# Patient Record
Sex: FEMALE | Race: WHITE | NOT HISPANIC OR LATINO | Employment: OTHER | URBAN - METROPOLITAN AREA
[De-identification: names, ages, dates, MRNs, and addresses within clinical notes are randomized per-mention and may not be internally consistent; named-entity substitution may affect disease eponyms.]

---

## 2017-03-27 DIAGNOSIS — I10 HYPERTENSION GOAL BP (BLOOD PRESSURE) < 140/90: ICD-10-CM

## 2017-03-27 NOTE — TELEPHONE ENCOUNTER
Lisinopril 5 mg tablet      Last Written Prescription Date: 03/14/16  Last Fill Quantity: 90, # refills: 3  Last Office Visit with G, P or [facility] prescribing provider: 09/16/16       Potassium   Date Value Ref Range Status   05/18/2016 4.6 3.4 - 5.3 mmol/L Final     Creatinine   Date Value Ref Range Status   05/18/2016 1.20 (H) 0.52 - 1.04 mg/dL Final     BP Readings from Last 3 Encounters:   09/16/16 134/74   03/14/16 114/76   01/13/16 138/76     Milly Sommer CPhT  On Behalf of [facility]

## 2017-03-28 RX ORDER — LISINOPRIL 5 MG/1
5 TABLET ORAL DAILY
Qty: 30 TABLET | Refills: 0 | Status: SHIPPED | OUTPATIENT
Start: 2017-03-28 | End: 2017-04-26

## 2017-03-28 NOTE — TELEPHONE ENCOUNTER
Medication is being filled for 1 time refill only due to:  Patient needs labs K and Cr.     Karen Saini, Pharm.D.  on behalf of [facility] [place]   [facility]   [email or web address]

## 2017-03-31 DIAGNOSIS — N18.30 CKD (CHRONIC KIDNEY DISEASE) STAGE 3, GFR 30-59 ML/MIN (H): ICD-10-CM

## 2017-03-31 DIAGNOSIS — I10 ESSENTIAL HYPERTENSION WITH GOAL BLOOD PRESSURE LESS THAN 140/90: ICD-10-CM

## 2017-03-31 LAB
ANION GAP SERPL CALCULATED.3IONS-SCNC: 6 MMOL/L (ref 3–14)
BUN SERPL-MCNC: 16 MG/DL (ref 7–30)
CALCIUM SERPL-MCNC: 9.2 MG/DL (ref 8.5–10.1)
CHLORIDE SERPL-SCNC: 105 MMOL/L (ref 94–109)
CO2 SERPL-SCNC: 30 MMOL/L (ref 20–32)
CREAT SERPL-MCNC: 1.11 MG/DL (ref 0.52–1.04)
GFR SERPL CREATININE-BSD FRML MDRD: 49 ML/MIN/1.7M2
GLUCOSE SERPL-MCNC: 75 MG/DL (ref 70–99)
POTASSIUM SERPL-SCNC: 4.4 MMOL/L (ref 3.4–5.3)
SODIUM SERPL-SCNC: 141 MMOL/L (ref 133–144)

## 2017-03-31 PROCEDURE — 36415 COLL VENOUS BLD VENIPUNCTURE: CPT | Performed by: NURSE PRACTITIONER

## 2017-03-31 PROCEDURE — 80048 BASIC METABOLIC PNL TOTAL CA: CPT | Performed by: NURSE PRACTITIONER

## 2017-03-31 NOTE — PROGRESS NOTES
Please send out result letter with the following note, thanks.     Kidney function and the rest of your labs are stable.    -Sapphire Merritt, CNP

## 2017-03-31 NOTE — LETTER
[facility]   [address] [place]   [address]  [phone]    April 3, 2017      Romayne L Sattameraigor  [address] [facility] [address]              Dear Ms. Roth,    Kidney function and the rest of your labs are stable.    Results for orders placed or performed in visit on 03/31/17   Basic metabolic panel   Result Value Ref Range    Sodium 141 133 - 144 mmol/L    Potassium 4.4 3.4 - 5.3 mmol/L    Chloride 105 94 - 109 mmol/L    Carbon Dioxide 30 20 - 32 mmol/L    Anion Gap 6 3 - 14 mmol/L    Glucose 75 70 - 99 mg/dL    Urea Nitrogen 16 7 - 30 mg/dL    Creatinine 1.11 (H) 0.52 - 1.04 mg/dL    GFR Estimate 49 (L) >60 mL/min/1.7m2    GFR Estimate If Black 59 (L) >60 mL/min/1.7m2    Calcium 9.2 8.5 - 10.1 mg/dL           Sincerely,    Sapphire Merritt, CNP/nr

## 2017-04-21 NOTE — PROGRESS NOTES
SUBJECTIVE:                                                    Romayne L Sathre is a 71 year old female who presents to clinic today for the following health issues:      Hyperlipidemia Follow-Up      Rate your low fat/cholesterol diet?: fair    Taking statin?  No, was having diarrhea from this    Other lipid medications/supplements?:  none     Hypertension Follow-up      Outpatient blood pressures are not being checked.    Low Salt Diet: low salt     Hypothyroidism Follow-up      Since last visit, patient describes the following symptoms: Weight stable, no hair loss, no skin changes, no constipation, no loose stools, loose stools and hair loss     Pt also notes what she believes to be a wart on her right thumb    History of hypertension, currently taking lisinopril without side effects.  Denies chest pain, palpitations, or shortness of breath.    HLD - pt started experiencing diarrhea after lipitor.  Had episode diarrhea immediately after taking pill.  Diarrhea did improve after stopping medication.  Notes ongoing intermittent loose stools.      Intermittent lose stools x 1-2 months.  Occurring 3-4 times a day.  Not liquid, loser than normal.  Associated urgency, sometimes fecal incontinence.  No blood in stool or rectal pain.  No abd pain, decreased appetite, weight loss, or nausea.  No change in diet.  Never had colonoscopy.      Hypothyroidism - taking synthroid consistently.      Breast cancer 2008 s/p right mastectomy and radiation.    Idiopathic leukopenia, status post extensive hematology workup including marrow biopsy not revealing.     Patient Active Problem List   Diagnosis     Hypothyroidism     Edema     Lipoma of other skin and subcutaneous tissue     Hypertension goal BP (blood pressure) < 140/90     CKD (chronic kidney disease) stage 3, GFR 30-59 ml/min     Hyperlipidemia LDL goal <100     Advanced directives, counseling/discussion     History of breast cancer     Leucopenia     Health Care Home      Memory deficit     Cellulitis     Maxillary sinusitis     Past Surgical History:   Procedure Laterality Date     HC REMV CATARACT EXTRACAP,INSERT LENS  7/20/2007     SURGICAL HISTORY OF -   Oct 3, 2008    Rt saline implant       Social History   Substance Use Topics     Smoking status: Passive Smoke Exposure - Never Smoker     Last attempt to quit: 11/19/1984     Smokeless tobacco: Never Used      Comment: family members smoke; daughter     Alcohol use 6.0 oz/week      Comment: 0-1 drink per month     Family History   Problem Relation Age of Onset     DIABETES Mother      dec     Respiratory Father      emphysema     DIABETES Sister      DIABETES Brother      Family History Negative Brother      x 2     Family History Negative Sister      x 2     Psychotic Disorder Brother      depression     KIDNEY DISEASE Sister      ESRD on dialysis, due to DM         Current Outpatient Prescriptions   Medication Sig Dispense Refill     lisinopril (PRINIVIL/ZESTRIL) 5 MG tablet Take 1 tablet (5 mg) by mouth daily 90 tablet 3     levothyroxine (SYNTHROID/LEVOTHROID) 200 MCG tablet Take 1 tablet (200 mcg) by mouth daily 90 tablet 3     order for DME Equipment being ordered: bilateral knee high compression, 20-30mmHg compression 1 Units 0     Multiple Vitamin (MULTI-VITAMIN) per tablet Take 1 tablet by mouth daily.         [DISCONTINUED] lisinopril (PRINIVIL/ZESTRIL) 5 MG tablet Take 1 tablet (5 mg) by mouth daily 30 tablet 0     atorvastatin (LIPITOR) 10 MG tablet Take 1 tablet (10 mg) by mouth daily (Patient not taking: Reported on 4/26/2017) 90 tablet 2     [DISCONTINUED] levothyroxine (SYNTHROID, LEVOTHROID) 200 MCG tablet Take 1 tablet (200 mcg) by mouth daily 90 tablet 3       ROS:  Const, CV, Resp, GI, Integ as above, otherwise negative       OBJECTIVE:                                                    /74 (BP Location: Right arm, Patient Position: Chair, Cuff Size: Adult Large)  Pulse 84  Temp 98.4  F (36.9  C)  (Tympanic)  Resp 16  Wt 245 lb (111.1 kg)  SpO2 97%  BMI 36.18 kg/m2   GENERAL APPEARANCE: healthy, alert and no distress  EYES: Eyes grossly normal to inspection and conjunctivae and sclerae normal  RESP: lungs clear to auscultation - no rales, rhonchi or wheezes  CV: regular rates and rhythm, normal S1 S2, no S3 or S4 and no murmur, click or rub  SKIN: pt has 5mm raised scaly lesion to base of right thumb with 1cm surrounding erythema  PSYCH: mentation appears normal and affect normal/bright          ASSESSMENT/PLAN:                                                    (R19.5) Change in stool  (primary encounter diagnosis)  Comment: loose stools x 1-2 months, no abd pain or red flag symptoms  Plan: GASTROENTEROLOGY ADULT REF PROCEDURE ONLY        Recommend colonoscopy to r/o colon cancer.  If negative will follow up to reevaluate.  Labs today     (L98.9) Skin lesion  Comment: concern for BCC  Plan: DERMATOLOGY REFERRAL        Follow up scheduled today with derm     (I10) Hypertension goal BP (blood pressure) < 140/90  Comment: controlled  Plan: Basic metabolic panel, lisinopril         (PRINIVIL/ZESTRIL) 5 MG tablet        No change    (N18.3) CKD (chronic kidney disease) stage 3, GFR 30-59 ml/min  Plan: CBC with platelets            (E78.5) Hyperlipidemia LDL goal <100  Comment: stopped lipitor, thought it was causing loose stools but did not improve with stopping med  Plan: Lipid panel reflex to direct LDL        restart lipitor     (E03.9) Acquired hypothyroidism  Plan: TSH with free T4 reflex, levothyroxine         (SYNTHROID/LEVOTHROID) 200 MCG tablet                See Patient Instructions    Sapphire Merritt, ANGEL  [facility]    Patient Instructions   1.  For change in stool habits I recommend we do a colonoscopy to rule out a serious cause like colon cancer.  If normal colonoscopy we should follow up to evaluate the diarrhea.    2.  Update labs today.  Restart cholesterol medication  (atorvastatin/lipitor), I dont think it is causing diarrhea.  Refilled your blood pressure and tyroid medication.    3.  See dermatologist, I am concerned the lesion on thumb could be skin cancer    4.  Update pneumonia shot today

## 2017-04-26 ENCOUNTER — OFFICE VISIT (OUTPATIENT)
Dept: FAMILY MEDICINE | Facility: CLINIC | Age: 72
End: 2017-04-26
Payer: COMMERCIAL

## 2017-04-26 VITALS
SYSTOLIC BLOOD PRESSURE: 122 MMHG | DIASTOLIC BLOOD PRESSURE: 74 MMHG | TEMPERATURE: 98.4 F | BODY MASS INDEX: 36.18 KG/M2 | OXYGEN SATURATION: 97 % | HEART RATE: 84 BPM | RESPIRATION RATE: 16 BRPM | WEIGHT: 245 LBS

## 2017-04-26 DIAGNOSIS — L98.9 SKIN LESION: ICD-10-CM

## 2017-04-26 DIAGNOSIS — E78.5 HYPERLIPIDEMIA LDL GOAL <100: ICD-10-CM

## 2017-04-26 DIAGNOSIS — N18.30 CKD (CHRONIC KIDNEY DISEASE) STAGE 3, GFR 30-59 ML/MIN (H): ICD-10-CM

## 2017-04-26 DIAGNOSIS — R19.5 CHANGE IN STOOL: Primary | ICD-10-CM

## 2017-04-26 DIAGNOSIS — Z23 NEED FOR PNEUMOCOCCAL VACCINATION: ICD-10-CM

## 2017-04-26 DIAGNOSIS — I10 HYPERTENSION GOAL BP (BLOOD PRESSURE) < 140/90: ICD-10-CM

## 2017-04-26 DIAGNOSIS — E03.9 ACQUIRED HYPOTHYROIDISM: ICD-10-CM

## 2017-04-26 LAB
ANION GAP SERPL CALCULATED.3IONS-SCNC: 7 MMOL/L (ref 3–14)
BUN SERPL-MCNC: 17 MG/DL (ref 7–30)
CALCIUM SERPL-MCNC: 9.3 MG/DL (ref 8.5–10.1)
CHLORIDE SERPL-SCNC: 104 MMOL/L (ref 94–109)
CHOLEST SERPL-MCNC: 236 MG/DL
CO2 SERPL-SCNC: 28 MMOL/L (ref 20–32)
CREAT SERPL-MCNC: 1.16 MG/DL (ref 0.52–1.04)
ERYTHROCYTE [DISTWIDTH] IN BLOOD BY AUTOMATED COUNT: 13.9 % (ref 10–15)
GFR SERPL CREATININE-BSD FRML MDRD: 46 ML/MIN/1.7M2
GLUCOSE SERPL-MCNC: 81 MG/DL (ref 70–99)
HCT VFR BLD AUTO: 43.6 % (ref 35–47)
HDLC SERPL-MCNC: 45 MG/DL
HGB BLD-MCNC: 14 G/DL (ref 11.7–15.7)
LDLC SERPL CALC-MCNC: 148 MG/DL
MCH RBC QN AUTO: 28 PG (ref 26.5–33)
MCHC RBC AUTO-ENTMCNC: 32.1 G/DL (ref 31.5–36.5)
MCV RBC AUTO: 87 FL (ref 78–100)
NONHDLC SERPL-MCNC: 191 MG/DL
PLATELET # BLD AUTO: 189 10E9/L (ref 150–450)
POTASSIUM SERPL-SCNC: 5 MMOL/L (ref 3.4–5.3)
RBC # BLD AUTO: 5 10E12/L (ref 3.8–5.2)
SODIUM SERPL-SCNC: 139 MMOL/L (ref 133–144)
TRIGL SERPL-MCNC: 214 MG/DL
TSH SERPL DL<=0.005 MIU/L-ACNC: 0.44 MU/L (ref 0.4–4)
WBC # BLD AUTO: 1.8 10E9/L (ref 4–11)

## 2017-04-26 PROCEDURE — 84443 ASSAY THYROID STIM HORMONE: CPT | Performed by: NURSE PRACTITIONER

## 2017-04-26 PROCEDURE — 99214 OFFICE O/P EST MOD 30 MIN: CPT | Mod: 25 | Performed by: NURSE PRACTITIONER

## 2017-04-26 PROCEDURE — 80048 BASIC METABOLIC PNL TOTAL CA: CPT | Performed by: NURSE PRACTITIONER

## 2017-04-26 PROCEDURE — 90670 PCV13 VACCINE IM: CPT | Performed by: NURSE PRACTITIONER

## 2017-04-26 PROCEDURE — 80061 LIPID PANEL: CPT | Performed by: NURSE PRACTITIONER

## 2017-04-26 PROCEDURE — 85027 COMPLETE CBC AUTOMATED: CPT | Performed by: NURSE PRACTITIONER

## 2017-04-26 PROCEDURE — 36415 COLL VENOUS BLD VENIPUNCTURE: CPT | Performed by: NURSE PRACTITIONER

## 2017-04-26 PROCEDURE — G0009 ADMIN PNEUMOCOCCAL VACCINE: HCPCS | Performed by: NURSE PRACTITIONER

## 2017-04-26 RX ORDER — LEVOTHYROXINE SODIUM 200 UG/1
200 TABLET ORAL DAILY
Qty: 90 TABLET | Refills: 3 | Status: SHIPPED | OUTPATIENT
Start: 2017-04-26 | End: 2018-05-09

## 2017-04-26 RX ORDER — LISINOPRIL 5 MG/1
5 TABLET ORAL DAILY
Qty: 90 TABLET | Refills: 3 | Status: SHIPPED | OUTPATIENT
Start: 2017-04-26 | End: 2018-04-25

## 2017-04-26 NOTE — PATIENT INSTRUCTIONS
1.  For change in stool habits I recommend we do a colonoscopy to rule out a serious cause like colon cancer.  If normal colonoscopy we should follow up to evaluate the diarrhea.    2.  Update labs today.  Restart cholesterol medication (atorvastatin/lipitor), I dont think it is causing diarrhea.  Refilled your blood pressure and tyroid medication.    3.  See dermatologist, I am concerned the lesion on thumb could be skin cancer    4.  Update pneumonia shot today

## 2017-04-26 NOTE — PROGRESS NOTES
Please send out result letter with the following note, thanks.    Romayne,    Labs are stable.    Thyroid is normal.    Your blood sugar was normal indicating you do not have diabetes.    Kidney function is stable.    White blood count continues to be low which has been an ongoing issue, it is stable.    -Sapphire Merritt, CNP

## 2017-04-26 NOTE — LETTER
[facility]   [address] [place]   [address]  [phone]    April 27, 2017      Romayne L Sathre  [address] [facility] [address]              Dear Ms. Roth,    Labs are stable.    Thyroid is normal.    Your blood sugar was normal indicating you do not have diabetes.    Kidney function is stable.    White blood count continues to be low which has been an ongoing issue, it is stable.    Results for orders placed or performed in visit on 04/26/17   CBC with platelets   Result Value Ref Range    WBC 1.8 (L) 4.0 - 11.0 10e9/L    RBC Count 5.00 3.8 - 5.2 10e12/L    Hemoglobin 14.0 11.7 - 15.7 g/dL    Hematocrit 43.6 35.0 - 47.0 %    MCV 87 78 - 100 fl    MCH 28.0 26.5 - 33.0 pg    MCHC 32.1 31.5 - 36.5 g/dL    RDW 13.9 10.0 - 15.0 %    Platelet Count 189 150 - 450 10e9/L   Basic metabolic panel   Result Value Ref Range    Sodium 139 133 - 144 mmol/L    Potassium 5.0 3.4 - 5.3 mmol/L    Chloride 104 94 - 109 mmol/L    Carbon Dioxide 28 20 - 32 mmol/L    Anion Gap 7 3 - 14 mmol/L    Glucose 81 70 - 99 mg/dL    Urea Nitrogen 17 7 - 30 mg/dL    Creatinine 1.16 (H) 0.52 - 1.04 mg/dL    GFR Estimate 46 (L) >60 mL/min/1.7m2    GFR Estimate If Black 56 (L) >60 mL/min/1.7m2    Calcium 9.3 8.5 - 10.1 mg/dL   TSH with free T4 reflex   Result Value Ref Range    TSH 0.44 0.40 - 4.00 mU/L   Lipid panel reflex to direct LDL   Result Value Ref Range    Cholesterol 236 (H) <200 mg/dL    Triglycerides 214 (H) <150 mg/dL    HDL Cholesterol 45 (L) >49 mg/dL    LDL Cholesterol Calculated 148 (H) <100 mg/dL    Non HDL Cholesterol 191 (H) <130 mg/dL           Sincerely,    Sapphire Merritt, ANGEL/nr

## 2017-12-20 ENCOUNTER — OFFICE VISIT (OUTPATIENT)
Dept: FAMILY MEDICINE | Facility: CLINIC | Age: 72
End: 2017-12-20
Payer: COMMERCIAL

## 2017-12-20 VITALS
WEIGHT: 234 LBS | SYSTOLIC BLOOD PRESSURE: 170 MMHG | TEMPERATURE: 97.6 F | RESPIRATION RATE: 18 BRPM | DIASTOLIC BLOOD PRESSURE: 98 MMHG | OXYGEN SATURATION: 98 % | BODY MASS INDEX: 34.56 KG/M2 | HEART RATE: 68 BPM

## 2017-12-20 DIAGNOSIS — M54.50 ACUTE RIGHT-SIDED LOW BACK PAIN WITHOUT SCIATICA: Primary | ICD-10-CM

## 2017-12-20 DIAGNOSIS — B34.9 NONSPECIFIC SYNDROME SUGGESTIVE OF VIRAL ILLNESS: ICD-10-CM

## 2017-12-20 LAB
ALBUMIN UR-MCNC: NEGATIVE MG/DL
APPEARANCE UR: CLEAR
BACTERIA #/AREA URNS HPF: ABNORMAL /HPF
BILIRUB UR QL STRIP: NEGATIVE
COLOR UR AUTO: YELLOW
GLUCOSE UR STRIP-MCNC: NEGATIVE MG/DL
HGB UR QL STRIP: ABNORMAL
KETONES UR STRIP-MCNC: NEGATIVE MG/DL
LEUKOCYTE ESTERASE UR QL STRIP: NEGATIVE
MUCOUS THREADS #/AREA URNS LPF: PRESENT /LPF
NITRATE UR QL: NEGATIVE
NON-SQ EPI CELLS #/AREA URNS LPF: ABNORMAL /LPF
PH UR STRIP: 5.5 PH (ref 5–7)
RBC #/AREA URNS AUTO: ABNORMAL /HPF
SOURCE: ABNORMAL
SP GR UR STRIP: 1.02 (ref 1–1.03)
UROBILINOGEN UR STRIP-ACNC: 0.2 EU/DL (ref 0.2–1)
WBC #/AREA URNS AUTO: ABNORMAL /HPF

## 2017-12-20 PROCEDURE — 81001 URINALYSIS AUTO W/SCOPE: CPT | Performed by: PHYSICIAN ASSISTANT

## 2017-12-20 PROCEDURE — 99214 OFFICE O/P EST MOD 30 MIN: CPT | Performed by: PHYSICIAN ASSISTANT

## 2017-12-20 RX ORDER — CYCLOBENZAPRINE HCL 5 MG
5 TABLET ORAL 3 TIMES DAILY PRN
Qty: 16 TABLET | Refills: 0 | Status: SHIPPED | OUTPATIENT
Start: 2017-12-20 | End: 2018-05-09

## 2017-12-20 ASSESSMENT — ENCOUNTER SYMPTOMS
FEVER: 0
VOMITING: 0
HEMATURIA: 0
FREQUENCY: 0
BACK PAIN: 1
DIARRHEA: 0
DYSURIA: 0
NAUSEA: 0
SHORTNESS OF BREATH: 0
ABDOMINAL PAIN: 0
FOCAL WEAKNESS: 0
SORE THROAT: 0
COUGH: 0
CHILLS: 1
MYALGIAS: 1

## 2017-12-20 NOTE — NURSING NOTE
"Chief Complaint   Patient presents with     URI       Initial BP (!) 170/98  Pulse 68  Temp 97.6  F (36.4  C) (Oral)  Resp 18  Wt 234 lb (106.1 kg)  SpO2 98%  Breastfeeding? No  BMI 34.56 kg/m2 Estimated body mass index is 34.56 kg/(m^2) as calculated from the following:    Height as of 9/16/16: 5' 9\" (1.753 m).    Weight as of this encounter: 234 lb (106.1 kg).  Medication Reconciliation: complete     Deepa Noonan MA      "

## 2017-12-20 NOTE — PROGRESS NOTES
HPI    SUBJECTIVE:   Romayne L Sathre is a 72 year old female who presents to clinic today for the following health issues:    RESPIRATORY SYMPTOMS      Duration: 3 days    Description  Myalgias, chills, R low back pain    Severity: moderate    Accompanying signs and symptoms: none    History (predisposing factors):  none    Precipitating or alleviating factors: None    Therapies tried and outcome:  Ibuprofen, heat for back pain    Denies rigors, urinary sx, CP, SOB, sore throat, cough, congestion, ear pain, dental pain.  Back pain is constant, does not radiate. Is worse with sitting. No injury or trauma. Denies bowel/bladder incontinence, saddle anesthesia, or focal weakness.    Chart Review:  No flowsheet data found.  No flowsheet data found.    Patient Active Problem List   Diagnosis     Hypothyroidism     Edema     Lipoma of other skin and subcutaneous tissue     Hypertension goal BP (blood pressure) < 140/90     CKD (chronic kidney disease) stage 3, GFR 30-59 ml/min     Hyperlipidemia LDL goal <100     Advanced directives, counseling/discussion     History of breast cancer     Leucopenia     Health Care Home     Memory deficit     Cellulitis     Maxillary sinusitis     Past Surgical History:   Procedure Laterality Date     HC REMV CATARACT EXTRACAP,INSERT LENS  7/20/2007     SURGICAL HISTORY OF -   Oct 3, 2008    Rt saline implant     Family History   Problem Relation Age of Onset     DIABETES Mother      dec     Respiratory Father      emphysema     DIABETES Sister      DIABETES Brother      Family History Negative Brother      x 2     Family History Negative Sister      x 2     Psychotic Disorder Brother      depression     KIDNEY DISEASE Sister      ESRD on dialysis, due to DM      Social History   Substance Use Topics     Smoking status: Passive Smoke Exposure - Never Smoker     Last attempt to quit: 11/19/1984     Smokeless tobacco: Never Used      Comment: family members smoke; daughter     Alcohol use  6.0 oz/week      Comment: 0-1 drink per month        Problem list, Medication list, Allergies, Medical/Social/Surg hx reviewed in [facility], updated as appropriate.      Review of Systems   Constitutional: Positive for chills. Negative for fever.   HENT: Negative for congestion and sore throat.    Respiratory: Negative for cough and shortness of breath.    Cardiovascular: Negative for chest pain.   Gastrointestinal: Negative for abdominal pain, diarrhea, nausea and vomiting.   Genitourinary: Negative for dysuria, frequency, hematuria and urgency.   Musculoskeletal: Positive for back pain and myalgias.   Neurological: Negative for focal weakness.        No bowel or bladder incontinence. No saddle anesthesia   All other systems reviewed and are negative.        Physical Exam   Constitutional: She is oriented to person, place, and time and well-developed, well-nourished, and in no distress.   HENT:   Head: Normocephalic and atraumatic.   Right Ear: Tympanic membrane, external ear and ear canal normal.   Left Ear: Tympanic membrane, external ear and ear canal normal.   Mouth/Throat: Uvula is midline, oropharynx is clear and moist and mucous membranes are normal.   Cardiovascular: Normal rate and regular rhythm.    Pulses:       Dorsalis pedis pulses are 2+ on the right side, and 2+ on the left side.   Pulmonary/Chest: Effort normal and breath sounds normal.   Musculoskeletal:        Thoracic back: Normal.        Lumbar back: She exhibits tenderness and pain. She exhibits no bony tenderness.        Back:    Dorsiflexion intact bilaterally. Negative SLR. Patient ambulatory.   Neurological: She is oriented to person, place, and time. She displays no weakness. Gait normal.   Strength 5/5 BLE   Nursing note and vitals reviewed.    Vital Signs  BP (!) 170/98  Pulse 68  Temp 97.6  F (36.4  C) (Oral)  Resp 18  Wt 234 lb (106.1 kg)  SpO2 98%  Breastfeeding? No  BMI 34.56 kg/m2   Body mass index is 34.56 kg/(m^2).    Diagnostic  Test Results:  Results for orders placed or performed in visit on 12/20/17 (from the past 24 hour(s))   UA reflex to Microscopic and Culture   Result Value Ref Range    Color Urine Yellow     Appearance Urine Clear     Glucose Urine Negative NEG^Negative mg/dL    Bilirubin Urine Negative NEG^Negative    Ketones Urine Negative NEG^Negative mg/dL    Specific Gravity Urine 1.020 1.003 - 1.035    Blood Urine Small (A) NEG^Negative    pH Urine 5.5 5.0 - 7.0 pH    Protein Albumin Urine Negative NEG^Negative mg/dL    Urobilinogen Urine 0.2 0.2 - 1.0 EU/dL    Nitrite Urine Negative NEG^Negative    Leukocyte Esterase Urine Negative NEG^Negative    Source Midstream Urine    Urine Microscopic   Result Value Ref Range    WBC Urine 2-5 (A) OTO2^O - 2 /HPF    RBC Urine O - 2 OTO2^O - 2 /HPF    Squamous Epithelial /LPF Urine Few FEW^Few /LPF    Bacteria Urine Few (A) NEG^Negative /HPF    Mucous Urine Present (A) NEG^Negative /LPF       ASSESSMENT/PLAN:                                                        ICD-10-CM    1. Acute right-sided low back pain without sciatica M54.5 UA reflex to Microscopic and Culture     Urine Microscopic     cyclobenzaprine (FLEXERIL) 5 MG tablet   2. Nonspecific syndrome suggestive of viral illness B34.9      No midline tenderness or s/sx cauda equina. UA negative. Suspect muscle spasm, Rx Flexeril.    Lungs CTAB, no focal sign of infection. Likely nonspecific viral syndrome, return precautions given.    I have discussed any lab or imaging results, the patient's diagnosis, and my plan of treatment with the patient and/or family. Patient is aware to come back in if with worsening symptoms or if no relief despite treatment plan.  Patient voiced understanding and had no further questions.       Follow Up: Data Unavailable    VIRGINIA OlivaresA, PA-C  [facility]

## 2017-12-20 NOTE — PROGRESS NOTES
"  SUBJECTIVE:   Romayne L Sathre is a 72 year old female who presents to clinic today for the following health issues:    RESPIRATORY SYMPTOMS      Duration: x sunday    Description  \"flu like symptoms\", chills, achy, cold    Severity: moderate, back pain/pinched nerve 10/10    Accompanying signs and symptoms: lower back pain/pinched nerve    History (predisposing factors):  exposure to smoke    Precipitating or alleviating factors: None    Therapies tried and outcome:  Ibuprofen         Problem list and histories reviewed & adjusted, as indicated.  Additional history: {NONE - AS DOCUMENTED:045709::\"as documented\"}    {HIST REVIEW/ LINKS 2:763483}    Reviewed and updated as needed this visit by clinical staff  Tobacco  Allergies  Meds  Med Hx  Surg Hx  Fam Hx  Soc Hx      Reviewed and updated as needed this visit by Provider         {PROVIDER CHARTING PREFERENCE:001981}    "

## 2017-12-20 NOTE — MR AVS SNAPSHOT
"              After Visit Summary   2017    Romayne L Sathre    MRN: [id number]           Patient Information     Date Of Birth          1945        Visit Information        Provider Department      2017 2:20 PM Radha Benjamin PA-C [facility]        Today's Diagnoses     Acute right-sided low back pain without sciatica    -  1    Nonspecific syndrome suggestive of viral illness           Follow-ups after your visit        Who to contact     If you have questions or need follow up information about today's clinic visit or your schedule please contact [facility] directly at [phone].  Normal or non-critical lab and imaging results will be communicated to you by MyChart, letter or phone within 4 business days after the clinic has received the results. If you do not hear from us within 7 days, please contact the clinic through MyChart or phone. If you have a critical or abnormal lab result, we will notify you by phone as soon as possible.  Submit refill requests through [employer] or call your pharmacy and they will forward the refill request to us. Please allow 3 business days for your refill to be completed.          Additional Information About Your Visit        MyChart Information     [employer] lets you send messages to your doctor, view your test results, renew your prescriptions, schedule appointments and more. To sign up, go to [email or web address] . Click on \"Log in\" on the left side of the screen, which will take you to the Welcome page. Then click on \"Sign up Now\" on the right side of the page.     You will be asked to enter the access code listed below, as well as some personal information. Please follow the directions to create your username and password.     Your access code is: [email or web address]  Expires: 3/20/2018  3:08 PM     Your access code will  in 90 days. If you need help or a new code, please call your [place] clinic or [phone].      "   Care EveryWhere ID     This is your Care EveryWhere ID. This could be used by other organizations to access your [place] medical records  [id number]        Your Vitals Were     Pulse Temperature Respirations Pulse Oximetry Breastfeeding? BMI (Body Mass Index)    68 97.6  F (36.4  C) (Oral) 18 98% No 34.56 kg/m2       Blood Pressure from Last 3 Encounters:   12/20/17 (!) 170/98   04/26/17 122/74   09/16/16 134/74    Weight from Last 3 Encounters:   12/20/17 234 lb (106.1 kg)   04/26/17 245 lb (111.1 kg)   09/16/16 238 lb (108 kg)              We Performed the Following     UA reflex to Microscopic and Culture     Urine Microscopic          Today's Medication Changes          These changes are accurate as of: 12/20/17  3:08 PM.  If you have any questions, ask your nurse or doctor.               Start taking these medicines.        Dose/Directions    cyclobenzaprine 5 MG tablet   Commonly known as:  FLEXERIL   Used for:  Acute right-sided low back pain without sciatica   Started by:  Radha Benjamin PA-C        Dose:  5 mg   Take 1 tablet (5 mg) by mouth 3 times daily as needed for muscle spasms   Quantity:  16 tablet   Refills:  0            Where to get your medicines      Call your pharmacy to confirm that your medication is ready for pickup. It may take up to 24 hours for them to receive the prescription. If the prescription is not ready within 3 business days, please contact your clinic or your provider.     We will let you know when these medications are ready. If you don't hear back within 3 business days, please contact us.     cyclobenzaprine 5 MG tablet                Primary Care Provider Office Phone # Fax #    SapphirePHOENIX Yarbrough [phone] [address] [facility] [address]        Equal Access to Services     JU SAMANIEGO AH: Bhupinder Gonzales, sada louis, amando olmstead. So [facility]  [phone].    ATENCIÓN: Si griselda shaw, tiene a gann disposición servicios gratuitos de asistencia lingüística. Josh beal [phone].    We comply with applicable federal civil rights laws and [place] laws. We do not discriminate on the basis of race, color, national origin, age, disability, sex, sexual orientation, or gender identity.            Thank you!     Thank you for choosing [facility]  for your care. Our goal is always to provide you with excellent care. Hearing back from our patients is one way we can continue to improve our services. Please take a few minutes to complete the written survey that you may receive in the mail after your visit with us. Thank you!             Your Updated Medication List - Protect others around you: Learn how to safely use, store and throw away your medicines at www.disposemymeds.org.          This list is accurate as of: 12/20/17  3:08 PM.  Always use your most recent med list.                   Brand Name Dispense Instructions for use Diagnosis    atorvastatin 10 MG tablet    LIPITOR    90 tablet    Take 1 tablet (10 mg) by mouth daily    Hyperlipidemia LDL goal <100       cyclobenzaprine 5 MG tablet    FLEXERIL    16 tablet    Take 1 tablet (5 mg) by mouth 3 times daily as needed for muscle spasms    Acute right-sided low back pain without sciatica       levothyroxine 200 MCG tablet    SYNTHROID/LEVOTHROID    90 tablet    Take 1 tablet (200 mcg) by mouth daily    Acquired hypothyroidism       lisinopril 5 MG tablet    PRINIVIL/ZESTRIL    90 tablet    Take 1 tablet (5 mg) by mouth daily    Hypertension goal BP (blood pressure) < 140/90       Multi-vitamin Tabs tablet   Generic drug:  multivitamin, therapeutic with minerals      Take 1 tablet by mouth daily.

## 2018-04-25 DIAGNOSIS — I10 HYPERTENSION GOAL BP (BLOOD PRESSURE) < 140/90: ICD-10-CM

## 2018-04-25 RX ORDER — LISINOPRIL 5 MG/1
5 TABLET ORAL DAILY
Qty: 30 TABLET | Refills: 0 | Status: SHIPPED | OUTPATIENT
Start: 2018-04-25 | End: 2019-04-05

## 2018-04-25 NOTE — TELEPHONE ENCOUNTER
Routing refill request to provider for review/approval because:  Labs out of range:  Creatinine  -BP elevated  Labs overdue: Creatinine, potassium, sodium    Sapphire-Please sign if agree.  One month supply pended.    Team cordinators-Please contact patient to schedule BP follow up visit.    Thank you!  ANDREA Partida, RN          Last Written Prescription Date:  4/26/17  Last Fill Quantity: 90,  # refills: 3   Last office visit: 12/20/2017 with prescribing provider:  CHELLE Benjamin   Future Office Visit:        BP Readings from Last 3 Encounters:   12/20/17 (!) 170/98   04/26/17 122/74   09/16/16 134/74     Creatinine   Date Value Ref Range Status   04/26/2017 1.16 (H) 0.52 - 1.04 mg/dL Final   ]  Potassium   Date Value Ref Range Status   04/26/2017 5.0 3.4 - 5.3 mmol/L Final   ]    Sodium 139 on 4/26/17.

## 2018-04-25 NOTE — TELEPHONE ENCOUNTER
"Requested Prescriptions   Pending Prescriptions Disp Refills     lisinopril (PRINIVIL/ZESTRIL) 5 MG tablet  Last Written Prescription Date:  4/26/217  Last Fill Quantity: 90 tablet,  # refills: 3   Last Office Visit: 12/20/2017   Future Office Visit:      90 tablet 3     Sig: Take 1 tablet (5 mg) by mouth daily    ACE Inhibitors (Including Combos) Protocol Failed    4/25/2018  2:50 PM       Failed - Blood pressure under 140/90 in past 12 months    BP Readings from Last 3 Encounters:   12/20/17 (!) 170/98   04/26/17 122/74   09/16/16 134/74          Failed - Normal serum creatinine on file in past 12 months    Recent Labs   Lab Test  04/26/17   0902   CR  1.16*          Passed - Recent (12 mo) or future (30 days) visit within the authorizing provider's specialty    Patient had office visit in the last 12 months or has a visit in the next 30 days with authorizing provider or within the authorizing provider's specialty.  See \"Patient Info\" tab in inbasket, or \"Choose Columns\" in Meds & Orders section of the refill encounter.           Passed - Patient is age 18 or older       Passed - No active pregnancy on record       Passed - Normal serum potassium on file in past 12 months    Recent Labs   Lab Test  04/26/17   0902   POTASSIUM  5.0          Passed - No positive pregnancy test in past 12 months          "

## 2018-04-25 NOTE — TELEPHONE ENCOUNTER
Reason for Call:  Medication or medication refill:    Do you use a [facility]?  Name of the pharmacy and phone number for the current request:  [facility] [place], [place] - [address]    Name of the medication requested: lisinopril (PRINIVIL/ZESTRIL) 5 MG tablet    Other request: Patient is requesting a refill on the medication above. States she is out. Please assist. Thanks!    Can we leave a detailed message on this number? YES    Phone number patient can be reached at: Home number on file [phone] (home)    Best Time: Any    Call taken on 4/25/2018 at 2:44 PM by Viridiana Walden

## 2018-04-25 NOTE — TELEPHONE ENCOUNTER
"Requested Prescriptions   Pending Prescriptions Disp Refills     lisinopril (PRINIVIL/ZESTRIL) 5 MG tablet [Pharmacy Med Name: LISINOPRIL 5MG TABS]  Last Written Prescription Date:  4/26/2017  Last Fill Quantity: 90 tablet,  # refills: 3   Last Office Visit: 12/20/2017   Future Office Visit:      90 tablet 3     Sig: TAKE ONE TABLET BY MOUTH EVERY DAY    ACE Inhibitors (Including Combos) Protocol Failed    4/25/2018  3:00 PM       Failed - Blood pressure under 140/90 in past 12 months    BP Readings from Last 3 Encounters:   12/20/17 (!) 170/98   04/26/17 122/74   09/16/16 134/74          Failed - Normal serum creatinine on file in past 12 months    Recent Labs   Lab Test  04/26/17   0902   CR  1.16*          Passed - Recent (12 mo) or future (30 days) visit within the authorizing provider's specialty    Patient had office visit in the last 12 months or has a visit in the next 30 days with authorizing provider or within the authorizing provider's specialty.  See \"Patient Info\" tab in inbasket, or \"Choose Columns\" in Meds & Orders section of the refill encounter.           Passed - Patient is age 18 or older       Passed - No active pregnancy on record       Passed - Normal serum potassium on file in past 12 months    Recent Labs   Lab Test  04/26/17   0902   POTASSIUM  5.0          Passed - No positive pregnancy test in past 12 months          "

## 2018-04-26 RX ORDER — LISINOPRIL 5 MG/1
TABLET ORAL
Qty: 30 TABLET | Refills: 0 | Status: SHIPPED | OUTPATIENT
Start: 2018-04-26 | End: 2018-05-09

## 2018-04-26 NOTE — TELEPHONE ENCOUNTER
Blood pressure above range  Creatinine above range  Patient due for annual visit with PCP.  1 month supply pended.  Camille Alvarez RN

## 2018-04-26 NOTE — TELEPHONE ENCOUNTER
Please call pt, due for annual physical for additional refills.  Refilled x 1mo.    Sapphire Glass, CNP

## 2018-04-26 NOTE — TELEPHONE ENCOUNTER
Spoke with patient made appt also schedule her mammogram and let detailed message on her voice mail

## 2018-05-01 ENCOUNTER — RADIANT APPOINTMENT (OUTPATIENT)
Dept: MAMMOGRAPHY | Facility: CLINIC | Age: 73
End: 2018-05-01
Attending: NURSE PRACTITIONER
Payer: COMMERCIAL

## 2018-05-01 DIAGNOSIS — Z12.31 VISIT FOR SCREENING MAMMOGRAM: ICD-10-CM

## 2018-05-01 PROCEDURE — 77067 SCR MAMMO BI INCL CAD: CPT | Mod: 52

## 2018-05-07 NOTE — PROGRESS NOTES
SUBJECTIVE:   Romayne L Sathre is a 72 year old female who presents to clinic today for the following health issues:      Medication Followup of Lisinopril, levothyroxine    Taking Medication as prescribed: yes    Side Effects:  None    Medication Helping Symptoms:  yes     Here today for medication refills, also several new issues she would like to address:    Change in bowel habits:  Loose stools ongoing 3 months.  Has to be close to bathroom after breakfast.  stooling 3 times a day, stool is lose, not liquid.  Occasionally some blood with wiping.  No rectal pain or itching.  No abdominal pain.  Appetite is good, no unintentional weight loss.  No recent travel, no fevers.  Did start tumeric supplement 1.5 months ago. Wondering if related to teeth, poor dentition, hard to chew.    No recent diet changes.  No family history of colon cancer.      We actually addressed similar problem last year, she was advised to do colonoscopy which she did not do, reports symptoms had resolved but now back again.      Left hand paresthesias:  Numbness to left fingertips (thumb and index finger) x 1-2 months.  Tingling comes and goes.  It does not wake her up at night.  No weakness of hand.  Overuse like shoveling does trigger wrist pain.    She wonders if she had a stroke.  Felt weak, dizzy, and out of sorts 4 months ago.  Did not have numbness at that time.  No balance changes.  She does note occasional loss of balance which will cause her to reach out to stabilize herself but this is not new.  No difficulty speaking or swallowing.  No vision changes.      New right hand tremor:  Tremor to right hand, sister told her to take tumeric, present x 3 months.  Constant.  Tremor sometimes interferes with writing, no problems eating or doing other activities.  Is not worse with goal directed activity.  No known family hx of tremor.  Tremor not worsening over 4 months.  Tremor amplitude is variable.      Chronic disease follow up:  HTN-  controlled on lisinopril 5mg daily    Hypothyroidism- controlled on levothyroxine 200mcg daily    On statin.    Breast cancer 2008 s/p right mastectomy and radiation.  Normal mammo several days ago.       Idiopathic leukopenia, status post extensive hematology workup including marrow biopsy not revealing.    Patient Active Problem List   Diagnosis     Hypothyroidism     Edema     Lipoma of other skin and subcutaneous tissue     Hypertension goal BP (blood pressure) < 140/90     CKD (chronic kidney disease) stage 3, GFR 30-59 ml/min     Hyperlipidemia LDL goal <100     Advanced directives, counseling/discussion     History of breast cancer     Leucopenia     Health Care Home     Memory deficit     Cellulitis     Maxillary sinusitis     Past Surgical History:   Procedure Laterality Date     HC REMV CATARACT EXTRACAP,INSERT LENS  7/20/2007     SURGICAL HISTORY OF -   Oct 3, 2008    Rt saline implant       Social History   Substance Use Topics     Smoking status: Passive Smoke Exposure - Never Smoker     Last attempt to quit: 11/19/1984     Smokeless tobacco: Never Used      Comment: family members smoke; daughter     Alcohol use 6.0 oz/week      Comment: 0-1 drink per month     Family History   Problem Relation Age of Onset     DIABETES Mother      dec     Respiratory Father      emphysema     DIABETES Sister      DIABETES Brother      Family History Negative Brother      x 2     Family History Negative Sister      x 2     Psychotic Disorder Brother      depression     KIDNEY DISEASE Sister      ESRD on dialysis, due to DM         Current Outpatient Prescriptions   Medication Sig Dispense Refill     atorvastatin (LIPITOR) 10 MG tablet Take 1 tablet (10 mg) by mouth daily 90 tablet 3     levothyroxine (SYNTHROID/LEVOTHROID) 200 MCG tablet Take 1 tablet (200 mcg) by mouth daily 90 tablet 3     lisinopril (PRINIVIL/ZESTRIL) 5 MG tablet Take 1 tablet (5 mg) by mouth daily 30 tablet 0     lisinopril (PRINIVIL/ZESTRIL) 5 MG  "tablet Take 1 tablet (5 mg) by mouth daily 90 tablet 3     Multiple Vitamin (MULTI-VITAMIN) per tablet Take 1 tablet by mouth daily.         order for DME Equipment being ordered: left wrist brace 1 Units 0     UNABLE TO FIND MEDICATION NAME: Tumeric supplement       [DISCONTINUED] atorvastatin (LIPITOR) 10 MG tablet Take 1 tablet (10 mg) by mouth daily 90 tablet 2     [DISCONTINUED] levothyroxine (SYNTHROID/LEVOTHROID) 200 MCG tablet Take 1 tablet (200 mcg) by mouth daily 90 tablet 3     [DISCONTINUED] lisinopril (PRINIVIL/ZESTRIL) 5 MG tablet TAKE ONE TABLET BY MOUTH EVERY DAY 30 tablet 0       ROS:  Const: as above   R: NEGATIVE for significant cough or SOB  CV: NEGATIVE for chest pain, palpitations or peripheral edema  GI: as above   NEURO: as above           OBJECTIVE:                                                    /84  Pulse 80  Temp 97.2  F (36.2  C) (Oral)  Resp 16  Ht 5' 9\" (1.753 m)  Wt 245 lb (111.1 kg)  SpO2 98%  Breastfeeding? No  BMI 36.18 kg/m2   GENERAL APPEARANCE: healthy, alert and no distress  EYES: Eyes grossly normal to inspection, PERRL and conjunctivae and sclerae normal  CV: regular rates and rhythm, normal S1 S2, no S3 or S4 and no murmur, click or rub   RESP: lungs clear to auscultation - no rales, rhonchi or wheezes  ORTHO: LEFT Wrist Exam: WRIST:  Inspection: no swelling  no effusion  Palpation: Tender: none  Non-tender:   Range of Motion: normal  Strength: no deficits  Special tests: negative Tinel's at carpal tunnel.  , negative Phalen's.      NEURO: Normal strength and tone, mentation intact, speech normal and cranial nerves 2-12 intact.  Slight tremor noted to right hand at rest.    PSYCH: mentation appears normal and affect normal/bright          ASSESSMENT/PLAN:                                                    (I10) Hypertension goal BP (blood pressure) < 140/90  (primary encounter diagnosis)  Comment: controlled  Plan: Basic metabolic panel, lisinopril         " (PRINIVIL/ZESTRIL) 5 MG tablet        Refilled, update labs     (R19.4) Change in bowel habits  Comment: loose stools x 3 months, no triggering medication or diet change.  Given age consider malignancy (no red flag symptoms).  No symptoms concerning for infectious etiology.    Plan: GASTROENTEROLOGY ADULT REF PROCEDURE ONLY        Recommend diagnostic colo, she will call to schedule    (G56.92) Neuropathy of left hand  Comment: suspect carpal tunnel  Plan: order for DME        Discussed wrist brace, tylenol and ice as needed    (R25.1) Tremor of right hand  Comment: mild tremor right hand x 3 months, not worsening, no other focal neuro symptoms, no gait disturbance, however unilateral presentation concerning for possible parkinsons  Plan: follow up scheduled today with neuro    (N18.3) CKD (chronic kidney disease) stage 3, GFR 30-59 ml/min  Comment:   Plan: Basic metabolic panel, Albumin Random Urine         Quantitative with Creat Ratio            (E03.9) Acquired hypothyroidism  Comment:   Plan: TSH with free T4 reflex, levothyroxine         (SYNTHROID/LEVOTHROID) 200 MCG tablet        refilled    (E78.5) Hyperlipidemia LDL goal <100  Comment:   Plan: Lipid panel reflex to direct LDL Fasting,         atorvastatin (LIPITOR) 10 MG tablet        refilled    (D70.9) Neutropenia, unspecified type (H)  Comment:   Plan: CBC with platelets and differential                  See Patient Instructions    Sapphire Glass, [facility]    Patient Instructions   1.  Update labs today, refilled lisinopril, lipitor, and levothyroxine    2.  For loose stools I recommend colonoscopy to rule out colon cancer.  Please call [phone] to schedule    3.  For tremor schedule with neurology here    4.  Mammogram was clear    5.  Left finger numbness/tingling might be related to carpal tunnel, try to wear wrist brace at night and with repetitive activity

## 2018-05-09 ENCOUNTER — OFFICE VISIT (OUTPATIENT)
Dept: FAMILY MEDICINE | Facility: CLINIC | Age: 73
End: 2018-05-09
Payer: COMMERCIAL

## 2018-05-09 VITALS
WEIGHT: 245 LBS | TEMPERATURE: 97.2 F | DIASTOLIC BLOOD PRESSURE: 84 MMHG | SYSTOLIC BLOOD PRESSURE: 130 MMHG | BODY MASS INDEX: 36.29 KG/M2 | HEART RATE: 80 BPM | OXYGEN SATURATION: 98 % | RESPIRATION RATE: 16 BRPM | HEIGHT: 69 IN

## 2018-05-09 DIAGNOSIS — E03.9 ACQUIRED HYPOTHYROIDISM: ICD-10-CM

## 2018-05-09 DIAGNOSIS — E78.5 HYPERLIPIDEMIA LDL GOAL <100: ICD-10-CM

## 2018-05-09 DIAGNOSIS — R19.4 CHANGE IN BOWEL HABITS: ICD-10-CM

## 2018-05-09 DIAGNOSIS — N18.30 CKD (CHRONIC KIDNEY DISEASE) STAGE 3, GFR 30-59 ML/MIN (H): ICD-10-CM

## 2018-05-09 DIAGNOSIS — D70.9 NEUTROPENIA, UNSPECIFIED TYPE (H): ICD-10-CM

## 2018-05-09 DIAGNOSIS — I10 HYPERTENSION GOAL BP (BLOOD PRESSURE) < 140/90: Primary | ICD-10-CM

## 2018-05-09 DIAGNOSIS — R25.1 TREMOR OF RIGHT HAND: ICD-10-CM

## 2018-05-09 DIAGNOSIS — G56.92 NEUROPATHY OF LEFT HAND: ICD-10-CM

## 2018-05-09 LAB
BASOPHILS # BLD AUTO: 0 10E9/L (ref 0–0.2)
BASOPHILS NFR BLD AUTO: 0.4 %
DIFFERENTIAL METHOD BLD: ABNORMAL
EOSINOPHIL # BLD AUTO: 0.1 10E9/L (ref 0–0.7)
EOSINOPHIL NFR BLD AUTO: 4 %
ERYTHROCYTE [DISTWIDTH] IN BLOOD BY AUTOMATED COUNT: 13.4 % (ref 10–15)
HCT VFR BLD AUTO: 40 % (ref 35–47)
HGB BLD-MCNC: 13 G/DL (ref 11.7–15.7)
LYMPHOCYTES # BLD AUTO: 0.8 10E9/L (ref 0.8–5.3)
LYMPHOCYTES NFR BLD AUTO: 33.6 %
MCH RBC QN AUTO: 28.6 PG (ref 26.5–33)
MCHC RBC AUTO-ENTMCNC: 32.5 G/DL (ref 31.5–36.5)
MCV RBC AUTO: 88 FL (ref 78–100)
MONOCYTES # BLD AUTO: 0.6 10E9/L (ref 0–1.3)
MONOCYTES NFR BLD AUTO: 25.6 %
NEUTROPHILS # BLD AUTO: 0.9 10E9/L (ref 1.6–8.3)
NEUTROPHILS NFR BLD AUTO: 36.4 %
PLATELET # BLD AUTO: 185 10E9/L (ref 150–450)
RBC # BLD AUTO: 4.55 10E12/L (ref 3.8–5.2)
WBC # BLD AUTO: 2.5 10E9/L (ref 4–11)

## 2018-05-09 PROCEDURE — 99214 OFFICE O/P EST MOD 30 MIN: CPT | Performed by: NURSE PRACTITIONER

## 2018-05-09 PROCEDURE — 82043 UR ALBUMIN QUANTITATIVE: CPT | Performed by: NURSE PRACTITIONER

## 2018-05-09 PROCEDURE — 85025 COMPLETE CBC W/AUTO DIFF WBC: CPT | Performed by: NURSE PRACTITIONER

## 2018-05-09 PROCEDURE — 84443 ASSAY THYROID STIM HORMONE: CPT | Performed by: NURSE PRACTITIONER

## 2018-05-09 PROCEDURE — 80048 BASIC METABOLIC PNL TOTAL CA: CPT | Performed by: NURSE PRACTITIONER

## 2018-05-09 PROCEDURE — 36415 COLL VENOUS BLD VENIPUNCTURE: CPT | Performed by: NURSE PRACTITIONER

## 2018-05-09 PROCEDURE — 84439 ASSAY OF FREE THYROXINE: CPT | Performed by: NURSE PRACTITIONER

## 2018-05-09 PROCEDURE — 80061 LIPID PANEL: CPT | Performed by: NURSE PRACTITIONER

## 2018-05-09 RX ORDER — ATORVASTATIN CALCIUM 10 MG/1
10 TABLET, FILM COATED ORAL DAILY
Qty: 90 TABLET | Refills: 3 | Status: SHIPPED | OUTPATIENT
Start: 2018-05-09 | End: 2019-04-05

## 2018-05-09 RX ORDER — LEVOTHYROXINE SODIUM 200 UG/1
200 TABLET ORAL DAILY
Qty: 90 TABLET | Refills: 3 | Status: SHIPPED | OUTPATIENT
Start: 2018-05-09 | End: 2018-05-10

## 2018-05-09 RX ORDER — LISINOPRIL 5 MG/1
5 TABLET ORAL DAILY
Qty: 90 TABLET | Refills: 3 | Status: SHIPPED | OUTPATIENT
Start: 2018-05-09 | End: 2019-04-05

## 2018-05-09 NOTE — PATIENT INSTRUCTIONS
1.  Update labs today, refilled lisinopril, lipitor, and levothyroxine    2.  For loose stools I recommend colonoscopy to rule out colon cancer.  Please call [phone] to schedule    3.  For tremor schedule with neurology here    4.  Mammogram was clear    5.  Left finger numbness/tingling might be related to carpal tunnel, try to wear wrist brace at night and with repetitive activity

## 2018-05-09 NOTE — LETTER
May 11, 2018      Romayne L Sathre  [address]  [facility] [address]        Dear ,    We are writing to inform you of your test results.    Your thyroid levels are high, I recommend we lower your synthroid dose, I will have a nurse call you to discuss this.    Blood sugar was high, we will check a follow up more accurate test for diabetes when we recheck your thyroid labs.      All other labs are stable.      Resulted Orders   Basic metabolic panel   Result Value Ref Range    Sodium 141 133 - 144 mmol/L    Potassium 4.6 3.4 - 5.3 mmol/L    Chloride 107 94 - 109 mmol/L    Carbon Dioxide 28 20 - 32 mmol/L    Anion Gap 6 3 - 14 mmol/L    Glucose 121 (H) 70 - 99 mg/dL      Comment:      Non Fasting    Urea Nitrogen 20 7 - 30 mg/dL    Creatinine 1.08 (H) 0.52 - 1.04 mg/dL    GFR Estimate 50 (L) >60 mL/min/1.7m2      Comment:      Non  GFR Calc    GFR Estimate If Black 60 (L) >60 mL/min/1.7m2      Comment:       GFR Calc    Calcium 9.0 8.5 - 10.1 mg/dL   TSH with free T4 reflex   Result Value Ref Range    TSH 0.08 (L) 0.40 - 4.00 mU/L   Lipid panel reflex to direct LDL Fasting   Result Value Ref Range    Cholesterol 214 (H) <200 mg/dL      Comment:      Desirable:       <200 mg/dl    Triglycerides 362 (H) <150 mg/dL      Comment:      Borderline high:  150-199 mg/dl  High:             200-499 mg/dl  Very high:       >499 mg/dl  Non Fasting      HDL Cholesterol 35 (L) >49 mg/dL    LDL Cholesterol Calculated 107 (H) <100 mg/dL      Comment:      Above desirable:  100-129 mg/dl  Borderline High:  130-159 mg/dL  High:             160-189 mg/dL  Very high:       >189 mg/dl      Non HDL Cholesterol 179 (H) <130 mg/dL      Comment:      Above Desirable:  130-159 mg/dl  Borderline high:  160-189 mg/dl  High:             190-219 mg/dl  Very high:       >219 mg/dl     CBC with platelets and differential   Result Value Ref Range    WBC 2.5 (L) 4.0 - 11.0 10e9/L    RBC Count 4.55 3.8  - 5.2 10e12/L    Hemoglobin 13.0 11.7 - 15.7 g/dL    Hematocrit 40.0 35.0 - 47.0 %    MCV 88 78 - 100 fl    MCH 28.6 26.5 - 33.0 pg    MCHC 32.5 31.5 - 36.5 g/dL    RDW 13.4 10.0 - 15.0 %    Platelet Count 185 150 - 450 10e9/L    Diff Method Automated Method     % Neutrophils 36.4 %    % Lymphocytes 33.6 %    % Monocytes 25.6 %    % Eosinophils 4.0 %    % Basophils 0.4 %    Absolute Neutrophil 0.9 (L) 1.6 - 8.3 10e9/L    Absolute Lymphocytes 0.8 0.8 - 5.3 10e9/L    Absolute Monocytes 0.6 0.0 - 1.3 10e9/L    Absolute Eosinophils 0.1 0.0 - 0.7 10e9/L    Absolute Basophils 0.0 0.0 - 0.2 10e9/L   Albumin Random Urine Quantitative with Creat Ratio   Result Value Ref Range    Creatinine Urine 142 mg/dL    Albumin Urine mg/L 10 mg/L    Albumin Urine mg/g Cr 6.70 0 - 25 mg/g Cr   T4 free   Result Value Ref Range    T4 Free 1.53 (H) 0.76 - 1.46 ng/dL       If you have any questions or concerns, please call the clinic at the number listed above.       Sincerely,        PHOENIX Castle CNP/nr

## 2018-05-09 NOTE — MR AVS SNAPSHOT
After Visit Summary   5/9/2018    Romayne L Sathre    MRN: [id number]           Patient Information     Date Of Birth          1945        Visit Information        Provider Department      5/9/2018 2:00 PM Sapphire Glass APRN [facility]        Today's Diagnoses     Hypertension goal BP (blood pressure) < 140/90    -  1    CKD (chronic kidney disease) stage 3, GFR 30-59 ml/min        Acquired hypothyroidism        Hyperlipidemia LDL goal <100        Neutropenia, unspecified type (H)        Special screening for malignant neoplasms, colon        Change in bowel habits          Care Instructions    1.  Update labs today, refilled lisinopril, lipitor, and levothyroxine    2.  For loose stools I recommend colonoscopy to rule out colon cancer.  Please call [phone] to schedule    3.  keep an eye on the tremor, if worsening schedule with neurology here    4.  Mammogram was clear    5.  Left finger numbness/tingling might be related to carpal tunnel, try to wear wrist brace at night and with repetitive activity            Follow-ups after your visit        Additional Services     GASTROENTEROLOGY ADULT REF PROCEDURE ONLY       Last Lab Result: Creatinine (mg/dL)       Date                     Value                 04/26/2017               1.16 (H)         ----------  Body mass index is 36.18 kg/(m^2).     Needed:  No  Language:  English    Patient will be contacted to schedule procedure.     Please be aware that coverage of these services is subject to the terms and limitations of your health insurance plan.  Call member services at your health plan with any benefit or coverage questions.  Any procedures must be performed at a [place] facility OR coordinated by your clinic's referral office.    Please bring the following with you to your appointment:    (1) Any X-Rays, CTs or MRIs which have been performed.  Contact the facility where they were done to arrange  "for  prior to your scheduled appointment.    (2) List of current medications   (3) This referral request   (4) Any documents/labs given to you for this referral                  Who to contact     If you have questions or need follow up information about today's clinic visit or your schedule please contact [facility] directly at [phone].  Normal or non-critical lab and imaging results will be communicated to you by MyChart, letter or phone within 4 business days after the clinic has received the results. If you do not hear from us within 7 days, please contact the clinic through [facility] or phone. If you have a critical or abnormal lab result, we will notify you by phone as soon as possible.  Submit refill requests through [facility] or call your pharmacy and they will forward the refill request to us. Please allow 3 business days for your refill to be completed.          Additional Information About Your Visit        [facility] Information     [facility] lets you send messages to your doctor, view your test results, renew your prescriptions, schedule appointments and more. To sign up, go to [email or web address] . Click on \"Log in\" on the left side of the screen, which will take you to the Welcome page. Then click on \"Sign up Now\" on the right side of the page.     You will be asked to enter the access code listed below, as well as some personal information. Please follow the directions to create your username and password.     Your access code is: [email or web address]  Expires: 2018  2:31 PM     Your access code will  in 90 days. If you need help or a new code, please call your [place] clinic or [phone].        Care EveryWhere ID     This is your Care EveryWhere ID. This could be used by other organizations to access your [place] medical records  [id number]        Your Vitals Were     Pulse Temperature Respirations Height Pulse Oximetry Breastfeeding?    80 97.2  F (36.2  C) (Oral) 16 " "5' 9\" (1.753 m) 98% No    BMI (Body Mass Index)                   36.18 kg/m2            Blood Pressure from Last 3 Encounters:   05/09/18 130/84   12/20/17 (!) 170/98   04/26/17 122/74    Weight from Last 3 Encounters:   05/09/18 245 lb (111.1 kg)   12/20/17 234 lb (106.1 kg)   04/26/17 245 lb (111.1 kg)              We Performed the Following     Albumin Random Urine Quantitative with Creat Ratio     Basic metabolic panel     CBC with platelets and differential     GASTROENTEROLOGY ADULT REF PROCEDURE ONLY     Lipid panel reflex to direct LDL Fasting     TSH with free T4 reflex          Today's Medication Changes          These changes are accurate as of 5/9/18  2:31 PM.  If you have any questions, ask your nurse or doctor.               These medicines have changed or have updated prescriptions.        Dose/Directions    * lisinopril 5 MG tablet   Commonly known as:  PRINIVIL/ZESTRIL   This may have changed:  Another medication with the same name was changed. Make sure you understand how and when to take each.   Used for:  Hypertension goal BP (blood pressure) < 140/90   Changed by:  Sapphire Glass APRN CNP        Dose:  5 mg   Take 1 tablet (5 mg) by mouth daily   Quantity:  30 tablet   Refills:  0       * lisinopril 5 MG tablet   Commonly known as:  PRINIVIL/ZESTRIL   This may have changed:  See the new instructions.   Used for:  Hypertension goal BP (blood pressure) < 140/90   Changed by:  Sapphire Glass APRN CNP        Dose:  5 mg   Take 1 tablet (5 mg) by mouth daily   Quantity:  90 tablet   Refills:  3       * Notice:  This list has 2 medication(s) that are the same as other medications prescribed for you. Read the directions carefully, and ask your doctor or other care provider to review them with you.      Stop taking these medicines if you haven't already. Please contact your care team if you have questions.     cyclobenzaprine 5 MG tablet   Commonly known as:  FLEXERIL   Stopped by:  " Sapphire Glass, PHOENIX ORTIZ                Where to get your medicines      These medications were sent to [facility] [place], [place] - [address]  [address], [facility] [address]     Phone:  [phone]     atorvastatin 10 MG tablet    levothyroxine 200 MCG tablet    lisinopril 5 MG tablet                Primary Care Provider Office Phone # Fax #    PHOENIX Castle [phone]       [address]  [facility] [address]        Equal Access to Services     [facility]: Hadii aad ku hadasho Soomaali, waaxda luqadaha, qaybta kaalmada adeegyada, waxflower stacy hayhien young . So [facility] [phone].    ATENCIÓN: Si habla español, tiene a gann disposición servicios gratuitos de asistencia lingüística. [facility] [phone].    We comply with applicable federal civil rights laws and [place] laws. We do not discriminate on the basis of race, color, national origin, age, disability, sex, sexual orientation, or gender identity.            Thank you!     Thank you for choosing [facility]  for your care. Our goal is always to provide you with excellent care. Hearing back from our patients is one way we can continue to improve our services. Please take a few minutes to complete the written survey that you may receive in the mail after your visit with us. Thank you!             Your Updated Medication List - Protect others around you: Learn how to safely use, store and throw away your medicines at www.disposemymeds.org.          This list is accurate as of 5/9/18  2:31 PM.  Always use your most recent med list.                   Brand Name Dispense Instructions for use Diagnosis    atorvastatin 10 MG tablet    LIPITOR    90 tablet    Take 1 tablet (10 mg) by mouth daily    Hyperlipidemia LDL goal <100       levothyroxine 200 MCG tablet    SYNTHROID/LEVOTHROID    90 tablet    Take 1 tablet (200 mcg) by mouth daily    Acquired  hypothyroidism       * lisinopril 5 MG tablet    PRINIVIL/ZESTRIL    30 tablet    Take 1 tablet (5 mg) by mouth daily    Hypertension goal BP (blood pressure) < 140/90       * lisinopril 5 MG tablet    PRINIVIL/ZESTRIL    90 tablet    Take 1 tablet (5 mg) by mouth daily    Hypertension goal BP (blood pressure) < 140/90       Multi-vitamin Tabs tablet   Generic drug:  multivitamin, therapeutic with minerals      Take 1 tablet by mouth daily.        UNABLE TO FIND      MEDICATION NAME: Tumeric supplement        * Notice:  This list has 2 medication(s) that are the same as other medications prescribed for you. Read the directions carefully, and ask your doctor or other care provider to review them with you.

## 2018-05-10 ENCOUNTER — TELEPHONE (OUTPATIENT)
Dept: FAMILY MEDICINE | Facility: CLINIC | Age: 73
End: 2018-05-10

## 2018-05-10 DIAGNOSIS — R73.01 IFG (IMPAIRED FASTING GLUCOSE): ICD-10-CM

## 2018-05-10 DIAGNOSIS — E03.9 ACQUIRED HYPOTHYROIDISM: Primary | ICD-10-CM

## 2018-05-10 LAB
ANION GAP SERPL CALCULATED.3IONS-SCNC: 6 MMOL/L (ref 3–14)
BUN SERPL-MCNC: 20 MG/DL (ref 7–30)
CALCIUM SERPL-MCNC: 9 MG/DL (ref 8.5–10.1)
CHLORIDE SERPL-SCNC: 107 MMOL/L (ref 94–109)
CHOLEST SERPL-MCNC: 214 MG/DL
CO2 SERPL-SCNC: 28 MMOL/L (ref 20–32)
CREAT SERPL-MCNC: 1.08 MG/DL (ref 0.52–1.04)
CREAT UR-MCNC: 142 MG/DL
GFR SERPL CREATININE-BSD FRML MDRD: 50 ML/MIN/1.7M2
GLUCOSE SERPL-MCNC: 121 MG/DL (ref 70–99)
HDLC SERPL-MCNC: 35 MG/DL
LDLC SERPL CALC-MCNC: 107 MG/DL
MICROALBUMIN UR-MCNC: 10 MG/L
MICROALBUMIN/CREAT UR: 6.7 MG/G CR (ref 0–25)
NONHDLC SERPL-MCNC: 179 MG/DL
POTASSIUM SERPL-SCNC: 4.6 MMOL/L (ref 3.4–5.3)
SODIUM SERPL-SCNC: 141 MMOL/L (ref 133–144)
T4 FREE SERPL-MCNC: 1.53 NG/DL (ref 0.76–1.46)
TRIGL SERPL-MCNC: 362 MG/DL
TSH SERPL DL<=0.005 MIU/L-ACNC: 0.08 MU/L (ref 0.4–4)

## 2018-05-10 RX ORDER — LEVOTHYROXINE SODIUM 175 UG/1
175 TABLET ORAL DAILY
Qty: 30 TABLET | Refills: 1 | Status: SHIPPED | OUTPATIENT
Start: 2018-05-10 | End: 2018-06-08

## 2018-05-10 NOTE — TELEPHONE ENCOUNTER
Writer ENRIQUETA (non-detailed) instructing patient to call clinic back at earliest convenience.     Message to be given:   Please call pt to inform her thyroid levels are high, I recommend reducing synthroid to 175 mcg daily and lab recheck in 6 weeks.  New Rx sent into pharmacy.       Blood glucose was also a little high, will get A1C test for diabetes when we recheck thyroid.     All other labs are stable.     Cholesterol is high, please make sure pt is taking her lipitor daily.     Sapphire Glass, CNP          Thanks! Milly Ruano RN

## 2018-05-10 NOTE — PROGRESS NOTES
Please send out result letter with the following note, thanks.    Your thyroid levels are high, I recommend we lower your synthroid dose, I will have a nurse call you to discuss this.    Blood sugar was high, we will check a follow up more accurate test for diabetes when we recheck your thyroid labs.      All other labs are stable.      -Sapphire Glass, CNP

## 2018-05-10 NOTE — TELEPHONE ENCOUNTER
Please call pt to inform her thyroid levels are high, I recommend reducing synthroid to 175 mcg daily and lab recheck in 6 weeks.  New Rx sent into pharmacy.      Blood glucose was also a little high, will get A1C test for diabetes when we recheck thyroid.    All other labs are stable.    Cholesterol is high, please make sure pt is taking her lipitor daily.    Sapphire Glass, CNP

## 2018-05-11 NOTE — TELEPHONE ENCOUNTER
Pt returned call and gave her message below. Also informed her new RX was sent in. No further action needed. Closing encounter.

## 2018-06-07 DIAGNOSIS — E03.9 ACQUIRED HYPOTHYROIDISM: ICD-10-CM

## 2018-06-07 DIAGNOSIS — R73.01 IFG (IMPAIRED FASTING GLUCOSE): ICD-10-CM

## 2018-06-07 LAB
HBA1C MFR BLD: 5.4 % (ref 0–5.6)
T4 FREE SERPL-MCNC: 1.46 NG/DL (ref 0.76–1.46)
TSH SERPL DL<=0.005 MIU/L-ACNC: 0.28 MU/L (ref 0.4–4)

## 2018-06-07 PROCEDURE — 36415 COLL VENOUS BLD VENIPUNCTURE: CPT | Performed by: NURSE PRACTITIONER

## 2018-06-07 PROCEDURE — 84443 ASSAY THYROID STIM HORMONE: CPT | Performed by: NURSE PRACTITIONER

## 2018-06-07 PROCEDURE — 84439 ASSAY OF FREE THYROXINE: CPT | Performed by: NURSE PRACTITIONER

## 2018-06-07 PROCEDURE — 83036 HEMOGLOBIN GLYCOSYLATED A1C: CPT | Performed by: NURSE PRACTITIONER

## 2018-06-07 NOTE — LETTER
June 8, 2018      Romayne L Sathre  [address]  [facility] [address]        Dear ,    We are writing to inform you of your test results.    A1C (3 month blood glucose/diabetes test) was normal.    Thyroid levels are improved but still slightly off, I recommend we decrease your synthroid again.  I will have a nurse call you to schedule this.      Resulted Orders   TSH with free T4 reflex   Result Value Ref Range    TSH 0.28 (L) 0.40 - 4.00 mU/L   Hemoglobin A1c   Result Value Ref Range    Hemoglobin A1C 5.4 0 - 5.6 %      Comment:      Normal <5.7% Prediabetes 5.7-6.4%  Diabetes 6.5% or higher - adopted from ADA   consensus guidelines.     T4 free   Result Value Ref Range    T4 Free 1.46 0.76 - 1.46 ng/dL       If you have any questions or concerns, please call the clinic at the number listed above.       Sincerely,        PHOENIX Gonzalez CNP/nr

## 2018-06-08 ENCOUNTER — TELEPHONE (OUTPATIENT)
Dept: FAMILY MEDICINE | Facility: CLINIC | Age: 73
End: 2018-06-08

## 2018-06-08 DIAGNOSIS — E03.9 ACQUIRED HYPOTHYROIDISM: Primary | ICD-10-CM

## 2018-06-08 RX ORDER — LEVOTHYROXINE SODIUM 150 UG/1
150 TABLET ORAL DAILY
Qty: 30 TABLET | Refills: 1 | Status: SHIPPED | OUTPATIENT
Start: 2018-06-08 | End: 2018-08-02

## 2018-06-08 NOTE — TELEPHONE ENCOUNTER
Please call pt to inform her thyroid levels are improved but still on the high side. I recommend we lower her synthroid again to 150mcg daily and recheck labs in 6 weeks.      Sapphire Glass, CNP

## 2018-06-08 NOTE — PROGRESS NOTES
Please send out result letter with the following note, thanks.    A1C (3 month blood glucose/diabetes test) was normal.    Thyroid levels are improved but still slightly off, I recommend we decrease your synthroid again.  I will have a nurse call you to schedule this.      -Sapphire Glass, CNP

## 2018-06-11 ENCOUNTER — OFFICE VISIT (OUTPATIENT)
Dept: FAMILY MEDICINE | Facility: CLINIC | Age: 73
End: 2018-06-11
Payer: COMMERCIAL

## 2018-06-11 VITALS
HEART RATE: 83 BPM | WEIGHT: 244 LBS | RESPIRATION RATE: 12 BRPM | BODY MASS INDEX: 36.03 KG/M2 | OXYGEN SATURATION: 98 % | TEMPERATURE: 98.8 F | SYSTOLIC BLOOD PRESSURE: 120 MMHG | DIASTOLIC BLOOD PRESSURE: 62 MMHG

## 2018-06-11 DIAGNOSIS — R82.90 NONSPECIFIC FINDING ON EXAMINATION OF URINE: ICD-10-CM

## 2018-06-11 DIAGNOSIS — R30.0 DYSURIA: ICD-10-CM

## 2018-06-11 DIAGNOSIS — N30.01 ACUTE CYSTITIS WITH HEMATURIA: Primary | ICD-10-CM

## 2018-06-11 LAB
ALBUMIN UR-MCNC: 100 MG/DL
APPEARANCE UR: CLEAR
BACTERIA #/AREA URNS HPF: ABNORMAL /HPF
BILIRUB UR QL STRIP: NEGATIVE
COLOR UR AUTO: YELLOW
GLUCOSE UR STRIP-MCNC: NEGATIVE MG/DL
HGB UR QL STRIP: ABNORMAL
KETONES UR STRIP-MCNC: NEGATIVE MG/DL
LEUKOCYTE ESTERASE UR QL STRIP: ABNORMAL
NITRATE UR QL: NEGATIVE
NON-SQ EPI CELLS #/AREA URNS LPF: ABNORMAL /LPF
PH UR STRIP: 5.5 PH (ref 5–7)
RBC #/AREA URNS AUTO: ABNORMAL /HPF
SOURCE: ABNORMAL
SP GR UR STRIP: 1.02 (ref 1–1.03)
UROBILINOGEN UR STRIP-ACNC: 0.2 EU/DL (ref 0.2–1)
WBC #/AREA URNS AUTO: ABNORMAL /HPF

## 2018-06-11 PROCEDURE — 99213 OFFICE O/P EST LOW 20 MIN: CPT | Performed by: NURSE PRACTITIONER

## 2018-06-11 PROCEDURE — 87086 URINE CULTURE/COLONY COUNT: CPT | Performed by: NURSE PRACTITIONER

## 2018-06-11 PROCEDURE — 81001 URINALYSIS AUTO W/SCOPE: CPT | Performed by: NURSE PRACTITIONER

## 2018-06-11 PROCEDURE — 99207 C PAF COMPLETED  NO CHARGE: CPT | Performed by: NURSE PRACTITIONER

## 2018-06-11 RX ORDER — SULFAMETHOXAZOLE/TRIMETHOPRIM 800-160 MG
1 TABLET ORAL 2 TIMES DAILY
Qty: 14 TABLET | Refills: 0 | Status: SHIPPED | OUTPATIENT
Start: 2018-06-11 | End: 2018-06-18

## 2018-06-11 NOTE — MR AVS SNAPSHOT
After Visit Summary   6/11/2018    Romayne L Sathre    MRN: [id number]           Patient Information     Date Of Birth          1945        Visit Information        Provider Department      6/11/2018 10:00 AM Sapphire Glass APRN CNP [facility]        Today's Diagnoses     Dysuria    -  1    Nonspecific finding on examination of urine        Acute cystitis with hematuria          Care Instructions    1.  For urinary tract infection start antibiotic bactrim 1 pill twice a day for 7 days.  Drink lots of fluids.  Follow up if worsening symptoms.  Final culture pending, I will call if we need to change antibiotics.      2.  Call to schedule diagnostic colonoscopy          Follow-ups after your visit        Your next 10 appointments already scheduled     Jun 12, 2018  3:30 PM CDT   New Visit with Brendan Cole MD   [facility]    [address] [place] [id number] [phone]            Jul 23, 2018  7:30 AM CDT   LAB with  LAB   [facility]    [address] [place] [id number] [phone]           Please do not eat 10-12 hours before your appointment if you are coming in fasting for labs on lipids, cholesterol, or glucose (sugar). This does not apply to pregnant women. Water, hot tea and black coffee (with nothing added) are okay. Do not drink other fluids, diet soda or chew gum.              Who to contact     If you have questions or need follow up information about today's clinic visit or your schedule please contact [facility] directly at [phone].  Normal or non-critical lab and imaging results will be communicated to you by MyChart, letter or phone within 4 business days after the clinic has received the results. If you do not hear from us within 7 days, please contact the clinic through MyChart or phone. If you  have a critical or abnormal lab result, we will notify you by phone as soon as possible.  Submit refill requests through [facility] or call your pharmacy and they will forward the refill request to us. Please allow 3 business days for your refill to be completed.          Additional Information About Your Visit        Care EveryWhere ID     This is your Care EveryWhere ID. This could be used by other organizations to access your [place] medical records  [id number]        Your Vitals Were     Pulse Temperature Respirations Pulse Oximetry BMI (Body Mass Index)       83 98.8  F (37.1  C) (Oral) 12 98% 36.03 kg/m2        Blood Pressure from Last 3 Encounters:   06/11/18 120/62   05/09/18 130/84   12/20/17 (!) 170/98    Weight from Last 3 Encounters:   06/11/18 244 lb (110.7 kg)   05/09/18 245 lb (111.1 kg)   12/20/17 234 lb (106.1 kg)              We Performed the Following     UA reflex to Microscopic and Culture     Urine Culture Aerobic Bacterial     Urine Microscopic          Today's Medication Changes          These changes are accurate as of 6/11/18 10:36 AM.  If you have any questions, ask your nurse or doctor.               Start taking these medicines.        Dose/Directions    sulfamethoxazole-trimethoprim 800-160 MG per tablet   Commonly known as:  BACTRIM DS/SEPTRA DS   Used for:  Acute cystitis with hematuria   Started by:  Sapphire Glass APRN CNP        Dose:  1 tablet   Take 1 tablet by mouth 2 times daily for 7 days   Quantity:  14 tablet   Refills:  0            Where to get your medicines      These medications were sent to [facility] [address]  [address] [facility] [address]     Phone:  [phone]     sulfamethoxazole-trimethoprim 800-160 MG per tablet                Primary Care Provider Office Phone # Fax #    PH[place]ENIX Castle [phone] [address]  [facility] [address]        Equal Access to  Services     [facility]: Hadii aad ku hadharshnic Sobella, waaxda luqadaha, qaybta kaalmada vadim, amando young . So [facility] [phone].    ATENCIÓN: Si ivala colin, tiene a gann disposición servicios gratuitos de asistencia lingüística. Llame al [phone].    We comply with applicable federal civil rights laws and [place] laws. We do not discriminate on the basis of race, color, national origin, age, disability, sex, sexual orientation, or gender identity.            Thank you!     Thank you for choosing [facility]  for your care. Our goal is always to provide you with excellent care. Hearing back from our patients is one way we can continue to improve our services. Please take a few minutes to complete the written survey that you may receive in the mail after your visit with us. Thank you!             Your Updated Medication List - Protect others around you: Learn how to safely use, store and throw away your medicines at www.disposemymeds.org.          This list is accurate as of 6/11/18 10:36 AM.  Always use your most recent med list.                   Brand Name Dispense Instructions for use Diagnosis    atorvastatin 10 MG tablet    LIPITOR    90 tablet    Take 1 tablet (10 mg) by mouth daily    Hyperlipidemia LDL goal <100       levothyroxine 150 MCG tablet    SYNTHROID/LEVOTHROID    30 tablet    Take 1 tablet (150 mcg) by mouth daily    Acquired hypothyroidism       * lisinopril 5 MG tablet    PRINIVIL/ZESTRIL    30 tablet    Take 1 tablet (5 mg) by mouth daily    Hypertension goal BP (blood pressure) < 140/90       * lisinopril 5 MG tablet    PRINIVIL/ZESTRIL    90 tablet    Take 1 tablet (5 mg) by mouth daily    Hypertension goal BP (blood pressure) < 140/90       Multi-vitamin Tabs tablet   Generic drug:  multivitamin, therapeutic with minerals      Take 1 tablet by mouth daily.        order for DME     1 Units    Equipment being ordered: left wrist brace     Neuropathy of left hand       sulfamethoxazole-trimethoprim 800-160 MG per tablet    BACTRIM DS/SEPTRA DS    14 tablet    Take 1 tablet by mouth 2 times daily for 7 days    Acute cystitis with hematuria       UNABLE TO FIND      MEDICATION NAME: Tumeric supplement        * Notice:  This list has 2 medication(s) that are the same as other medications prescribed for you. Read the directions carefully, and ask your doctor or other care provider to review them with you.

## 2018-06-11 NOTE — PROGRESS NOTES
SUBJECTIVE:   Romayne L Sathre is a 72 year old female who presents to clinic today for the following health issues:      URINARY TRACT SYMPTOMS      Duration: 6-7 days    Description  dysuria and urgency    Intensity:  moderate    Accompanying signs and symptoms:  Fever/chills: no   Flank pain no   Nausea and vomiting: no   Vaginal symptoms: odor just this AM  Abdominal/Pelvic Pain: YES    History  History of frequent UTI's: no   History of kidney stones: no   Sexually Active: no  Possibility of pregnancy: No    Precipitating or alleviating factors: None    Therapies tried and outcome: cranberry juice    Outcome: no relief    Pt would also like to talk about change in bowel patterns and appetite    Discussed scheduling colo last visit to eval change in bowel habits.  She has not scheduled this yet.      Seeing neuro tomorrow for eval hand tremor.      Patient Active Problem List   Diagnosis     Hypothyroidism     Edema     Lipoma of other skin and subcutaneous tissue     Hypertension goal BP (blood pressure) < 140/90     CKD (chronic kidney disease) stage 3, GFR 30-59 ml/min     Hyperlipidemia LDL goal <100     Advanced directives, counseling/discussion     History of breast cancer     Leucopenia     Health Care Home     Memory deficit     Cellulitis     Maxillary sinusitis     Past Surgical History:   Procedure Laterality Date     HC REMV CATARACT EXTRACAP,INSERT LENS  7/20/2007     SURGICAL HISTORY OF -   Oct 3, 2008    Rt saline implant       Social History   Substance Use Topics     Smoking status: Passive Smoke Exposure - Never Smoker     Last attempt to quit: 11/19/1984     Smokeless tobacco: Never Used      Comment: family members smoke; daughter     Alcohol use 6.0 oz/week      Comment: 0-1 drink per month     Family History   Problem Relation Age of Onset     DIABETES Mother      dec     Respiratory Father      emphysema     DIABETES Sister      DIABETES Brother      Family History Negative Brother       x 2     Family History Negative Sister      x 2     Psychotic Disorder Brother      depression     KIDNEY DISEASE Sister      ESRD on dialysis, due to DM         Current Outpatient Prescriptions   Medication Sig Dispense Refill     atorvastatin (LIPITOR) 10 MG tablet Take 1 tablet (10 mg) by mouth daily 90 tablet 3     levothyroxine (SYNTHROID/LEVOTHROID) 150 MCG tablet Take 1 tablet (150 mcg) by mouth daily 30 tablet 1     lisinopril (PRINIVIL/ZESTRIL) 5 MG tablet Take 1 tablet (5 mg) by mouth daily 90 tablet 3     lisinopril (PRINIVIL/ZESTRIL) 5 MG tablet Take 1 tablet (5 mg) by mouth daily 30 tablet 0     Multiple Vitamin (MULTI-VITAMIN) per tablet Take 1 tablet by mouth daily.         order for DME Equipment being ordered: left wrist brace 1 Units 0     UNABLE TO FIND MEDICATION NAME: Tumeric supplement         ROS:  , GI, Neuro as above, otherwise negative       OBJECTIVE:                                                    /62 (BP Location: Right arm, Patient Position: Chair, Cuff Size: Adult Large)  Pulse 83  Temp 98.8  F (37.1  C) (Oral)  Resp 12  Wt 244 lb (110.7 kg)  SpO2 98%  BMI 36.03 kg/m2   GENERAL APPEARANCE: healthy, alert and no distress  EYES: Eyes grossly normal to inspection and conjunctivae and sclerae normal  RESP: respirations nonlabored  PSYCH: mentation appears normal and affect normal/bright       Diagnostic test results:  Diagnostic Test Results:  Results for orders placed or performed in visit on 06/11/18 (from the past 24 hour(s))   UA reflex to Microscopic and Culture   Result Value Ref Range    Color Urine Yellow     Appearance Urine Clear     Glucose Urine Negative NEG^Negative mg/dL    Bilirubin Urine Negative NEG^Negative    Ketones Urine Negative NEG^Negative mg/dL    Specific Gravity Urine 1.020 1.003 - 1.035    Blood Urine Moderate (A) NEG^Negative    pH Urine 5.5 5.0 - 7.0 pH    Protein Albumin Urine 100 (A) NEG^Negative mg/dL    Urobilinogen Urine 0.2 0.2 - 1.0  EU/dL    Nitrite Urine Negative NEG^Negative    Leukocyte Esterase Urine Small (A) NEG^Negative    Source Midstream Urine    Urine Microscopic   Result Value Ref Range    WBC Urine 25-50 (A) OTO5^0 - 5 /HPF    RBC Urine 5-10 (A) OTO2^O - 2 /HPF    Squamous Epithelial /LPF Urine Few FEW^Few /LPF    Bacteria Urine Moderate (A) NEG^Negative /HPF        ASSESSMENT/PLAN:                                                    (N30.01) Acute cystitis with hematuria  (primary encounter diagnosis)  Comment: UA positive for infection  Plan: sulfamethoxazole-trimethoprim (BACTRIM         DS/SEPTRA DS) 800-160 MG per tablet        Start 7d course bactrim, push fluids, culture pending.  Follow up if not improving or worsening.      (R30.0) Dysuria  Comment:   Plan: UA reflex to Microscopic and Culture, Urine         Microscopic            (R82.90) Nonspecific finding on examination of urine  Comment:   Plan: Urine Culture Aerobic Bacterial            Given number to schedule diagnostic colo per last visit.          See Patient Instructions    Sapphire Glass, [facility]    Patient Instructions   1.  For urinary tract infection start antibiotic bactrim 1 pill twice a day for 7 days.  Drink lots of fluids.  Follow up if worsening symptoms.  Final culture pending, I will call if we need to change antibiotics.

## 2018-06-11 NOTE — PATIENT INSTRUCTIONS
1.  For urinary tract infection start antibiotic bactrim 1 pill twice a day for 7 days.  Drink lots of fluids.  Follow up if worsening symptoms.  Final culture pending, I will call if we need to change antibiotics.      2.  Call to schedule diagnostic colonoscopy

## 2018-06-11 NOTE — TELEPHONE ENCOUNTER
Pt did call back and this message was relayed to her.   She did  her new rx and appt was made for her lab in 6 weeks    Nasrin Scott RN, BSN     .

## 2018-06-11 NOTE — LETTER
June 13, 2018      Romayne L Sathre  [address]  [facility] [address]        Dear ,    We are writing to inform you of your test results.    Final urine culture was actually negative for infection.  I do recommend you finish out the antibiotics and follow up if symptoms are not improving.    Resulted Orders   UA reflex to Microscopic and Culture   Result Value Ref Range    Color Urine Yellow     Appearance Urine Clear     Glucose Urine Negative NEG^Negative mg/dL    Bilirubin Urine Negative NEG^Negative    Ketones Urine Negative NEG^Negative mg/dL    Specific Gravity Urine 1.020 1.003 - 1.035    Blood Urine Moderate (A) NEG^Negative    pH Urine 5.5 5.0 - 7.0 pH    Protein Albumin Urine 100 (A) NEG^Negative mg/dL    Urobilinogen Urine 0.2 0.2 - 1.0 EU/dL    Nitrite Urine Negative NEG^Negative    Leukocyte Esterase Urine Small (A) NEG^Negative    Source Midstream Urine    Urine Microscopic   Result Value Ref Range    WBC Urine 25-50 (A) OTO5^0 - 5 /HPF    RBC Urine 5-10 (A) OTO2^O - 2 /HPF    Squamous Epithelial /LPF Urine Few FEW^Few /LPF    Bacteria Urine Moderate (A) NEG^Negative /HPF   Urine Culture Aerobic Bacterial   Result Value Ref Range    Specimen Description Urine     Culture Micro       50,000 to 100,000 colonies/mL  mixed urogenital caryn         If you have any questions or concerns, please call the clinic at the number listed above.       Sincerely,        PHOENIX Castle CNP/nr

## 2018-06-12 ENCOUNTER — OFFICE VISIT (OUTPATIENT)
Dept: NEUROLOGY | Facility: CLINIC | Age: 73
End: 2018-06-12
Payer: COMMERCIAL

## 2018-06-12 VITALS
HEART RATE: 75 BPM | RESPIRATION RATE: 16 BRPM | BODY MASS INDEX: 36.33 KG/M2 | DIASTOLIC BLOOD PRESSURE: 70 MMHG | SYSTOLIC BLOOD PRESSURE: 122 MMHG | TEMPERATURE: 98.5 F | WEIGHT: 246 LBS | OXYGEN SATURATION: 97 %

## 2018-06-12 DIAGNOSIS — R25.1 TREMOR OF RIGHT HAND: Primary | ICD-10-CM

## 2018-06-12 LAB
BACTERIA SPEC CULT: NORMAL
SPECIMEN SOURCE: NORMAL

## 2018-06-12 PROCEDURE — 99205 OFFICE O/P NEW HI 60 MIN: CPT | Performed by: PSYCHIATRY & NEUROLOGY

## 2018-06-12 NOTE — PROGRESS NOTES
INITIAL NEUROLOGY CONSULTATION    DATE OF VISIT: 6/12/2018  CLINIC LOCATION: [facility]  MRN: [id number]  PATIENT NAME: Romayne L Sathre  YOB: 1945    PRIMARY CARE PROVIDER: PHOENIX Castle CNP.     REASON FOR VISIT:   Chief Complaint   Patient presents with     Consult     tremor-right hand     HISTORY OF PRESENT ILLNESS:                                                    Ms. Romayne L Sathre is 72 year old right handed female patient with past medical history of breast cancer s/p right mastectomy, chronic kidney disease stage III, hyperlipidemia, hypertension, and hypothyroidism, who was seen in consultation today requested by PHOENIX Castle CNP, for right hand tremor.    Per patient's report, she was in her usual state of health until approximately 3 years ago, when she noticed intermittent postural and action right hand tremor.  She does not notice it at rest.  It does not interfere with her eating, drinking, and other daily activities.  She does not drink alcohol and is unable to tell whether it improves her tremor.  Certain arm positions make it more pronounced.  It also increases with stress.  Tremor worsened when her thyroid medication was increased.  Otherwise, she did not notice significant interval worsening over time.  She did not notice any other aggravating or alleviating factors.  No tremor of other body parts.  Occasionally, her speech gets slurred, but she denies any other focal neurological symptoms.  She takes turmeric and curcumin.  No other treatments tried.      At the same time, the patient denies any anosmia, micrographia, bradykinesia, festinating gait, postural instability, acting out in sleep, hypophonia, hypomimia, muscle stiffness, and recent falls.  Her paternal aunt had hand tremor.  She does not have any additional family history of Parkinson's disease or tremor.    Recent laboratory evaluation (May 2018) includes BMP (notable for  mildly elevated creatinine of 1.08 and slightly reduced GFR of 50), normal hemoglobin A1C of 5.4, elevated LDL at 107, low TSH (0.08 and later 0.28), and CBC (notable for low  WBC of 2.5).  Her free T4 was elevated at 1.53 in May, but normalized in June 2018 (1.46).  Her urinalysis was normal in June 2018.    Previous brain evaluation includes brain MRI (2008) that demonstrated no acute intracranial findings or evidence of metastatic disease.  Minimal brain atrophy and small vessel white matter changes were noted.    The patient denies a history of recent head injury. Prior neurological history: negative for migraine, stroke, brain neoplasms, seizure disorders, multiple sclerosis, meningitis, encephalitis, and major head injuries.    Neurologic Review of Systems - no amaurosis, diplopia, abnormal speech, unilateral numbness or weakness. She endorses insomnia, fatigue, feet edema, thyroid problems, blood in stool, heartburn, and urinary urgency.  These problems have been already discussed with other medical providers.  Otherwise, she denies any other complaints on 14-point comprehensive review of systems.  PAST MEDICAL/SURGICAL HISTORY:                                                    I personally reviewed patient's past medical and surgical history with the patient at today's visit.  Past Medical History:   Diagnosis Date     Breast cancer (H) 8/26/2008    Right Breast     CKD (chronic kidney disease) stage 3, GFR 30-59 ml/min 9/17/2010     Hyperlipidemia LDL goal <100 11/29/2010     Hypertension goal BP (blood pressure) < 140/90 9/17/2010     Hypothyroid      Other psoriasis      Past Surgical History:   Procedure Laterality Date     HC REMV CATARACT EXTRACAP,INSERT LENS  7/20/2007     SURGICAL HISTORY OF -   Oct 3, 2008    Rt saline implant     MEDICATIONS:                                                    I personally reviewed patient's medications and allergies with the patient at today's visit.  Current  Outpatient Prescriptions on File Prior to Visit:  atorvastatin (LIPITOR) 10 MG tablet Take 1 tablet (10 mg) by mouth daily   levothyroxine (SYNTHROID/LEVOTHROID) 150 MCG tablet Take 1 tablet (150 mcg) by mouth daily   lisinopril (PRINIVIL/ZESTRIL) 5 MG tablet Take 1 tablet (5 mg) by mouth daily   lisinopril (PRINIVIL/ZESTRIL) 5 MG tablet Take 1 tablet (5 mg) by mouth daily   Multiple Vitamin (MULTI-VITAMIN) per tablet Take 1 tablet by mouth daily.     order for DME Equipment being ordered: left wrist brace   sulfamethoxazole-trimethoprim (BACTRIM DS/SEPTRA DS) 800-160 MG per tablet Take 1 tablet by mouth 2 times daily for 7 days   UNABLE TO FIND MEDICATION NAME: Tumeric supplement      ALLERGIES:                                                      Allergies   Allergen Reactions     No Known Drug Allergies      FAMILY/SOCIAL HISTORY:                                                    Family and social history was reviewed with the patient at today's visit.  Paternal aunt had history of hand tremor.  No other family history of neurological disorders.   Problem (# of Occurrences) Relation (Name,Age of Onset)    DIABETES (3) Mother: dec, Sister, Brother    Family History Negative (2) Brother: x 2, Sister: x 2    KIDNEY DISEASE (1) Sister: ESRD on dialysis, due to DM    Psychotic Disorder (1) Brother: depression    Respiratory (1) Father: emphysema        , lives with daughter.  Never smoker.  Denies current alcohol and recreational drug use.  Retired.  Social History   Substance Use Topics     Smoking status: Passive Smoke Exposure - Never Smoker     Last attempt to quit: 11/19/1984     Smokeless tobacco: Never Used      Comment: family members smoke; daughter     Alcohol use 6.0 oz/week      Comment: 0-1 drink per month     REVIEW OF SYSTEMS:                                                    Patient has completed a [facility] Patient Health History, including a 14-system review, which was personally  reviewed, and pertinent positives are listed in HPI. She denies any additional problems on the further questioning.  EXAM:                                                    VITAL SIGNS:   /70 (BP Location: Left arm, Patient Position: Sitting, Cuff Size: Adult Regular)  Pulse 75  Temp 98.5  F (36.9  C) (Oral)  Resp 16  Wt 111.6 kg (246 lb)  SpO2 97%  BMI 36.33 kg/m2  MINI COG 6/12/2018   Clock Draw Score 2 Normal   3 Item Recall 2 objects recalled   Mini Cog Total Score 4   Administered by:  Eunice Noonan MA     General: pt is in NAD, cooperative.  Skin: normal turgor, moist mucous membranes, no lesions/rashes noticed. Right mastectomy.  HEENT: ATNC, EOMI, PERRL, white sclera, normal conjunctiva, no nystagmus or ptosis. No carotid bruits bilaterally.  Respiratory: lung sounds clear to auscultation bilaterally, no crackles, wheezes, rhonchi. Symmetric lung excursion, no accessory respiratory muscle use.    Cardiovascular: normal S1/S2, no murmurs/rubs/gallops.   Abdomen: Not distended.  : deferred.    Neurological:  Mental: alert, follows commands, mini-cog is 4/5 with 2/3 on memory recall, no aphasia or dysarthria. Fund of knowledge is appropriate for age.  Cranial Nerves:  CN II: visual acuity - able to accurately count fingers with each eye. Visual fields intact, fundi: discs sharp, no papilledema and normal vessels bilaterally.  CN III, IV, VI: EOM intact, left pupil is larger than right (patient's baseline), both are reactive to light  CN V: facial sensation nl  CN VII: face symmetric, no facial droop  CN VIII: hearing is bilaterally reduced  CN IX: palate elevation symmetric, uvula at midline  CN XI SCM normal, shoulder shrug nl  CN XII: tongue midline  Motor: Strength: 5/5 in all major groups of all extremities. Normal tone (even with provoking maneuvers).  The patient has intermittent low amplitude high-frequency postural and action right hand tremor.  Milder tremor is also seen on the left.  The  patient completed spiral tremor sheet that will be scanned into her medical record.  No other abnormal movements. No pronator drift b/l.  Reflexes: Triceps, biceps, brachioradialis, patellar, and achilles reflexes normal and symmetric. No clonus noted. Toes are down-going b/l.   Sensory: temperature, light touch, pinprick, and vibration intact. Romberg: negative.  Coordination: FNF and heel-shin tests intact b/l. No dysdiadochokinesia with rapid alternating movements.  Gait:  Normal, except mild difficulty with tandem walk.  DATA:   LABS/IMAGING/OTHER STUDIES: I reviewed pertinent medical records, including personal review of brain MRI images and Care Everywhere, as detailed in the history of present illness.  ASSESSMENT and PLAN:      ASSESSMENT: Romayne L Sathre is a 72 year old female patient with past medical history of breast cancer s/p right mastectomy, chronic kidney disease stage III, hyperlipidemia, hypertension, and hypothyroidism, who presents with bilateral asymmetric (right greater than left) postural and action hand tremor for the last 3 years.    We had a detailed discussion with the patient regarding her symptoms.  Her neurological exam today reveals mentioned above hand tremor without additional focal neurological findings.  The clinical presentation and tremor features would be most consistent with enhanced physiologic tremor versus essential tremor.  The possibility of tremor-predominant Parkinson's disease is highly unlikely, though not totally excluded.  At the present time, the patient does not have any additional extrapyramidal findings to suggest such diagnosis.  Interval examination over time might be helpful to monitor for development of these symptoms.    Meanwhile, we went in detail over the available treatment options for the suspected diagnosis.  Her thyroid function should be stable before any additional medications are tried.  We discussed that propranolol and primidone could be used  in the future if tremor progresses and starts to interfere with his daily activities.  However, at this time, the patient does not feel that any treatment is needed.     DIAGNOSES:    ICD-10-CM    1. Tremor of right hand R25.1      PLAN: At today's visit we thoroughly discussed various diagnostic possibilities for patient's right hand tremor.  No additional neurologic evaluation is needed at this point.     No new medications were ordered.    Next follow-up appointment is in the next 6 months (to evaluate for interval changes) or earlier if needed.    I advised the patient to call me with any questions or concerns.    Total Time:  81 minutes with > 50% spent counseling the patient on stated above assessment and recommendations, including nature of the differential diagnosis, available treatment options, and the plan.  Additional time was used to answer patient's questions regarding her symptoms and the plan.    Brendan Cole MD  / Neurology  [place]  (Chart documentation was completed in part with Dragon voice-recognition software. Even though reviewed, some grammatical, spelling, and word errors may remain.)

## 2018-06-12 NOTE — PATIENT INSTRUCTIONS
AFTER VISIT SUMMARY (AVS):    At today's visit we discussed various diagnostic possibilities for your right hand tremor, which  could be related to elevated thyroid hormone or essential tremor.  No additional neurologic evaluation is needed at this point.  I would like to see you in 6 months to evaluate for interval changes.    No new medications were ordered.    Next follow-up appointment is in the next 6 months or earlier if needed.    Please do not hesitate to call me with any questions or concerns.    Thanks.

## 2018-06-12 NOTE — MR AVS SNAPSHOT
After Visit Summary   6/12/2018    Romayne L Sathre    MRN: [id number]           Patient Information     Date Of Birth          1945        Visit Information        Provider Department      6/12/2018 3:30 PM Brendan Cole MD [facility]        Today's Diagnoses     Tremor of right hand    -  1      Care Instructions    AFTER VISIT SUMMARY (AVS):    At today's visit we discussed various diagnostic possibilities for your right hand tremor, which  could be related to elevated thyroid hormone or essential tremor.  No additional neurologic evaluation is needed at this point.  I would like to see you in 6 months to evaluate for interval changes.    No new medications were ordered.    Next follow-up appointment is in the next 6 months or earlier if needed.    Please do not hesitate to call me with any questions or concerns.    Thanks.           Follow-ups after your visit        Follow-up notes from your care team     Return in about 6 months (around 12/12/2018).      Your next 10 appointments already scheduled     Jul 23, 2018  7:30 AM CDT   LAB with  LAB   [facility] ([facility])    [address] [place] [id number] [phone]           Please do not eat 10-12 hours before your appointment if you are coming in fasting for labs on lipids, cholesterol, or glucose (sugar). This does not apply to pregnant women. Water, hot tea and black coffee (with nothing added) are okay. Do not drink other fluids, diet soda or chew gum.            Dec 12, 2018  3:30 PM CST   Return Visit with Brendan Cole MD   [facility] ([facility])    [address] [place] [address] [phone]              Who to contact     If you have questions or need follow up information about today's clinic visit or your schedule please contact [facility] directly at  [phone].  Normal or non-critical lab and imaging results will be communicated to you by MyChart, letter or phone within 4 business days after the clinic has received the results. If you do not hear from us within 7 days, please contact the clinic through MyChart or phone. If you have a critical or abnormal lab result, we will notify you by phone as soon as possible.  Submit refill requests through [employer] or call your pharmacy and they will forward the refill request to us. Please allow 3 business days for your refill to be completed.          Additional Information About Your Visit        Care EveryWhere ID     This is your Care EveryWhere ID. This could be used by other organizations to access your [place] medical records  [id number]        Your Vitals Were     Pulse Temperature Respirations Pulse Oximetry BMI (Body Mass Index)       75 98.5  F (36.9  C) (Oral) 16 97% 36.33 kg/m2        Blood Pressure from Last 3 Encounters:   06/12/18 122/70   06/11/18 120/62   05/09/18 130/84    Weight from Last 3 Encounters:   06/12/18 111.6 kg (246 lb)   06/11/18 110.7 kg (244 lb)   05/09/18 111.1 kg (245 lb)              Today, you had the following     No orders found for display       Primary Care Provider Office Phone # Fax #    Sapphire PHOENIX Winters [facility] [phone]       [address]  [facility] [address]        Equal Access to Services     JU SAMANIEGO : Hadii aad ku hadasho Sobettyali, waaxda luqadaha, qaybta kaalmada adeegyada, amando young . So [facility] [phone].    ATENCIÓN: Si habla español, tiene a gann disposición servicios gratuitos de asistencia lingüística. Llame al [phone].    We comply with applicable federal civil rights laws and [place] laws. We do not discriminate on the basis of race, color, national origin, age, disability, sex, sexual orientation, or gender identity.            Thank you!     Thank you for choosing [facility]   for your care. Our goal is always to provide you with excellent care. Hearing back from our patients is one way we can continue to improve our services. Please take a few minutes to complete the written survey that you may receive in the mail after your visit with us. Thank you!             Your Updated Medication List - Protect others around you: Learn how to safely use, store and throw away your medicines at www.disposemymeds.org.          This list is accurate as of 6/12/18  4:04 PM.  Always use your most recent med list.                   Brand Name Dispense Instructions for use Diagnosis    atorvastatin 10 MG tablet    LIPITOR    90 tablet    Take 1 tablet (10 mg) by mouth daily    Hyperlipidemia LDL goal <100       levothyroxine 150 MCG tablet    SYNTHROID/LEVOTHROID    30 tablet    Take 1 tablet (150 mcg) by mouth daily    Acquired hypothyroidism       * lisinopril 5 MG tablet    PRINIVIL/ZESTRIL    30 tablet    Take 1 tablet (5 mg) by mouth daily    Hypertension goal BP (blood pressure) < 140/90       * lisinopril 5 MG tablet    PRINIVIL/ZESTRIL    90 tablet    Take 1 tablet (5 mg) by mouth daily    Hypertension goal BP (blood pressure) < 140/90       Multi-vitamin Tabs tablet   Generic drug:  multivitamin, therapeutic with minerals      Take 1 tablet by mouth daily.        order for DME     1 Units    Equipment being ordered: left wrist brace    Neuropathy of left hand       sulfamethoxazole-trimethoprim 800-160 MG per tablet    BACTRIM DS/SEPTRA DS    14 tablet    Take 1 tablet by mouth 2 times daily for 7 days    Acute cystitis with hematuria       UNABLE TO FIND      MEDICATION NAME: Tumeric supplement        * Notice:  This list has 2 medication(s) that are the same as other medications prescribed for you. Read the directions carefully, and ask your doctor or other care provider to review them with you.

## 2018-06-13 NOTE — PROGRESS NOTES
Please send out result letter with the following note, thanks.    Final urine culture was actually negative for infection.  I do recommend you finish out the antibiotics and follow up if symptoms are not improving.    -Sapphire Glass, CNP

## 2018-07-23 DIAGNOSIS — E03.9 ACQUIRED HYPOTHYROIDISM: ICD-10-CM

## 2018-07-23 LAB — TSH SERPL DL<=0.005 MIU/L-ACNC: 0.94 MU/L (ref 0.4–4)

## 2018-07-23 PROCEDURE — 36415 COLL VENOUS BLD VENIPUNCTURE: CPT | Performed by: NURSE PRACTITIONER

## 2018-07-23 PROCEDURE — 84443 ASSAY THYROID STIM HORMONE: CPT | Performed by: NURSE PRACTITIONER

## 2018-07-23 NOTE — LETTER
July 24, 2018      Amritflowerne FARA Ira  [address]  [facility] [address]        Dear ,    We are writing to inform you of your test results.    Normal results.     Resulted Orders   TSH with free T4 reflex   Result Value Ref Range    TSH 0.94 0.40 - 4.00 mU/L       If you have any questions or concerns, please call the clinic at the number listed above.       Sincerely,    Erica Schmitz MD/nr

## 2018-10-01 DIAGNOSIS — E03.9 ACQUIRED HYPOTHYROIDISM: ICD-10-CM

## 2018-10-01 NOTE — TELEPHONE ENCOUNTER
"Requested Prescriptions   Pending Prescriptions Disp Refills     levothyroxine (SYNTHROID/LEVOTHROID) 150 MCG tablet [Pharmacy Med Name: LEVOTHYROXINE SODIUM 150MCG TABS]  Last Written Prescription Date:  8/3/2018  Last Fill Quantity: 30 tablet,  # refills: 1   Last Office Visit: 6/11/2018   Future Office Visit:    Next 5 appointments (look out 90 days)     Dec 12, 2018  3:30 PM CST   [facility] Visit with Brendan Cole MD   [facility]    [address] [place] [id number] [phone]                30 tablet 1     Sig: TAKE ONE TABLET BY MOUTH EVERY DAY    Thyroid Protocol Passed    10/1/2018 10:48 AM       Passed - Patient is 12 years or older       Passed - Recent (12 mo) or future (30 days) visit within the authorizing provider's specialty    Patient had office visit in the last 12 months or has a visit in the next 30 days with authorizing provider or within the authorizing provider's specialty.  See \"Patient Info\" tab in inbasket, or \"Choose Columns\" in Meds & Orders section of the refill encounter.           Passed - Normal TSH on file in past 12 months    Recent Labs   Lab Test  07/23/18   0943   TSH  0.94           Passed - No active pregnancy on record    If patient is pregnant or has had a positive pregnancy test, please check TSH.       Passed - No positive pregnancy test in past 12 months    If patient is pregnant or has had a positive pregnancy test, please check TSH.            "

## 2018-10-02 RX ORDER — LEVOTHYROXINE SODIUM 150 UG/1
TABLET ORAL
Qty: 90 TABLET | Refills: 1 | Status: SHIPPED | OUTPATIENT
Start: 2018-10-02 | End: 2019-04-05

## 2019-04-04 NOTE — PROGRESS NOTES
SUBJECTIVE:   Romayne L Sathre is a 73 year old female who presents to clinic today for the following health issues:    Hypertension Follow-up      Outpatient blood pressures are not being checked.    Low Salt Diet: low salt, trying to keep low but does add salt    Hypothyroidism Follow-up      Since last visit, patient describes the following symptoms: Weight stable, no hair loss, no skin changes, no constipation, no loose stools      Amount of exercise or physical activity: None    Problems taking medications regularly: No    Medication side effects: none    Diet: low salt        Acute concerns:  Edema x 1-2 months to legs, wearing compression stockings.  No prior similar occurrence.  No prior echo.  Weight is up 10 pounds since June.  Elevating legs at night helps.  No leg pain.  Does have dyspnea on exertion as below.      She is wondering about treatment of her nail fungus.    Wondering about sleep study.  She falls asleep easily during the day.  She has been told she snores at night, she doesn't think it can be heard through close door.  No morning headache.  She does have night wakening, gets 4h/sleep per night.  Bad dreams wake her up, or just wakes up and has difficulty going back to sleep.  Was taking over the counter sleep aid, but this worsened bad dreams.  She does drink coffee in the afternoon.    Chronic disease follow up:  HTN- controlled on lisinopril 5mg daily.  No chest pain.  No side effects with lisinopril.  She does note some shortness of breath with walking, this has been ongoing for 1 month.  It is off an on when she is walking.  She can walk 1/2 mile before she gets short of breath.  She used to be able to walk 2 miles.  No coughing or wheezing.  No associated chest pain.  She clarifies she gets tired and has to stop and rest.  She does note new swelling in legs 1-2 months ago as above .  Difficulty breathing only occurs with exertion.    Quit smoking 20 years ago.  She smoked for 2-3  years and a bit when she was younger.  A pack would last her 1 week.  No hx of COPD or asthma.     Hypothyroidism- controlled on levothyroxine 150mcg daily     Breast cancer 2008 s/p right mastectomy and radiation.  Normal mammo 5/2018.       Idiopathic leukopenia, status post extensive hematology workup including marrow biopsy not revealing etiology.    Saw neurology for hand tremor 6/2018, felt to be physiologic tremor v. Essential tremor    FIT  Tdap/zoster    Patient Active Problem List   Diagnosis     Hypothyroidism     Edema     Lipoma of other skin and subcutaneous tissue     Hypertension goal BP (blood pressure) < 140/90     CKD (chronic kidney disease) stage 3, GFR 30-59 ml/min (H)     Hyperlipidemia LDL goal <100     Advanced directives, counseling/discussion     History of breast cancer     Leucopenia     Health Care Home     Memory deficit     Cellulitis     Maxillary sinusitis     Obesity (BMI 35.0-39.9) with comorbidity (H)     Past Surgical History:   Procedure Laterality Date     HC REMV CATARACT EXTRACAP,INSERT LENS  7/20/2007     SURGICAL HISTORY OF -   Oct 3, 2008    Rt saline implant       Social History     Tobacco Use     Smoking status: Passive Smoke Exposure - Never Smoker     Smokeless tobacco: Never Used     Tobacco comment: family members smoke; daughter   Substance Use Topics     Alcohol use: Yes     Alcohol/week: 6.0 oz     Comment: 0-1 drink per month     Family History   Problem Relation Age of Onset     Diabetes Mother         dec     Respiratory Father         emphysema     Diabetes Sister      Diabetes Brother      Family History Negative Brother         x 2     Family History Negative Sister         x 2     Psychotic Disorder Brother         depression     Kidney Disease Sister         ESRD on dialysis, due to DM         Current Outpatient Medications   Medication Sig Dispense Refill     atorvastatin (LIPITOR) 10 MG tablet Take 1 tablet (10 mg) by mouth daily 90 tablet 3     Black  Pepper-Turmeric (TURMERIC COMPLEX/BLACK PEPPER PO)        levothyroxine (SYNTHROID/LEVOTHROID) 150 MCG tablet Take 1 tablet (150 mcg) by mouth daily 90 tablet 3     lisinopril (PRINIVIL/ZESTRIL) 5 MG tablet Take 1 tablet (5 mg) by mouth daily 90 tablet 3     Multiple Vitamin (MULTI-VITAMIN) per tablet Take 1 tablet by mouth daily.         order for DME Equipment being ordered: left wrist brace 1 Units 0     Potassium Acetate POWD        UNABLE TO FIND MEDICATION NAME: Tumeric supplement         ROS:  CV, Resp, Integ as above, otherwise negative       OBJECTIVE:                                                    /65 (BP Location: Right arm, Patient Position: Chair, Cuff Size: Adult Large)   Pulse 74   Temp 98.3  F (36.8  C) (Oral)   Resp 16   Wt 116.6 kg (257 lb)   SpO2 98%   BMI 37.95 kg/m     GENERAL APPEARANCE: alert and no distress  EYES: Eyes grossly normal to inspection and conjunctivae and sclerae normal  HENT: ear canals and TM's normal and nose and mouth without ulcers or lesions  RESP: lungs clear to auscultation - no rales, rhonchi or wheezes  CV: regular rates and rhythm, normal S1 S2, no S3 or S4 and no murmur, click or rub.  Trace peripheral edema to bilateral lower legs, she was wearing compression stockings today  SKIN: all toenails are thick, yellow discoloring, and deformed  PSYCH: mentation appears normal and affect normal/bright     EKG today NSR, no ischemic changes     ASSESSMENT/PLAN:                                                    (R06.09) DIAZ (dyspnea on exertion)  (primary encounter diagnosis)  Comment: she has decreased exercise intol x 1mo, also with new peripheral edema, concerning for possible heart failure.  No recent smoking hx or wheezing on exam to suggest COPD.  No anginal symptoms to suggest CAD, normal EKG today.  Also consider exercise intolerance and obesity hypoventilation  Plan: EKG 12-lead complete w/read - Clinics,         Echocardiogram Complete        Labs  today to r/o anemia.  Scheduled for echo next week with plans to follow up after.  consider CXR at follow up visit.  will reevaluate symptoms to determine if stress test is indicated.  I do suspect weight and deconditioning are playing a role.    (R60.9) Peripheral edema  Comment: HF v. Weight related  Plan: EKG 12-lead complete w/read - Clinics,         Echocardiogram Complete        Workup with echo as above, she is already doing compression and leg elevation    (I10) Hypertension goal BP (blood pressure) < 140/90  Comment: controlled  Plan: TSH with free T4 reflex, Albumin Random Urine         Quantitative with Creat Ratio, lisinopril         (PRINIVIL/ZESTRIL) 5 MG tablet        monitor    (R40.0) Has daytime drowsiness  Comment: does have interrupted sleep which likely contributes, snoring and weight concerning for possible HUGO  Plan: SLEEP EVALUATION & MANAGEMENT REFERRAL - [facility] / [facility]  [phone] (Age 2 and up)        Reviewed good sleep hygiene, did provider referral for sleep eval    (B35.1) Onychomycosis  Comment:   Plan: we discussed would need oral antifungal for treatment, declines at this time    (N18.3) CKD (chronic kidney disease) stage 3, GFR 30-59 ml/min (H)  Comment:   Plan: Basic metabolic panel, Albumin Random Urine         Quantitative with Creat Ratio            (E78.5) Hyperlipidemia LDL goal <100  Comment:   Plan: Lipid panel reflex to direct LDL Fasting,         atorvastatin (LIPITOR) 10 MG tablet            (Z12.11) Screen for colon cancer  Comment:   Plan: Fecal colorectal cancer screen (FIT)            (D70.9) Neutropenia, unspecified type (H)  Comment:   Plan: CBC with platelets            (E03.9) Acquired hypothyroidism  Comment:   Plan: levothyroxine (SYNTHROID/LEVOTHROID) 150 MCG         tablet            (E66.01) Morbid obesity (H)  Comment:   Plan:         See Patient Instructions    Sapphire Glass,  [facility]    Patient Instructions   1.  I am concerned about the swelling in your legs and shortness of breath.  We need to get an ultrasound of your heart to make sure it is pumping normally.  We will also get lab work today.  Lets follow up after the US to review results.    2.  We will update labs and I refilled all you medications today    3.  I did give you a referral to the sleep center, look at sleep recommendations below    General recommendations for sleep problems (Insomnia)  Allow 2-4 weeks to see results     Establish a regular sleep schedule   Go to bed at same time each night, get up at same time each day, avoid sleeping-in on weekends.  Cut down time in bed (if not asleep, get up, go in other room, do something calming and boring such as sorting papers, making warm milk, knitting, dusting)   Avoid trying to force yourself to sleep.  Use your bed only for sleep. Do not read or watch Television in bed.     Make the bedroom comfortable  Keepthe temperature in your bedroom comfortable, keep bedroom quiet when sleeping, and consider ear plugs (silicon) especially if you have partner who snores.  Keep bedroom dark enough:  use dark blinds or wear an eye mask if needed.    Relax at bedtime.    Do not watch tv or play video games or surf on computer right before bed; the full spectrum light actually stimulates your brain!  Relax each muscle group individually ; begin with your feet, work toward your head. Deal with your worries before bedtime by setting aside a worry time for 30 minutes earlier or journal your thoughts.  Listen to relaxation tapes such as Classical Music or Nature sounds or imagine a tranquil scene (e.g. waterfall or beach)   Back Massage : Gentle 5-minute back rub prior to bedtime can help relax you.    Perform measures to make you tired at bedtime.   Get regular Exercise each day (at least 6 hours before bedtime)   Take medications only as directed   Eat a light bedtime  snack or warm drink, such as milk or herbal tea (non-caffeinated)   No Napping during day     Stimulus Control   Do not lie awake for more than 30 minutes.   Get out of bed and perform a quiet activity, then return to bed when sleepy.  Repeat as many times per night as needed     Things to avoid   Do not Exercise just before bedtime   No overstimulating activities just before bed, such as competitive games or exciting Television programs  Avoid caffeine (coffee, tea, soda), chocolate after lunchtime   Do not use alcohol to induce sleep (worsens Insomnia)   Do not take someone else's sleeping pills   Do not look at the clock when awakening   Do not turn on light when getting up to use bathroom     Read the book Say Good Night To Insomnia by Nyla.    I recommend taking melatonin at night to help with insomnia. This is a natural sleep inducer that shouldn't leave you feeling sedated or too tired in the morning.  Many people use it to help prevent jet lag when traveling.      It is available over the counter in drug stores and grocery stores.  Please follow the instruction on the bottle.  Dosages vary from 0.3 mg to 3 mg, and even very low doses appear to be effective.  Take it about 30 minutes before you lie down at night.

## 2019-04-05 ENCOUNTER — OFFICE VISIT (OUTPATIENT)
Dept: FAMILY MEDICINE | Facility: CLINIC | Age: 74
End: 2019-04-05
Payer: COMMERCIAL

## 2019-04-05 VITALS
SYSTOLIC BLOOD PRESSURE: 146 MMHG | TEMPERATURE: 98.3 F | RESPIRATION RATE: 16 BRPM | HEART RATE: 74 BPM | DIASTOLIC BLOOD PRESSURE: 65 MMHG | OXYGEN SATURATION: 98 % | WEIGHT: 257 LBS | BODY MASS INDEX: 37.95 KG/M2

## 2019-04-05 DIAGNOSIS — E03.9 ACQUIRED HYPOTHYROIDISM: ICD-10-CM

## 2019-04-05 DIAGNOSIS — D70.9 NEUTROPENIA, UNSPECIFIED TYPE (H): ICD-10-CM

## 2019-04-05 DIAGNOSIS — Z12.11 SCREEN FOR COLON CANCER: ICD-10-CM

## 2019-04-05 DIAGNOSIS — R60.0 PERIPHERAL EDEMA: ICD-10-CM

## 2019-04-05 DIAGNOSIS — E66.01 MORBID OBESITY (H): ICD-10-CM

## 2019-04-05 DIAGNOSIS — R40.0 HAS DAYTIME DROWSINESS: ICD-10-CM

## 2019-04-05 DIAGNOSIS — I10 HYPERTENSION GOAL BP (BLOOD PRESSURE) < 140/90: ICD-10-CM

## 2019-04-05 DIAGNOSIS — B35.1 ONYCHOMYCOSIS: ICD-10-CM

## 2019-04-05 DIAGNOSIS — E78.5 HYPERLIPIDEMIA LDL GOAL <100: ICD-10-CM

## 2019-04-05 DIAGNOSIS — N18.30 CKD (CHRONIC KIDNEY DISEASE) STAGE 3, GFR 30-59 ML/MIN (H): ICD-10-CM

## 2019-04-05 DIAGNOSIS — R06.09 DOE (DYSPNEA ON EXERTION): Primary | ICD-10-CM

## 2019-04-05 LAB
ANION GAP SERPL CALCULATED.3IONS-SCNC: 4 MMOL/L (ref 3–14)
BUN SERPL-MCNC: 17 MG/DL (ref 7–30)
CALCIUM SERPL-MCNC: 9.2 MG/DL (ref 8.5–10.1)
CHLORIDE SERPL-SCNC: 107 MMOL/L (ref 94–109)
CHOLEST SERPL-MCNC: 155 MG/DL
CO2 SERPL-SCNC: 28 MMOL/L (ref 20–32)
CREAT SERPL-MCNC: 1.2 MG/DL (ref 0.52–1.04)
ERYTHROCYTE [DISTWIDTH] IN BLOOD BY AUTOMATED COUNT: 13.6 % (ref 10–15)
GFR SERPL CREATININE-BSD FRML MDRD: 45 ML/MIN/{1.73_M2}
GLUCOSE SERPL-MCNC: 76 MG/DL (ref 70–99)
HCT VFR BLD AUTO: 42.3 % (ref 35–47)
HDLC SERPL-MCNC: 49 MG/DL
HGB BLD-MCNC: 13.5 G/DL (ref 11.7–15.7)
LDLC SERPL CALC-MCNC: 81 MG/DL
MCH RBC QN AUTO: 28.6 PG (ref 26.5–33)
MCHC RBC AUTO-ENTMCNC: 31.9 G/DL (ref 31.5–36.5)
MCV RBC AUTO: 90 FL (ref 78–100)
NONHDLC SERPL-MCNC: 106 MG/DL
PLATELET # BLD AUTO: 201 10E9/L (ref 150–450)
POTASSIUM SERPL-SCNC: 4.4 MMOL/L (ref 3.4–5.3)
RBC # BLD AUTO: 4.72 10E12/L (ref 3.8–5.2)
SODIUM SERPL-SCNC: 139 MMOL/L (ref 133–144)
TRIGL SERPL-MCNC: 125 MG/DL
TSH SERPL DL<=0.005 MIU/L-ACNC: 3.95 MU/L (ref 0.4–4)
WBC # BLD AUTO: 2.1 10E9/L (ref 4–11)

## 2019-04-05 PROCEDURE — 93000 ELECTROCARDIOGRAM COMPLETE: CPT | Performed by: NURSE PRACTITIONER

## 2019-04-05 PROCEDURE — 82043 UR ALBUMIN QUANTITATIVE: CPT | Performed by: NURSE PRACTITIONER

## 2019-04-05 PROCEDURE — 99214 OFFICE O/P EST MOD 30 MIN: CPT | Performed by: NURSE PRACTITIONER

## 2019-04-05 PROCEDURE — 36415 COLL VENOUS BLD VENIPUNCTURE: CPT | Performed by: NURSE PRACTITIONER

## 2019-04-05 PROCEDURE — 84443 ASSAY THYROID STIM HORMONE: CPT | Performed by: NURSE PRACTITIONER

## 2019-04-05 PROCEDURE — 80061 LIPID PANEL: CPT | Performed by: NURSE PRACTITIONER

## 2019-04-05 PROCEDURE — 85027 COMPLETE CBC AUTOMATED: CPT | Performed by: NURSE PRACTITIONER

## 2019-04-05 PROCEDURE — 80048 BASIC METABOLIC PNL TOTAL CA: CPT | Performed by: NURSE PRACTITIONER

## 2019-04-05 RX ORDER — ATORVASTATIN CALCIUM 10 MG/1
10 TABLET, FILM COATED ORAL DAILY
Qty: 90 TABLET | Refills: 3 | Status: SHIPPED | OUTPATIENT
Start: 2019-04-05 | End: 2020-05-22

## 2019-04-05 RX ORDER — LISINOPRIL 5 MG/1
5 TABLET ORAL DAILY
Qty: 90 TABLET | Refills: 3 | Status: SHIPPED | OUTPATIENT
Start: 2019-04-05 | End: 2019-04-17

## 2019-04-05 RX ORDER — POTASSIUM ACETATE 100 %
POWDER (GRAM) MISCELLANEOUS
COMMUNITY
End: 2020-05-19

## 2019-04-05 RX ORDER — LEVOTHYROXINE SODIUM 150 UG/1
150 TABLET ORAL DAILY
Qty: 90 TABLET | Refills: 3 | Status: SHIPPED | OUTPATIENT
Start: 2019-04-05 | End: 2020-04-07

## 2019-04-05 NOTE — PATIENT INSTRUCTIONS
1.  I am concerned about the swelling in your legs and shortness of breath.  We need to get an ultrasound of your heart to make sure it is pumping normally.  We will also get lab work today.  Lets follow up after the US to review results.    2.  We will update labs and I refilled all you medications today    3.  I did give you a referral to the sleep center, look at sleep recommendations below    General recommendations for sleep problems (Insomnia)  Allow 2-4 weeks to see results     Establish a regular sleep schedule   Go to bed at same time each night, get up at same time each day, avoid sleeping-in on weekends.  Cut down time in bed (if not asleep, get up, go in other room, do something calming and boring such as sorting papers, making warm milk, knitting, dusting)   Avoid trying to force yourself to sleep.  Use your bed only for sleep. Do not read or watch Television in bed.     Make the bedroom comfortable  Keepthe temperature in your bedroom comfortable, keep bedroom quiet when sleeping, and consider ear plugs (silicon) especially if you have partner who snores.  Keep bedroom dark enough:  use dark blinds or wear an eye mask if needed.    Relax at bedtime.    Do not watch tv or play video games or surf on computer right before bed; the full spectrum light actually stimulates your brain!  Relax each muscle group individually ; begin with your feet, work toward your head. Deal with your worries before bedtime by setting aside a worry time for 30 minutes earlier or journal your thoughts.  Listen to relaxation tapes such as Classical Music or Nature sounds or imagine a tranquil scene (e.g. waterfall or beach)   Back Massage : Gentle 5-minute back rub prior to bedtime can help relax you.    Perform measures to make you tired at bedtime.   Get regular Exercise each day (at least 6 hours before bedtime)   Take medications only as directed   Eat a light bedtime snack or warm drink, such as milk or herbal tea  (non-caffeinated)   No Napping during day     Stimulus Control   Do not lie awake for more than 30 minutes.   Get out of bed and perform a quiet activity, then return to bed when sleepy.  Repeat as many times per night as needed     Things to avoid   Do not Exercise just before bedtime   No overstimulating activities just before bed, such as competitive games or exciting Television programs  Avoid caffeine (coffee, tea, soda), chocolate after lunchtime   Do not use alcohol to induce sleep (worsens Insomnia)   Do not take someone else's sleeping pills   Do not look at the clock when awakening   Do not turn on light when getting up to use bathroom     Read the book Say Good Night To Insomnia by Nyla.    I recommend taking melatonin at night to help with insomnia. This is a natural sleep inducer that shouldn't leave you feeling sedated or too tired in the morning.  Many people use it to help prevent jet lag when traveling.      It is available over the counter in drug stores and grocery stores.  Please follow the instruction on the bottle.  Dosages vary from 0.3 mg to 3 mg, and even very low doses appear to be effective.  Take it about 30 minutes before you lie down at night.

## 2019-04-06 LAB
CREAT UR-MCNC: 144 MG/DL
MICROALBUMIN UR-MCNC: 67 MG/L
MICROALBUMIN/CREAT UR: 46.25 MG/G CR (ref 0–25)

## 2019-04-08 NOTE — RESULT ENCOUNTER NOTE
Please send out result letter with the following note, thanks.    Romayne,    Kidney function is down just a bit.  Staying hydrated, taking your lisinopril, and avoiding NSAID medications (ibuprofen, naproxen, aleve) helps keep kidneys healthy.  We might consider increasing your lisinopril dose next visit.    Normal thyroid, cholesterol looks good, normal blood sugar.  White count continues to be low as it has been for a long time.    -Sapphire Glass, CNP

## 2019-04-11 ENCOUNTER — ANCILLARY PROCEDURE (OUTPATIENT)
Dept: CARDIOLOGY | Facility: CLINIC | Age: 74
End: 2019-04-11
Attending: NURSE PRACTITIONER
Payer: COMMERCIAL

## 2019-04-11 DIAGNOSIS — R60.0 PERIPHERAL EDEMA: ICD-10-CM

## 2019-04-11 DIAGNOSIS — R06.09 DOE (DYSPNEA ON EXERTION): ICD-10-CM

## 2019-04-12 NOTE — PROGRESS NOTES
SUBJECTIVE:   Romayne L Sathre is a 73 year old female who presents to clinic today for the following   health issues:        Results      Pt is here today to follow-up for results of recent echo       Seen last week for peripheral edema and mild dyspnea on exertion x 1-2 months.  She had workup including normal EKG, TSH, BMP, CBC.  Echo with EF 60-65%, LVH, intermediate diastolic function/filling pressures.      She also noted snoring and daytime drowsiness, discussed sleep study.    Today pt notes no change in dyspnea on exertion or peripheral edema.      Patient Active Problem List   Diagnosis     Hypothyroidism     Edema     Lipoma of other skin and subcutaneous tissue     Hypertension goal BP (blood pressure) < 140/90     CKD (chronic kidney disease) stage 3, GFR 30-59 ml/min (H)     Hyperlipidemia LDL goal <100     Advanced directives, counseling/discussion     History of breast cancer     Leucopenia     Health Care Home     Memory deficit     Cellulitis     Maxillary sinusitis     Obesity (BMI 35.0-39.9) with comorbidity (H)     Past Surgical History:   Procedure Laterality Date     HC REMV CATARACT EXTRACAP,INSERT LENS  7/20/2007     SURGICAL HISTORY OF -   Oct 3, 2008    Rt saline implant       Social History     Tobacco Use     Smoking status: Passive Smoke Exposure - Never Smoker     Smokeless tobacco: Never Used     Tobacco comment: family members smoke; daughter   Substance Use Topics     Alcohol use: Yes     Alcohol/week: 6.0 oz     Comment: 0-1 drink per month     Family History   Problem Relation Age of Onset     Diabetes Mother         dec     Respiratory Father         emphysema     Diabetes Sister      Diabetes Brother      Family History Negative Brother         x 2     Family History Negative Sister         x 2     Psychotic Disorder Brother         depression     Kidney Disease Sister         ESRD on dialysis, due to DM         Current Outpatient Medications   Medication Sig Dispense Refill      atorvastatin (LIPITOR) 10 MG tablet Take 1 tablet (10 mg) by mouth daily 90 tablet 3     Black Pepper-Turmeric (TURMERIC COMPLEX/BLACK PEPPER PO)        levothyroxine (SYNTHROID/LEVOTHROID) 150 MCG tablet Take 1 tablet (150 mcg) by mouth daily 90 tablet 3     lisinopril (PRINIVIL/ZESTRIL) 10 MG tablet Take 1 tablet (10 mg) by mouth daily 90 tablet 3     Multiple Vitamin (MULTI-VITAMIN) per tablet Take 1 tablet by mouth daily.         Potassium Acetate POWD        UNABLE TO FIND MEDICATION NAME: Tumeric supplement         ROS:  CV, Resp as above, otherwise negative       OBJECTIVE:                                                    /72 (BP Location: Left arm, Patient Position: Chair, Cuff Size: Adult Regular)   Pulse 79   Temp 98.6  F (37  C) (Oral)   Resp 16   Wt 117.9 kg (260 lb)   SpO2 98%   BMI 38.40 kg/m     GENERAL APPEARANCE: healthy, alert and no distress  EYES: Eyes grossly normal to inspection and conjunctivae and sclerae normal  RESP: lungs clear to auscultation - no rales, rhonchi or wheezes  CV: regular rates and rhythm, normal S1 S2, no S3 or S4 and no murmur, click or rub.  tracem edema to lower extremities  PSYCH: mentation appears normal and affect normal/bright     Office Visit on 04/05/2019   Component Date Value Ref Range Status     WBC 04/05/2019 2.1* 4.0 - 11.0 10e9/L Final     RBC Count 04/05/2019 4.72  3.8 - 5.2 10e12/L Final     Hemoglobin 04/05/2019 13.5  11.7 - 15.7 g/dL Final     Hematocrit 04/05/2019 42.3  35.0 - 47.0 % Final     MCV 04/05/2019 90  78 - 100 fl Final     MCH 04/05/2019 28.6  26.5 - 33.0 pg Final     MCHC 04/05/2019 31.9  31.5 - 36.5 g/dL Final     RDW 04/05/2019 13.6  10.0 - 15.0 % Final     Platelet Count 04/05/2019 201  150 - 450 10e9/L Final     Sodium 04/05/2019 139  133 - 144 mmol/L Final     Potassium 04/05/2019 4.4  3.4 - 5.3 mmol/L Final     Chloride 04/05/2019 107  94 - 109 mmol/L Final     Carbon Dioxide 04/05/2019 28  20 - 32 mmol/L Final      Anion Gap 04/05/2019 4  3 - 14 mmol/L Final     Glucose 04/05/2019 76  70 - 99 mg/dL Final     Urea Nitrogen 04/05/2019 17  7 - 30 mg/dL Final     Creatinine 04/05/2019 1.20* 0.52 - 1.04 mg/dL Final     GFR Estimate 04/05/2019 45* >60 mL/min/[1.73_m2] Final    Comment: Non  GFR Calc  Starting 12/18/2018, serum creatinine based estimated GFR (eGFR) will be   calculated using the Chronic Kidney Disease Epidemiology Collaboration   (CKD-EPI) equation.       GFR Estimate If Black 04/05/2019 52* >60 mL/min/[1.73_m2] Final    Comment:  GFR Calc  Starting 12/18/2018, serum creatinine based estimated GFR (eGFR) will be   calculated using the Chronic Kidney Disease Epidemiology Collaboration   (CKD-EPI) equation.       Calcium 04/05/2019 9.2  8.5 - 10.1 mg/dL Final     Cholesterol 04/05/2019 155  <200 mg/dL Final     Triglycerides 04/05/2019 125  <150 mg/dL Final     HDL Cholesterol 04/05/2019 49* >49 mg/dL Final     LDL Cholesterol Calculated 04/05/2019 81  <100 mg/dL Final    Desirable:       <100 mg/dl     Non HDL Cholesterol 04/05/2019 106  <130 mg/dL Final     TSH 04/05/2019 3.95  0.40 - 4.00 mU/L Final     Creatinine Urine 04/05/2019 144  mg/dL Final     Albumin Urine mg/L 04/05/2019 67  mg/L Final     Albumin Urine mg/g Cr 04/05/2019 46.25* 0 - 25 mg/g Cr Final     Echo 4/11/19:  Interpretation Summary  Global and regional left ventricular function is normal with an EF of 60-65%.  Left ventricular hypertrophy.  Diastolic function and filling pressures indeterminate.  Right ventricular function, chamber size, wall motion, and thickness are  normal.  No significant valvular abnormalities.  IVC collapses with inspiration suggesting normal RA pressure.  No pericardial effusion is present.    Chest xray negative for infiltrate or mass on my interpretation, radiology read pending.       ASSESSMENT/PLAN:                                                    (R06.09) DIAZ (dyspnea on exertion)   (primary encounter diagnosis)  Comment: echo with normal EF, r/o HF (thought intermediate filling pressures HFPEF a possibility), neg CXR today ruling out infiltrate or lung mass, labs today stable, no HALI or anemia.  Still consider CAD/anginal equivalent, obstructive lung disease (minimal smoking hx makes COPD less likely, unlikely to have new dx asthma at this age).  Also consider exercise intolerance/obesity hypoventilation   Plan: XR Chest 2 Views, Echocardiogram Dobutamine         Stress        Stress test scheduled for next week, she would like to be called with the results.  If normal we will initiate graded exercise program with close monitoring of symptoms, will pursue pulm eval if worsening/not improving.      (I10) Hypertension goal BP (blood pressure) < 140/90  Comment: controlled  Plan: will increase lisinopril dose for renal protection per below    (R60.9) Peripheral edema  Comment: suspect dependent edema, reassuring echo and labs  Plan: discussed wt loss, compression, elevation    (R53.83) Fatigue, unspecified type  Comment: concern for underlying HUGO  Plan: she will schedule sleep study after above cardiac workup     (N18.3) CKD (chronic kidney disease) stage 3, GFR 30-59 ml/min (H)  Comment:   Plan: lisinopril (PRINIVIL/ZESTRIL) 10 MG tablet,         **Basic metabolic panel FUTURE anytime        Increase lisinopril to 10mg daily, follow up lab check in 1-2 weeks        See Patient Instructions    Sapphire Glass [facility]    Patient Instructions   1. Ultrasound of your heart looked good.  Labs were normal/stable.  Chest xray today is normal.  I think we need to move forward with a stress test to make sure there is no blockage in your heart causing the shortness of breath.      2.  If stress test is normal I would recommend starting an exercise routine and slowly increasing it (walking every day and slowly increasing the distance you walk).  I would want you to let me know if the  shortness of breath was worsening or not slowly improving.    3.  To help protect your kidneys and keep blood pressure lower increase lisinopril to 10mg once a day.  You can take two of the 5mg tabs until you are out.  Please schedule a lab recheck within 1-2 weeks.  There is a refill of 10mg tabs available at the pharmacy    4.  I do recommend you schedule a sleep study like we talked about last visit

## 2019-04-17 ENCOUNTER — OFFICE VISIT (OUTPATIENT)
Dept: FAMILY MEDICINE | Facility: CLINIC | Age: 74
End: 2019-04-17
Payer: COMMERCIAL

## 2019-04-17 ENCOUNTER — ANCILLARY PROCEDURE (OUTPATIENT)
Dept: GENERAL RADIOLOGY | Facility: CLINIC | Age: 74
End: 2019-04-17
Attending: NURSE PRACTITIONER
Payer: COMMERCIAL

## 2019-04-17 VITALS
SYSTOLIC BLOOD PRESSURE: 142 MMHG | BODY MASS INDEX: 38.4 KG/M2 | WEIGHT: 260 LBS | RESPIRATION RATE: 16 BRPM | HEART RATE: 79 BPM | OXYGEN SATURATION: 98 % | TEMPERATURE: 98.6 F | DIASTOLIC BLOOD PRESSURE: 72 MMHG

## 2019-04-17 DIAGNOSIS — I10 HYPERTENSION GOAL BP (BLOOD PRESSURE) < 140/90: ICD-10-CM

## 2019-04-17 DIAGNOSIS — R53.83 FATIGUE, UNSPECIFIED TYPE: ICD-10-CM

## 2019-04-17 DIAGNOSIS — N18.30 CKD (CHRONIC KIDNEY DISEASE) STAGE 3, GFR 30-59 ML/MIN (H): ICD-10-CM

## 2019-04-17 DIAGNOSIS — R06.09 DOE (DYSPNEA ON EXERTION): ICD-10-CM

## 2019-04-17 DIAGNOSIS — R06.09 DOE (DYSPNEA ON EXERTION): Primary | ICD-10-CM

## 2019-04-17 DIAGNOSIS — R60.0 PERIPHERAL EDEMA: ICD-10-CM

## 2019-04-17 PROCEDURE — 71046 X-RAY EXAM CHEST 2 VIEWS: CPT

## 2019-04-17 PROCEDURE — 99214 OFFICE O/P EST MOD 30 MIN: CPT | Performed by: NURSE PRACTITIONER

## 2019-04-17 RX ORDER — LISINOPRIL 10 MG/1
10 TABLET ORAL DAILY
Qty: 90 TABLET | Refills: 3 | Status: SHIPPED | OUTPATIENT
Start: 2019-04-17 | End: 2020-05-04

## 2019-04-17 NOTE — PATIENT INSTRUCTIONS
1. Ultrasound of your heart looked good.  Labs were normal/stable.  Chest xray today is normal.  I think we need to move forward with a stress test to make sure there is no blockage in your heart causing the shortness of breath.      2.  If stress test is normal I would recommend starting an exercise routine and slowly increasing it (walking every day and slowly increasing the distance you walk).  I would want you to let me know if the shortness of breath was worsening or not slowly improving.    3.  To help protect your kidneys and keep blood pressure lower increase lisinopril to 10mg once a day.  You can take two of the 5mg tabs until you are out.  Please schedule a lab recheck within 1-2 weeks.  There is a refill of 10mg tabs available at the pharmacy    4.  I do recommend you schedule a sleep study like we talked about last visit

## 2019-04-23 ENCOUNTER — HOSPITAL ENCOUNTER (OUTPATIENT)
Dept: CARDIOLOGY | Facility: CLINIC | Age: 74
Discharge: HOME | End: 2019-04-23
Attending: NURSE PRACTITIONER | Admitting: NURSE PRACTITIONER
Payer: COMMERCIAL

## 2019-04-23 DIAGNOSIS — R06.09 DOE (DYSPNEA ON EXERTION): ICD-10-CM

## 2019-04-23 PROCEDURE — 93325 DOPPLER ECHO COLOR FLOW MAPG: CPT | Mod: 26 | Performed by: INTERNAL MEDICINE

## 2019-04-23 PROCEDURE — 93321 DOPPLER ECHO F-UP/LMTD STD: CPT | Mod: 26 | Performed by: INTERNAL MEDICINE

## 2019-04-23 PROCEDURE — 25000128 H RX IP 250 OP 636: Performed by: NURSE PRACTITIONER

## 2019-04-23 PROCEDURE — 25500064 ZZH RX 255 OP 636: Performed by: NURSE PRACTITIONER

## 2019-04-23 PROCEDURE — 93016 CV STRESS TEST SUPVJ ONLY: CPT | Performed by: INTERNAL MEDICINE

## 2019-04-23 PROCEDURE — 25000125 ZZHC RX 250: Performed by: NURSE PRACTITIONER

## 2019-04-23 PROCEDURE — 40000264 ECHO STRESS ECHOCARDIOGRAM

## 2019-04-23 PROCEDURE — 93018 CV STRESS TEST I&R ONLY: CPT | Performed by: INTERNAL MEDICINE

## 2019-04-23 PROCEDURE — 93350 STRESS TTE ONLY: CPT | Mod: 26 | Performed by: INTERNAL MEDICINE

## 2019-04-23 RX ORDER — SODIUM CHLORIDE 9 MG/ML
INJECTION, SOLUTION INTRAVENOUS CONTINUOUS
Status: ACTIVE | OUTPATIENT
Start: 2019-04-23 | End: 2019-04-23

## 2019-04-23 RX ORDER — DOBUTAMINE HYDROCHLORIDE 200 MG/100ML
10-50 INJECTION INTRAVENOUS CONTINUOUS
Status: ACTIVE | OUTPATIENT
Start: 2019-04-23 | End: 2019-04-23

## 2019-04-23 RX ORDER — ATROPINE SULFATE 0.4 MG/ML
.2-2 AMPUL (ML) INJECTION
Status: COMPLETED | OUTPATIENT
Start: 2019-04-23 | End: 2019-04-23

## 2019-04-23 RX ORDER — METOPROLOL TARTRATE 1 MG/ML
1-20 INJECTION, SOLUTION INTRAVENOUS
Status: ACTIVE | OUTPATIENT
Start: 2019-04-23 | End: 2019-04-23

## 2019-04-23 RX ADMIN — PERFLUTREN 6 ML: 6.52 INJECTION, SUSPENSION INTRAVENOUS at 14:27

## 2019-04-23 RX ADMIN — METOPROLOL TARTRATE 2 MG: 5 INJECTION INTRAVENOUS at 14:26

## 2019-04-23 RX ADMIN — ATROPINE SULFATE 0.1 MG: 0.4 INJECTION, SOLUTION INTRAMUSCULAR; INTRAVENOUS; SUBCUTANEOUS at 14:21

## 2019-04-23 RX ADMIN — DOBUTAMINE IN DEXTROSE 10 MCG/KG/MIN: 200 INJECTION, SOLUTION INTRAVENOUS at 14:13

## 2019-04-23 NOTE — LETTER
2019      Romayne L Sathre  [address]  [facility] [address]        Romayne,    Here are the results of your stress test which was normal as we discussed on the phone.  Lets try upping the exercise level and see if the breathing improves.    Results for orders placed or performed during the hospital encounter of 19   Echocardiogram Dobutamine Stress    Narrative    [id number]  [id number]  [id number]  [id number]^JESENIA^ELI^TIFFANY           [facility],[place]  Echocardiography Laboratory  [address] [place] [id number]     Name: SATHRE, ROMAYNE L  MRN: [id number]  : 1945  Study Date: 2019 02:08 PM  Age: 73 yrs  Gender: Female  Patient Location: [facility]  Reason For Study: DIAZ (dyspnea on exertion)  Ordering Physician: ELI BA  Referring Physician: ELI BA  Performed By: Baltazar Palmer RDCS     BSA: 2.3 m2  Height: 69 in  Weight: 260 lb  BP: 176/86 mmHg  _____________________________________________________________________________  __     _____________________________________________________________________________  __        Interpretation Summary  Dobutamine stress echocardiogram with no inducible ischemia.     Target heart rate achieved. Normal blood pressure response to dobutamine  No anginal symptoms during stress test.  No ECG evidence of ischemia at rest or with dobutamine.  Normal segmental and global LV function with EF of approximately 55-60% at  rest, with dobutamine left ventricular cavity size decreases and LVEF  increases to 65-70%.  No stress induced regional wall motion abnormalities.     Normal aortic root and no significant valvular dysfunction noted on screening  2D and Doppler examination.  _____________________________________________________________________________  __     Stress  The drug infusion was stopped due to target heart rate achieved.  The patient did not exhibit any symptoms during drug  infusion.  The maximum dose of dobutamine was 30mcg/kg/min.  The maximum dose of atropine was 0.1mg.  The maximum dose of metoprolol was 2mg.  Definity (NDC #05770-469-25) given intravenously.  Patient was given 6ml mixture of 1.5ml Definity and 8.5ml saline.  4 ml wasted.  Definity Expiration 2019 .  Definity Lot # 6229 .  There was a normal BP response to drug infusion.     Stress Results                                       Maximum Predicted HR:   148 bpm             Target HR: 126 bpm        % Maximum Predicted HR: 92 %                             Stage  DurationHeart Rate  BP                                 (mm:ss)   (bpm)                         Baseline            64    176/86                           Peak   10:40     136    153/64                           Stress Duration:   10:40 mm:ss *                     Maximum Stress HR: 136 bpm *     Left Ventricle  The left ventricle is normal in size. Left ventricular systolic function is  normal.     Right Ventricle  The right ventricle is normal in size and function.     Mitral Valve  The mitral valve is normal in structure and function.        Tricuspid Valve  The tricuspid valve is normal in structure and function.     Aortic Valve  The aortic valve is normal in structure and function.     Vessels  The aortic root is normal size.     Pericardium  There is no pericardial effusion.     Procedure  Dobutamine Echo Complete. Contrast Definity.     _____________________________________________________________________________  __  MMode/2D Measurements & Calculations  asc Aorta Diam: 3.2 cm           Doppler Measurements & Calculations  TR max judy: 285.0 cm/sec  TR max P.5 mmHg           _____________________________________________________________________________  __           Report approved by: Flako Jean 2019 04:51 PM          Sincerely,      -Sapphire Glass APRN CNP/jln

## 2019-04-23 NOTE — PROGRESS NOTES
Pt here for dobutamine stress test.  Test, meds and side effects reviewed with patient.  Achieved target HR at 30 mcg Dobutamine and a total of 0.1mg IV atropine.  Gave a total of 2mg IV Metoprolol to bring HR back to baseline.  Post monitoring complete and VSS.  Pt escorted out to the gold waiting room.

## 2019-04-24 ENCOUNTER — TELEPHONE (OUTPATIENT)
Dept: FAMILY MEDICINE | Facility: CLINIC | Age: 74
End: 2019-04-24

## 2019-04-24 DIAGNOSIS — R40.0 HAS DAYTIME DROWSINESS: ICD-10-CM

## 2019-04-24 DIAGNOSIS — N18.30 CKD (CHRONIC KIDNEY DISEASE) STAGE 3, GFR 30-59 ML/MIN (H): ICD-10-CM

## 2019-04-24 LAB
ANION GAP SERPL CALCULATED.3IONS-SCNC: 7 MMOL/L (ref 3–14)
BUN SERPL-MCNC: 18 MG/DL (ref 7–30)
CALCIUM SERPL-MCNC: 8.9 MG/DL (ref 8.5–10.1)
CHLORIDE SERPL-SCNC: 106 MMOL/L (ref 94–109)
CO2 SERPL-SCNC: 27 MMOL/L (ref 20–32)
CREAT SERPL-MCNC: 1.19 MG/DL (ref 0.52–1.04)
GFR SERPL CREATININE-BSD FRML MDRD: 45 ML/MIN/{1.73_M2}
GLUCOSE SERPL-MCNC: 126 MG/DL (ref 70–99)
POTASSIUM SERPL-SCNC: 4.5 MMOL/L (ref 3.4–5.3)
SODIUM SERPL-SCNC: 140 MMOL/L (ref 133–144)

## 2019-04-24 PROCEDURE — 80048 BASIC METABOLIC PNL TOTAL CA: CPT | Performed by: NURSE PRACTITIONER

## 2019-04-24 PROCEDURE — 36415 COLL VENOUS BLD VENIPUNCTURE: CPT | Performed by: NURSE PRACTITIONER

## 2019-04-24 NOTE — TELEPHONE ENCOUNTER
Patient did return my call and we reviewed her normal stress test results, discussed graded exercise program for shortness of breath.    Sapphire Glass, CNP

## 2019-04-24 NOTE — TELEPHONE ENCOUNTER
Called pt home and mobile number and left vm to call back and discuss stress test results.    Sapphire Glass, CNP

## 2019-04-24 NOTE — LETTER
April 25, 2019      Romayne L Sathre  [address]  [facility] [address]          Romayne,    The results of your follow up labs look good!  Results for orders placed or performed in visit on 04/24/19   **Basic metabolic panel FUTURE anytime   Result Value Ref Range    Sodium 140 133 - 144 mmol/L    Potassium 4.5 3.4 - 5.3 mmol/L    Chloride 106 94 - 109 mmol/L    Carbon Dioxide 27 20 - 32 mmol/L    Anion Gap 7 3 - 14 mmol/L    Glucose 126 (H) 70 - 99 mg/dL    Urea Nitrogen 18 7 - 30 mg/dL    Creatinine 1.19 (H) 0.52 - 1.04 mg/dL    GFR Estimate 45 (L) >60 mL/min/[1.73_m2]    GFR Estimate If Black 53 (L) >60 mL/min/[1.73_m2]    Calcium 8.9 8.5 - 10.1 mg/dL             Sincerely,        Sapphire Glass, APRN CNP/jln

## 2019-04-24 NOTE — RESULT ENCOUNTER NOTE
Please send out result letter with the following note, thanks.    Romayne,    Follow up labs look good.    -Sapphire Glass, CNP

## 2019-05-22 NOTE — LETTER
April 9, 2019      Romayne L Sathre  [address]  [facility] [address]        Dear ,    We are writing to inform you of your test results.    Kidney function is down just a bit.  Staying hydrated, taking your lisinopril, and avoiding NSAID medications (ibuprofen, naproxen, aleve) helps keep kidneys healthy.  We might consider increasing your lisinopril dose next visit.     Normal thyroid, cholesterol looks good, normal blood sugar.  White count continues to be low as it has been for a long time.     Resulted Orders   CBC with platelets   Result Value Ref Range    WBC 2.1 (L) 4.0 - 11.0 10e9/L    RBC Count 4.72 3.8 - 5.2 10e12/L    Hemoglobin 13.5 11.7 - 15.7 g/dL    Hematocrit 42.3 35.0 - 47.0 %    MCV 90 78 - 100 fl    MCH 28.6 26.5 - 33.0 pg    MCHC 31.9 31.5 - 36.5 g/dL    RDW 13.6 10.0 - 15.0 %    Platelet Count 201 150 - 450 10e9/L   Basic metabolic panel   Result Value Ref Range    Sodium 139 133 - 144 mmol/L    Potassium 4.4 3.4 - 5.3 mmol/L    Chloride 107 94 - 109 mmol/L    Carbon Dioxide 28 20 - 32 mmol/L    Anion Gap 4 3 - 14 mmol/L    Glucose 76 70 - 99 mg/dL    Urea Nitrogen 17 7 - 30 mg/dL    Creatinine 1.20 (H) 0.52 - 1.04 mg/dL    GFR Estimate 45 (L) >60 mL/min/[1.73_m2]      Comment:      Non  GFR Calc  Starting 12/18/2018, serum creatinine based estimated GFR (eGFR) will be   calculated using the Chronic Kidney Disease Epidemiology Collaboration   (CKD-EPI) equation.      GFR Estimate If Black 52 (L) >60 mL/min/[1.73_m2]      Comment:       GFR Calc  Starting 12/18/2018, serum creatinine based estimated GFR (eGFR) will be   calculated using the Chronic Kidney Disease Epidemiology Collaboration   (CKD-EPI) equation.      Calcium 9.2 8.5 - 10.1 mg/dL   Lipid panel reflex to direct LDL Fasting   Result Value Ref Range    Cholesterol 155 <200 mg/dL    Triglycerides 125 <150 mg/dL    HDL Cholesterol 49 (L) >49 mg/dL    LDL Cholesterol Calculated 81 <100  mg/dL      Comment:      Desirable:       <100 mg/dl    Non HDL Cholesterol 106 <130 mg/dL   TSH with free T4 reflex   Result Value Ref Range    TSH 3.95 0.40 - 4.00 mU/L   Albumin Random Urine Quantitative with Creat Ratio   Result Value Ref Range    Creatinine Urine 144 mg/dL    Albumin Urine mg/L 67 mg/L    Albumin Urine mg/g Cr 46.25 (H) 0 - 25 mg/g Cr       If you have any questions or concerns, please call the clinic at the number listed above.       Sincerely,        Sapphire Glass, APRN CNP/nr                 various height poles. Pt. required encouragement to reach the 3rd pole with B hands. Pt. was able to reach the 3rd pole with B hands. Pt. then removed the rings and placed in a bucket, completing one side at a time. Pt. required increased time to complete the task but tolerated the weights well. Plan   Plan  Times per week: 5-7x/wk   Plan weeks: 1-2  Current Treatment Recommendations: Strengthening, Balance Training, Functional Mobility Training, Pain Management, Safety Education & Training, Self-Care / ADL, Equipment Evaluation, Education, & procurement, Patient/Caregiver Education & Training, Positioning, Home Management Training, Endurance Training  Plan Comment: cont current POC    Goals  Patient Goals   Patient goals : \"To get stronger or something. \"        Therapy Time   Individual Concurrent Group Co-treatment   Time In 1000         Time Out [address] [place] [address] Electronically signed by EDMUNDO Sung on 5/22/2019 at 12:39 PM

## 2020-02-10 ENCOUNTER — TELEPHONE (OUTPATIENT)
Dept: FAMILY MEDICINE | Facility: CLINIC | Age: 75
End: 2020-02-10

## 2020-02-10 NOTE — TELEPHONE ENCOUNTER
Panel Management Review      Patient has the following on her problem list: None      Composite cancer screening  Chart review shows that this patient is due/due soon for the following None  Summary:    Patient is due/failing the following:   BP CHECK    Action needed:   Patient needs nurse only appointment.    Type of outreach:    Phone, left message for patient to call back.  -vivian cortez     Questions for provider review:    None                                                                                                                                    Talia Noonan MA       Chart routed to Care Team .

## 2020-02-11 ENCOUNTER — ALLIED HEALTH/NURSE VISIT (OUTPATIENT)
Dept: NURSING | Facility: CLINIC | Age: 75
End: 2020-02-11
Payer: COMMERCIAL

## 2020-02-11 VITALS — OXYGEN SATURATION: 99 % | HEART RATE: 80 BPM | SYSTOLIC BLOOD PRESSURE: 132 MMHG | DIASTOLIC BLOOD PRESSURE: 78 MMHG

## 2020-02-11 DIAGNOSIS — I10 HYPERTENSION GOAL BP (BLOOD PRESSURE) < 140/90: Primary | ICD-10-CM

## 2020-02-11 PROCEDURE — 99207 ZZC NO CHARGE NURSE ONLY: CPT

## 2020-02-11 NOTE — NURSING NOTE
Romayne L Sathre is a 74 year old patient who comes in today for a Blood Pressure check.  Initial BP:  /78 (BP Location: Left arm, Patient Position: Sitting, Cuff Size: Adult Regular)   Pulse 80   SpO2 99%      80  Disposition: follow-up as previously indicated by provider and results routed to provider    Charisse Patterson MA on 2/11/2020 at 2:59 PM

## 2020-03-03 ENCOUNTER — TRANSFERRED RECORDS (OUTPATIENT)
Dept: HEALTH INFORMATION MANAGEMENT | Facility: CLINIC | Age: 75
End: 2020-03-03

## 2020-04-05 DIAGNOSIS — E03.9 ACQUIRED HYPOTHYROIDISM: ICD-10-CM

## 2020-04-07 RX ORDER — LEVOTHYROXINE SODIUM 150 UG/1
TABLET ORAL
Qty: 90 TABLET | Refills: 0 | Status: SHIPPED | OUTPATIENT
Start: 2020-04-07 | End: 2020-05-22

## 2020-04-07 NOTE — TELEPHONE ENCOUNTER
"Last Written Prescription Date:  4/5/19  Last Fill Quantity: 90,  # refills: 3   Last office visit: 4/17/2019 with prescribing provider:     Future Office Visit:    Requested Prescriptions   Pending Prescriptions Disp Refills     levothyroxine (SYNTHROID/LEVOTHROID) 150 MCG tablet [Pharmacy Med Name: LEVOTHYROXINE SODIUM 150MCG TABS] 90 tablet 3     Sig: TAKE ONE TABLET BY MOUTH ONCE DAILY       Thyroid Protocol Failed - 4/7/2020 11:41 AM        Failed - Normal TSH on file in past 12 months     Recent Labs   Lab Test 04/05/19  1227   TSH 3.95              Passed - Patient is 12 years or older        Passed - Recent (12 mo) or future (30 days) visit within the authorizing provider's specialty     Patient has had an office visit with the authorizing provider or a provider within the authorizing providers department within the previous 12 mos or has a future within next 30 days. See \"Patient Info\" tab in inbasket, or \"Choose Columns\" in Meds & Orders section of the refill encounter.              Passed - Medication is active on med list        Passed - No active pregnancy on record     If patient is pregnant or has had a positive pregnancy test, please check TSH.          Passed - No positive pregnancy test in past 12 months     If patient is pregnant or has had a positive pregnancy test, please check TSH.                   "

## 2020-04-28 DIAGNOSIS — N18.30 CKD (CHRONIC KIDNEY DISEASE) STAGE 3, GFR 30-59 ML/MIN (H): ICD-10-CM

## 2020-04-30 NOTE — TELEPHONE ENCOUNTER
Medication is being filled for 1 time refill only due to:  Patient needs labs to be ordered. Patient needs to be seen because it has been more than one year since last visit.   Please schedule.  Mary Terry RN

## 2020-05-04 DIAGNOSIS — N18.30 CKD (CHRONIC KIDNEY DISEASE) STAGE 3, GFR 30-59 ML/MIN (H): ICD-10-CM

## 2020-05-04 RX ORDER — LISINOPRIL 10 MG/1
TABLET ORAL
Qty: 90 TABLET | Refills: 0 | OUTPATIENT
Start: 2020-05-04

## 2020-05-04 RX ORDER — LISINOPRIL 10 MG/1
10 TABLET ORAL DAILY
Qty: 30 TABLET | Refills: 0 | Status: SHIPPED | OUTPATIENT
Start: 2020-05-04 | End: 2020-05-22

## 2020-05-04 NOTE — TELEPHONE ENCOUNTER
DORY Glass  --Please contact patient and ask patient to schedule a virtual visit for this refill request.    --Due for annual office visit.    --She has only one pill left for tomorrow, so if you are not able to schedule her today or tomorrow, route to provider because patient has abnormal lab.      Creatinine   Date Value Ref Range Status   04/24/2019 1.19 (H) 0.52 - 1.04 mg/dL Final   04/05/2019 1.20 (H) 0.52 - 1.04 mg/dL Final   05/09/2018 1.08 (H) 0.52 - 1.04 mg/dL Final   04/26/2017 1.16 (H) 0.52 - 1.04 mg/dL Final   03/31/2017 1.11 (H) 0.52 - 1.04 mg/dL Final

## 2020-05-04 NOTE — TELEPHONE ENCOUNTER
She only has one pill left for tomorrow.     Thank You,    [facility] [place]

## 2020-05-04 NOTE — TELEPHONE ENCOUNTER
Refilled x 1mo, agree with virtual visit for follow up as suggested by triage.    Sapphire Glass, CNP

## 2020-05-19 ENCOUNTER — VIRTUAL VISIT (OUTPATIENT)
Dept: FAMILY MEDICINE | Facility: CLINIC | Age: 75
End: 2020-05-19
Payer: COMMERCIAL

## 2020-05-19 DIAGNOSIS — R41.3 MEMORY DEFICIT: ICD-10-CM

## 2020-05-19 DIAGNOSIS — E66.01 MORBID OBESITY (H): ICD-10-CM

## 2020-05-19 DIAGNOSIS — Z85.3 HISTORY OF BREAST CANCER: ICD-10-CM

## 2020-05-19 DIAGNOSIS — E03.9 ACQUIRED HYPOTHYROIDISM: ICD-10-CM

## 2020-05-19 DIAGNOSIS — R12 HEART BURN: ICD-10-CM

## 2020-05-19 DIAGNOSIS — E78.5 HYPERLIPIDEMIA LDL GOAL <100: ICD-10-CM

## 2020-05-19 DIAGNOSIS — R60.9 DEPENDENT EDEMA: ICD-10-CM

## 2020-05-19 DIAGNOSIS — Z12.11 SCREEN FOR COLON CANCER: ICD-10-CM

## 2020-05-19 DIAGNOSIS — R53.81 PHYSICAL DECONDITIONING: ICD-10-CM

## 2020-05-19 DIAGNOSIS — N18.30 CKD (CHRONIC KIDNEY DISEASE) STAGE 3, GFR 30-59 ML/MIN (H): ICD-10-CM

## 2020-05-19 DIAGNOSIS — I10 HYPERTENSION GOAL BP (BLOOD PRESSURE) < 140/90: Primary | ICD-10-CM

## 2020-05-19 DIAGNOSIS — R73.09 ELEVATED GLUCOSE: ICD-10-CM

## 2020-05-19 DIAGNOSIS — R53.82 CHRONIC FATIGUE: ICD-10-CM

## 2020-05-19 DIAGNOSIS — D70.9 NEUTROPENIA, UNSPECIFIED TYPE (H): ICD-10-CM

## 2020-05-19 PROCEDURE — 99214 OFFICE O/P EST MOD 30 MIN: CPT | Mod: 95 | Performed by: FAMILY MEDICINE

## 2020-05-19 RX ORDER — FAMOTIDINE 20 MG/1
20 TABLET, FILM COATED ORAL DAILY
Status: ON HOLD | COMMUNITY
End: 2020-11-23

## 2020-05-19 RX ORDER — CETIRIZINE HYDROCHLORIDE 10 MG/1
10 TABLET ORAL DAILY
COMMUNITY

## 2020-05-19 NOTE — PROGRESS NOTES
"Subjective     Romayne L Sathre is a 74 year old female who presents to clinic today for the following health issues:    HPI   Hypertension Follow-up      Do you check your blood pressure regularly outside of the clinic? No     Are you following a low salt diet? Yes    Are your blood pressures ever more than 140 on the top number (systolic) OR more   than 90 on the bottom number (diastolic), for example 140/90? No      How many servings of fruits and vegetables do you eat daily?  2-3    On average, how many sweetened beverages do you drink each day (Examples: soda, juice, sweet tea, etc.  Do NOT count diet or artificially sweetened beverages)?   0    How many days per week do you exercise enough to make your heart beat faster? 3 or less    How many minutes a day do you exercise enough to make your heart beat faster? 30 - 60    How many days per week do you miss taking your medication? 0    {additonal problems for provider to add (Optional):555718}    {HIST REVIEW/ LINKS 2 (Optional):425723}    {Additional problems for the provider to add (optional):207676}  Reviewed and updated as needed this visit by Provider         Review of Systems   {ROS COMP (Optional):715159}      Objective    There were no vitals taken for this visit.  There is no height or weight on file to calculate BMI.  Physical Exam   {Exam List (Optional):107344}    {Diagnostic Test Results (Optional):239762::\"Diagnostic Test Results:\",\"Labs reviewed in Epic\"}        {PROVIDER CHARTING PREFERENCE:857801}  .tel      "

## 2020-05-19 NOTE — PATIENT INSTRUCTIONS
Labs are due and ordered.  Please do them this week and then we can reorder your medications for longer based on results in case dose needs to be adjusted.  Follow-up with Sapphire leo primary in 6 months or earlier.  Consider sleep evaluation if fatigue recurs.  Stay active and keep feet elevated if you have any dependent edema.  Take your cholesterol medication and blood pressure pill at bedtime and your thyroid medication in the morning.  I have updated your med list please review it if there is any discrepancies let us know.  Continue routine care with your primary Sapphire Glass.

## 2020-05-19 NOTE — NURSING NOTE
Staff called to make appt for pt. Unable to reach pt. Left a vm for pt to call us back to schedule lab appt.     Talia Noonan MA

## 2020-05-19 NOTE — PROGRESS NOTES
"  Romayne L Sathre is a 74 year old female who is being evaluated via a billable telephone visit.      The patient has been notified of following:     \"This telephone visit will be conducted via a call between you and your physician/provider. We have found that certain health care needs can be provided without the need for a physical exam.  This service lets us provide the care you need with a short phone conversation.  If a prescription is necessary we can send it directly to your pharmacy.  If lab work is needed we can place an order for that and you can then stop by our lab to have the test done at a later time.    Telephone visits are billed at different rates depending on your insurance coverage. During this emergency period, for some insurers they may be billed the same as an in-person visit.  Please reach out to your insurance provider with any questions.    If during the course of the call the physician/provider feels a telephone visit is not appropriate, you will not be charged for this service.\"    Patient has given verbal consent for Telephone visit?  Yes    What phone number would you like to be contacted at?     How would you like to obtain your AVS? Mail a copy    Subjective     Romayne L Sathre is a 74 year old female who presents via phone visit today for the following health issues:    HPI  Hypertension Follow-up    Do you check your blood pressure regularly outside of the clinic? No     Are you following a low salt diet? Yes    Are your blood pressures ever more than 140 on the top number (systolic) OR more   than 90 on the bottom number (diastolic), for example 140/90? No    How many servings of fruits and vegetables do you eat daily?  2-3    On average, how many sweetened beverages do you drink each day (Examples: soda, juice, sweet tea, etc.  Do NOT count diet or artificially sweetened beverages)?   0    How many days per week do you exercise enough to make your heart beat faster? 3 or " less    How many minutes a day do you exercise enough to make your heart beat faster? 30 - 60    How many days per week do you miss taking your medication? 0    History of passive smoke exposure, obesity, hypertension on lisinopril, hyperlipidemia on atorvastatin, hypothyroidism on levothyroxine, CKD 3, history of memory deficit per epic, history of chronic leg edema, history of prior maxillary sinusitis, resolved cellulitis, history of idiopathic leukopenia s/p work-up that was negative by hematology in 2012, history of lipoma noted in the past, history of prior right breast cancer in 2008 s/p surgery and saline implants in remission no longer followed by oncology, history of cataract surgery, on multivitamin under care of PCP [facility],  Seen by PCP last in April 2019 for dyspnea on exertion.  Examination was unremarkable, chest x-ray was negative.  EKG was unremarkable.  Echo showed a normal EF.  Stress test done was normal.  Her lisinopril was increased and suspected to be deconditioned and advised weight loss and increase activity and referred to sleep for her history of fatigue for possible obstructive sleep apnea but was never seen by sleep.  Not seen till called for medication refill on 4/30/2020 and appointment made today with this writer.  Fit test never done.    New to this writer.    Reports taking all her meds in the morning.  Reports is no longer on a potassium pill or ranitidine which was recalled has been taking famotidine 20 mg twice a day.  HTN not checking  HLD taking meds  Thyroid overdue for labs, Needs med refill  Has enough 30 days of lisinopril   Has 90 days on other meds given in April as an up till June.  CKD3  Not on nsaids  In conversation with her difficult getting the names of the meds that she is on.  Reports her fatigue has resolved she does snore off-and-on she has some dependent edema but is trying to be more active and never did see the sleep specialist.  Denies any symptoms of  breast issues currently reports of breast cancer is in remission.  Has no concerns about her memory currently.  Reports heartburn is controlled on famotidine twice a day.  Reminded about colon cancer screening with a stool test.    Currently notes no fever or chills, no headache or dizziness, no double or blurry vision, no facial pain, earache, sore throat, runny nose, post nasal drip, no trouble hearing, smelling, tasting or swallowing, no cough , no chest pain, trouble breathing or palpitations, No abdominal pain,  nausea or vomiting or diarrhea or constipation, no blood in stools or black stools, no weight loss or night sweats. No dysuria, hematuria, frequency, urgency, hesitancy, incontinence, No pelvic complaints. No joint pain. No rash.    Lives with daughter and granddaughter     Patient Active Problem List   Diagnosis     Hypothyroidism     Lipoma of other skin and subcutaneous tissue     Hypertension goal BP (blood pressure) < 140/90     CKD (chronic kidney disease) stage 3, GFR 30-59 ml/min (H)     Hyperlipidemia LDL goal <100     Advanced directives, counseling/discussion     History of breast cancer     Leucopenia     Health Care Home     Memory deficit     Obesity (BMI 35.0-39.9) with comorbidity (H)     Dependent edema     Chronic fatigue     Physical deconditioning     Heart burn     Past Surgical History:   Procedure Laterality Date     HC REMV CATARACT EXTRACAP,INSERT LENS, W/O ECP  7/20/2007     SURGICAL HISTORY OF -   Oct 3, 2008    Rt saline implant       Social History     Tobacco Use     Smoking status: Passive Smoke Exposure - Never Smoker     Smokeless tobacco: Never Used     Tobacco comment: family members smoke; daughter   Substance Use Topics     Alcohol use: Yes     Alcohol/week: 10.0 standard drinks     Comment: 0-1 drink per month     Family History   Problem Relation Age of Onset     Diabetes Mother         dec     Respiratory Father         emphysema     Diabetes Sister      Diabetes  Brother      Family History Negative Brother         x 2     Family History Negative Sister         x 2     Psychotic Disorder Brother         depression     Kidney Disease Sister         ESRD on dialysis, due to DM         Current Outpatient Medications   Medication Sig Dispense Refill     atorvastatin (LIPITOR) 10 MG tablet Take 1 tablet (10 mg) by mouth daily 90 tablet 3     Black Pepper-Turmeric (TURMERIC COMPLEX/BLACK PEPPER PO)        cetirizine (ZYRTEC) 10 MG tablet Take 10 mg by mouth daily       famotidine (PEPCID) 20 MG tablet Take 20 mg by mouth 2 times daily       levothyroxine (SYNTHROID/LEVOTHROID) 150 MCG tablet TAKE ONE TABLET BY MOUTH ONCE DAILY 90 tablet 0     lisinopril (ZESTRIL) 10 MG tablet Take 1 tablet (10 mg) by mouth daily 30 tablet 0     Multiple Vitamin (MULTI-VITAMIN) per tablet Take 1 tablet by mouth daily.         Allergies   Allergen Reactions     No Known Drug Allergies      Recent Labs   Lab Test 04/24/19  0804 04/05/19  1227 07/23/18  0943 06/07/18  0944 05/09/18  1445 04/26/17  0902  07/19/14  0627 07/18/14  0722  07/16/14  1750   A1C  --   --   --  5.4  --   --   --   --   --   --   --    LDL  --  81  --   --  107* 148*   < >  --   --   --   --    HDL  --  49*  --   --  35* 45*   < >  --   --   --   --    TRIG  --  125  --   --  362* 214*   < >  --   --   --   --    ALT  --   --   --   --   --   --   --  28 24  --  30   CR 1.19* 1.20*  --   --  1.08* 1.16*   < > 1.21* 1.13*   < > 1.00   GFRESTIMATED 45* 45*  --   --  50* 46*   < > 44* 48*   < > 55*   GFRESTBLACK 53* 52*  --   --  60* 56*   < > 54* 58*   < > 67   POTASSIUM 4.5 4.4  --   --  4.6 5.0   < > 4.9 5.3   < > 4.3   TSH  --  3.95 0.94 0.28* 0.08* 0.44   < >  --   --   --   --     < > = values in this interval not displayed.      BP Readings from Last 3 Encounters:   02/11/20 132/78   04/17/19 142/72   04/05/19 146/65    Wt Readings from Last 3 Encounters:   04/17/19 117.9 kg (260 lb)   04/05/19 116.6 kg (257 lb)   06/12/18  111.6 kg (246 lb)                    Reviewed and updated as needed this visit by Provider  Tobacco  Allergies  Meds  Med Hx  Surg Hx  Fam Hx  Soc Hx        Review of Systems   Constitutional, HEENT, cardiovascular, pulmonary, GI, , musculoskeletal, neuro, skin, endocrine and psych systems are negative, except as otherwise noted.       Objective   Reported vitals:  There were no vitals taken for this visit.   healthy, alert, no distress and cooperative  PSYCH: Alert and oriented times 3; coherent speech, normal   rate and volume, able to articulate logical thoughts, able   to abstract reason, no tangential thoughts, no hallucinations   or delusions  Her affect is normal, pleasant and mildly forgetful about her meds but is tracking appropriately.  RESP: No cough, no audible wheezing, able to talk in full sentences  Remainder of exam unable to be completed due to telephone visits    Diagnostic Test Results:  Labs reviewed in Epic  none         Assessment/Plan:  1. Hypertension goal BP (blood pressure) < 140/90  Unknown what her blood pressure is doing currently it has been stable in the past.  Advised to get a home blood pressure monitoring kit and to check it at home.  And follow-up with primary in 3 to 6 months to have it reevaluated. Labs are over due and ordered.  Advised to please do them this week and then we can reorder her medications for longer based on results in case dose needs to be adjusted.  She has enough medications for now until June. Follow-up with [facility] primary in 6 months or earlier.  Consider sleep evaluation if fatigue recurs. Stay active and keep feet elevated if having any dependent edema. Advised to take her cholesterol medication and blood pressure pill at bedtime and her thyroid medication in the morning on an empty stomach. I have updated her med list  What she told me today there is a record of having a memory deficit in epic.  She responds appropriately but this is the  first time I am talking to up and on the telephone.  She lives with her daughter and granddaughter and there have been no concerns expressed that I know well.  She is advised to please review her med list on a AVS for any discrepancies & let us know.  To continue routine care with your primary [facility].  - Albumin Random Urine Quantitative with Creat Ratio; Future  - Comprehensive metabolic panel; Future  - TSH with free T4 reflex; Future    2. Hyperlipidemia LDL goal <100  She will do labs and then we will refill her medications based on the result.  She has enough medications currently till June.  - Lipid panel reflex to direct LDL Fasting; Future  - TSH with free T4 reflex; Future    3. Acquired hypothyroidism  She will do labs and then we will refill her medications based on the result.  She has enough medications currently till June.  - TSH with free T4 reflex; Future    4. CKD (chronic kidney disease) stage 3, GFR 30-59 ml/min (H)  Avoid NSAID's.  - Albumin Random Urine Quantitative with Creat Ratio; Future  - Comprehensive metabolic panel; Future    5. Obesity (BMI 35.0-39.9) with comorbidity (H)  Work on diet and exercise and weight loss.  - Comprehensive metabolic panel; Future  - Lipid panel reflex to direct LDL Fasting; Future  - TSH with free T4 reflex; Future    6. Dependent edema  Complete echo and examination last year and labs are unremarkable.  Advised to stay active and weight loss would help.  - CBC with platelets differential; Future  - Comprehensive metabolic panel; Future    7. Chronic fatigue  Reports resolved and declines need to see a sleep specialist.  - CBC with platelets differential; Future  - TSH with free T4 reflex; Future    8. Physical deconditioning  Working on improved activity.    9. Neutropenia, unspecified type (H)  Chronic asymptomatic worked up in the past by hematology in 2012.  - CBC with platelets differential; Future    10. Memory deficit  Denies any issue currently.   Lives with her daughter and granddaughter.  Had some difficulty discussing her medications on the phone with me but I think we clarified what she is supposed to be on and she will check the after visit summary for any discrepancies.  - TSH with free T4 reflex; Future    11. History of breast cancer  Reportedly asymptomatic and in remission.    12. Heart burn  Reports has no symptoms currently on famotidine twice a day.    13. Screen for colon cancer  Never did do the fit test last year so reordered again.  - Fecal colorectal cancer screen FIT; Future    Return in about 6 months (around 11/19/2020) for Routine Visit for chronic issues with PCP.      Phone call duration:  274 - 201 = 8 minutes    Marry Rodriguez MD

## 2020-05-21 DIAGNOSIS — I10 HYPERTENSION GOAL BP (BLOOD PRESSURE) < 140/90: ICD-10-CM

## 2020-05-21 DIAGNOSIS — R60.9 DEPENDENT EDEMA: ICD-10-CM

## 2020-05-21 DIAGNOSIS — N18.30 CKD (CHRONIC KIDNEY DISEASE) STAGE 3, GFR 30-59 ML/MIN (H): ICD-10-CM

## 2020-05-21 DIAGNOSIS — E03.9 ACQUIRED HYPOTHYROIDISM: ICD-10-CM

## 2020-05-21 DIAGNOSIS — D70.9 NEUTROPENIA, UNSPECIFIED TYPE (H): ICD-10-CM

## 2020-05-21 DIAGNOSIS — R41.3 MEMORY DEFICIT: ICD-10-CM

## 2020-05-21 DIAGNOSIS — R53.82 CHRONIC FATIGUE: ICD-10-CM

## 2020-05-21 DIAGNOSIS — E78.5 HYPERLIPIDEMIA LDL GOAL <100: ICD-10-CM

## 2020-05-21 DIAGNOSIS — E66.01 MORBID OBESITY (H): ICD-10-CM

## 2020-05-21 PROCEDURE — 36415 COLL VENOUS BLD VENIPUNCTURE: CPT | Performed by: FAMILY MEDICINE

## 2020-05-21 PROCEDURE — 85025 COMPLETE CBC W/AUTO DIFF WBC: CPT | Performed by: FAMILY MEDICINE

## 2020-05-21 PROCEDURE — 80053 COMPREHEN METABOLIC PANEL: CPT | Performed by: FAMILY MEDICINE

## 2020-05-21 PROCEDURE — 83721 ASSAY OF BLOOD LIPOPROTEIN: CPT | Mod: 59 | Performed by: FAMILY MEDICINE

## 2020-05-21 PROCEDURE — 84443 ASSAY THYROID STIM HORMONE: CPT | Performed by: FAMILY MEDICINE

## 2020-05-21 PROCEDURE — 82043 UR ALBUMIN QUANTITATIVE: CPT | Performed by: FAMILY MEDICINE

## 2020-05-21 PROCEDURE — 80061 LIPID PANEL: CPT | Performed by: FAMILY MEDICINE

## 2020-05-21 PROCEDURE — 84439 ASSAY OF FREE THYROXINE: CPT | Performed by: FAMILY MEDICINE

## 2020-05-22 ENCOUNTER — TELEPHONE (OUTPATIENT)
Dept: FAMILY MEDICINE | Facility: CLINIC | Age: 75
End: 2020-05-22

## 2020-05-22 DIAGNOSIS — E03.9 ACQUIRED HYPOTHYROIDISM: ICD-10-CM

## 2020-05-22 DIAGNOSIS — N18.30 CKD (CHRONIC KIDNEY DISEASE) STAGE 3, GFR 30-59 ML/MIN (H): ICD-10-CM

## 2020-05-22 DIAGNOSIS — E78.5 HYPERLIPIDEMIA LDL GOAL <100: ICD-10-CM

## 2020-05-22 LAB
ALBUMIN SERPL-MCNC: 3.4 G/DL (ref 3.4–5)
ALP SERPL-CCNC: 49 U/L (ref 40–150)
ALT SERPL W P-5'-P-CCNC: 32 U/L (ref 0–50)
ANION GAP SERPL CALCULATED.3IONS-SCNC: 7 MMOL/L (ref 3–14)
AST SERPL W P-5'-P-CCNC: 22 U/L (ref 0–45)
BASOPHILS # BLD AUTO: 0 10E9/L (ref 0–0.2)
BILIRUB SERPL-MCNC: 0.3 MG/DL (ref 0.2–1.3)
BUN SERPL-MCNC: 24 MG/DL (ref 7–30)
CALCIUM SERPL-MCNC: 8.6 MG/DL (ref 8.5–10.1)
CHLORIDE SERPL-SCNC: 106 MMOL/L (ref 94–109)
CHOLEST SERPL-MCNC: 162 MG/DL
CO2 SERPL-SCNC: 26 MMOL/L (ref 20–32)
CREAT SERPL-MCNC: 1.15 MG/DL (ref 0.52–1.04)
CREAT UR-MCNC: 120 MG/DL
DACRYOCYTES BLD QL SMEAR: ABNORMAL
DIFFERENTIAL METHOD BLD: ABNORMAL
EOSINOPHIL # BLD AUTO: 0.1 10E9/L (ref 0–0.7)
ERYTHROCYTE [DISTWIDTH] IN BLOOD BY AUTOMATED COUNT: 13.4 % (ref 10–15)
GFR SERPL CREATININE-BSD FRML MDRD: 47 ML/MIN/{1.73_M2}
GLUCOSE SERPL-MCNC: 146 MG/DL (ref 70–99)
HCT VFR BLD AUTO: 41 % (ref 35–47)
HDLC SERPL-MCNC: 31 MG/DL
HGB BLD-MCNC: 12.8 G/DL (ref 11.7–15.7)
LDLC SERPL CALC-MCNC: ABNORMAL MG/DL
LDLC SERPL DIRECT ASSAY-MCNC: 76 MG/DL
LYMPHOCYTES # BLD AUTO: 1 10E9/L (ref 0.8–5.3)
MCH RBC QN AUTO: 28.9 PG (ref 26.5–33)
MCHC RBC AUTO-ENTMCNC: 31.2 G/DL (ref 31.5–36.5)
MCV RBC AUTO: 93 FL (ref 78–100)
MICROALBUMIN UR-MCNC: 13 MG/L
MICROALBUMIN/CREAT UR: 10.92 MG/G CR (ref 0–25)
MONOCYTES # BLD AUTO: 0.6 10E9/L (ref 0–1.3)
NEUTROPHILS # BLD AUTO: 0.5 10E9/L (ref 1.6–8.3)
NONHDLC SERPL-MCNC: 131 MG/DL
PLATELET # BLD AUTO: 200 10E9/L (ref 150–450)
POIKILOCYTOSIS BLD QL SMEAR: ABNORMAL
POTASSIUM SERPL-SCNC: 4.4 MMOL/L (ref 3.4–5.3)
PROT SERPL-MCNC: 7.6 G/DL (ref 6.8–8.8)
RBC # BLD AUTO: 4.43 10E12/L (ref 3.8–5.2)
SODIUM SERPL-SCNC: 139 MMOL/L (ref 133–144)
T4 FREE SERPL-MCNC: 1.57 NG/DL (ref 0.76–1.46)
TRIGL SERPL-MCNC: 466 MG/DL
TSH SERPL DL<=0.005 MIU/L-ACNC: 10.36 MU/L (ref 0.4–4)
WBC # BLD AUTO: 2.2 10E9/L (ref 4–11)

## 2020-05-22 RX ORDER — ATORVASTATIN CALCIUM 10 MG/1
10 TABLET, FILM COATED ORAL DAILY
Qty: 90 TABLET | Refills: 1 | Status: SHIPPED | OUTPATIENT
Start: 2020-05-22 | End: 2020-12-08

## 2020-05-22 RX ORDER — LISINOPRIL 10 MG/1
10 TABLET ORAL DAILY
Qty: 90 TABLET | Refills: 1 | Status: ON HOLD | OUTPATIENT
Start: 2020-05-22 | End: 2020-11-23

## 2020-05-22 RX ORDER — LEVOTHYROXINE SODIUM 150 UG/1
150 TABLET ORAL DAILY
Qty: 90 TABLET | Refills: 0 | Status: SHIPPED | OUTPATIENT
Start: 2020-05-22 | End: 2020-06-30

## 2020-05-22 NOTE — RESULT ENCOUNTER NOTE
-Liver and gallbladder tests (ALT,AST, Alk phos,bilirubin) are normal.  -Kidney function (GFR) is decreased.  ADVISE: but similar to prior, avoid anti inflammatories to protect your kidneys.   -Sodium is normal.  -Potassium is normal.  -Calcium is normal.  - glucose is elevated, will add HBA1c so we can check for diabetes. rest of labs not back

## 2020-05-22 NOTE — TELEPHONE ENCOUNTER
Referral info given to pt and mailed  We went over the message below from Dr Rodriguez-  And patient verbalized her understanding.     Thanks!     Karen Hdz RN

## 2020-05-22 NOTE — TELEPHONE ENCOUNTER
CBC shows a low white count but stable to prior.  CMP shows decreased kidney function similar to prior recommend avoiding NSAIDs.  Glucose is elevated please add hemoglobin A1c to labs to check for diabetes.  Rest of electrolytes and liver tests were normal.  Microalbumin now normal continue lisinopril which protects the kidneys a refill will be sent on that.  Triglycerides are elevated LDL was cool HDL was low.  Continue statin at current dose and a refill will be sent.  TSH is suggestive of thyroid being undercorrected but free T4 suggests it is overcorrected so this is a confusing picture.  Recommend seeing an endocrinologist given this conflict but recheck TSH and free T4 in 6 weeks to further determine what to do next in the meantime continue same dose of levothyroxine 150 mcg  Please close encounter when done.

## 2020-05-22 NOTE — TELEPHONE ENCOUNTER
LVM for patient to call clinic and ask for available triage nurse.     See Provider message below reagarding lab results from 5/21.      Thanks! Milly Ruano RN

## 2020-05-26 ENCOUNTER — TELEPHONE (OUTPATIENT)
Dept: ENDOCRINOLOGY | Facility: CLINIC | Age: 75
End: 2020-05-26

## 2020-05-26 NOTE — TELEPHONE ENCOUNTER
To schedulers: Please schedule  for lst available endocrine within a month. This could be a virtual visit. Preferred Provider: Josiah Jeffery MD  Endocrine triage    [facility]    Phone Message    May a detailed message be left on voicemail: no // unknown    Reason for Call: Appointment Intake    Referring Provider Name: Marry Rodriguez MD  Diagnosis and/or Symptoms: Acquired hypothyroidism    Referral and patient records are in the system, please advise and contact patient directly for scheduling options.    Action Taken: Message routed to:  [facility] Endocrinology Clinic    Travel Screening: Not Applicable

## 2020-05-28 NOTE — TELEPHONE ENCOUNTER
CLINIC COORDINATOR SCHEDULING NOTES    CALL RESULT: LVM    APPT TYPE: VIDEO VISIT NEW (or TELEPHONE VISIT NEW if pt does not have video capabilities)    PROVIDER: any provider accepting new endocrine patients. Preferred: Latia/Josiah    DATE APPT NEEDED: 1st available within a month    ADDITIONAL NOTES: Letter sent.

## 2020-05-30 ENCOUNTER — DOCUMENTATION ONLY (OUTPATIENT)
Dept: LAB | Facility: CLINIC | Age: 75
End: 2020-05-30

## 2020-05-30 DIAGNOSIS — R73.09 ELEVATED GLUCOSE: Primary | ICD-10-CM

## 2020-05-30 NOTE — PROGRESS NOTES
Lab was not notified to the add on lab, so the sample is now too old to add on the A1c.  Order was sent through the [place] lab which is not open, so the order was never seen.  Order was futured if still needed.    Thanks

## 2020-06-01 NOTE — PROGRESS NOTES
"[facility]-Please advise if you recommend patient return for A1C lab draw?  Dr. Michael durbin [facility] this week.    Per 5/22/20 telephone encounter:  \"Glucose is elevated please add hemoglobin A1c to labs to check for diabetes.\"    Thank you!  VEL Burkett, BSN, RN    "

## 2020-06-01 NOTE — PROGRESS NOTES
LM for pt to make a new lab appt for these labs.  Clinic lab was NOT able to add these orders onto the most recent lab draw.  Please make a lab only appt.  LENORA Roque

## 2020-06-01 NOTE — PROGRESS NOTES
Yes and she needs tsh, labs have already been entered by dr. Rodriguez.  Thanks.    [facility], CNP

## 2020-06-08 DIAGNOSIS — Z12.11 SCREEN FOR COLON CANCER: ICD-10-CM

## 2020-06-08 DIAGNOSIS — R73.09 ELEVATED GLUCOSE: ICD-10-CM

## 2020-06-08 DIAGNOSIS — E03.9 ACQUIRED HYPOTHYROIDISM: ICD-10-CM

## 2020-06-08 LAB — HBA1C MFR BLD: 5.5 % (ref 0–5.6)

## 2020-06-08 PROCEDURE — 83036 HEMOGLOBIN GLYCOSYLATED A1C: CPT | Performed by: FAMILY MEDICINE

## 2020-06-08 PROCEDURE — 84439 ASSAY OF FREE THYROXINE: CPT | Performed by: FAMILY MEDICINE

## 2020-06-08 PROCEDURE — 36415 COLL VENOUS BLD VENIPUNCTURE: CPT | Performed by: FAMILY MEDICINE

## 2020-06-08 PROCEDURE — 84443 ASSAY THYROID STIM HORMONE: CPT | Performed by: FAMILY MEDICINE

## 2020-06-08 PROCEDURE — 82274 ASSAY TEST FOR BLOOD FECAL: CPT | Performed by: FAMILY MEDICINE

## 2020-06-08 NOTE — LETTER
Anuja 15, 2020      Romayne L Ira  [address]  [facility] [id number]        Dear ,    We are writing to inform you of your test results.    Results within acceptable limits.  -FIT test (screening test for colon cancer) was normal. ADVISE: rechecking this test in 1 year..     Resulted Orders   Fecal colorectal cancer screen FIT   Result Value Ref Range    Occult Blood Scn FIT Negative NEG^Negative       If you have any questions or concerns, please call the clinic at the number listed above.       Sincerely,    Marry Rodriguez MD/nr

## 2020-06-09 LAB
T4 FREE SERPL-MCNC: 1.09 NG/DL (ref 0.76–1.46)
TSH SERPL DL<=0.005 MIU/L-ACNC: 10.38 MU/L (ref 0.4–4)

## 2020-06-09 NOTE — RESULT ENCOUNTER NOTE
TSH remains mildly elevated at 10.38 but free T4 is now normal.  Continue current dose and recheck your thyroid in 6 weeks with your primary [facility].

## 2020-06-14 LAB — HEMOCCULT STL QL IA: NEGATIVE

## 2020-06-29 DIAGNOSIS — E03.9 ACQUIRED HYPOTHYROIDISM: ICD-10-CM

## 2020-06-30 RX ORDER — LEVOTHYROXINE SODIUM 150 UG/1
TABLET ORAL
Qty: 90 TABLET | Refills: 0 | Status: SHIPPED | OUTPATIENT
Start: 2020-06-30 | End: 2020-07-08

## 2020-07-01 NOTE — TELEPHONE ENCOUNTER
Marry Rodriguez MD   6/9/2020  9:25 AM       TSH remains mildly elevated at 10.38 but free T4 is now normal.  Continue current dose and recheck your thyroid in 6 weeks with your primary [facility]     Medication refilled-Future lab orders placed. Alexandria Gonzalez RN

## 2020-07-07 NOTE — PROGRESS NOTES
"Romayne L Sathre is a 74 year old female who is being evaluated via a billable telephone visit.      The patient has been notified of following:     \"This telephone visit will be conducted via a call between you and your physician/provider. We have found that certain health care needs can be provided without the need for a physical exam.  This service lets us provide the care you need with a short phone conversation.  If a prescription is necessary we can send it directly to your pharmacy.  If lab work is needed we can place an order for that and you can then stop by our lab to have the test done at a later time.    Telephone visits are billed at different rates depending on your insurance coverage. During this emergency period, for some insurers they may be billed the same as an in-person visit.  Please reach out to your insurance provider with any questions.    If during the course of the call the physician/provider feels a telephone visit is not appropriate, you will not be charged for this service.\"    Patient has given verbal consent for Telephone visit?  Yes    What phone number would you like to be contacted at? [phone]    How would you like to obtain your AVS? Kareen Carson MA        "

## 2020-07-08 ENCOUNTER — VIRTUAL VISIT (OUTPATIENT)
Dept: ENDOCRINOLOGY | Facility: CLINIC | Age: 75
End: 2020-07-08
Payer: COMMERCIAL

## 2020-07-08 DIAGNOSIS — E03.9 ACQUIRED HYPOTHYROIDISM: ICD-10-CM

## 2020-07-08 DIAGNOSIS — M81.0 AGE-RELATED OSTEOPOROSIS WITHOUT CURRENT PATHOLOGICAL FRACTURE: Primary | ICD-10-CM

## 2020-07-08 RX ORDER — LEVOTHYROXINE SODIUM 150 UG/1
TABLET ORAL
Qty: 100 TABLET | Refills: 3 | Status: SHIPPED | OUTPATIENT
Start: 2020-07-08 | End: 2021-02-07

## 2020-07-08 NOTE — PROGRESS NOTES
"Endocrine Consult telephone visit note-     Attending Assessment/Plan :     subclinicial hypothyroidism prior to restart of LT4 11/2004.  She doesn't describe hypothyroid symptoms other than the ignacio horses.  Based on past labs the 175 mcg/day dose is the one that gave her normal TFTS. She has a large supply of the current 150 mcg tablets- increase to 150 * 8/week in divided dose (mean 171 mcg/day)    Osteoporosis by DXA 2014.  Untreated.  - recommend DXA follow up at [facility]. Discussed and ordered     Due to the COVID 19 pandemic this visit was a telephone  visit in order to help prevent spread of infection in this high risk patient and the general population. The patient gave verbal consent for the visit today.    Start time 1601 doximity video; nudge 1603; telephone call   Stop time 1625  Total time 24 minutes   This would  have been billed as E & M new consult level 4     Kamila Jeffery MD    Chief complaint:  Romayne is a 74 year old female seen in consultation at the request of Dr Marry Rodriguez for hypothyroidism. I have reviewed Care Everywhere including [facility] lab reports, imaging reports and provider notes as indicated.      HISTORY OF PRESENT ILLNESS  Romayne recalls that she has been on thyroid pills almost 10 years. Physician notes 11/19/04 refers to her being out of meds for a long time, \"needs ...Synthroid\".   She recalls she was always sleepy and dragging prior to the treatment.  She thinks the pills helped \"to a certain extent\". She was less tired, less sleeping.    She has never been told of goiter.     Review of our system records shows TFTS dating to 2002, most of which have slightly high TSH with normal free T4, a few with low TSH.  The most recent were 6/8/2020: TSH 10.38, free T4 1.09    The MAR shows she has been prescribed LT4 for many years, at least since 2004. .  Specifically  11/19/04 Synthroid 100   3/22/10 Synthroid 125  11/22/10 levothyroxine (Synthroid) 125  1/27/14 LT4 " 137  5/5/14 LT4 150 and 137  7/3/14 LT4 150  1/7/16 LT4 150 and 25  1/8/16  LT4 175  3/14/16  LT4 200  5/10/18 LT4 175  6/8/18 to present  LT4 150-- today she says she takes it in the AM 30 minutes before she eats.  She takes it by itself with water. She has pill tray she has set up.   She has been on this dose about 2 months.  Later she said it was 6 months ago.   She said she was on 180 as one pill /day prior.       There his a family history of thyroid disease in her daughter, ? Her mother    DXA 2014 showed lowest T-score -2.5 at the   CXR 4/17/2019 shows no compressions.  There is ? Mild kyphosis   She has never taken treatment for osteoporosis.  She has never fractured a bone.  She reports some height loss- used to be 5'10 and now 5'9.5? .  She notes that her Back is curved; DXA hurt the back      REVIEW OF SYSTEMS  No neck or throat symptoms  No neck lumps  Swallow is OK  Voice is OK  Cardiac: negative  Respiratory: negative  GI:a little loose stools - constipation once in a while; painful defecation/tight feeling x about 1 month;   No other pains.   Not too good with exercise - not on feet a lot -   Prakash horses in legs - she uses gatorade for that     10 system ROS otherwise as per the HPI or negative    Past Medical History  Past Medical History:   Diagnosis Date     Breast cancer (H) 8/26/2008    Right Breast     CKD (chronic kidney disease) stage 3, GFR 30-59 ml/min (H) 9/17/2010     Hyperlipidemia LDL goal <100 11/29/2010     Hypertension goal BP (blood pressure) < 140/90 9/17/2010     Hypothyroid 2002     Osteoporosis 2014     Other psoriasis      Past Surgical History:   Procedure Laterality Date     HC REMV CATARACT EXTRACAP,INSERT LENS, W/O ECP  7/20/2007     SURGICAL HISTORY OF -   Oct 3, 2008    Rt saline implant       Medications  Current Outpatient Medications   Medication Sig Dispense Refill     atorvastatin (LIPITOR) 10 MG tablet Take 1 tablet (10 mg) by mouth daily 90 tablet 1     Black  Pepper-Turmeric (TURMERIC COMPLEX/BLACK PEPPER PO)        cetirizine (ZYRTEC) 10 MG tablet Take 10 mg by mouth daily       famotidine (PEPCID) 20 MG tablet Take 20 mg by mouth 2 times daily       levothyroxine (SYNTHROID/LEVOTHROID) 150 MCG tablet MON to SAT 1 tablet/day; SAT and SUN 1.5 tablet 100 tablet 3     lisinopril (ZESTRIL) 10 MG tablet Take 1 tablet (10 mg) by mouth daily 90 tablet 1     Multiple Vitamin (MULTI-VITAMIN) per tablet Take 1 tablet by mouth daily.         She is  On generic LT4 - she notes that sometimes they are a different     Allergies  Allergies   Allergen Reactions     No Known Drug Allergies      Family History  family history includes Diabetes in her brother, mother, and sister; Family History Negative in her brother and sister; Kidney Disease in her sister; Psychotic Disorder in her brother; Respiratory in her father; Thyroid Disease in her daughter and mother.    Social History  Social History     Tobacco Use     Smoking status: Passive Smoke Exposure - Never Smoker     Smokeless tobacco: Never Used     Tobacco comment: family members smoke; daughter   Substance Use Topics     Alcohol use: Yes     Alcohol/week: 10.0 standard drinks     Comment: 0-1 drink per month     Drug use: No     Granddaughter lives with her;     Physical Exam  There were no vitals taken for this visit.  There is no height or weight on file to calculate BMI.  GENERAL :  In no apparent distress  RESP: No audible wheeze, cough,  NEURO: Mentation and speech appropriate   PSYCH:  affect normal/bright    DATA REVIEW    ENDO THYROID LABS-[facility] Latest Ref Rng & Units 6/8/2020 5/21/2020   TSH 0.40 - 4.00 mU/L 10.38 (H) 10.36 (H)   T4 FREE 0.76 - 1.46 ng/dL 1.09 1.57 (H)   CEA 0 - 2.5 ug/L       ENDO THYROID LABS-[facility] Latest Ref Rng & Units 4/5/2019 7/23/2018   TSH 0.40 - 4.00 mU/L 3.95 0.94   T4 FREE 0.76 - 1.46 ng/dL     CEA 0 - 2.5 ug/L       ENDO THYROID LABS-[facility] Latest Ref Rng & Units 6/7/2018 5/9/2018   TSH 0.40 - 4.00  mU/L 0.28 (L) 0.08 (L)   T4 FREE 0.76 - 1.46 ng/dL 1.46 1.53 (H)   CEA 0 - 2.5 ug/L       ENDO THYROID LABS-[facility] Latest Ref Rng & Units 4/26/2017 5/18/2016   TSH 0.40 - 4.00 mU/L 0.44 0.46   T4 FREE 0.76 - 1.46 ng/dL     CEA 0 - 2.5 ug/L       ENDO THYROID LABS-[facility] Latest Ref Rng & Units 3/14/2016 1/7/2016   TSH 0.40 - 4.00 mU/L 8.67 (H) 14.72 (H)   T4 FREE 0.76 - 1.46 ng/dL 1.02 0.94   CEA 0 - 2.5 ug/L       ENDO THYROID LABS-[facility] Latest Ref Rng & Units 11/19/2014 5/5/2014   TSH 0.40 - 4.00 mU/L 2.76 9.71 (H)   T4 FREE 0.76 - 1.46 ng/dL  1.19   CEA 0 - 2.5 ug/L       ENDO THYROID LABS-[facility] Latest Ref Rng & Units 1/22/2014 1/30/2013   TSH 0.40 - 4.00 mU/L 7.77 (H) 5.97 (H)   T4 FREE 0.76 - 1.46 ng/dL  1.31   CEA 0 - 2.5 ug/L       ENDO THYROID LABS-[facility] Latest Ref Rng & Units 12/14/2011 10/26/2011   TSH 0.40 - 4.00 mU/L 3.18 4.65   T4 FREE 0.76 - 1.46 ng/dL     CEA 0 - 2.5 ug/L       ENDO THYROID LABS-[facility] Latest Ref Rng & Units 11/22/2010 4/27/2009   TSH 0.40 - 4.00 mU/L 7.74 (H)    T4 FREE 0.76 - 1.46 ng/dL 1.26    CEA 0 - 2.5 ug/L  <0.5     ENDO THYROID LABS-[facility] Latest Ref Rng & Units 11/4/2008 9/19/2008   TSH 0.40 - 4.00 mU/L 5.90 (H) 5.13 (H)   T4 FREE 0.76 - 1.46 ng/dL 1.21 1.11   CEA 0 - 2.5 ug/L       ENDO THYROID LABS-[facility] Latest Ref Rng & Units 9/5/2008 7/18/2008   TSH 0.40 - 4.00 mU/L  10.50 (H)   T4 FREE 0.76 - 1.46 ng/dL  0.68 (L)   CEA 0 - 2.5 ug/L <0.5      ENDO THYROID LABS-[facility] Latest Ref Rng & Units 3/14/2006 1/7/2005   TSH 0.40 - 4.00 mU/L 12.48 (H) 6.31 (H)   T4 FREE 0.76 - 1.46 ng/dL 1.35 1.24   CEA 0 - 2.5 ug/L       ENDO THYROID LABS-[facility] Latest Ref Rng & Units 11/19/2004 8/1/2003   TSH 0.40 - 4.00 mU/L 14.01 (H) 7.67 (H)   T4 FREE 0.76 - 1.46 ng/dL 0.87 0.72   CEA 0 - 2.5 ug/L       ENDO THYROID LABS-[facility] Latest Ref Rng & Units 11/5/2002   TSH 0.40 - 4.00 mU/L 11.36 (H)   T4 FREE 0.76 - 1.46 ng/dL    CEA 0 - 2.5 ug/L

## 2020-07-08 NOTE — PATIENT INSTRUCTIONS
I recommend you increase the levothyroxine using the 150 mcg tablets as follows:  MON to FRI 1 tablet/day;  Saturday and Sudany 1.5 tablet/day    You should have labs to follow up on the change in about 3 months or at the time of your already scheduled testing in November as long as you feel OK.  I am leaving orders for follow up labs on the medical record.  Please call [phone]  to schedule lab follow up testing as directed.  Alternatively, it can be drawn in any [place] lab.      Using a pill tray can help you keep track of your medication.  If you miss a levothyroxine dose you can take 2 the next day.  You can't do this with most pills but you can do this with the thyroid pill      Information for patients on Levo-thyroxine      The thyroid hormone, Levo-thyroxine (L-T4) is one of the main hormones normally produced by the thyroid.  The drug is identical in chemical structure to the natural product.  Levothyroxine is used to treat hypothyroidism (underactive thyroid).  Sometimes it is used even when the thyroid is not underactive, to treat a goiter (enlarged thyroid) or a thyroid nodule.  For patients with a history of thyroid removal, L-T4 is used to replace the deficiency created by the surgery.    The purpose of giving L-T4 to treat hypothyroidism is to make up for the thyroid hormone previously produced by your thyroid before it failed.  Depending on the extent of your thyroid failure, you may require a higher or a lower dose of L-T4.  The goal is to make your blood level of TSH (a hormone made by the master gland, the pituitary, the gland which controls and judges the thyroid) normal.    This drug is usually well-tolerated and safe but it is important to take the proper dose for YOU.  This involves periodic measurements of TSH, usually within 1-2 months of a dose change.  You should be off biotin containing supplements for at least one week prior to thyroid blood tests.    You should alert your doctor  if you have signs you are getting too much L-T4.  These include excessive nervousness, heart fluttering or skipping beats, diarrhea, or feeling too hot and/or sweaty.      There are many brand names for Levo-thyroxine (L-T4), including Synthroid, Levoxyl, Levothroid, Unithroid, Tirosint and others.  Changing from one brand name thyroid hormone product to another or to a generic product (all generics are called levothyroxine) can cause changes in your thyroid hormone balance, even if the dose stays the same.  Since generic products can come from many different companies (such as [employer], [employer], [employer] and others), there may be less consistency among the different generic preparations.  The decision to change brands or to change to a generic product must be made with your physician or other caregiver.  Follow-up testing should be arranged to monitor for any needed adjustments.  Monitoring may need to be more frequent if you always receive a generic thyroid hormone product.    For proper thyroid hormone balance the dose of thyroid hormone in your pills must be precise and consistent.    Be sure to take the medication as prescribed on an empty stomach, and at least 4 hours apart from calcium supplements, iron and soy products.  Store the medication away from severe heat and humidity.    You should be OFF any biotin containing supplements at least 7 days prior to thyroid lab draws.       Many insurance companies and other providers charge higher co-payments for brand name medications.  Since brand name L-T4 is not very expensive, be sure to ask if the actual cost of buying the medication is less than the co-payment.  In some instances the charge for a co-payment may be greater than buying the drug outright, especially if the prescription needs to be refilled every 30 days.

## 2020-09-29 ENCOUNTER — OFFICE VISIT (OUTPATIENT)
Dept: OPHTHALMOLOGY | Facility: CLINIC | Age: 75
End: 2020-09-29
Attending: OPHTHALMOLOGY
Payer: COMMERCIAL

## 2020-09-29 DIAGNOSIS — S05.91XS RIGHT EYE INJURY, SEQUELA: Primary | ICD-10-CM

## 2020-09-29 DIAGNOSIS — H25.12 AGE-RELATED NUCLEAR CATARACT OF LEFT EYE: ICD-10-CM

## 2020-09-29 DIAGNOSIS — Z96.1 PSEUDOPHAKIA OF RIGHT EYE: ICD-10-CM

## 2020-09-29 DIAGNOSIS — H40.003 GLAUCOMA SUSPECT OF BOTH EYES: ICD-10-CM

## 2020-09-29 DIAGNOSIS — H18.053 KRUKENBERG SPINDLE OF BOTH EYES: ICD-10-CM

## 2020-09-29 DIAGNOSIS — H43.821 VITREOMACULAR ADHESION OF RIGHT EYE: ICD-10-CM

## 2020-09-29 PROCEDURE — 92133 CPTRZD OPH DX IMG PST SGM ON: CPT | Mod: ZF | Performed by: OPHTHALMOLOGY

## 2020-09-29 PROCEDURE — G0463 HOSPITAL OUTPT CLINIC VISIT: HCPCS | Mod: ZF

## 2020-09-29 PROCEDURE — 92134 CPTRZ OPH DX IMG PST SGM RTA: CPT | Mod: ZF | Performed by: OPHTHALMOLOGY

## 2020-09-29 ASSESSMENT — REFRACTION_WEARINGRX
OD_ADD: +2.25
OS_SPHERE: -5.00
OD_AXIS: 060
OD_SPHERE: -4.00
OS_CYLINDER: +1.00
OS_AXIS: 004
OS_ADD: +2.25
OD_CYLINDER: +0.25
SPECS_TYPE: PAL

## 2020-09-29 ASSESSMENT — TONOMETRY
OS_IOP_MMHG: 18
IOP_METHOD: TONOPEN
OD_IOP_MMHG: 14

## 2020-09-29 ASSESSMENT — REFRACTION_MANIFEST
OD_CYLINDER: +0.25
OS_AXIS: 004
OD_SPHERE: -4.00
OS_CYLINDER: +1.25
OD_AXIS: 060
OS_SPHERE: -5.25

## 2020-09-29 ASSESSMENT — VISUAL ACUITY
METHOD: SNELLEN - LINEAR
OS_PH_CC: 20/30 SLOW
OD_CC: 20/40 SLOW
OS_CC: 20/40 SLOW
OS_CC+: -2
CORRECTION_TYPE: GLASSES
OD_CC+: -2
OS_PH_CC+: -2

## 2020-09-29 ASSESSMENT — EXTERNAL EXAM - LEFT EYE: OS_EXAM: NORMAL

## 2020-09-29 ASSESSMENT — EXTERNAL EXAM - RIGHT EYE: OD_EXAM: NORMAL

## 2020-09-29 ASSESSMENT — CONF VISUAL FIELD
OS_NORMAL: 1
OD_NORMAL: 1

## 2020-09-29 ASSESSMENT — CUP TO DISC RATIO
OS_RATIO: 0.7
OD_RATIO: 0.5

## 2020-09-29 ASSESSMENT — SLIT LAMP EXAM - LIDS
COMMENTS: NORMAL
COMMENTS: MILD PTOSIS

## 2020-09-29 NOTE — PROGRESS NOTES
"Chief Complaint(s) and History of Present Illness(es)     COMPREHENSIVE EYE EXAM     Laterality: both eyes    Onset: gradual    Onset: months ago    Quality: blurred vision    Severity: moderate    Frequency: intermittently    Timing: throughout the day    Context: distance vision and near vision    Course: gradually worsening    Associated symptoms: Negative for dryness, eye pain, redness, tearing,   flashes and floaters    Pain scale: 0/10       Comments     Pt is here for a complete eye exam. She c/o blurry vision x the last   month with her glasses at dist. Near vision is ok with glasses, but does   note she tends to take glasses off for really small print x the last   month. Pt feels the RE may be \"clouded\" over after the cataract surgery.     Ocular meds: none    Gemini Myersong, COMT 2:47 PM September 29, 2020      Patient reports that she has been having difficulties for the past month and a half - seeing clear with distance.   Patient wears glasses, and obtained new MRx last September.  Was told that she had cataract in the right eye before.   No eye pain. No eye irritation. No flashes of lights, or floaters. No discharge.   No problems with lights.     No CTL usage - has not worn in the past.   Hx of stick to her right eye, occurred back 20 years ago. This eye went black; then they did surgery in that right eye.      PMHx: HTN    POHx: CE IOL right eye (20 years ago). No other ocular surgeries. No ocular hx; uses glasses (progressive).      FHx: No FHx of glaucoma, cataract, AMD or other ocular conditions.      Review of systems for the eyes was negative other than the pertinent positives/negatives listed in the HPI.     Assessment & Plan      Romayne L Sathre is a 75 year old female with the following diagnoses:   1. Right eye injury, sequela    2. Krukenberg spindle of both eyes    3. Glaucoma suspect of both eyes    4. Pseudophakia of right eye    5. Age-related nuclear cataract of left eye    6. " Vitreomacular adhesion of right eye         Patient with pigmentation on endothelium, pseudophacodenesis right eye, and asymmetric c/d ratio.  ?history of PDS versus pseudoexfoliation. Concerning for secondary glaucoma.  Suspect right eye vitrectomy (will try to obtain old [facility] notes)  TMax unknown  Family history: negative  Trauma history: positive - right eye with remote hx of trauma.   Gonioscopy: will obtain next visit  Pachmetry: will obtain next visit  Visual field: will obtain next visit    Pseudophakia right eye  - PCIOL centered, but unstable.  Monitor    Cataract, left eye  VISUALLY SIGNIFICANT.  Will pursue cataract extraction after completion of glaucoma eval    Vitreomacular traction right eye   Recommend retina consult for eval/treatment    Patient disposition:   Return in about 4 weeks (around 10/27/2020) for Venus (same day as Afsaneh); [facility] VF, pachy, gonio.    Thuan Combs MD  Department of Ophthalmology  Pager: [phone]    Attending Physician Attestation:  Complete documentation of historical and exam elements from today's encounter can be found in the full encounter summary report (not reduplicated in this progress note).  I personally obtained the chief complaint(s) and history of present illness.  I confirmed and edited as necessary the review of systems, past medical/surgical history, family history, social history, and examination findings as documented by others; and I examined the patient myself.  I personally reviewed the relevant tests, images, and reports as documented above.  I formulated and edited as necessary the assessment and plan and discussed the findings and management plan with the patient and family. .Attending Physician Image/Tesing Attestation: I personally reviewed the ophthalmic test(s) associated with this encounter, agree with the interpretation(s) as documented by the resident/fellow, and have edited the corresponding report(s) as necessary.   - Ruben Shelby,  MD

## 2020-09-29 NOTE — NURSING NOTE
"Chief Complaint(s) and History of Present Illness(es)     COMPREHENSIVE EYE EXAM     In both eyes.  Onset was gradual.  This started months ago.  Charactertized as  blurred vision.  Severity is moderate.  Occurring intermittently.  It is worse throughout the day.  Context:  distance vision and near vision.  Since onset it is gradually worsening.  Associated symptoms include Negative for dryness, eye pain, redness, tearing, flashes and floaters.  Pain was noted as 0/10.              Comments     Pt is here for a complete eye exam. She c/o blurry vision x the last month with her glasses at dist. Near vision is ok with glasses, but does note she tends to take glasses off for really small print x the last month. Pt feels the RE may be \"clouded\" over after the cataract surgery.     Ocular meds: none    Gemini Villa, COMT 2:47 PM September 29, 2020                   "

## 2020-09-30 DIAGNOSIS — H40.003 GLAUCOMA SUSPECT OF BOTH EYES: Primary | ICD-10-CM

## 2020-10-14 ENCOUNTER — OFFICE VISIT (OUTPATIENT)
Dept: OPHTHALMOLOGY | Facility: CLINIC | Age: 75
End: 2020-10-14
Attending: OPHTHALMOLOGY
Payer: COMMERCIAL

## 2020-10-14 DIAGNOSIS — H25.812 COMBINED FORM OF AGE-RELATED CATARACT, LEFT EYE: ICD-10-CM

## 2020-10-14 DIAGNOSIS — H43.821 VITREOMACULAR ADHESION OF RIGHT EYE: ICD-10-CM

## 2020-10-14 DIAGNOSIS — Z96.1 PSEUDOPHAKIA OF RIGHT EYE: ICD-10-CM

## 2020-10-14 DIAGNOSIS — H25.812 COMBINED FORM OF AGE-RELATED CATARACT, LEFT EYE: Primary | ICD-10-CM

## 2020-10-14 DIAGNOSIS — H40.003 GLAUCOMA SUSPECT OF BOTH EYES: Primary | ICD-10-CM

## 2020-10-14 DIAGNOSIS — H40.003 GLAUCOMA SUSPECT OF BOTH EYES: ICD-10-CM

## 2020-10-14 DIAGNOSIS — H31.001 RETINAL SCAR OF RIGHT EYE: ICD-10-CM

## 2020-10-14 DIAGNOSIS — H43.821 VITREOMACULAR ADHESION OF RIGHT EYE: Primary | ICD-10-CM

## 2020-10-14 DIAGNOSIS — S05.91XS RIGHT EYE INJURY, SEQUELA: ICD-10-CM

## 2020-10-14 PROCEDURE — 92083 EXTENDED VISUAL FIELD XM: CPT | Performed by: OPHTHALMOLOGY

## 2020-10-14 PROCEDURE — 92012 INTRM OPH EXAM EST PATIENT: CPT | Mod: 25 | Performed by: OPHTHALMOLOGY

## 2020-10-14 PROCEDURE — 76519 ECHO EXAM OF EYE: CPT | Performed by: OPHTHALMOLOGY

## 2020-10-14 PROCEDURE — 76514 ECHO EXAM OF EYE THICKNESS: CPT | Performed by: OPHTHALMOLOGY

## 2020-10-14 PROCEDURE — 999N000103 HC STATISTIC NO CHARGE FACILITY FEE

## 2020-10-14 PROCEDURE — 92020 GONIOSCOPY: CPT | Performed by: OPHTHALMOLOGY

## 2020-10-14 PROCEDURE — 92250 FUNDUS PHOTOGRAPHY W/I&R: CPT | Performed by: OPHTHALMOLOGY

## 2020-10-14 PROCEDURE — 99213 OFFICE O/P EST LOW 20 MIN: CPT | Mod: 25 | Performed by: OPHTHALMOLOGY

## 2020-10-14 PROCEDURE — G0463 HOSPITAL OUTPT CLINIC VISIT: HCPCS

## 2020-10-14 ASSESSMENT — TONOMETRY
OD_IOP_MMHG: 13
OS_IOP_MMHG: 16
IOP_METHOD: APPLANATION
OD_IOP_MMHG: 13
IOP_METHOD: APPLANATION
OS_IOP_MMHG: 16

## 2020-10-14 ASSESSMENT — EXTERNAL EXAM - RIGHT EYE
OD_EXAM: NORMAL
OD_EXAM: NORMAL

## 2020-10-14 ASSESSMENT — REFRACTION_WEARINGRX
OD_SPHERE: -4.00
OD_CYLINDER: +0.25
OS_SPHERE: -5.00
OS_AXIS: 004
SPECS_TYPE: PAL
OS_CYLINDER: +1.00
OD_AXIS: 060
OS_ADD: +2.25
OD_CYLINDER: +0.25
OS_SPHERE: -5.00
OS_ADD: +2.25
OS_AXIS: 004
OD_ADD: +2.25
SPECS_TYPE: PAL
OD_SPHERE: -4.00
OD_AXIS: 060
OS_CYLINDER: +1.00
OD_ADD: +2.25

## 2020-10-14 ASSESSMENT — VISUAL ACUITY
CORRECTION_TYPE: GLASSES
OS_CC+: +2
OS_CC: 20/60
OD_CC: 20/50
OS_CC+: +2
OS_PH_CC+: -2
CORRECTION_TYPE: GLASSES
OS_PH_CC+: -2
OS_PH_CC: 20/30
OS_PH_CC: 20/30
OD_CC: 20/50
OS_CC: 20/60
METHOD: SNELLEN - LINEAR
OD_CC+: -2
METHOD: SNELLEN - LINEAR
OD_CC+: -2

## 2020-10-14 ASSESSMENT — SLIT LAMP EXAM - LIDS
COMMENTS: NORMAL
COMMENTS: MILD PTOSIS
COMMENTS: MILD PTOSIS
COMMENTS: NORMAL

## 2020-10-14 ASSESSMENT — CUP TO DISC RATIO
OS_RATIO: 0.6
OS_RATIO: 0.6
OD_RATIO: 0.5
OD_RATIO: 0.5

## 2020-10-14 ASSESSMENT — EXTERNAL EXAM - LEFT EYE
OS_EXAM: NORMAL
OS_EXAM: NORMAL

## 2020-10-14 ASSESSMENT — CONF VISUAL FIELD
OD_NORMAL: 1
OS_NORMAL: 1
OS_NORMAL: 1
OD_NORMAL: 1

## 2020-10-14 ASSESSMENT — PACHYMETRY
OD_CT(UM): 571
OS_CT(UM): 568

## 2020-10-14 NOTE — PROGRESS NOTES
CC -   Blurred vision ou    INTERVAL HISTORY - Initial visit    HPI -   Romayne L Sathre is a  75 year old year-old patient presenting for referral from Dr Shelby for VMT right eye. Problem with focusing at distance and near. No complain from metamorphopsia. Deneis seeing floaters or flashes.       PAST OCULAR SURGERY  Notes only CE IOL  But retina scars shows she has had a PPV for a superotemporal retinal tear before    RETINAL IMAGING:  OCT 9/25/20  OD - VMT with traction over fovea  OS - normal foveal contour ; small ERM inferior macula    Optos:   Right eye 360 peripheral laser with a superotemporal tear surrounded by laser scars. Nasal mid peripheral atrophy (likely a posterior drainage retinotomy site)  Left eye within normal limits    ASSESSMENT & PLAN    1. Vitreoretinal adhesion right eye  Endorses blurred vision but denies metamorphopsia  Retina appearance confirms a previous retinal surgery (PPV for RRD) but patient doesn't recall it  Vitreous appears empty in exam  This is more likely a partially detached ERM rather than VMT  Traction at fovea possibly contributes to blurred vision  Observe for 4-6 months and then consider PPV and MS    2. Cataract, left eye  Being scheduled for surgery by Dr. Shelby  No major pathology in retinal periphery     3. Glaucoma suspect ou  Large disc with large cup each eye  Being followed by Dr. Shelby    Disposition:    Proceed with left eye ce iol  RTC 3-4 months for repeat OCT and DFE    Complete documentation of historical and exam elements from today's encounter can be found in the full encounter summary report (not reduplicated in this progress note). I personally obtained the chief complaint(s) and history of present illness.  I confirmed and edited as necessary the review of systems, past medical/surgical history, family history, social history, and examination findings as documented by others; and I examined the patient myself. I personally reviewed the relevant tests,  images, and reports as documented above. I formulated and edited as necessary the assessment and plan and discussed the findings and management plan with the patient and family.     Fede Donovan MD

## 2020-10-14 NOTE — LETTER
10/14/2020       RE: Romayne L Sathre  [address]  [facility] [address]     Dear Dr. Shelby,    Thank you for referring your patient, Romayne L Sathre, to the [facility] at [facility]. Please see a copy of my visit note below.    CC -   Blurred vision ou    INTERVAL HISTORY - Initial visit    HPI -   Romayne L Sathre is a  75 year old year-old patient presenting for referral from Dr Shelby for VMT right eye. Problem with focusing at distance and near. No complain from metamorphopsia. Deneis seeing floaters or flashes.       PAST OCULAR SURGERY  Notes only CE IOL  But retina scars shows she has had a PPV for a superotemporal retinal tear before    RETINAL IMAGING:  OCT 9/25/20  OD - VMT with traction over fovea  OS - normal foveal contour ; small ERM inferior macula    Optos:   Right eye 360 peripheral laser with a superotemporal tear surrounded by laser scars. Nasal mid peripheral atrophy (likely a posterior drainage retinotomy site)  Left eye within normal limits    ASSESSMENT & PLAN    1. Vitreoretinal adhesion right eye  Endorses blurred vision but denies metamorphopsia  Retina appearance confirms a previous retinal surgery (PPV for RRD) but patient doesn't recall it  Vitreous appears empty in exam  This is more likely a partially detached ERM rather than VMT  Traction at fovea possibly contributes to blurred vision  Observe for 4-6 months and then consider PPV and MS    2. Cataract, left eye  Being scheduled for surgery by Dr. Shelby  No major pathology in retinal periphery     3. Glaucoma suspect ou  Large disc with large cup each eye  Being followed by Dr. Shelby    Disposition:    Proceed with left eye ce iol  RTC 3-4 months for repeat OCT and DFE    Complete documentation of historical and exam elements from today's encounter can be found in the full encounter summary report (not reduplicated in this progress note). I personally obtained the chief  complaint(s) and history of present illness.  I confirmed and edited as necessary the review of systems, past medical/surgical history, family history, social history, and examination findings as documented by others; and I examined the patient myself. I personally reviewed the relevant tests, images, and reports as documented above. I formulated and edited as necessary the assessment and plan and discussed the findings and management plan with the patient and family.     Fede Donovan MD        Again, thank you for allowing me to participate in the care of your patient.      Sincerely,    Fede Donovan MD

## 2020-10-14 NOTE — NURSING NOTE
Chief Complaints and History of Present Illnesses   Patient presents with     Glaucoma Suspect Follow Up     2 week f/u glaucoma suspect     Chief Complaint(s) and History of Present Illness(es)     Glaucoma Suspect Follow Up     Laterality: both eyes    Associated symptoms: Negative for eye pain, flashes and floaters    Pain scale: 0/10    Comments: 2 week f/u glaucoma suspect              Comments     No change in vision noted  No drops    SHANNON Victoria 9:32 AM October 14, 2020

## 2020-10-14 NOTE — NURSING NOTE
Chief Complaints and History of Present Illnesses   Patient presents with     Consult For     Chief Complaint(s) and History of Present Illness(es)     Consult For     Laterality: right eye    Associated symptoms: Negative for eye pain, flashes and floaters    Pain scale: 0/10              Comments     Referred by Dr. Shelby for vitreomacular traction of the right eye  Seeing Dr. Shelby today for glaucoma suspect f/u - pt reports no change in vision noted  No drops    SHANNON Victoria 9:51 AM October 14, 2020

## 2020-10-14 NOTE — PROGRESS NOTES
Chief Complaint(s) and History of Present Illness(es)     Glaucoma Suspect Follow Up     Laterality: both eyes    Associated symptoms: Negative for eye pain, flashes and floaters    Pain scale: 0/10    Comments: 2 week f/u glaucoma suspect              Comments     No change in vision noted  No drops    SHANNON Victoria 9:32 AM October 14, 2020             Review of systems for the eyes was negative other than the pertinent positives/negatives listed in the HPI.      Assessment & Plan      Romayne L Sathre is a 75 year old female with the following diagnoses:   1. Combined form of age-related cataract, left eye    2. Glaucoma suspect of both eyes    3. Pseudophakia of right eye    4. Right eye injury, sequela    5. Vitreomacular adhesion of right eye       TMax unknown  Family history: negative  Trauma history: positive - right eye with remote hx of trauma.   Gonioscopy: right eye: open to CB, variable pigmented TM left eye: steep insertion, open to ~scleral spur, sampolesi's line+ inferiorly  Pachmetry: 571/568  Visual field: right eye: LVC, central scotoma, non specific defects left eye: LVC, early inferior arcuate vs rim artifact    - No indication for additional glaucoma treatment at this time.  Would proceed with cataract extraction alone for the left eye     Cataract, LEFT eye  VISUALLY SIGNIFICANT.    Will pursue cataract extraction left eye without additional MIGS.      Vitreomacular traction right eye   Seeing Dr. Donovan today 10/14/2020  Would recommend to pursue retina surgery after left eye cataract surgery      Special equipment/needs:    Anesthesia:Topical  Dilation:Moderate  Iris expansion:Possible Epi  Pseudoexfoliation: No pseudoexfoliation  Trypan Blue: No   Dex  Goal emmetropia          Shaq Preston MD  Ophthalmology PGY-3  [facility]    Attending Physician Attestation:  Complete documentation of historical and exam elements from today's encounter can be found in the full  encounter summary report (not reduplicated in this progress note).  I personally obtained the chief complaint(s) and history of present illness.  I confirmed and edited as necessary the review of systems, past medical/surgical history, family history, social history, and examination findings as documented by others; and I examined the patient myself.  I personally reviewed the relevant tests, images, and reports as documented above.  I formulated and edited as necessary the assessment and plan and discussed the findings and management plan with the patient and family. .Attending Physician Image/Tesing Attestation: I personally reviewed the ophthalmic test(s) associated with this encounter, agree with the interpretation(s) as documented by the resident/fellow, and have edited the corresponding report(s) as necessary.   - Ruben Shelby MD

## 2020-10-20 ENCOUNTER — TELEPHONE (OUTPATIENT)
Dept: OPHTHALMOLOGY | Facility: CLINIC | Age: 75
End: 2020-10-20

## 2020-10-20 ENCOUNTER — HOSPITAL ENCOUNTER (OUTPATIENT)
Facility: AMBULATORY SURGERY CENTER | Age: 75
End: 2020-10-20
Attending: OPHTHALMOLOGY
Payer: COMMERCIAL

## 2020-10-20 DIAGNOSIS — Z11.59 ENCOUNTER FOR SCREENING FOR OTHER VIRAL DISEASES: Primary | ICD-10-CM

## 2020-10-20 NOTE — TELEPHONE ENCOUNTER
Spoke with patient to schedule left eye surgery with Dr. Shelby    Surgery was scheduled on 11/16 at [facility]  Patient will have H&P at  [facility] on 11/12    Patient is aware a COVID-19 test is needed before their procedure. The test should be with-in 4 days of their procedure.   Test Details: Date 11/12 Location [facility]     Post-Op visit was scheduled on  11/16 and 11/12  Patient was advised a / is needed day of surgery. As well as, for 24 hours after their surgery procedure.  Surgery packet was mailed 10/20, patient has my direct contact information for any further questions [phone].

## 2020-10-21 NOTE — TELEPHONE ENCOUNTER
FUTURE VISIT INFORMATION      SURGERY INFORMATION:    Date: 20    [facility]    Surgeon:  Ruben Shelby MD    Anesthesia Type:  Combined MAC with Topical    Procedure: LEFT EYE PHACOEMULSIFICATION, CATARACT, WITH INTRAOCULAR LENS IMPLANT    Consult: ov 10/14/20    RECORDS REQUESTED FROM:       Primary Care Provider: Sapphire Glass APRN [facility]    Pertinent Medical History: Hypertension    Most recent EKG+ Tracin19    Most recent ECHO: 19    Most recent Cardiac Stress Test: 19

## 2020-11-05 ENCOUNTER — OFFICE VISIT (OUTPATIENT)
Dept: URGENT CARE | Facility: URGENT CARE | Age: 75
End: 2020-11-05
Payer: COMMERCIAL

## 2020-11-05 VITALS
WEIGHT: 238 LBS | BODY MASS INDEX: 35.25 KG/M2 | HEIGHT: 69 IN | OXYGEN SATURATION: 95 % | DIASTOLIC BLOOD PRESSURE: 85 MMHG | HEART RATE: 81 BPM | TEMPERATURE: 103 F | SYSTOLIC BLOOD PRESSURE: 145 MMHG

## 2020-11-05 DIAGNOSIS — R50.9 FEVER, UNSPECIFIED FEVER CAUSE: ICD-10-CM

## 2020-11-05 DIAGNOSIS — R05.9 COUGH: ICD-10-CM

## 2020-11-05 DIAGNOSIS — N30.00 ACUTE CYSTITIS WITHOUT HEMATURIA: ICD-10-CM

## 2020-11-05 DIAGNOSIS — R82.90 NONSPECIFIC FINDING ON EXAMINATION OF URINE: Primary | ICD-10-CM

## 2020-11-05 LAB
ALBUMIN UR-MCNC: 100 MG/DL
APPEARANCE UR: CLEAR
BACTERIA #/AREA URNS HPF: ABNORMAL /HPF
BILIRUB UR QL STRIP: NEGATIVE
COLOR UR AUTO: YELLOW
FLUAV+FLUBV AG SPEC QL: NEGATIVE
FLUAV+FLUBV AG SPEC QL: NEGATIVE
GLUCOSE UR STRIP-MCNC: NEGATIVE MG/DL
HGB UR QL STRIP: ABNORMAL
KETONES UR STRIP-MCNC: NEGATIVE MG/DL
LEUKOCYTE ESTERASE UR QL STRIP: ABNORMAL
NITRATE UR QL: POSITIVE
NON-SQ EPI CELLS #/AREA URNS LPF: ABNORMAL /LPF
PH UR STRIP: 5 PH (ref 5–7)
RBC #/AREA URNS AUTO: ABNORMAL /HPF
SOURCE: ABNORMAL
SP GR UR STRIP: 1.02 (ref 1–1.03)
SPECIMEN SOURCE: NORMAL
UROBILINOGEN UR STRIP-ACNC: 0.2 EU/DL (ref 0.2–1)
WAXY CASTS #/AREA URNS LPF: ABNORMAL /LPF
WBC #/AREA URNS AUTO: ABNORMAL /HPF

## 2020-11-05 PROCEDURE — 87804 INFLUENZA ASSAY W/OPTIC: CPT | Performed by: FAMILY MEDICINE

## 2020-11-05 PROCEDURE — U0003 INFECTIOUS AGENT DETECTION BY NUCLEIC ACID (DNA OR RNA); SEVERE ACUTE RESPIRATORY SYNDROME CORONAVIRUS 2 (SARS-COV-2) (CORONAVIRUS DISEASE [COVID-19]), AMPLIFIED PROBE TECHNIQUE, MAKING USE OF HIGH THROUGHPUT TECHNOLOGIES AS DESCRIBED BY CMS-2020-01-R: HCPCS | Performed by: FAMILY MEDICINE

## 2020-11-05 PROCEDURE — 87086 URINE CULTURE/COLONY COUNT: CPT | Performed by: FAMILY MEDICINE

## 2020-11-05 PROCEDURE — 99214 OFFICE O/P EST MOD 30 MIN: CPT | Performed by: FAMILY MEDICINE

## 2020-11-05 PROCEDURE — 81001 URINALYSIS AUTO W/SCOPE: CPT | Performed by: FAMILY MEDICINE

## 2020-11-05 RX ORDER — SULFAMETHOXAZOLE/TRIMETHOPRIM 800-160 MG
1 TABLET ORAL 2 TIMES DAILY
Qty: 10 TABLET | Refills: 0 | Status: SHIPPED | OUTPATIENT
Start: 2020-11-05 | End: 2020-11-10

## 2020-11-05 ASSESSMENT — MIFFLIN-ST. JEOR: SCORE: 1643.94

## 2020-11-05 NOTE — PROGRESS NOTES
Subjective:   Romayne L Sathre is a 75 year old female who presents for   Chief Complaint   Patient presents with     Urgent Care     Pt in clinic c/o fever, abdominal pain, nausea and dizziness for 3-4 days.     Fever     Dizziness     Abdominal Pain     Nausea     No known exposure to COVID-19  She reports no vomiting.   She has no hx of kidney infections.   No present cough. Patient then clarified after examination that she has had a mild cough for 3 days    She has no pain with urination nor does she have increased urinary frequency    She has no alterations to taste or smell - although things may taste bland    Fever present today and yesterday.     She lives with her daughter who is asymptomatic at this time      Dizziness: Started 2-3 days ago -- symptoms not worsening - comes and goes. NO hx of vertigo or stroke.   No issues with vision.     Denies hx of heart problems   She is treated for high blood pressure - takes lisinopril for this.   Previous smoker.     She has abdominal discomfort that is present in the upper region of her belly. Again, without vomiting. NO pain with touching belly today.   No hx of surgeries (Abdominal)     Her last bowel movement was today  (loose stools no more than 2-3 times a day) -- present for 2 days    Denies flank discomfort    Patient Active Problem List    Diagnosis Date Noted     Combined form of age-related cataract, left eye 10/20/2020     Priority: Medium     Added automatically from request for surgery [id number]       Dependent edema 05/19/2020     Priority: Medium     Chronic fatigue 05/19/2020     Priority: Medium     Physical deconditioning 05/19/2020     Priority: Medium     Heart burn 05/19/2020     Priority: Medium     Obesity (BMI 35.0-39.9) with comorbidity (H) 04/05/2019     Priority: Medium     Memory deficit 10/03/2012     Priority: Medium     Health Care Home 07/17/2012     Priority: Medium     FPA [facility] for .  Margarita Lawler RN-BSN,  "[facility]  [phone]   DX V65.8 REPLACED WITH 14016 HEALTH CARE HOME (04/08/2013)       Leucopenia 07/13/2012     Priority: Medium     Idiopathic - s/p hematology workup       Advanced directives, counseling/discussion 12/12/2011     Priority: Medium     Advance Directive Problem List Overview:   Name Relationship Phone    Primary Health Care Agent            Alternative Health Care Agent          Discussed advance care planning with patient; information given to patient to review. 12/12/2011          History of breast cancer 12/12/2011     Priority: Medium     Hyperlipidemia LDL goal <100 11/29/2010     Priority: Medium     Hypertension goal BP (blood pressure) < 140/90 09/17/2010     Priority: Medium     CKD (chronic kidney disease) stage 3, GFR 30-59 ml/min 09/17/2010     Priority: Medium     Hypothyroidism 11/19/2004     Priority: Medium     Problem list name updated by automated process. Provider to review       Lipoma of other skin and subcutaneous tissue 11/19/2004     Priority: Medium       Current Outpatient Medications   Medication     atorvastatin (LIPITOR) 10 MG tablet     Black Pepper-Turmeric (TURMERIC COMPLEX/BLACK PEPPER PO)     cetirizine (ZYRTEC) 10 MG tablet     famotidine (PEPCID) 20 MG tablet     levothyroxine (SYNTHROID/LEVOTHROID) 150 MCG tablet     lisinopril (ZESTRIL) 10 MG tablet     Multiple Vitamin (MULTI-VITAMIN) per tablet     sulfamethoxazole-trimethoprim (BACTRIM DS) 800-160 MG tablet     No current facility-administered medications for this visit.        ROS:  As above per HPI    Objective:   BP (!) 145/85   Pulse 81   Temp 103  F (39.4  C) (Oral)   Ht 1.753 m (5' 9\")   Wt 108 kg (238 lb)   SpO2 95%   BMI 35.15 kg/m  , Body mass index is 35.15 kg/m .  Gen:  NAD, well-nourished, sitting in chair comfortably  HEENT: EOMI, sclera anicteric, Head normocephalic, ; nares patent; moist mucous membranes  Neck: trachea midline, no thyromegaly  CV:  Hemodynamically stable, RRR  Pulm:  no " increased work of breathing , CTAB, no wheezes/rales/rhonchi   ABD: soft, non-distended, no pain with palpation  Extrem: no cyanosis, edema or clubbing  Skin: no obvious rashes or abnormalities  Psych: Euthymic, linear thoughts, normal rate of speech  Neuro: no slurred speech, no facial droop, CN II-XII Intact    Results for orders placed or performed in visit on 11/05/20   *UA reflex to Microscopic and Culture ([place] and [facility] (except [place] and [place])     Status: Abnormal    Specimen: Midstream Urine   Result Value Ref Range    Color Urine Yellow     Appearance Urine Clear     Glucose Urine Negative NEG^Negative mg/dL    Bilirubin Urine Negative NEG^Negative    Ketones Urine Negative NEG^Negative mg/dL    Specific Gravity Urine 1.025 1.003 - 1.035    Blood Urine Small (A) NEG^Negative    pH Urine 5.0 5.0 - 7.0 pH    Protein Albumin Urine 100 (A) NEG^Negative mg/dL    Urobilinogen Urine 0.2 0.2 - 1.0 EU/dL    Nitrite Urine Positive (A) NEG^Negative    Leukocyte Esterase Urine Small (A) NEG^Negative    Source Midstream Urine    Urine Microscopic     Status: Abnormal   Result Value Ref Range    WBC Urine 5-10 (A) OTO5^0 - 5 /HPF    RBC Urine O - 2 OTO2^O - 2 /HPF    Waxy Cast 2-5 (A) NEG^Negative /LPF    Squamous Epithelial /LPF Urine Moderate (A) FEW^Few /LPF    Bacteria Urine Few (A) NEG^Negative /HPF   Influenza A/B antigen     Status: None   Result Value Ref Range    Influenza A/B Agn Specimen Nasopharyngeal     Influenza A Negative NEG^Negative    Influenza B Negative NEG^Negative       Assessment & Plan:   Romayne L Sathre, 75 year old female who presents with:  Nonspecific finding on examination of urine  Acute cystitis without hematuria  Absent of flank pain and dysuria. No hx of pyelonephritis. 5 day course of bactrim given to cover for possible UTI  - sulfamethoxazole-trimethoprim (BACTRIM DS) 800-160 MG tablet  Dispense: 10 tablet; Refill: 0    - Urine Culture Aerobic Bacterial    Fever,  unspecified fever cause  - Influenza A/B antigen  - Symptomatic COVID-19 Virus (Coronavirus) by PCR      Cough   COVID swab and flu NP swab collected here in the clinic by this writer (full PPE with n95 worn)      Patient does not appear to be septic and has normal mentation - she reports no current dizziness. She presents with a fever and is flu negative, given we are in a pandemic we discussed this could be COVID-19 -- she assures me she has not been exposed to strangers but she does live with her daughter who does go out in public. Patient was not aware she had a fever and had initially told staff she had no cough - MA and myself had made contact with the patient without full PPE worn (surgical masks were worn) - the time I spent in the room for this initial encounter was < 3 minutes before stepping out to sanitize and gown up.      Normal lung exam and O2 wnl - she has no shortness of breath or pain with breathing. Patient has a fever of 103 present. Concern for pneumonia is low at this time. MyChart is active but will make an emphasis to call ASAP if results return positive.     Additional instructions provided in AVS      Livan Stover MD   [facility]    The use of Dragon/[employer] dictation services may have been used to construct the content in this note; any grammatical or spelling errors are non-intentional. Please contact the author of this note directly if you are in need of any clarification.

## 2020-11-05 NOTE — PATIENT INSTRUCTIONS
Bactrim antibiotic take for 5 days (twice a day) to cover bladder infection    If fever hasn't broken by Saturday please call the nurse line to discuss      It is preferred for fever and discomfort to treat with tylenol 650mg (every 4-6 hours) -- this is preferred over ibuprofen (although evidence is lacking that it can worsen illness with COVID-19)      Self-quarantine away from others for 10 days from onset of symptoms as a precaution   -- must be at least 24-48 hours without fever (without the use of ibuprofen/tylenol) before going back out into the public      Stay hydrated: drink 50-70 ounces of water a day      If you develop severe shortness of breath/difficulty breathing,  lightheadedness or dizziness-- please call the clinic immediately or go to ED to be evaluated if symptoms appear severe.         Follow-up appointment with your doctor's office in 1 week (an E-visit may be optional if things are uncomplicated)  -They will determine when it is okay for you to return to work/school/being out in public.         COVID test is usually completed by our central testing facility within 48 hours but could take longer based on current demand.   -**Please sign up for [employer] , this will be the fastest way to get results of your test**   -otherwise you may see delays as long as 24-48 hours to get notification by phone for a positive result; negative tests could see a delay as long as 7-10 days (via mail)  to reach you      It is important to know false negative rates for the COVID-19 PCR test can be as high as 30% , thus if your test is negative and you have ongoing or worsening symptoms please call your doctor's office for a recommendation on if you should be re-tested for COVID-19

## 2020-11-06 ENCOUNTER — TELEPHONE (OUTPATIENT)
Dept: URGENT CARE | Facility: URGENT CARE | Age: 75
End: 2020-11-06

## 2020-11-06 LAB
BACTERIA SPEC CULT: NORMAL
BACTERIA SPEC CULT: NORMAL
Lab: NORMAL
SARS-COV-2 RNA SPEC QL NAA+PROBE: ABNORMAL
SPECIMEN SOURCE: ABNORMAL
SPECIMEN SOURCE: NORMAL

## 2020-11-07 ENCOUNTER — NURSE TRIAGE (OUTPATIENT)
Dept: EMERGENCY MEDICINE | Facility: CLINIC | Age: 75
End: 2020-11-07

## 2020-11-07 NOTE — TELEPHONE ENCOUNTER
Coronavirus (COVID-19) Notification    Reason for call  Notify of POSITIVE  COVID-19 lab result, assess symptoms,  review [facility] recommendations    Lab Result   Lab test for 2019-nCoV rRt-PCR or SARS-COV-2 PCR  Oropharyngeal AND/OR nasopharyngeal swabs were POSITIVE for 2019-nCoV RNA [OR] SARS-COV-2 RNA (COVID-19) RNA     We have been unable to reach Patient by phone at this time to notify of their Positive COVID-19 result.  Left voicemail message requesting a call back to [phone] [facility] for results.        POSITIVE COVID-19 Letter sent.    [Name]  Felton Casey RN  [facility]  COVID19 Results Team RN  Ph# [phone]

## 2020-11-07 NOTE — TELEPHONE ENCOUNTER
Please call patient to let her know her test for covid is positive.  Have her quarantine for 7 days (at least) and follow up with her pcp in 3 days - Monday.  Follow up sooner if she develops worsening shortness of breath.

## 2020-11-09 NOTE — TELEPHONE ENCOUNTER
"Pt calls back to receive her coronavirus PCR testing results.  Conveyed Positive Result per RN protocols.  Primary symptom = \"fatigue.\"  States \"I'm just dragging.\"  Onset 5 days ago, ongoing now.  \"Still able to perform all ADLs.\"    OTHER Covid Symptom:  \"Chills\" ongoing.  \"Real bad chills.\"  Now on day 5 of chills.  Pt has taken acetaminophen, but not consistently.    SECOND ISSUE:  UTi -> treated.  Pt completed Bactrim Rx for UTi diagnosed Nov 5th.  Urine was malodorous and cloudy at time of urine labs.  Now resolved.  No burning pain with urination before and none now.    Due to pt's immune suppressed status (leucopenia) and positive Covid19 status, decision is to seek provider advice via virtual telephone visit for her ongoing chills.  Pt agrees to plan.  Transferred to a  for an appointment now.    Татьяна VELASCO Health Nurse Advisor     Reason for Disposition    HIGH RISK patient (e.g., age > 64 years, diabetes, heart or lung disease, weak immune system) (Exception: Has already been evaluated by healthcare provider and has no new or worsening symptoms)    Fever present > 3 days (72 hours)    Additional Information    Negative: SEVERE difficulty breathing (e.g., struggling for each breath, speaks in single words)    Negative: Difficult to awaken or acting confused (e.g., disoriented, slurred speech)    Negative: Bluish (or gray) lips or face now    Negative: Shock suspected (e.g., cold/pale/clammy skin, too weak to stand, low BP, rapid pulse)    Negative: Sounds like a life-threatening emergency to the triager    Negative: [1] COVID-19 exposure AND [2] no symptoms    Negative: COVID-19 and Breastfeeding, questions about    Negative: [1] Adult with possible COVID-19 symptoms AND [2] triager concerned about severity of symptoms or other causes    Negative: SEVERE or constant chest pain or pressure (Exception: mild central chest pain, present only when coughing)    Negative: MODERATE difficulty " breathing (e.g., speaks in phrases, SOB even at rest, pulse 100-120)    Negative: Patient sounds very sick or weak to the triager    Negative: MILD difficulty breathing (e.g., minimal/no SOB at rest, SOB with walking, pulse <100)    Negative: Chest pain or pressure    Negative: Fever > 103 F (39.4 C)    Negative: [1] Fever > 101 F (38.3 C) AND [2] age > 60    Negative: [1] Fever > 100.0 F (37.8 C) AND [2] bedridden (e.g., nursing home patient, CVA, chronic illness, recovering from surgery)    Protocols used: CORONAVIRUS (COVID-19) DIAGNOSED OR [id number]-IZ 8.4.20

## 2020-11-12 ENCOUNTER — NURSE TRIAGE (OUTPATIENT)
Dept: CARE COORDINATION | Facility: CLINIC | Age: 75
End: 2020-11-12

## 2020-11-12 ENCOUNTER — PRE VISIT (OUTPATIENT)
Dept: SURGERY | Facility: CLINIC | Age: 75
End: 2020-11-12

## 2020-11-16 ENCOUNTER — APPOINTMENT (OUTPATIENT)
Dept: GENERAL RADIOLOGY | Facility: CLINIC | Age: 75
DRG: 871 | End: 2020-11-16
Attending: EMERGENCY MEDICINE
Payer: COMMERCIAL

## 2020-11-16 ENCOUNTER — HOSPITAL ENCOUNTER (INPATIENT)
Facility: CLINIC | Age: 75
LOS: 8 days | Discharge: HOME | DRG: 871 | End: 2020-11-24
Attending: EMERGENCY MEDICINE | Admitting: INTERNAL MEDICINE
Payer: COMMERCIAL

## 2020-11-16 DIAGNOSIS — N30.00 ACUTE CYSTITIS WITHOUT HEMATURIA: Primary | ICD-10-CM

## 2020-11-16 DIAGNOSIS — U07.1 2019 NOVEL CORONAVIRUS DISEASE (COVID-19): ICD-10-CM

## 2020-11-16 DIAGNOSIS — R09.02 HYPOXIA: ICD-10-CM

## 2020-11-16 LAB
ALBUMIN SERPL-MCNC: 2.2 G/DL (ref 3.4–5)
ALP SERPL-CCNC: 84 U/L (ref 40–150)
ALT SERPL W P-5'-P-CCNC: 24 U/L (ref 0–50)
ANION GAP SERPL CALCULATED.3IONS-SCNC: 10 MMOL/L (ref 3–14)
AST SERPL W P-5'-P-CCNC: 34 U/L (ref 0–45)
BASOPHILS # BLD AUTO: 0 10E9/L (ref 0–0.2)
BASOPHILS NFR BLD AUTO: 0.5 %
BILIRUB SERPL-MCNC: 0.8 MG/DL (ref 0.2–1.3)
BUN SERPL-MCNC: 48 MG/DL (ref 7–30)
CALCIUM SERPL-MCNC: 8.9 MG/DL (ref 8.5–10.1)
CHLORIDE SERPL-SCNC: 105 MMOL/L (ref 94–109)
CO2 SERPL-SCNC: 21 MMOL/L (ref 20–32)
CREAT SERPL-MCNC: 1.55 MG/DL (ref 0.52–1.04)
D DIMER PPP FEU-MCNC: >20 UG/ML FEU (ref 0–0.5)
DIFFERENTIAL METHOD BLD: ABNORMAL
EOSINOPHIL # BLD AUTO: 0 10E9/L (ref 0–0.7)
EOSINOPHIL NFR BLD AUTO: 0.5 %
ERYTHROCYTE [DISTWIDTH] IN BLOOD BY AUTOMATED COUNT: 13.6 % (ref 10–15)
FLUAV+FLUBV AG SPEC QL: NEGATIVE
FLUAV+FLUBV AG SPEC QL: NEGATIVE
FLUAV+FLUBV RNA SPEC QL NAA+PROBE: NORMAL
FLUAV+FLUBV RNA SPEC QL NAA+PROBE: NORMAL
GFR SERPL CREATININE-BSD FRML MDRD: 33 ML/MIN/{1.73_M2}
GLUCOSE SERPL-MCNC: 152 MG/DL (ref 70–99)
HCT VFR BLD AUTO: 40.9 % (ref 35–47)
HGB BLD-MCNC: 13.3 G/DL (ref 11.7–15.7)
IMM GRANULOCYTES # BLD: 0.1 10E9/L (ref 0–0.4)
IMM GRANULOCYTES NFR BLD: 1.4 %
LACTATE BLD-SCNC: 2.7 MMOL/L (ref 0.7–2)
LYMPHOCYTES # BLD AUTO: 0.4 10E9/L (ref 0.8–5.3)
LYMPHOCYTES NFR BLD AUTO: 6.7 %
MCH RBC QN AUTO: 27.7 PG (ref 26.5–33)
MCHC RBC AUTO-ENTMCNC: 32.5 G/DL (ref 31.5–36.5)
MCV RBC AUTO: 85 FL (ref 78–100)
MONOCYTES # BLD AUTO: 0.9 10E9/L (ref 0–1.3)
MONOCYTES NFR BLD AUTO: 16.2 %
NEUTROPHILS # BLD AUTO: 4.1 10E9/L (ref 1.6–8.3)
NEUTROPHILS NFR BLD AUTO: 74.7 %
NRBC # BLD AUTO: 0 10*3/UL
NRBC BLD AUTO-RTO: 0 /100
NT-PROBNP SERPL-MCNC: 359 PG/ML (ref 0–900)
PLATELET # BLD AUTO: 331 10E9/L (ref 150–450)
PLATELET # BLD EST: ABNORMAL 10*3/UL
POTASSIUM SERPL-SCNC: 4.4 MMOL/L (ref 3.4–5.3)
PROCALCITONIN SERPL-MCNC: 0.13 NG/ML
PROT SERPL-MCNC: 8.8 G/DL (ref 6.8–8.8)
RBC # BLD AUTO: 4.81 10E12/L (ref 3.8–5.2)
RSV AG SPEC QL: NEGATIVE
RSV RNA SPEC NAA+PROBE: NORMAL
SODIUM SERPL-SCNC: 136 MMOL/L (ref 133–144)
SPECIMEN SOURCE: NORMAL
TROPONIN I SERPL-MCNC: <0.015 UG/L (ref 0–0.04)
WBC # BLD AUTO: 5.5 10E9/L (ref 4–11)

## 2020-11-16 PROCEDURE — 83605 ASSAY OF LACTIC ACID: CPT | Performed by: EMERGENCY MEDICINE

## 2020-11-16 PROCEDURE — 99292 CRITICAL CARE ADDL 30 MIN: CPT | Mod: 25 | Performed by: EMERGENCY MEDICINE

## 2020-11-16 PROCEDURE — 99285 EMERGENCY DEPT VISIT HI MDM: CPT | Mod: 25

## 2020-11-16 PROCEDURE — 83880 ASSAY OF NATRIURETIC PEPTIDE: CPT | Performed by: EMERGENCY MEDICINE

## 2020-11-16 PROCEDURE — 96365 THER/PROPH/DIAG IV INF INIT: CPT | Mod: 59

## 2020-11-16 PROCEDURE — 84484 ASSAY OF TROPONIN QUANT: CPT | Performed by: EMERGENCY MEDICINE

## 2020-11-16 PROCEDURE — 200N000002 HC R&B ICU UMMC

## 2020-11-16 PROCEDURE — 87040 BLOOD CULTURE FOR BACTERIA: CPT | Performed by: EMERGENCY MEDICINE

## 2020-11-16 PROCEDURE — 250N000011 HC RX IP 250 OP 636: Performed by: EMERGENCY MEDICINE

## 2020-11-16 PROCEDURE — 80053 COMPREHEN METABOLIC PANEL: CPT | Performed by: EMERGENCY MEDICINE

## 2020-11-16 PROCEDURE — XW043E5 INTRODUCTION OF REMDESIVIR ANTI-INFECTIVE INTO CENTRAL VEIN, PERCUTANEOUS APPROACH, NEW TECHNOLOGY GROUP 5: ICD-10-PCS | Performed by: INTERNAL MEDICINE

## 2020-11-16 PROCEDURE — 85025 COMPLETE CBC W/AUTO DIFF WBC: CPT | Performed by: EMERGENCY MEDICINE

## 2020-11-16 PROCEDURE — 87807 RSV ASSAY W/OPTIC: CPT | Performed by: EMERGENCY MEDICINE

## 2020-11-16 PROCEDURE — 93010 ELECTROCARDIOGRAM REPORT: CPT | Performed by: EMERGENCY MEDICINE

## 2020-11-16 PROCEDURE — 71045 X-RAY EXAM CHEST 1 VIEW: CPT

## 2020-11-16 PROCEDURE — 96367 TX/PROPH/DG ADDL SEQ IV INF: CPT

## 2020-11-16 PROCEDURE — 87804 INFLUENZA ASSAY W/OPTIC: CPT | Performed by: EMERGENCY MEDICINE

## 2020-11-16 PROCEDURE — 96375 TX/PRO/DX INJ NEW DRUG ADDON: CPT

## 2020-11-16 PROCEDURE — 96368 THER/DIAG CONCURRENT INF: CPT

## 2020-11-16 PROCEDURE — 250N000009 HC RX 250: Performed by: EMERGENCY MEDICINE

## 2020-11-16 PROCEDURE — 258N000003 HC RX IP 258 OP 636: Performed by: EMERGENCY MEDICINE

## 2020-11-16 PROCEDURE — 84145 PROCALCITONIN (PCT): CPT | Performed by: EMERGENCY MEDICINE

## 2020-11-16 PROCEDURE — 85379 FIBRIN DEGRADATION QUANT: CPT | Performed by: EMERGENCY MEDICINE

## 2020-11-16 PROCEDURE — 99291 CRITICAL CARE FIRST HOUR: CPT | Mod: 25 | Performed by: EMERGENCY MEDICINE

## 2020-11-16 PROCEDURE — 96376 TX/PRO/DX INJ SAME DRUG ADON: CPT

## 2020-11-16 PROCEDURE — 96366 THER/PROPH/DIAG IV INF ADDON: CPT

## 2020-11-16 RX ORDER — OSELTAMIVIR PHOSPHATE 75 MG/1
75 CAPSULE ORAL ONCE
Status: DISCONTINUED | OUTPATIENT
Start: 2020-11-16 | End: 2020-11-17

## 2020-11-16 RX ORDER — SODIUM CHLORIDE 9 MG/ML
INJECTION, SOLUTION INTRAVENOUS CONTINUOUS
Status: DISCONTINUED | OUTPATIENT
Start: 2020-11-16 | End: 2020-11-17

## 2020-11-16 RX ORDER — HEPARIN SODIUM 10000 [USP'U]/100ML
0-5000 INJECTION, SOLUTION INTRAVENOUS CONTINUOUS
Status: DISCONTINUED | OUTPATIENT
Start: 2020-11-16 | End: 2020-11-17

## 2020-11-16 RX ORDER — DEXAMETHASONE SODIUM PHOSPHATE 10 MG/ML
10 INJECTION, SOLUTION INTRAMUSCULAR; INTRAVENOUS ONCE
Status: COMPLETED | OUTPATIENT
Start: 2020-11-16 | End: 2020-11-16

## 2020-11-16 RX ORDER — PIPERACILLIN SODIUM, TAZOBACTAM SODIUM 4; .5 G/20ML; G/20ML
4.5 INJECTION, POWDER, LYOPHILIZED, FOR SOLUTION INTRAVENOUS ONCE
Status: COMPLETED | OUTPATIENT
Start: 2020-11-16 | End: 2020-11-16

## 2020-11-16 RX ADMIN — DEXAMETHASONE SODIUM PHOSPHATE 10 MG: 10 INJECTION, SOLUTION INTRAMUSCULAR; INTRAVENOUS at 20:08

## 2020-11-16 RX ADMIN — HEPARIN SODIUM 1800 UNITS/HR: 10000 INJECTION, SOLUTION INTRAVENOUS at 22:31

## 2020-11-16 RX ADMIN — VANCOMYCIN HYDROCHLORIDE 1500 MG: 1 INJECTION, POWDER, LYOPHILIZED, FOR SOLUTION INTRAVENOUS at 21:48

## 2020-11-16 RX ADMIN — PIPERACILLIN SODIUM AND TAZOBACTAM SODIUM 4.5 G: 4; .5 INJECTION, POWDER, LYOPHILIZED, FOR SOLUTION INTRAVENOUS at 20:50

## 2020-11-16 RX ADMIN — REMDESIVIR 200 MG: 100 INJECTION, POWDER, LYOPHILIZED, FOR SOLUTION INTRAVENOUS at 23:39

## 2020-11-16 RX ADMIN — SODIUM CHLORIDE 500 ML: 9 INJECTION, SOLUTION INTRAVENOUS at 20:49

## 2020-11-16 ASSESSMENT — ENCOUNTER SYMPTOMS
SHORTNESS OF BREATH: 1
MYALGIAS: 1

## 2020-11-17 ENCOUNTER — APPOINTMENT (OUTPATIENT)
Dept: CARDIOLOGY | Facility: CLINIC | Age: 75
DRG: 871 | End: 2020-11-17
Payer: COMMERCIAL

## 2020-11-17 LAB
ANION GAP SERPL CALCULATED.3IONS-SCNC: 7 MMOL/L (ref 3–14)
APTT PPP: 238 SEC (ref 22–37)
BASE DEFICIT BLDA-SCNC: 5.1 MMOL/L
BASOPHILS # BLD AUTO: 0 10E9/L (ref 0–0.2)
BASOPHILS NFR BLD AUTO: 0.3 %
BUN SERPL-MCNC: 47 MG/DL (ref 7–30)
CALCIUM SERPL-MCNC: 8.8 MG/DL (ref 8.5–10.1)
CHLORIDE SERPL-SCNC: 110 MMOL/L (ref 94–109)
CO2 SERPL-SCNC: 19 MMOL/L (ref 20–32)
CREAT SERPL-MCNC: 1.53 MG/DL (ref 0.52–1.04)
CRP SERPL-MCNC: 170 MG/L (ref 0–8)
DIFFERENTIAL METHOD BLD: ABNORMAL
EOSINOPHIL # BLD AUTO: 0 10E9/L (ref 0–0.7)
EOSINOPHIL NFR BLD AUTO: 0 %
ERYTHROCYTE [DISTWIDTH] IN BLOOD BY AUTOMATED COUNT: 13.5 % (ref 10–15)
FIBRINOGEN PPP-MCNC: 711 MG/DL (ref 200–420)
GFR SERPL CREATININE-BSD FRML MDRD: 33 ML/MIN/{1.73_M2}
GLUCOSE BLDC GLUCOMTR-MCNC: 109 MG/DL (ref 70–99)
GLUCOSE BLDC GLUCOMTR-MCNC: 127 MG/DL (ref 70–99)
GLUCOSE BLDC GLUCOMTR-MCNC: 139 MG/DL (ref 70–99)
GLUCOSE BLDC GLUCOMTR-MCNC: 171 MG/DL (ref 70–99)
GLUCOSE SERPL-MCNC: 152 MG/DL (ref 70–99)
HCO3 BLD-SCNC: 19 MMOL/L (ref 21–28)
HCT VFR BLD AUTO: 36.8 % (ref 35–47)
HGB BLD-MCNC: 11.6 G/DL (ref 11.7–15.7)
IMM GRANULOCYTES # BLD: 0.1 10E9/L (ref 0–0.4)
IMM GRANULOCYTES NFR BLD: 1.9 %
INR PPP: 1.63 (ref 0.86–1.14)
INTERPRETATION ECG - MUSE: NORMAL
LDH SERPL L TO P-CCNC: 330 U/L (ref 81–234)
LYMPHOCYTES # BLD AUTO: 0.3 10E9/L (ref 0.8–5.3)
LYMPHOCYTES NFR BLD AUTO: 10 %
MCH RBC QN AUTO: 27 PG (ref 26.5–33)
MCHC RBC AUTO-ENTMCNC: 31.5 G/DL (ref 31.5–36.5)
MCV RBC AUTO: 86 FL (ref 78–100)
MONOCYTES # BLD AUTO: 0.3 10E9/L (ref 0–1.3)
MONOCYTES NFR BLD AUTO: 10.6 %
NEUTROPHILS # BLD AUTO: 2.5 10E9/L (ref 1.6–8.3)
NEUTROPHILS NFR BLD AUTO: 77.2 %
NRBC # BLD AUTO: 0 10*3/UL
NRBC BLD AUTO-RTO: 0 /100
O2/TOTAL GAS SETTING VFR VENT: ABNORMAL %
PCO2 BLD: 32 MM HG (ref 35–45)
PH BLD: 7.39 PH (ref 7.35–7.45)
PLATELET # BLD AUTO: 265 10E9/L (ref 150–450)
PO2 BLD: 79 MM HG (ref 80–105)
POTASSIUM SERPL-SCNC: 4.9 MMOL/L (ref 3.4–5.3)
RBC # BLD AUTO: 4.29 10E12/L (ref 3.8–5.2)
RETICS # AUTO: 54.9 10E9/L (ref 25–95)
RETICS/RBC NFR AUTO: 1.3 % (ref 0.5–2)
SODIUM SERPL-SCNC: 137 MMOL/L (ref 133–144)
UFH PPP CHRO-ACNC: 0.23 IU/ML
UFH PPP CHRO-ACNC: 0.71 IU/ML
UFH PPP CHRO-ACNC: >1.1 IU/ML
UFH PPP CHRO-ACNC: >1.1 IU/ML
WBC # BLD AUTO: 3.2 10E9/L (ref 4–11)

## 2020-11-17 PROCEDURE — 85025 COMPLETE CBC W/AUTO DIFF WBC: CPT | Performed by: STUDENT IN AN ORGANIZED HEALTH CARE EDUCATION/TRAINING PROGRAM

## 2020-11-17 PROCEDURE — 250N000009 HC RX 250: Performed by: EMERGENCY MEDICINE

## 2020-11-17 PROCEDURE — 36415 COLL VENOUS BLD VENIPUNCTURE: CPT | Performed by: INTERNAL MEDICINE

## 2020-11-17 PROCEDURE — 36415 COLL VENOUS BLD VENIPUNCTURE: CPT | Performed by: STUDENT IN AN ORGANIZED HEALTH CARE EDUCATION/TRAINING PROGRAM

## 2020-11-17 PROCEDURE — 94799 UNLISTED PULMONARY SVC/PX: CPT

## 2020-11-17 PROCEDURE — 999N000127 HC STATISTIC PERIPHERAL IV START W US GUIDANCE

## 2020-11-17 PROCEDURE — 82803 BLOOD GASES ANY COMBINATION: CPT | Performed by: STUDENT IN AN ORGANIZED HEALTH CARE EDUCATION/TRAINING PROGRAM

## 2020-11-17 PROCEDURE — 200N000002 HC R&B ICU UMMC

## 2020-11-17 PROCEDURE — 250N000011 HC RX IP 250 OP 636: Performed by: STUDENT IN AN ORGANIZED HEALTH CARE EDUCATION/TRAINING PROGRAM

## 2020-11-17 PROCEDURE — 999N000157 HC STATISTIC RCP TIME EA 10 MIN

## 2020-11-17 PROCEDURE — 999N001017 HC STATISTIC GLUCOSE BY METER IP

## 2020-11-17 PROCEDURE — 80048 BASIC METABOLIC PNL TOTAL CA: CPT | Performed by: STUDENT IN AN ORGANIZED HEALTH CARE EDUCATION/TRAINING PROGRAM

## 2020-11-17 PROCEDURE — 93306 TTE W/DOPPLER COMPLETE: CPT

## 2020-11-17 PROCEDURE — 250N000013 HC RX MED GY IP 250 OP 250 PS 637: Performed by: STUDENT IN AN ORGANIZED HEALTH CARE EDUCATION/TRAINING PROGRAM

## 2020-11-17 PROCEDURE — 86140 C-REACTIVE PROTEIN: CPT | Performed by: STUDENT IN AN ORGANIZED HEALTH CARE EDUCATION/TRAINING PROGRAM

## 2020-11-17 PROCEDURE — 36600 WITHDRAWAL OF ARTERIAL BLOOD: CPT

## 2020-11-17 PROCEDURE — 93306 TTE W/DOPPLER COMPLETE: CPT | Mod: 26 | Performed by: STUDENT IN AN ORGANIZED HEALTH CARE EDUCATION/TRAINING PROGRAM

## 2020-11-17 PROCEDURE — 85610 PROTHROMBIN TIME: CPT | Performed by: STUDENT IN AN ORGANIZED HEALTH CARE EDUCATION/TRAINING PROGRAM

## 2020-11-17 PROCEDURE — 999N000215 HC STATISTIC HFNC ADULT NON-CPAP

## 2020-11-17 PROCEDURE — 85730 THROMBOPLASTIN TIME PARTIAL: CPT | Performed by: STUDENT IN AN ORGANIZED HEALTH CARE EDUCATION/TRAINING PROGRAM

## 2020-11-17 PROCEDURE — 250N000013 HC RX MED GY IP 250 OP 250 PS 637: Performed by: INTERNAL MEDICINE

## 2020-11-17 PROCEDURE — 85045 AUTOMATED RETICULOCYTE COUNT: CPT | Performed by: STUDENT IN AN ORGANIZED HEALTH CARE EDUCATION/TRAINING PROGRAM

## 2020-11-17 PROCEDURE — 99291 CRITICAL CARE FIRST HOUR: CPT | Mod: GC | Performed by: INTERNAL MEDICINE

## 2020-11-17 PROCEDURE — 99292 CRITICAL CARE ADDL 30 MIN: CPT | Performed by: INTERNAL MEDICINE

## 2020-11-17 PROCEDURE — 85384 FIBRINOGEN ACTIVITY: CPT | Performed by: STUDENT IN AN ORGANIZED HEALTH CARE EDUCATION/TRAINING PROGRAM

## 2020-11-17 PROCEDURE — 85520 HEPARIN ASSAY: CPT | Performed by: INTERNAL MEDICINE

## 2020-11-17 PROCEDURE — 250N000012 HC RX MED GY IP 250 OP 636 PS 637: Performed by: STUDENT IN AN ORGANIZED HEALTH CARE EDUCATION/TRAINING PROGRAM

## 2020-11-17 PROCEDURE — 83615 LACTATE (LD) (LDH) ENZYME: CPT | Performed by: STUDENT IN AN ORGANIZED HEALTH CARE EDUCATION/TRAINING PROGRAM

## 2020-11-17 PROCEDURE — 85520 HEPARIN ASSAY: CPT | Performed by: STUDENT IN AN ORGANIZED HEALTH CARE EDUCATION/TRAINING PROGRAM

## 2020-11-17 PROCEDURE — 258N000003 HC RX IP 258 OP 636: Performed by: EMERGENCY MEDICINE

## 2020-11-17 RX ORDER — ONDANSETRON 4 MG/1
4 TABLET, ORALLY DISINTEGRATING ORAL EVERY 6 HOURS PRN
Status: DISCONTINUED | OUTPATIENT
Start: 2020-11-17 | End: 2020-11-24 | Stop reason: HOSPADM

## 2020-11-17 RX ORDER — NICOTINE POLACRILEX 4 MG
15-30 LOZENGE BUCCAL
Status: DISCONTINUED | OUTPATIENT
Start: 2020-11-17 | End: 2020-11-24 | Stop reason: HOSPADM

## 2020-11-17 RX ORDER — POLYETHYLENE GLYCOL 3350 17 G/17G
17 POWDER, FOR SOLUTION ORAL DAILY PRN
Status: DISCONTINUED | OUTPATIENT
Start: 2020-11-17 | End: 2020-11-24 | Stop reason: HOSPADM

## 2020-11-17 RX ORDER — FAMOTIDINE 20 MG/1
20 TABLET, FILM COATED ORAL DAILY
Status: DISCONTINUED | OUTPATIENT
Start: 2020-11-17 | End: 2020-11-20

## 2020-11-17 RX ORDER — CETIRIZINE HYDROCHLORIDE 10 MG/1
10 TABLET ORAL DAILY
Status: DISCONTINUED | OUTPATIENT
Start: 2020-11-17 | End: 2020-11-24 | Stop reason: HOSPADM

## 2020-11-17 RX ORDER — LEVOTHYROXINE SODIUM 75 UG/1
150 TABLET ORAL
Status: DISCONTINUED | OUTPATIENT
Start: 2020-11-17 | End: 2020-11-24 | Stop reason: HOSPADM

## 2020-11-17 RX ORDER — ACETAMINOPHEN 325 MG/1
650 TABLET ORAL EVERY 4 HOURS PRN
Status: DISCONTINUED | OUTPATIENT
Start: 2020-11-17 | End: 2020-11-24 | Stop reason: HOSPADM

## 2020-11-17 RX ORDER — AMOXICILLIN 250 MG
1 CAPSULE ORAL 2 TIMES DAILY PRN
Status: DISCONTINUED | OUTPATIENT
Start: 2020-11-17 | End: 2020-11-24 | Stop reason: HOSPADM

## 2020-11-17 RX ORDER — AMOXICILLIN 250 MG
2 CAPSULE ORAL 2 TIMES DAILY PRN
Status: DISCONTINUED | OUTPATIENT
Start: 2020-11-17 | End: 2020-11-24 | Stop reason: HOSPADM

## 2020-11-17 RX ORDER — HEPARIN SODIUM 10000 [USP'U]/100ML
0-5000 INJECTION, SOLUTION INTRAVENOUS CONTINUOUS
Status: DISCONTINUED | OUTPATIENT
Start: 2020-11-17 | End: 2020-11-19

## 2020-11-17 RX ORDER — ATORVASTATIN CALCIUM 10 MG/1
10 TABLET, FILM COATED ORAL DAILY
Status: DISCONTINUED | OUTPATIENT
Start: 2020-11-17 | End: 2020-11-24 | Stop reason: HOSPADM

## 2020-11-17 RX ORDER — PROCHLORPERAZINE MALEATE 5 MG
5 TABLET ORAL EVERY 6 HOURS PRN
Status: DISCONTINUED | OUTPATIENT
Start: 2020-11-17 | End: 2020-11-24 | Stop reason: HOSPADM

## 2020-11-17 RX ORDER — PROCHLORPERAZINE 25 MG
12.5 SUPPOSITORY, RECTAL RECTAL EVERY 12 HOURS PRN
Status: DISCONTINUED | OUTPATIENT
Start: 2020-11-17 | End: 2020-11-24 | Stop reason: HOSPADM

## 2020-11-17 RX ORDER — ONDANSETRON 2 MG/ML
4 INJECTION INTRAMUSCULAR; INTRAVENOUS EVERY 6 HOURS PRN
Status: DISCONTINUED | OUTPATIENT
Start: 2020-11-17 | End: 2020-11-24 | Stop reason: HOSPADM

## 2020-11-17 RX ORDER — DEXTROSE MONOHYDRATE 25 G/50ML
25-50 INJECTION, SOLUTION INTRAVENOUS
Status: DISCONTINUED | OUTPATIENT
Start: 2020-11-17 | End: 2020-11-24 | Stop reason: HOSPADM

## 2020-11-17 RX ORDER — HEPARIN SODIUM 10000 [USP'U]/100ML
0-5000 INJECTION, SOLUTION INTRAVENOUS CONTINUOUS
Status: DISCONTINUED | OUTPATIENT
Start: 2020-11-17 | End: 2020-11-17

## 2020-11-17 RX ADMIN — FAMOTIDINE 20 MG: 20 TABLET ORAL at 08:18

## 2020-11-17 RX ADMIN — DEXAMETHASONE 6 MG: 2 TABLET ORAL at 08:18

## 2020-11-17 RX ADMIN — REMDESIVIR 100 MG: 100 INJECTION, POWDER, LYOPHILIZED, FOR SOLUTION INTRAVENOUS at 23:51

## 2020-11-17 RX ADMIN — LEVOTHYROXINE SODIUM 150 MCG: 0.03 TABLET ORAL at 08:18

## 2020-11-17 RX ADMIN — CETIRIZINE HYDROCHLORIDE 10 MG: 10 TABLET, FILM COATED ORAL at 08:23

## 2020-11-17 RX ADMIN — ATORVASTATIN CALCIUM 10 MG: 10 TABLET, FILM COATED ORAL at 08:23

## 2020-11-17 RX ADMIN — HEPARIN SODIUM 1200 UNITS/HR: 10000 INJECTION, SOLUTION INTRAVENOUS at 05:42

## 2020-11-17 ASSESSMENT — ACTIVITIES OF DAILY LIVING (ADL)
ADLS_ACUITY_SCORE: 20
ADLS_ACUITY_SCORE: 19
TOILETING_ISSUES: NO
ADLS_ACUITY_SCORE: 19
FALL_HISTORY_WITHIN_LAST_SIX_MONTHS: NO
ADLS_ACUITY_SCORE: 19
WALKING_OR_CLIMBING_STAIRS_DIFFICULTY: NO
DOING_ERRANDS_INDEPENDENTLY_DIFFICULTY: YES
ADLS_ACUITY_SCORE: 20
DRESSING/BATHING_DIFFICULTY: NO

## 2020-11-17 NOTE — PLAN OF CARE
ICU End of Shift Summary. See flowsheets for vital signs and detailed assessment.     Changes this shift: Pt alert and oriented x4, pleasant, cooperative. VS WNL ex, intermittent tachypnea, high 20's-low 30's. Afebrile, denies pain. Arrived to unit around 0200, mutual skin check completed by myself and Opal MUNOZ RN. L gluteal cleft wound present (non-blanchable redness), wound consult ordered. Started on 13L Oxymask, weaned to 10L, sating mid-high 90's. Frequent cough, non-productive, congested. Switched to High Flow NC 30LPM FiO2 80. Pt sating high 90's, resting comfortably in bed, work of breathing seems lessened. Pupils unequal, but reactive, MD notified. Purewick in place, adequate UOP. Small (smear) BM overnight. Able to turn with assist of 1, turned frequently overnight.     Plan: Continue to monitor.     Problem: Adult Inpatient Plan of Care  Goal: Optimal Comfort and Wellbeing  Outcome: No Change  Goal: Readiness for Transition of Care  Outcome: No Change  Intervention: Mutually Develop Transition Plan  Recent Flowsheet Documentation  Taken 11/17/2020 0200 by Shabana Lugo, RN  Equipment Currently Used at Home: none     Problem: Adult Inpatient Plan of Care  Goal: Absence of Hospital-Acquired Illness or Injury  Intervention: Prevent Skin Injury  Recent Flowsheet Documentation  Taken 11/17/2020 0400 by Shabana Lugo, RN  Body Position:   turned   side-lying 30 degrees   left  Taken 11/17/2020 0200 by Shabana Lugo, RN  Body Position:   turned   side-lying   right  Intervention: Prevent and Manage VTE (Venous Thromboembolism) Risk  Recent Flowsheet Documentation  Taken 11/17/2020 0400 by Shabana Lugo, RN  VTE Prevention/Management:   pneumatic compression device   ambulation promoted   anticoagulant therapy maintained   bleeding risk assessed  Taken 11/17/2020 0200 by Shabana Lugo, RN  VTE Prevention/Management:   pneumatic compression device   ambulation promoted   anticoagulant therapy  maintained   bleeding risk assessed  Intervention: Prevent Infection  Recent Flowsheet Documentation  Taken 11/17/2020 0400 by Shabana Lugo, RN  Infection Prevention:   hand hygiene promoted   personal protective equipment utilized   rest/sleep promoted   single patient room provided   visitors restricted/screened  Taken 11/17/2020 0200 by Shabana Lugo, RN  Infection Prevention:   hand hygiene promoted   personal protective equipment utilized   rest/sleep promoted   single patient room provided   visitors restricted/screened

## 2020-11-17 NOTE — PHARMACY-VANCOMYCIN DOSING SERVICE
Pharmacy Vancomycin Initial Note  Date of Service 2020  Patient's  1945  75 year old, female    Indication: Sepsis    Current estimated CrCl = CrCl cannot be calculated (Patient's most recent lab result is older than the maximum 10 days allowed.).    Creatinine for last 3 days  No results found for requested labs within last 72 hours.    Recent Vancomycin Level(s) for last 3 days  No results found for requested labs within last 72 hours.      Vancomycin IV Administrations (past 72 hours)      No vancomycin orders with administrations in past 72 hours.                Nephrotoxins and other renal medications (From now, onward)    Start     Dose/Rate Route Frequency Ordered Stop    20  piperacillin-tazobactam (ZOSYN) 4.5 g vial to attach to  mL bag      4.5 g  over 30 Minutes Intravenous ONCE 20            Contrast Orders - past 72 hours (72h ago, onward)    None                Plan:  1.  Start vancomycin 1500 mg IV once for loading dose in the ER.         Carla Ring [facility]

## 2020-11-17 NOTE — ED NOTES
Dr. Estrada informed pt creatinine 1.55 CT discontinued at this time. Another bolus ordered then RN to repeat BMP.

## 2020-11-17 NOTE — ED PROVIDER NOTES
ED Provider Note  [facility]      History     Chief Complaint   Patient presents with     Shortness of Breath     cough, SOB,      The history is provided by the patient and medical records.     Romayne L Sathre is a 75 year old female with a medical history significant for hypertension, hyperlipidemia, CKD stage III, hypothyroidism and right breast cancer s/p right mastectomy (2008) who presents to the Emergency Department for evaluation of shortness of breath.  Per review of patient's chart, patient presented to an urgent care clinic on 11/5/2020 with a 3-day history of a mild cough, dizziness, fever, nausea and abdominal pain.  Patient tested positive for COVID-19.  Patient was started on Bactrim twice daily for 5 days.    Patient presents to the Emergency Department today as her shortness of breath has been worsening over the last couple of days.  She has also been having myalgias, but she denies any specific chest pain.  She denies any history of lung disease.  She does note that she smoked for approximately a year.    Past Medical History  Past Medical History:   Diagnosis Date     Breast cancer (H) 8/26/2008    Right Breast     CKD (chronic kidney disease) stage 3, GFR 30-59 ml/min 9/17/2010     Hyperlipidemia LDL goal <100 11/29/2010     Hypertension goal BP (blood pressure) < 140/90 9/17/2010     Hypothyroid 2002     Nonsenile cataract      Osteoporosis 2014     Other psoriasis      Past Surgical History:   Procedure Laterality Date     HC REMV CATARACT EXTRACAP,INSERT LENS, W/O ECP Right 05/29/2002    Dr. Clinton Lopez ([id number], Power:  20.0D)     SURGICAL HISTORY OF -   Oct 3, 2008    Rt saline implant          atorvastatin (LIPITOR) 10 MG tablet       Black Pepper-Turmeric (TURMERIC COMPLEX/BLACK PEPPER PO)       cetirizine (ZYRTEC) 10 MG tablet       famotidine (PEPCID) 20 MG tablet       levothyroxine (SYNTHROID/LEVOTHROID) 150 MCG tablet       lisinopril (ZESTRIL) 10 MG  tablet       Multiple Vitamin (MULTI-VITAMIN) per tablet      Allergies   Allergen Reactions     No Known Drug Allergies      Past medical history, past surgical history, medications, and allergies were reviewed with the patient. Additional pertinent items: S/p right mastectomy (2008)    Family History  Family History   Problem Relation Age of Onset     Diabetes Mother         dec     Thyroid Disease Mother         unsure     Respiratory Father         emphysema     Diabetes Sister      Diabetes Brother      Family History Negative Brother         x 2     Family History Negative Sister         x 2     Psychotic Disorder Brother         depression     Kidney Disease Sister         ESRD on dialysis, due to DM     Thyroid Disease Daughter      Goiter No family hx of      Glaucoma No family hx of      Macular Degeneration No family hx of      Family history was reviewed with the patient. Additional pertinent items: None    Social History  Social History     Tobacco Use     Smoking status: Passive Smoke Exposure - Never Smoker     Smokeless tobacco: Never Used     Tobacco comment: family members smoke; daughter   Substance Use Topics     Alcohol use: Yes     Alcohol/week: 10.0 standard drinks     Comment: 0-1 drink per month     Drug use: No      Social history was reviewed with the patient. Additional pertinent items: None      Review of Systems   Constitutional: Positive for fatigue, fever and unexpected weight change.   HENT: Positive for rhinorrhea and sinus pain.    Respiratory: Positive for shortness of breath (worsening).    Cardiovascular: Negative for chest pain.   Gastrointestinal: Positive for abdominal pain and nausea.   Musculoskeletal: Positive for myalgias.   Neurological: Negative.    Hematological: Negative.    Psychiatric/Behavioral: Negative.    All other systems reviewed and are negative.      Physical Exam     ED Triage Vitals   Enc Vitals Group      BP 11/16/20 1951 126/82      Pulse 11/16/20 1938  107      Resp --       Temp 11/16/20 1938 98.3  F (36.8  C)      Temp src 11/16/20 1938 Tympanic      SpO2 11/16/20 1938 (!) 79 %     Physical Exam  Vitals signs and nursing note reviewed.   Constitutional:       General: She is not in acute distress.     Appearance: She is well-developed. She is not diaphoretic.   HENT:      Head: Atraumatic.      Mouth/Throat:      Mouth: Mucous membranes are moist.      Pharynx: Oropharynx is clear. No oropharyngeal exudate.   Eyes:      General: No scleral icterus.     Pupils: Pupils are equal, round, and reactive to light.   Neck:      Musculoskeletal: Normal range of motion.   Cardiovascular:      Rate and Rhythm: Normal rate and regular rhythm.      Heart sounds: Normal heart sounds.   Pulmonary:      Effort: Pulmonary effort is normal. No respiratory distress.      Breath sounds: Normal breath sounds.   Abdominal:      General: Bowel sounds are normal.      Palpations: Abdomen is soft.      Tenderness: There is no abdominal tenderness.   Musculoskeletal:         General: No tenderness.   Skin:     General: Skin is warm.      Findings: No rash.   Neurological:      General: No focal deficit present.      Mental Status: She is alert and oriented to person, place, and time.      Cranial Nerves: No cranial nerve deficit.           ED Course      Procedures     7:49 PM  The patient was seen and examined by Leoncio Estrada MD in Room ED07.                EKG Interpretation:      Interpreted by Leoncio Estrada MD  Time reviewed: perEpic  Symptoms at time of EKG: SOB   Rhythm: normal sinus   Rate: normal  Axis: normal  Ectopy: none  Conduction: normal  ST Segments/ T Waves: No ST-T wave changes  Q Waves: none  Comparison to prior: No old EKG available    Clinical Impression: normal EKG             Critical Care Addendum    My initial assessment, based on my review of prehospital provider report, review of nursing observations, review of vital signs, focused history, physical exam,  review of cardiac rhythm monitor and 12 lead ECG analysis, established that Romayne L Sathre has respiratory insufficiency, which requires immediate intervention, and therefore she is critically ill.     After the initial assessment, the care team initiated multiple lab tests, initiated IV fluid administration and initiated medication therapy with dexamethasone, remdesivir, zosyn, vanco, heparin gtt to provide stabilization care. Due to the critical nature of this patient, I reassessed nursing observations, vital signs, physical exam and review of cardiac rhythm monitor multiple times prior to her disposition.     Time also spent performing documentation, reviewing test results and coordination of care.     Critical care time (excluding teaching time and procedures): 80 minutes.      The Lactic acid level is elevated due to dehydration, at this time there is no sign of severe sepsis or septic shock.     No results found for any visits on 11/16/20.  Medications   dexamethasone PF (DECADRON) injection 10 mg (has no administration in time range)        Assessments & Plan (with Medical Decision Making)     75 year old female with known diagnosis of COVID-19 a medical history significant for hypertension, hyperlipidemia, CKD stage III, hypothyroidism and right breast cancer s/p right mastectomy (2008) who presents to the Emergency Department for evaluation of shortness of breath.  Patient was placed on cardiac monitor which revealed sinus tachycardia and severe hypoxia to the 70s percent on room air, this was improved after placement of supplemental nasal cannula oxygen.  IV was established, labs drawn sent reviewed document epic remarkable for dimer greater than 20, and new HALI with BUN/creatinine of 48/1.5 and GFR of 33, lactic acid 2.7 likely due to dehydration, but otherwise normal CBC.  Procalcitonin 0.13.  X-ray of the chest was obtained which revealed diffuse patchy airspace opacities consistent with COVID-19.   EKG was obtained which revealed sinus tachycardia without ischemic changes.    Given the patient's dyspnea and tachycardia and hypoxia with underlying hypercoagulable state given her COVID-19 diagnosis is worried PE might be a possibility.  However given her HALI with GFR in the low 30s I decided to instead treat her empirically with heparin bolus and infusion at therapeutic doses.  She was also given dexamethasone, remdesivir, and Zosyn and vancomycin for broad-spectrum antibiotic coverage.  Patient was proned in the emergency department which improved her oxygen levels from 91% on 8 L up to 95% on 7 L.  Case discussed with ICU staff with agreement to stabilize overnight on ICU and consider transfer once patient's respiratory status stabilized.    I have reviewed the nursing notes. I have reviewed the findings, diagnosis, plan and need for follow up with the patient.    New Prescriptions    No medications on file       Final diagnoses:   Hypoxia   2019 novel coronavirus disease (COVID-19)       --  ILonnie, am serving as a trained medical scribe to document services personally performed by Leoncio Estrada MD, based on the provider's statements to me.     ILeoncio MD, was physically present and have reviewed and verified the accuracy of this note documented by Lonnie Yates.    Leoncio Estrada MD  [facility] EMERGENCY DEPARTMENT  11/16/2020     Leoncio Estrada MD  11/30/20 1142

## 2020-11-17 NOTE — PROGRESS NOTES
Critical Care Services Progress Note:     I have evaluated all laboratory values and imaging studies from the past 24 hours.  Summary of hospital course:  75F pmh of htn, ckd3 now presents with covid19 pna.    Overnight events/pertinent findings today:  Started on high flow for worsening fio2 needs.   On my visit she is breathing ok high flow.  Denies dyspnea, chest pain, dizzyness, lightheadedness.  Data  satting acceptably on high flow 85%, 30lpm; bp ok off pressors.  Labs (personally reviewed):  Lytes ok.  Creat 1.53 (baseline 1.15),  abg 7.39/32/79/19, wbc 3.2 slightly low (runs low at baseline); hgb slightly low 11.6; pltlts ok 265.  CXR (personally reviewed):  B/l infiltrates  Assessment/plan:  1.  Acute hypoxemic respiratory failure, highflow dependent:  Worsening course, needed to initiate high flow overnight.  May ultimately require intubation today.    2.  HALI:  Creat 1.53 above baseline of 1.15.  In setting of critical illness.    3.  Leukopenia:  Chronic, stable, minimally changed today.  Trend.    I spent a total of 40 minutes (excluding procedure time) personally providing and directing critical care services at the bedside and on the critical care unit for this patient.     Aly Fiore

## 2020-11-17 NOTE — CONSULTS
"Care Management Initial Consult    General Information  Assessment completed with: Patient;Mariana Clinton [phone]  Type of CM/SW Visit: Initial Assessment  Primary Care Provider verified and updated as needed:     Readmission within the last 30 days: no previous admission in last 30 days      Reason for Consult: health care directive  Advance Care Planning: Advance Care Planning Reviewed: education/resources on health care directives provided      Pt's daughter requested information on HCD. SW called pt in room to inquire if she would like to complete a HCD. Pt declined and reported \"not right now.\" SW informed pt of [facility] policy, pt reports she understands Mariana is decision maker and is ok with that at time time. Pt aware to request SW if she wishes to complete HCD.  SW spoke with Mariana regarding above. Mariana reports she wants to ensure that someone will be able to make medical decisions for pt if pt cannot. SW provided education on [facility] policy. Mariana in agreement and accepting.       Communication Assessment  Patient's communication style: spoken language (English or Bilingual)    Hearing Difficulty or Deaf: no   Wear Glasses or Blind: yes    Cognitive  Cognitive/Neuro/Behavioral: WDL                      Living Environment:   People in home: child(radha), adult    - Pt lives with daughter Luci. Per Mariana, Luci has mild intellectual disabilities and has a \"hard time comprehending things.\" Mariana stated that Luci does not require any assistance from pt and is safe at home.   Current living Arrangements: house      Able to return to prior arrangements:  Yes - pending medical stability.        Family/Social Support:  Care provided by: self  Provides care for: no one  Marital Status:   Children          Description of Support System: Supportive;Involved         Current Resources:   Skilled Home Care Services:  NA  Community Resources:  NA  Equipment currently used at home: none  Supplies currently " used at home:  NA    Employment/Financial:  Employment Status: retired        Financial Concerns: No concerns identified           Lifestyle & Psychosocial Needs:        Socioeconomic History     Marital status:      Spouse name: robbie England     Number of children: 3     Years of education: Not on file     Highest education level: Not on file   Occupational History     Occupation:      Employer: [employer]     Tobacco Use     Smoking status: Passive Smoke Exposure - Never Smoker     Smokeless tobacco: Never Used     Tobacco comment: family members smoke; daughter   Substance and Sexual Activity     Alcohol use: Yes     Alcohol/week: 10.0 standard drinks     Comment: 0-1 drink per month     Drug use: No     Sexual activity: Never       Functional Status:  Prior to admission patient needed assistance: PTA, pt was independent with all ADL and IADL.              Mental Health Status:  Mental Health Status: No Current Concerns       Chemical Dependency Status:  Chemical Dependency Status: No Current Concerns             Values/Beliefs:  Spiritual, Cultural Beliefs, Restorationism Practices, Values that affect care: no               Additional Information:  SW consulted for HCD. SW spoke with pt via phone to introduce self and role on treatment team. Pt declined completing HCD at this time. Assessment completed with pt at this time. Pt was agreeable to SW calling pt's daughter Mariana to inform her of this as she had requested HCD.    SW spoke with Mariana. JAMAAL updated Mariana on pt declining HCD and [facility] policy. Mariana assisted with some assessment questions.   No further SW needs at this time. SW available as needed.    CHRIS Espino, SW  Adult Acute Care SW  Ph:[phone]  Pager [phone]

## 2020-11-17 NOTE — PLAN OF CARE
ICU End of Shift Summary. See flowsheets for vital signs and detailed assessment.    Changes this shift: Vapotherm 30L, 50%, sat >88%. Nonproductive cough. Hep10a level 0.71, stopped 1 hr and restarted at decreased rate 900cc/hr, hep10a check at 2115. Smear BM, pt reports BM q 3-4 days at home. Regular diet, tolerated well, good appetite. Incontinence, purewick in place, bladder scan at 1700 for 537cc, encourage pt to void, void 100cc, pt refused the idea of straight cath x2, pt reports will continue to try. MD aware L<R pupil, neuro baseline. Echo done at bedside. Assist x1.     Plan: Hep10a check at 2115. Continue to wean O2; monitor WOB. Encourage activity with pt tomorrow. Continue with POC.     Problem: Adult Inpatient Plan of Care  Goal: Optimal Comfort and Wellbeing  Outcome: No Change     Problem: Adult Inpatient Plan of Care  Goal: Absence of Hospital-Acquired Illness or Injury  Intervention: Prevent and Manage VTE (Venous Thromboembolism) Risk  Recent Flowsheet Documentation  Taken 11/17/2020 1200 by Gemini Lima RN  VTE Prevention/Management:    pneumatic compression device    bleeding risk assessed    bleeding risk factor(s) identified, physician notified  Taken 11/17/2020 0800 by Gemini Lima RN  VTE Prevention/Management:    pneumatic compression device    bleeding risk assessed    bleeding risk factor(s) identified, physician notified

## 2020-11-17 NOTE — ED NOTES
MD informed pt successfully pronated and RN to hold tamiflu for now as pt pronated and unsafe for PO medications.

## 2020-11-17 NOTE — H&P
MEDICAL ICU H&P  11/17/2020    Date of Hospital Admission: 11/17/2020  Date of ICU Admission: 11/17/2020  Reason for Critical Care Admission: Acute hypoxic respiratory failure  Date of Service (when I saw the patient): 11/17/2020    ASSESSMENT: Romayne L Sathre is a 75 year old female with PMH including Br ca (s/p mastectomy and chemotherapy), HTN, HLD, hypothyroidism, and CKDIII who was admitted on 11/16/2020 for acute hypoxic respiratory failure due to COVID-19 pneumonia.    PLAN:    Neuro:  # No active concerns  - Delirium precautions    Pulmonary:  # Acute hypoxic respiratory failure d/t COVID-19 pneumonia  - HFNC to maintain SpO2 > 90%  - ABG in AM  - COVID labs in AM  - Self prone as able/needed  - Guaifenesin PRN for cough  - Interventions in ID    # At risk for DVT/PE/other thrombosis  # Elevated D-dimer  D-dimer elevated >20 on admission. Started on high-intensity heparin gtt in ED with bolus.  - Cont heparin gtt, but transition to low-intensity  - Attempt to possibly complete CTA chest if very high c/f PE though would be unlikely to  unless patient shows evidence of hemodynamic instability  - Consider LE duplexes (though no evidence of DVT on exam)  - Trend D-dimer intermittently    Cardiovascular:  # HTN  - Hold lisinopril for now    # HLD  - Cont atorvastatin    GI/Nutrition:  # GERD  - Cont famotidine    Renal/Fluids/Electrolytes:  # HALI on CKD III  Cr. 1.55 on admission, slightly elevated from baseline of ~1.2.   - S/p ~750ml NS  - Hold off on further fluids given COVID-19 and desire to maintain patient slightly dry  - Trend Cr, avoid nephrotoxins, strict I/O     Endocrine:  # Hypothyroidism  - Cont levothyroxine    ID:  # COVID-19 pneumonia  # C/f superimposed bacterial PNA, low likelihood  Presents ~11 days after first known positive and somewhat atypical very delayed hypoxia, with concern for development of possible PE. Procal negative. COVID inflammatory labs to be drawn in  AM.  - S/p zosyn/vanc; hold off on further abx for now  - Remdesivir x 5 days  - Dexamethasone 6mg PO x 9 days (s/p 10mg IV)  - Consider plasma if further decompensates  - COVID inflammatory labs intermittently    Hematology/Oncology:    # Lymphopenia  Related to viral infection.  - Trend intermittently    # Hx breast cancer (s/p mastectomy and chemotherapy)  - No active interventions    Musculoskeletal:  # No active concerns    Skin:  # No active concerns    General Cares/Prophylaxis:    DVT Prophylaxis: Heparin gtt  GI Prophylaxis: H2 Blocker  Restraints: None  Family Communication: None  Code Status: FULL    Lines/tubes/drains:  - None, PIVs    Disposition:  - Medical ICU pending O2 requirements    Patient seen and findings/plan discussed with medical ICU staff, Dr. Zurita.    Alvin Dias    -----------------------------------------------------------------------    HISTORY PRESENTING ILLNESS:     Romayne L Sathre is a 75 year old female with PMH including Br ca (s/p mastectomy and chemotherapy), HTN, HLD, hypothyroidism, and CKDIII who was admitted on 11/16/2020 for acute hypoxic respiratory failure due to COVID-19 pneumonia.    Patient initially diagnosed with COVID-19 on 11/5. At that time, was symptomatic with myalgias, fevers, cough, and mild shortness of breath. All of these apart from cough improved somewhat until about 48 hours before admission. Then experienced worsening dyspnea, mostly exertional. Cough has remained persistent and bothersome. Otherwise, now recurrent fevers, and now with only very mild myalgias. No lightheadedness, headaches, rhinorrhea, chest pain (including pleuritic pain), GI symptoms (including N/V/D, abdominal pain). No LE edema, redness, or pain apart from mild myalgias. Has been isolating with family members (daughter and grandchild), none of whom are symptomatic.    ED Course: stable vitals apart from hypoxia. Labs notable mostly for elevated D-dimer an HALI, otherwise  somewhat unremarkable. Blood cultures drawn. Imaging notable for bilateral infiltrates c/w COVID-19 PNA. Interventions included 500ml NS (plus maintenance), 10mg IV decadron, heparin bolus and gtt, zosyn 4.5g, remdesivir 200mg, and vancomycin 1.5g.    REVIEW OF SYSTEMS: 10-point negative apart from HPI.    PAST MEDICAL HISTORY:   Past Medical History:   Diagnosis Date     Breast cancer (H) 2008    Right Breast     CKD (chronic kidney disease) stage 3, GFR 30-59 ml/min 2010     Hyperlipidemia LDL goal <100 2010     Hypertension goal BP (blood pressure) < 140/90 2010     Hypothyroid      Nonsenile cataract      Osteoporosis      Other psoriasis      SURGICAL HISTORY:  Past Surgical History:   Procedure Laterality Date     HC REMV CATARACT EXTRACAP,INSERT LENS, W/O ECP Right 2002    Dr. Clinton Lopez ([id number], Power:  20.0D)     SURGICAL HISTORY OF -   Oct 3, 2008    Rt saline implant     SOCIAL HISTORY:  Social History     Socioeconomic History     Marital status:      Spouse name: Tomás, robbie     Number of children: 3     Years of education: None     Highest education level: None   Occupational History     Occupation: [employer]     Employer: [employer]   Social Needs     Financial resource strain: None     Food insecurity     Worry: None     Inability: None     Transportation needs     Medical: None     Non-medical: None   Tobacco Use     Smoking status: Passive Smoke Exposure - Never Smoker     Smokeless tobacco: Never Used     Tobacco comment: family members smoke; daughter   Substance and Sexual Activity     Alcohol use: Yes     Alcohol/week: 10.0 standard drinks     Comment: 0-1 drink per month     Drug use: No     Sexual activity: Never   Lifestyle     Physical activity     Days per week: None     Minutes per session: None     Stress: None   Relationships     Social connections     Talks on phone: None     Gets together: None     Attends Yarsanism service: None     Active  member of club or organization: None     Attends meetings of clubs or organizations: None     Relationship status: None     Intimate partner violence     Fear of current or ex partner: None     Emotionally abused: None     Physically abused: None     Forced sexual activity: None   Other Topics Concern      Service Not Asked     Blood Transfusions Not Asked     Caffeine Concern Not Asked     Comment: Occasionally - coffee - 2 to 3 cups a day     Occupational Exposure Not Asked     Hobby Hazards Not Asked     Sleep Concern Not Asked     Stress Concern Not Asked     Weight Concern Not Asked     Special Diet Not Asked     Back Care Not Asked     Exercise Not Asked     Comment: No exercise program     Bike Helmet Not Asked     Seat Belt Not Asked     Self-Exams Not Asked     Parent/sibling w/ CABG, MI or angioplasty before 65F 55M? No   Social History Narrative    Balanced Diet - Yes    Osteoporosis Prevention Measures - Dairy servings per day: 3 servings daily    Regular Exercise -  No Describe     Dental Exam - YES - Date: over a year, going tomorrow    Eye Exam - YES - Date: 1 year ago    Self Breast Exam - Yes    Abuse: Current or Past (Physical, Sexual or Emotional)- No    Do you feel safe in your environment - Yes    Guns stored in the home - unknown    Sunscreen used - Yes    Seatbelts used - Yes    Lipids -  YES - Date: 3-06    Glucose -  YES - Date: 11-04    Colon Cancer Screening - Colonoscopy 4  Or 5 years ago, not in chart  (date completed)    Hemoccults - NO    Pap Test -  UNKNOWN    Do you have any concerns about STD's -  No    Mammography - YES - Date: 3-06, has appt later this week    DEXA - NO    Immunizations reviewed and up to date - No    8-04-08  ZAC Ortega CMA                 Very mild smoking history, states she smoked intermittently while in college. No recent smoking.      FAMILY HISTORY:   Family History   Problem Relation Age of Onset     Diabetes Mother         dec     Thyroid Disease  Mother         unsure     Respiratory Father         emphysema     Diabetes Sister      Diabetes Brother      Family History Negative Brother         x 2     Family History Negative Sister         x 2     Psychotic Disorder Brother         depression     Kidney Disease Sister         ESRD on dialysis, due to DM     Thyroid Disease Daughter      Goiter No family hx of      Glaucoma No family hx of      Macular Degeneration No family hx of      ALLERGIES:   Allergies   Allergen Reactions     No Known Drug Allergies      MEDICATIONS:  No current facility-administered medications on file prior to encounter.        atorvastatin (LIPITOR) 10 MG tablet, Take 1 tablet (10 mg) by mouth daily       levothyroxine (SYNTHROID/LEVOTHROID) 150 MCG tablet, MON to SAT 1 tablet/day; SAT and SUN 1.5 tablet       lisinopril (ZESTRIL) 10 MG tablet, Take 1 tablet (10 mg) by mouth daily       Black Pepper-Turmeric (TURMERIC COMPLEX/BLACK PEPPER PO),        cetirizine (ZYRTEC) 10 MG tablet, Take 10 mg by mouth daily       famotidine (PEPCID) 20 MG tablet, Take 20 mg by mouth 2 times daily       Multiple Vitamin (MULTI-VITAMIN) per tablet, Take 1 tablet by mouth daily.          PHYSICAL EXAMINATION:  Temp:  [98.3  F (36.8  C)] 98.3  F (36.8  C)  Pulse:  [] 80  Resp:  [24-32] 30  BP: ()/(29-97) 150/73  SpO2:  [71 %-97 %] 97 %  General: Well-appearing, no acute distress, talking on phone  HEENT: NCAT. Anicteric. MMM.   Neuro: A&Ox3, NAD, non-focal.  Pulm/Resp: Scattered crackles throughout.  CV: RRR, normal S1, S2, no murmurs  Abd: BS+. Soft, non-distended, non-tender  : Not performed.  Ext: WWP, trace edema, symmetrical  Incisions/Skin: No rashes or lesions on examined skin.    LABS: Reviewed.   Arterial Blood Gases   No lab results found in last 7 days.  Complete Blood Count   Recent Labs   Lab 11/16/20 2010   WBC 5.5   HGB 13.3        Basic Metabolic Panel  Recent Labs   Lab 11/16/20 2010      POTASSIUM 4.4    CHLORIDE 105   CO2 21   BUN 48*   CR 1.55*   *     Liver Function Tests  Recent Labs   Lab 11/16/20 2010   AST 34   ALT 24   ALKPHOS 84   BILITOTAL 0.8   ALBUMIN 2.2*     Coagulation Profile  No lab results found in last 7 days.    IMAGING:  Recent Results (from the past 24 hour(s))   XR Chest Port 1 View    Narrative    XR PORTABLE CHEST ONE VIEW   11/16/2020 9:05 PM     HISTORY: Shortness of breath, Covid.    COMPARISON: Chest x-ray on 4/17/2019      Impression    IMPRESSION: Single portable AP view of the chest was obtained. Diffuse  patchy airspace pulmonary opacities, consistent with patient's history  of Covid-19 infection. No significant pleural effusion or  pneumothorax.    ADRIAN PUENTE MD

## 2020-11-17 NOTE — ED NOTES
[facility]    ED Nurse to Floor Handoff     Romayne L Sathre is a 75 year old female who speaks English and lives with family members,  in a home  They arrived in the ED by car from home    ED Chief Complaint: Shortness of Breath (cough, SOB, )    ED Dx;   Final diagnoses:   Hypoxia   2019 novel coronavirus disease (COVID-19)         Needed?: No    Allergies:   Allergies   Allergen Reactions     No Known Drug Allergies    .  Past Medical Hx:   Past Medical History:   Diagnosis Date     Breast cancer (H) 8/26/2008    Right Breast     CKD (chronic kidney disease) stage 3, GFR 30-59 ml/min 9/17/2010     Hyperlipidemia LDL goal <100 11/29/2010     Hypertension goal BP (blood pressure) < 140/90 9/17/2010     Hypothyroid 2002     Nonsenile cataract      Osteoporosis 2014     Other psoriasis       Baseline Mental status: WDL  Current Mental Status changes: at basesline    Infection present or suspected this encounter: yes respiratory  Sepsis suspected: Yes  Isolation type: Special Precautions-COVID-19, Droplet  Patient tested for COVID 19 prior to admission: YES     Activity level - Baseline/Home:  Independent  Activity Level - Current:   Bedrest Ax1-2    Bariatric equipment needed?: No    In the ED these meds were given:   Medications   oseltamivir (TAMIFLU) capsule 75 mg (0 mg Oral Hold 11/16/20 2147)   vancomycin (VANCOCIN) 1,500 mg in sodium chloride 0.9 % 250 mL intermittent infusion (1,500 mg Intravenous New Bag 11/16/20 2148)   remdesivir 200 mg in sodium chloride 0.9 % 250 mL intermittent infusion (has no administration in time range)     Followed by   remdesivir 100 mg in sodium chloride 0.9 % 250 mL intermittent infusion (has no administration in time range)   0.9% sodium chloride BOLUS (500 mLs Intravenous Not Given 11/16/20 2233)     Followed by   sodium chloride 0.9% infusion (has no administration in time range)   heparin 25,000 units in 0.45% NaCl 250  mL ANTICOAGULANT  infusion (1,800 Units/hr Intravenous New Bag 11/16/20 2231)   dexamethasone PF (DECADRON) injection 10 mg (10 mg Intravenous Given 11/16/20 2008)   piperacillin-tazobactam (ZOSYN) 4.5 g vial to attach to  mL bag (0 g Intravenous Stopped 11/16/20 2141)   0.9% sodium chloride BOLUS (0 mLs Intravenous Stopped 11/16/20 2233)   heparin ANTICOAGULANT loading dose for  HIGH INTENSITY TREATMENT* Give BEFORE starting heparin infusion (8,000 Units Intravenous Given 11/16/20 2230)       Drips running?  Yes    Home pump  No    Current LDAs  Peripheral IV 11/16/20 Right Upper forearm (Active)   Site Assessment WDL 11/16/20 2048   Line Status Saline locked 11/16/20 2048   Dressing Intervention New dressing  11/16/20 2048   Phlebitis Scale 0-->no symptoms 11/16/20 2048   Number of days: 0       Peripheral IV 11/16/20 Left Upper forearm (Active)   Site Assessment [facility] 11/16/20 2048   Line Status Saline locked 11/16/20 2048   Dressing Intervention New dressing  11/16/20 2048   Phlebitis Scale 0-->no symptoms 11/16/20 2048   Number of days: 0       Labs results:   Labs Ordered and Resulted from Time of ED Arrival Up to the Time of Departure from the ED   CBC WITH PLATELETS DIFFERENTIAL - Abnormal; Notable for the following components:       Result Value    Absolute Lymphocytes 0.4 (*)     All other components within normal limits   COMPREHENSIVE METABOLIC PANEL - Abnormal; Notable for the following components:    Glucose 152 (*)     Urea Nitrogen 48 (*)     Creatinine 1.55 (*)     GFR Estimate 33 (*)     GFR Estimate If Black 38 (*)     Albumin 2.2 (*)     All other components within normal limits   LACTIC ACID WHOLE BLOOD - Abnormal; Notable for the following components:    Lactic Acid 2.7 (*)     All other components within normal limits   D DIMER QUANTITATIVE - Abnormal; Notable for the following components:    D Dimer >20.0 (*)     All other components within normal limits   TROPONIN I   NT PROBNP INPATIENT    PROCALCITONIN   PARTIAL THROMBOPLASTIN TIME   CBC WITH PLATELETS   MEASURE WEIGHT   NOTIFY PHYSICIAN   NOTIFY PHYSICIAN   BLOOD CULTURE   BLOOD CULTURE   INFLUENZA A AND B AND RSV PCR       Imaging Studies:   Recent Results (from the past 24 hour(s))   XR Chest Port 1 View    Narrative    XR PORTABLE CHEST ONE VIEW   11/16/2020 9:05 PM     HISTORY: Shortness of breath, Covid.    COMPARISON: Chest x-ray on 4/17/2019      Impression    IMPRESSION: Single portable AP view of the chest was obtained. Diffuse  patchy airspace pulmonary opacities, consistent with patient's history  of Covid-19 infection. No significant pleural effusion or  pneumothorax.    ADRIAN PUENTE MD       Recent vital signs:   /86   Pulse 99   Temp 98.3  F (36.8  C) (Tympanic)   Resp 30   Wt 108 kg (238 lb)   SpO2 93%   BMI 35.15 kg/m              Cardiac Rhythm: Normal Sinus and Tachycardia  Pt needs tele? Yes  Skin/wound Issues: None    Code Status: Full Code    Pain control: good back pain after laying pronated     Nausea control: pt had none    Abnormal labs/tests/findings requiring intervention: Positive COVID and abnormal xray and DDimer >20    Family present during ED course? No   Family Comments/Social Situation comments: Daughter number on file    Tasks needing completion: None    SATHISH ASTUDILLO, LENORA  ascom --   [phone] [place] ED  [phone] [facility] ED

## 2020-11-18 LAB
ANION GAP SERPL CALCULATED.3IONS-SCNC: 5 MMOL/L (ref 3–14)
BASOPHILS # BLD AUTO: 0 10E9/L (ref 0–0.2)
BASOPHILS NFR BLD AUTO: 0.2 %
BUN SERPL-MCNC: 46 MG/DL (ref 7–30)
BURR CELLS BLD QL SMEAR: SLIGHT
CALCIUM SERPL-MCNC: 8.5 MG/DL (ref 8.5–10.1)
CHLORIDE SERPL-SCNC: 114 MMOL/L (ref 94–109)
CO2 SERPL-SCNC: 22 MMOL/L (ref 20–32)
CREAT SERPL-MCNC: 1.25 MG/DL (ref 0.52–1.04)
CRP SERPL-MCNC: 110 MG/L (ref 0–8)
DIFFERENTIAL METHOD BLD: ABNORMAL
EOSINOPHIL # BLD AUTO: 0 10E9/L (ref 0–0.7)
EOSINOPHIL NFR BLD AUTO: 0.2 %
ERYTHROCYTE [DISTWIDTH] IN BLOOD BY AUTOMATED COUNT: 13.6 % (ref 10–15)
FIBRINOGEN PPP-MCNC: 614 MG/DL (ref 200–420)
GFR SERPL CREATININE-BSD FRML MDRD: 42 ML/MIN/{1.73_M2}
GLUCOSE BLDC GLUCOMTR-MCNC: 78 MG/DL (ref 70–99)
GLUCOSE SERPL-MCNC: 116 MG/DL (ref 70–99)
HCT VFR BLD AUTO: 35.5 % (ref 35–47)
HGB BLD-MCNC: 10.9 G/DL (ref 11.7–15.7)
IMM GRANULOCYTES # BLD: 0.1 10E9/L (ref 0–0.4)
IMM GRANULOCYTES NFR BLD: 1.7 %
INR PPP: 1.52 (ref 0.86–1.14)
LDH SERPL L TO P-CCNC: 272 U/L (ref 81–234)
LYMPHOCYTES # BLD AUTO: 0.6 10E9/L (ref 0.8–5.3)
LYMPHOCYTES NFR BLD AUTO: 11.4 %
MCH RBC QN AUTO: 27 PG (ref 26.5–33)
MCHC RBC AUTO-ENTMCNC: 30.7 G/DL (ref 31.5–36.5)
MCV RBC AUTO: 88 FL (ref 78–100)
MONOCYTES # BLD AUTO: 1 10E9/L (ref 0–1.3)
MONOCYTES NFR BLD AUTO: 19 %
NEUTROPHILS # BLD AUTO: 3.6 10E9/L (ref 1.6–8.3)
NEUTROPHILS NFR BLD AUTO: 67.5 %
NRBC # BLD AUTO: 0 10*3/UL
NRBC BLD AUTO-RTO: 0 /100
PLATELET # BLD AUTO: 275 10E9/L (ref 150–450)
PLATELET # BLD EST: ABNORMAL 10*3/UL
POIKILOCYTOSIS BLD QL SMEAR: SLIGHT
POTASSIUM SERPL-SCNC: 4.3 MMOL/L (ref 3.4–5.3)
RBC # BLD AUTO: 4.04 10E12/L (ref 3.8–5.2)
RETICS # AUTO: 54.5 10E9/L (ref 25–95)
RETICS/RBC NFR AUTO: 1.4 % (ref 0.5–2)
SODIUM SERPL-SCNC: 140 MMOL/L (ref 133–144)
TSH SERPL DL<=0.005 MIU/L-ACNC: 0.42 MU/L (ref 0.4–4)
UFH PPP CHRO-ACNC: 0.46 IU/ML
UFH PPP CHRO-ACNC: 0.48 IU/ML
WBC # BLD AUTO: 5.3 10E9/L (ref 4–11)

## 2020-11-18 PROCEDURE — 258N000003 HC RX IP 258 OP 636: Performed by: EMERGENCY MEDICINE

## 2020-11-18 PROCEDURE — 85610 PROTHROMBIN TIME: CPT | Performed by: INTERNAL MEDICINE

## 2020-11-18 PROCEDURE — 85025 COMPLETE CBC W/AUTO DIFF WBC: CPT | Performed by: STUDENT IN AN ORGANIZED HEALTH CARE EDUCATION/TRAINING PROGRAM

## 2020-11-18 PROCEDURE — 85045 AUTOMATED RETICULOCYTE COUNT: CPT | Performed by: STUDENT IN AN ORGANIZED HEALTH CARE EDUCATION/TRAINING PROGRAM

## 2020-11-18 PROCEDURE — 99233 SBSQ HOSP IP/OBS HIGH 50: CPT | Performed by: INTERNAL MEDICINE

## 2020-11-18 PROCEDURE — 250N000013 HC RX MED GY IP 250 OP 250 PS 637: Performed by: STUDENT IN AN ORGANIZED HEALTH CARE EDUCATION/TRAINING PROGRAM

## 2020-11-18 PROCEDURE — 85384 FIBRINOGEN ACTIVITY: CPT | Performed by: INTERNAL MEDICINE

## 2020-11-18 PROCEDURE — 250N000012 HC RX MED GY IP 250 OP 636 PS 637: Performed by: STUDENT IN AN ORGANIZED HEALTH CARE EDUCATION/TRAINING PROGRAM

## 2020-11-18 PROCEDURE — 84443 ASSAY THYROID STIM HORMONE: CPT | Performed by: STUDENT IN AN ORGANIZED HEALTH CARE EDUCATION/TRAINING PROGRAM

## 2020-11-18 PROCEDURE — 86140 C-REACTIVE PROTEIN: CPT | Performed by: STUDENT IN AN ORGANIZED HEALTH CARE EDUCATION/TRAINING PROGRAM

## 2020-11-18 PROCEDURE — 250N000009 HC RX 250: Performed by: EMERGENCY MEDICINE

## 2020-11-18 PROCEDURE — 999N000157 HC STATISTIC RCP TIME EA 10 MIN

## 2020-11-18 PROCEDURE — 999N001017 HC STATISTIC GLUCOSE BY METER IP

## 2020-11-18 PROCEDURE — 85520 HEPARIN ASSAY: CPT | Performed by: STUDENT IN AN ORGANIZED HEALTH CARE EDUCATION/TRAINING PROGRAM

## 2020-11-18 PROCEDURE — 83615 LACTATE (LD) (LDH) ENZYME: CPT | Performed by: STUDENT IN AN ORGANIZED HEALTH CARE EDUCATION/TRAINING PROGRAM

## 2020-11-18 PROCEDURE — 36415 COLL VENOUS BLD VENIPUNCTURE: CPT | Performed by: STUDENT IN AN ORGANIZED HEALTH CARE EDUCATION/TRAINING PROGRAM

## 2020-11-18 PROCEDURE — 80048 BASIC METABOLIC PNL TOTAL CA: CPT | Performed by: STUDENT IN AN ORGANIZED HEALTH CARE EDUCATION/TRAINING PROGRAM

## 2020-11-18 PROCEDURE — 85520 HEPARIN ASSAY: CPT | Performed by: INTERNAL MEDICINE

## 2020-11-18 PROCEDURE — 250N000013 HC RX MED GY IP 250 OP 250 PS 637: Performed by: INTERNAL MEDICINE

## 2020-11-18 PROCEDURE — 120N000002 HC R&B MED SURG/OB UMMC

## 2020-11-18 PROCEDURE — 250N000011 HC RX IP 250 OP 636: Performed by: STUDENT IN AN ORGANIZED HEALTH CARE EDUCATION/TRAINING PROGRAM

## 2020-11-18 PROCEDURE — 36415 COLL VENOUS BLD VENIPUNCTURE: CPT | Performed by: INTERNAL MEDICINE

## 2020-11-18 RX ADMIN — LEVOTHYROXINE SODIUM 150 MCG: 0.03 TABLET ORAL at 09:05

## 2020-11-18 RX ADMIN — CETIRIZINE HYDROCHLORIDE 10 MG: 10 TABLET, FILM COATED ORAL at 09:09

## 2020-11-18 RX ADMIN — MICONAZOLE NITRATE: 20 POWDER TOPICAL at 20:41

## 2020-11-18 RX ADMIN — DEXAMETHASONE 6 MG: 2 TABLET ORAL at 09:07

## 2020-11-18 RX ADMIN — ACETAMINOPHEN 650 MG: 325 TABLET, FILM COATED ORAL at 16:28

## 2020-11-18 RX ADMIN — ATORVASTATIN CALCIUM 10 MG: 10 TABLET, FILM COATED ORAL at 09:09

## 2020-11-18 RX ADMIN — REMDESIVIR 100 MG: 100 INJECTION, POWDER, LYOPHILIZED, FOR SOLUTION INTRAVENOUS at 23:00

## 2020-11-18 RX ADMIN — FAMOTIDINE 20 MG: 20 TABLET ORAL at 09:07

## 2020-11-18 RX ADMIN — HEPARIN SODIUM 1050 UNITS/HR: 10000 INJECTION, SOLUTION INTRAVENOUS at 02:45

## 2020-11-18 ASSESSMENT — ACTIVITIES OF DAILY LIVING (ADL)
ADLS_ACUITY_SCORE: 19
ADLS_ACUITY_SCORE: 19
ADLS_ACUITY_SCORE: 20
ADLS_ACUITY_SCORE: 21
ADLS_ACUITY_SCORE: 21
ADLS_ACUITY_SCORE: 20

## 2020-11-18 NOTE — PLAN OF CARE
ICU End of Shift Summary. See flowsheets for vital signs and detailed assessment.    Changes this shift: Remains on Vapotherm at 50-60%. Frequent cough, occasionally with desat into mid-80s. Recovers slowly but at rest sats are mid to upper 90s. BUS and straight cath for retention. Purewick also in place. Heparin bolus and gtt rate change, next Xa ordered for 1145.      Plan:  Continue to closely monitor respiratory status. Monitor for continued urinary retention.

## 2020-11-18 NOTE — PROGRESS NOTES
MEDICAL ICU PROGRESS NOTE  11/18/2020      Date of Service (when I saw the patient): 11/18/2020    ASSESSMENT: Romayne L Sathre is a 75 year old female with PMH including Br ca (s/p mastectomy and chemotherapy), HTN, HLD, hypothyroidism, and CKDIII who was admitted on 11/16/2020 for acute hypoxic respiratory failure due to COVID-19 pneumonia.    CHANGES and MAJOR THINGS TODAY:   - Doing well and stable for transfer to the floor       Neuro:  # No active concerns  - Delirium precautions     Pulmonary:  # Acute hypoxic respiratory failure d/t COVID-19 pneumonia  - HFNC to maintain SpO2 > 90%  - ABG in AM  - COVID labs in AM  - Self prone as able/needed  - Guaifenesin PRN for cough  - Interventions in ID     # At risk for DVT/PE/other thrombosis  # Elevated D-dimer  D-dimer elevated >20 on admission. Started on high-intensity heparin gtt in ED with bolus.  - Cont heparin gtt, but transition to low-intensity  - Attempt to possibly complete CTA chest if very high c/f PE though would be unlikely to  unless patient shows evidence of hemodynamic instability  - Consider LE duplexes (though no evidence of DVT on exam)  - Trend D-dimer intermittently     Cardiovascular:  # HTN  - Hold lisinopril for now     # HLD  - Cont atorvastatin     GI/Nutrition:  # GERD  - Cont famotidine     Renal/Fluids/Electrolytes:  # HALI on CKD III  Cr. 1.55 on admission, slightly elevated from baseline of ~1.2.   - S/p ~750ml NS  - Hold off on further fluids given COVID-19 and desire to maintain patient slightly dry  - Trend Cr, avoid nephrotoxins, strict I/O     #Urinary retention  Noting some difficulty with urinating since admission.  Not on significant anticholinergic medications.      Endocrine:  # Hypothyroidism  - Cont levothyroxine     ID:  # COVID-19 pneumonia  # C/f superimposed bacterial PNA, low likelihood  Presents ~11 days after first known positive and somewhat atypical very delayed hypoxia, with concern for  development of possible PE. Procal negative.  - S/p zosyn/vanc; hold off on further abx for now  - Remdesivir x 5 days  - Dexamethasone 6mg PO x 9 days (s/p 10mg IV)  - Consider plasma if further decompensates  - COVID inflammatory labs intermittently     Hematology/Oncology:    # Lymphopenia  Related to viral infection.  - Trend intermittently     # Hx breast cancer (s/p mastectomy and chemotherapy)  - No active interventions     Musculoskeletal:  # No active concerns     Skin:  # No active concerns     General Cares/Prophylaxis:    DVT Prophylaxis: Heparin gtt  GI Prophylaxis: H2 Blocker  Restraints: None  Family Communication: None  Code Status: FULL     Lines/tubes/drains:  - None, PIVs    Disposition:  - Transfer to the floor     Patient seen and findings/plan discussed with medical ICU staff, Dr. Fiore.    Sonia Gordon    ====================================  INTERVAL HISTORY:   No acute events overnight.  Intermittent very brief desats to the high 80s.  Notes that she is and was noted to that .  Denies nausea, vomiting, chest pain.  Notes some shortness of breath with exertion, but denies shortness of breath at rest.  Some urinary retention requiring straight cath    OBJECTIVE:   1. VITAL SIGNS:   Temp:  [97.3  F (36.3  C)-98  F (36.7  C)] 97.5  F (36.4  C)  Pulse:  [51-78] 63  Resp:  [15-36] 19  BP: ()/(47-88) 108/61  FiO2 (%):  [50 %-60 %] 60 %  SpO2:  [89 %-96 %] 95 %  FiO2 (%): 60 %  Resp: 19    2. INTAKE/ OUTPUT:   I/O last 3 completed shifts:  In: 1442.8 [P.O.:810; I.V.:632.8]  Out: 1045 [Urine:1045]    3. PHYSICAL EXAMINATION:  General: Well-appearing, no acute distress, sitting up in bed  HEENT: NCAT. Anicteric. MMM. Anisocoria L<R (baseline after cataract surgery)  Neuro: A&Ox3, NAD, non-focal.Responding to questions appropriately.  Pulm/Resp: Mildly tachypenic.  Breathing comfortably on 30 L HFNC. Scattered crackles throughout.  CV: RRR, normal S1, S2, no murmurs  Abd: BS+. Soft,  non-distended, non-tender  Ext: WWP, trace edema, symmetrical  Incisions/Skin: No rashes or lesions on examined skin.  Psych: Appropriate mood and affect     4. LABS:   Arterial Blood Gases   Recent Labs   Lab 20   PH 7.39   PCO2 32*   PO2 79*   HCO3 19*     Complete Blood Count   Recent Labs   Lab 20   WBC 5.3 3.2* 5.5   HGB 10.9* 11.6* 13.3    265 331     Basic Metabolic Panel  Recent Labs   Lab 20    137 136   POTASSIUM 4.3 4.9 4.4   CHLORIDE 114* 110* 105   CO2 22 19* 21   BUN 46* 47* 48*   CR 1.25* 1.53* 1.55*   * 152* 152*     Liver Function Tests  Recent Labs   Lab 20   AST  --   --  34   ALT  --   --  24   ALKPHOS  --   --  84   BILITOTAL  --   --  0.8   ALBUMIN  --   --  2.2*   INR 1.52* 1.63*  --      Coagulation Profile  Recent Labs   Lab 20   INR 1.52* 1.63*   PTT  --  238*       5. RADIOLOGY:   Recent Results (from the past 24 hour(s))   Echo Complete    Narrative    [id number]  [id number]  [id number]  [id number]^AMIRA^GASTON           [facility]  Echocardiography Laboratory  [address] [place] [id number]     Name: SATHRE, ROMAYNE L  MRN: [id number]  : 1945  Study Date: 2020 01:17 PM  Age: 75 yrs  Gender: Female  [place] [facility] [place] For Study: Dyspnea  Ordering Physician: GASTON COLLIER  Performed By: Joellen Saini RDCS     BSA: 2.2 m2  Height: 69 in  Weight: 221 lb  HR: 74  BP: 113/72 mmHg  _____________________________________________________________________________  __        Procedure  Complete Portable Echo Adult. Echocardiogram with two-dimensional, color and  spectral Doppler performed.  _____________________________________________________________________________  __        Interpretation Summary  Left ventricular function, chamber size, wall  motion, and wall thickness are  normal.The EF is 60-65%.  Right ventricular function, chamber size, wall motion, and thickness are  normal.  Right ventricular systolic pressure is 35mmHg above the right atrial pressure.  IVC diameter >2.1 cm collapsing <50% with sniff suggests a high RA pressure  estimated at 15 mmHg or greater.     This study was compared with the study from 4/23/2019. The IVC is dilated  suggestive of elevated RA pressure.  _____________________________________________________________________________  __        Left Ventricle  Left ventricular function, chamber size, wall motion, and wall thickness are  normal.The EF is 60-65%. Left ventricular diastolic function is indeterminate.     Right Ventricle  Right ventricular function, chamber size, wall motion, and thickness are  normal.     Atria  Both atria appear normal.     Mitral Valve  The mitral valve is normal. Trace mitral insufficiency is present.        Aortic Valve  Trileaflet aortic sclerosis without stenosis.     Tricuspid Valve  The tricuspid valve is normal. Trace tricuspid insufficiency is present. Right  ventricular systolic pressure is 35mmHg above the right atrial pressure.     Pulmonic Valve  The valve leaflets are not well visualized. On Doppler interrogation, there is  no significant stenosis or regurgitation.     Vessels  The aorta root is normal. The thoracic aorta is normal. The pulmonary artery  cannot be assessed. Dilation of the inferior vena cava is present with  abnormal respiratory variation in diameter. IVC diameter >2.1 cm collapsing  <50% with sniff suggests a high RA pressure estimated at 15 mmHg or greater.     Pericardium  No pericardial effusion is present.        Compared to Previous Study  This study was compared with the study from 4/23/2019 . The IVC is dilated  suggestive of elevated RA pressure.  _____________________________________________________________________________  __  MMode/2D Measurements &  Calculations     IVSd: 0.96 cm  LVIDd: 4.0 cm  LVIDs: 2.3 cm  LVPWd: 1.1 cm  FS: 43.0 %  LV mass(C)d: 131.1 grams  LV mass(C)dI: 60.8 grams/m2  Ao root diam: 3.0 cm  asc Aorta Diam: 3.3 cm  LVOT diam: 2.1 cm  LVOT area: 3.5 cm2  LA Volume (BP): 45.2 ml  LA Volume Index (BP): 20.9 ml/m2  RWT: 0.56           Doppler Measurements & Calculations  MV E max judy: 72.0 cm/sec  MV A max judy: 94.7 cm/sec  MV E/A: 0.76  MV dec slope: 252.0 cm/sec2  MV dec time: 0.28 sec  Ao V2 max: 240.0 cm/sec  Ao max P.0 mmHg  Ao V2 mean: 146.0 cm/sec  Ao mean PG: 10.0 mmHg  Ao V2 VTI: 42.6 cm  PA acc time: 0.08 sec  TR max judy: 295.7 cm/sec  TR max P.0 mmHg  E/E' av.8  Lateral E/e': 10.0  Medial E/e': 13.5        _____________________________________________________________________________  __        Report approved by: MD Margarito Franklin 2020 02:26 PM

## 2020-11-18 NOTE — PROGRESS NOTES
Critical Care Services Progress Note:  I personally examined and evaluated the patient today. I formulated today s plan with the house staff team or resident(s) and agree with the findings and plan in the associated note (see separately attested resident note).   I have evaluated all laboratory values and imaging studies from the past 24 hours.  Summary of hospital course:  75F pmh of htn, ckd3 now presents with covid19 pna.    Overnight events/pertinent findings today:  Weaning fio2 overnight.  Straight cathed for retention.  This morning she denies complaint except for dry cough. No pain, no shortness of breath, no dizzyness/lightheadedness.  Data  satting acceptably on high flow 50-80%, 30lpm; bp ok off pressors.  Labs (personally reviewed):  Lytes ok.  Creat 1.25 (baseline 1.15), wbc 5.3 ok; hgb stable 10.9; pltlts ok 275.  INR 1.52 downtrending.  Assessment/plan:  1.  Acute hypoxemic respiratory failure, highflow dependent:  Stable to improving today on high flow.  2.  HALI:  Creat 1.25; above baseline of 1.15.  In setting of critical illness.    3.  Leukopenia:  Improved to 5.3.  4.  Covid19:  Continue decadron and remdesivir.  Rest per resident note.     Aly Fiore

## 2020-11-18 NOTE — PROGRESS NOTES
[facility]     Medicine Progress Note - Hospitalist Service, Gold 5       Date of Admission:  11/16/2020  Assessment & Plan       Romayne L Sathre is a 75 year old female with history of HTN, HLD, hypothyroidism, CKDIII, and breast cancer (s/p chemo and mastectomy 2008) who was admitted to [facility] MICU 11/17 with COVID-19. Stabilized then transitioned to medicine 11/18 for ongoing care    Severe sepsis, acute hypoxic respiratory failure 2/2 COVID-19 PNA: COVID+ 11/5. Admitted 11/17 with acute resp failure requiring 30 LMP HiFlow. Flu, RVS negative 11/116. CXR 11/17 diffuse patchy airspace opacities c/w COVID. D-dimer >20. ABG 7.39/32/79/19.  (170).  (330). Fibrinogen 614 (771). Treated with Dexamethasone, Remdesivir 11/17 to present. Initially on Vanc/Zosyn but abx stopped 11/18 due to low suspicion. VSS on Hiflow 30 LMP  - Continue Remdesiriv, dex  - High intensity heparin gtt  - Daily COVID labs  - Wean O2 as able, continuous pulse ox    HALI on CKD III: BL Cr 1.1-1.2, elevated to 1.53 on presentation with BUN 47. Improved to near BL 1.25. Etiology likely prerenal 2/2 COVID. Trend BMP daily     Hypothyroidism: No recent TSH. Check TFTs Continue synthroid     Normocytic anemia: Mild. BL hgb normal. Hgb 10.9, trend     Lymphopenia: Improving. WBC normalized. Abs lymphocytes 0.3 11/17. Improved to 0.6 11/18. Likely 2/2 COVID. Trend     Urinary retention: Required straight cath x2 over the past 24 hours. Continue prn straight cath for now, however, may require Juarez pending couse      Other Medical Conditions:  HTN: PTA on lisinopril. Hold for now  HLD: Continue statin  H/o breast cancer: S/p mastectomy and chemo 2008. Follow up OP as indicated   GERD: Continue famotidine        Diet: Regular Diet Adult    DVT Prophylaxis: Heparin gtt  Juarez Catheter: not present  Code Status: Full Code           Disposition Plan   Expected discharge: several days, recommended to  home vs other  once COVID treated, improvement in O2 sats, medically stable.  Entered: Tabatha Roy PA-C 11/18/2020, 11:44 AM       The patient's care was discussed with the patient and attending physician, Dr. Greg Roy PA-C  Hospitalist Service, [address]   Contact information available via [facility] Paging/Directory  Please see sign in/sign out for up to date coverage information  ______________________________________________________________________    Interval History   Denies any acute issues. No dyspnea at rest. Denies fever or chills. No chest pain or palpitations. Appetite is fair     Data reviewed today: I reviewed all medications, new labs and imaging results over the last 24 hours    Physical Exam   Vital Signs: Temp: 97.5  F (36.4  C) Temp src: Axillary BP: 111/58 Pulse: 78   Resp: 28 SpO2: 92 % O2 Device: Other (Comments)(Vapotherm) Oxygen Delivery: 30 LPM  Weight: 223 lbs 15.8 oz  General Appearance: Alert and oriented x3, sitting up in bed  HEENT: Anicteric sclera, MMM   Respiratory: Breathing comfortably on 30% Hi Flow. Bilateral crackles noted  Cardiovascular: RRR, S1, S2. No murmur noted  GI: Abdomen soft, non-tender with active bowel sounds. No guarding or rebound  Lymph/Hematologic: No peripheral edema, distal pulses palpable   Skin: No rash or jaundice   Musculoskeletal: Moves all extremities   Neurologic: No focal deficits, CN II-XII grossly intact      Data   Recent Labs   Lab 11/18/20  0432 11/17/20  0333 11/16/20 2010   WBC 5.3 3.2* 5.5   HGB 10.9* 11.6* 13.3   MCV 88 86 85    265 331   INR 1.52* 1.63*  --     137 136   POTASSIUM 4.3 4.9 4.4   CHLORIDE 114* 110* 105   CO2 22 19* 21   BUN 46* 47* 48*   CR 1.25* 1.53* 1.55*   ANIONGAP 5 7 10   GALLO 8.5 8.8 8.9   * 152* 152*   ALBUMIN  --   --  2.2*   PROTTOTAL  --   --  8.8   BILITOTAL  --   --  0.8   ALKPHOS  --   --  84   ALT  --   --  24    AST  --   --  34   TROPI  --   --  <0.015     Medications     heparin 1,050 Units/hr (11/18/20 0400)       atorvastatin  10 mg Oral Daily     cetirizine  10 mg Oral Daily     dexamethasone  6 mg Oral Daily     famotidine  20 mg Oral Daily     levothyroxine  150 mcg Oral Once per day on Mon Tue Wed Thu Fri    And     [START ON 11/21/2020] levothyroxine  225 mcg Oral Once per day on Sun Sat     miconazole   Topical BID     remdesivir  100 mg Intravenous Q24H

## 2020-11-18 NOTE — PROGRESS NOTES
Brief Note    SW received phone call from pt's daughter Mariana ([phone]) requesting a call back. She stated in message that she works until 4 today, but only until 2 tomorrow, and SW could call back with a good time to call. SW returned call and left voicemail recommending Mariana call between 2 and 4 tomorrow afternoon.    CHRIS Jordan, [facility]  ICU    [facility]   P: [phone]  Pager: [phone]

## 2020-11-19 ENCOUNTER — APPOINTMENT (OUTPATIENT)
Dept: ULTRASOUND IMAGING | Facility: CLINIC | Age: 75
DRG: 871 | End: 2020-11-19
Attending: PHYSICIAN ASSISTANT
Payer: COMMERCIAL

## 2020-11-19 LAB
ANION GAP SERPL CALCULATED.3IONS-SCNC: 4 MMOL/L (ref 3–14)
APTT PPP: 188 SEC (ref 22–37)
BASOPHILS # BLD AUTO: 0 10E9/L (ref 0–0.2)
BASOPHILS NFR BLD AUTO: 0.3 %
BUN SERPL-MCNC: 36 MG/DL (ref 7–30)
CALCIUM SERPL-MCNC: 8.6 MG/DL (ref 8.5–10.1)
CHLORIDE SERPL-SCNC: 112 MMOL/L (ref 94–109)
CO2 SERPL-SCNC: 22 MMOL/L (ref 20–32)
CREAT SERPL-MCNC: 1.09 MG/DL (ref 0.52–1.04)
CRP SERPL-MCNC: 80 MG/L (ref 0–8)
DIFFERENTIAL METHOD BLD: ABNORMAL
EOSINOPHIL # BLD AUTO: 0.1 10E9/L (ref 0–0.7)
EOSINOPHIL NFR BLD AUTO: 2.1 %
ERYTHROCYTE [DISTWIDTH] IN BLOOD BY AUTOMATED COUNT: 13.3 % (ref 10–15)
ERYTHROCYTE [DISTWIDTH] IN BLOOD BY AUTOMATED COUNT: 13.5 % (ref 10–15)
FIBRINOGEN PPP-MCNC: 614 MG/DL (ref 200–420)
GFR SERPL CREATININE-BSD FRML MDRD: 50 ML/MIN/{1.73_M2}
GLUCOSE SERPL-MCNC: 82 MG/DL (ref 70–99)
HCT VFR BLD AUTO: 35.6 % (ref 35–47)
HCT VFR BLD AUTO: 37.7 % (ref 35–47)
HGB BLD-MCNC: 10.9 G/DL (ref 11.7–15.7)
HGB BLD-MCNC: 11.6 G/DL (ref 11.7–15.7)
IMM GRANULOCYTES # BLD: 0 10E9/L (ref 0–0.4)
IMM GRANULOCYTES NFR BLD: 1.2 %
INR PPP: 1.35 (ref 0.86–1.14)
LACTATE BLD-SCNC: 1.8 MMOL/L (ref 0.7–2)
LDH SERPL L TO P-CCNC: 268 U/L (ref 81–234)
LYMPHOCYTES # BLD AUTO: 0.5 10E9/L (ref 0.8–5.3)
LYMPHOCYTES NFR BLD AUTO: 15 %
MCH RBC QN AUTO: 26.7 PG (ref 26.5–33)
MCH RBC QN AUTO: 27 PG (ref 26.5–33)
MCHC RBC AUTO-ENTMCNC: 30.6 G/DL (ref 31.5–36.5)
MCHC RBC AUTO-ENTMCNC: 30.8 G/DL (ref 31.5–36.5)
MCV RBC AUTO: 87 FL (ref 78–100)
MCV RBC AUTO: 88 FL (ref 78–100)
MONOCYTES # BLD AUTO: 0.8 10E9/L (ref 0–1.3)
MONOCYTES NFR BLD AUTO: 24.7 %
NEUTROPHILS # BLD AUTO: 1.9 10E9/L (ref 1.6–8.3)
NEUTROPHILS NFR BLD AUTO: 56.7 %
NRBC # BLD AUTO: 0 10*3/UL
NRBC BLD AUTO-RTO: 0 /100
PLATELET # BLD AUTO: 264 10E9/L (ref 150–450)
PLATELET # BLD AUTO: 267 10E9/L (ref 150–450)
PLATELET # BLD EST: ABNORMAL 10*3/UL
POTASSIUM SERPL-SCNC: 4 MMOL/L (ref 3.4–5.3)
RBC # BLD AUTO: 4.09 10E12/L (ref 3.8–5.2)
RBC # BLD AUTO: 4.3 10E12/L (ref 3.8–5.2)
RBC MORPH BLD: NORMAL
RETICS # AUTO: 64.5 10E9/L (ref 25–95)
RETICS/RBC NFR AUTO: 1.5 % (ref 0.5–2)
SODIUM SERPL-SCNC: 139 MMOL/L (ref 133–144)
UFH PPP CHRO-ACNC: 0.21 IU/ML
UFH PPP CHRO-ACNC: 0.63 IU/ML
UFH PPP CHRO-ACNC: >1.1 IU/ML
UFH PPP CHRO-ACNC: >1.1 IU/ML
WBC # BLD AUTO: 2.2 10E9/L (ref 4–11)
WBC # BLD AUTO: 3.4 10E9/L (ref 4–11)

## 2020-11-19 PROCEDURE — 86140 C-REACTIVE PROTEIN: CPT | Performed by: STUDENT IN AN ORGANIZED HEALTH CARE EDUCATION/TRAINING PROGRAM

## 2020-11-19 PROCEDURE — 250N000011 HC RX IP 250 OP 636: Performed by: PHYSICIAN ASSISTANT

## 2020-11-19 PROCEDURE — 83605 ASSAY OF LACTIC ACID: CPT | Performed by: INTERNAL MEDICINE

## 2020-11-19 PROCEDURE — 85045 AUTOMATED RETICULOCYTE COUNT: CPT | Performed by: STUDENT IN AN ORGANIZED HEALTH CARE EDUCATION/TRAINING PROGRAM

## 2020-11-19 PROCEDURE — 83615 LACTATE (LD) (LDH) ENZYME: CPT | Performed by: STUDENT IN AN ORGANIZED HEALTH CARE EDUCATION/TRAINING PROGRAM

## 2020-11-19 PROCEDURE — 85384 FIBRINOGEN ACTIVITY: CPT | Performed by: STUDENT IN AN ORGANIZED HEALTH CARE EDUCATION/TRAINING PROGRAM

## 2020-11-19 PROCEDURE — 250N000013 HC RX MED GY IP 250 OP 250 PS 637: Performed by: INTERNAL MEDICINE

## 2020-11-19 PROCEDURE — 250N000012 HC RX MED GY IP 250 OP 636 PS 637: Performed by: STUDENT IN AN ORGANIZED HEALTH CARE EDUCATION/TRAINING PROGRAM

## 2020-11-19 PROCEDURE — 85520 HEPARIN ASSAY: CPT | Performed by: INTERNAL MEDICINE

## 2020-11-19 PROCEDURE — 250N000009 HC RX 250: Performed by: EMERGENCY MEDICINE

## 2020-11-19 PROCEDURE — 999N000157 HC STATISTIC RCP TIME EA 10 MIN

## 2020-11-19 PROCEDURE — 85520 HEPARIN ASSAY: CPT | Performed by: STUDENT IN AN ORGANIZED HEALTH CARE EDUCATION/TRAINING PROGRAM

## 2020-11-19 PROCEDURE — 36415 COLL VENOUS BLD VENIPUNCTURE: CPT | Performed by: PHYSICIAN ASSISTANT

## 2020-11-19 PROCEDURE — 85610 PROTHROMBIN TIME: CPT | Performed by: STUDENT IN AN ORGANIZED HEALTH CARE EDUCATION/TRAINING PROGRAM

## 2020-11-19 PROCEDURE — 93970 EXTREMITY STUDY: CPT

## 2020-11-19 PROCEDURE — 85730 THROMBOPLASTIN TIME PARTIAL: CPT | Performed by: PHYSICIAN ASSISTANT

## 2020-11-19 PROCEDURE — 80048 BASIC METABOLIC PNL TOTAL CA: CPT | Performed by: STUDENT IN AN ORGANIZED HEALTH CARE EDUCATION/TRAINING PROGRAM

## 2020-11-19 PROCEDURE — 85027 COMPLETE CBC AUTOMATED: CPT | Performed by: PHYSICIAN ASSISTANT

## 2020-11-19 PROCEDURE — 258N000003 HC RX IP 258 OP 636: Performed by: EMERGENCY MEDICINE

## 2020-11-19 PROCEDURE — 93970 EXTREMITY STUDY: CPT | Mod: 26 | Performed by: RADIOLOGY

## 2020-11-19 PROCEDURE — 120N000003 HC R&B IMCU UMMC

## 2020-11-19 PROCEDURE — 85025 COMPLETE CBC W/AUTO DIFF WBC: CPT | Performed by: STUDENT IN AN ORGANIZED HEALTH CARE EDUCATION/TRAINING PROGRAM

## 2020-11-19 PROCEDURE — 36415 COLL VENOUS BLD VENIPUNCTURE: CPT | Performed by: INTERNAL MEDICINE

## 2020-11-19 PROCEDURE — 99233 SBSQ HOSP IP/OBS HIGH 50: CPT | Performed by: INTERNAL MEDICINE

## 2020-11-19 PROCEDURE — 36415 COLL VENOUS BLD VENIPUNCTURE: CPT | Performed by: STUDENT IN AN ORGANIZED HEALTH CARE EDUCATION/TRAINING PROGRAM

## 2020-11-19 PROCEDURE — 250N000011 HC RX IP 250 OP 636: Performed by: STUDENT IN AN ORGANIZED HEALTH CARE EDUCATION/TRAINING PROGRAM

## 2020-11-19 PROCEDURE — 250N000013 HC RX MED GY IP 250 OP 250 PS 637: Performed by: STUDENT IN AN ORGANIZED HEALTH CARE EDUCATION/TRAINING PROGRAM

## 2020-11-19 RX ORDER — HEPARIN SODIUM 10000 [USP'U]/100ML
0-5000 INJECTION, SOLUTION INTRAVENOUS CONTINUOUS
Status: DISCONTINUED | OUTPATIENT
Start: 2020-11-19 | End: 2020-11-19

## 2020-11-19 RX ORDER — HEPARIN SODIUM 10000 [USP'U]/100ML
0-5000 INJECTION, SOLUTION INTRAVENOUS CONTINUOUS
Status: DISCONTINUED | OUTPATIENT
Start: 2020-11-19 | End: 2020-11-20

## 2020-11-19 RX ORDER — PANTOPRAZOLE SODIUM 40 MG/1
40 TABLET, DELAYED RELEASE ORAL
Status: DISCONTINUED | OUTPATIENT
Start: 2020-11-19 | End: 2020-11-20

## 2020-11-19 RX ADMIN — ATORVASTATIN CALCIUM 10 MG: 10 TABLET, FILM COATED ORAL at 09:00

## 2020-11-19 RX ADMIN — LEVOTHYROXINE SODIUM 150 MCG: 0.03 TABLET ORAL at 09:06

## 2020-11-19 RX ADMIN — HEPARIN SODIUM 1050 UNITS/HR: 10000 INJECTION, SOLUTION INTRAVENOUS at 06:13

## 2020-11-19 RX ADMIN — REMDESIVIR 100 MG: 100 INJECTION, POWDER, LYOPHILIZED, FOR SOLUTION INTRAVENOUS at 22:11

## 2020-11-19 RX ADMIN — MICONAZOLE NITRATE: 20 POWDER TOPICAL at 19:43

## 2020-11-19 RX ADMIN — HEPARIN SODIUM 1800 UNITS/HR: 10000 INJECTION, SOLUTION INTRAVENOUS at 10:13

## 2020-11-19 RX ADMIN — CETIRIZINE HYDROCHLORIDE 10 MG: 10 TABLET, FILM COATED ORAL at 09:00

## 2020-11-19 RX ADMIN — MICONAZOLE NITRATE: 20 POWDER TOPICAL at 09:00

## 2020-11-19 RX ADMIN — FAMOTIDINE 20 MG: 20 TABLET ORAL at 09:00

## 2020-11-19 RX ADMIN — HEPARIN SODIUM 1500 UNITS/HR: 10000 INJECTION, SOLUTION INTRAVENOUS at 22:21

## 2020-11-19 RX ADMIN — PANTOPRAZOLE SODIUM 40 MG: 40 TABLET, DELAYED RELEASE ORAL at 15:32

## 2020-11-19 RX ADMIN — DEXAMETHASONE 6 MG: 2 TABLET ORAL at 09:00

## 2020-11-19 ASSESSMENT — ACTIVITIES OF DAILY LIVING (ADL)
ADLS_ACUITY_SCORE: 15
ADLS_ACUITY_SCORE: 19
ADLS_ACUITY_SCORE: 15
ADLS_ACUITY_SCORE: 15

## 2020-11-19 NOTE — PROGRESS NOTES
[facility] [place]    Internal Medicine Progress Note - Gold Service      Assessment & Plan   Romayne L Sathre is a 75 year old female with history of HTN, HLD, hypothyroidism, CKDIII, and breast cancer (s/p chemo and mastectomy 2008) who was admitted to [facility] MICU 11/17 with COVID-19. Stabilized then transitioned to medicine 11/18 for ongoing care.      # Severe sepsis, acute hypoxic respiratory failure 2/2 COVID-19 PNA: COVID+ 11/5. Admitted 11/17 with acute resp failure requiring 30 LMP HiFlow. Flu, RVS negative 11/116. CXR 11/17 diffuse patchy airspace opacities c/w COVID. D-dimer >20. ABG 7.39/32/79/19.  (170).  (330). Fibrinogen 614 (771). Treated with Dexamethasone, Remdesivir 11/17 to present. Initially on Vanc/Zosyn but abx stopped 11/18 due to low suspicion. VSS on Hiflow 30 LMP 50 FiO2. Ultrasound of b/l lower extremities notable for non-occlusive thrombus in left popliteal vein. No DVT in RLE. Patient denies any chest pain or pleuritic chest pain. Stable on FiO2 45-50%. Was initially on high intensity heparin gtt in ED -> changed to low intensity on 11/18 and will now resume high intensity - if any decompensation would have low threshold to get CT PE study - however given stability will hold off at this time as it would not  and to avoid contrast as had HALI on admission.   - Continue Remdesivir (day 3)  - Continue Dexamethasone 6mg daily for 10 doses (day 3)  - Transition back to high intensity heparin gtt for DVT in LLE  - Daily COVID labs  - Wean O2 as able, continuous pulse ox    # Elevated D-dimer  # Non-occlusive thrombus in left popliteal vein:  D-dimer >20 on admission. Started on high-intensity heparin gtt in ED with bolus- and transitioned to low intensity on 11/18. Ultrasound of b/l lower extremities notable for non-occlusive thrombus in left popliteal vein.  - If any decompensation would have low threshold to get CT PE study - however  given stability will hold off at this time as it would not  and to avoid contrast as had HALI on admission.   - High intensity heparin gtt      # HALI on CKD III: BL Cr 1.1-1.2, elevated to 1.53 on presentation with BUN 47. Improved and 1.09 today. Etiology likely prerenal 2/2 COVID. Trend BMP daily      # Hypothyroidism: TSH on admission WNL at 0.42. Continue PTA Synthroid      # Normocytic anemia: Mild. BL hgb normal. Hgb 10.9, trend today 11.6. No s/sx of bleeding  - Trend CBC In AM      # Lymphopenia: Improving. WBC normalized on 11/18 but again 3.4. Abs lymphocytes 0.3 11/17. Improved to 0.6 11/18 and now 0.5. Likely 2/2 COVID. Trend      # Urinary retention: Required straight cath x2 over the past 24 hours. Continue prn straight cath for now. Has not required straight cath today- voiding in commode per RN.  - Bladder scan for PVR - notify medicine if PVR >300  - Straight cath pr        Other Medical Conditions:  # HTN: PTA on lisinopril. Hold for now  # HLD: Continue statin  # H/o breast cancer: S/p mastectomy and chemo 2008. Follow up OP as indicated   # GERD: Continue famotidine     Diet: Regular Diet Adult  Fluids: PO intake  DVT Prophylaxis: high intensity heparin gtt for DVT   Code Status: Full Code    Disposition Plan   Expected discharge: 4 - 7 days; recommended to TBD once oxygen needs improved, and anticoagulation plan in place and safe disposition plan with PT/OT.     The patient's care was discussed with the Attending Physician, Dr. Garza, Bedside Nurse, Care Coordinator/, Patient and Patient's Family.    Mariana Wells PA-C  Hospitalist Service  [facility]  Pager: [phone]    Interval History   Patient reports feeling fine. No dyspnea, chest pain or pleuritic chest pain. No N/V/D. Is urinating. No dysuria. Denies any calf pain or swelling. No dizziness or lightheadedness. No sign of bleeding.     A 4 point ROS was performed (including CV,  Resp, GI, ) and negative unless otherwise noted in HPI.     Physical Exam   /64   Pulse 61   Temp 97.5  F (36.4  C) (Oral)   Resp 19   Wt 101.6 kg (223 lb 15.8 oz)   SpO2 95%   BMI 33.08 kg/m       GENERAL: Alert and oriented x 3. Pleasant. Answering questions appropriately.   HEENT: Anicteric sclera. Mucous membranes moist.   CV: RRR. S1, S2. No murmurs appreciated.   RESPIRATORY: Effort normal on HFNC at 50% FiO2 at 30L. Lungs sounds diminished bilaterally. No rhonchi, rales or wheeze.    GI: Abdomen soft and non distended, bowel sounds present. No tenderness, rebound, guarding. Obese.   NEUROLOGICAL: No focal deficits. Moves all extremities.    EXTREMITIES: 1+ peripheral edema. Intact bilateral pedal pulses.   SKIN: No jaundice. No rashes.     Lines/Tubes/Drains  PIV     Data reviewed today: I reviewed all medications, new labs and imaging results over the last 24 hours.

## 2020-11-19 NOTE — PROGRESS NOTES
Transfer  Transferred from:   Via:bed  Reason for transfer: Pt appropriate for 6B- improved patient condition  Family: Patient able to tell family update  Belongings: Received with pt phone and phone cord  Chart: Received with pt  Medications: Meds received from old unit with pt  Code Status verified on armband: yes  2 RN Skin Assessment Completed By: Brittney PERSAUD  Med rec completed: yes  Bed surface reassessed with algorithm and charted: yes, pulsate ordered  New bed surface ordered: yes    Report received from: David COOLEY  Pt status: stable, vapo therm 50%, blanchable redness to back of the ears, foam applied, Pulsate ordered due to right buttock non-blanchable with mepilex applied

## 2020-11-19 NOTE — PROGRESS NOTES
Transferred to: (6B) at (1030)  Status at time of transfer: stable. Vapotherm 50%  Belongings: with patient  Juarez removed? (if no, why?): NA  Chart and medications: with patient  Family notified: yes

## 2020-11-19 NOTE — PLAN OF CARE
ICU End of Shift Summary. See flowsheets for vital signs and detailed assessment.    Changes this shift: Afebrile. NSR. BP stable. Vapotherm 50%. RR 15-25. Encouraging patient to do pulmonary hygiene. AxOx4. Follows commands. Patient was able to pivot to the commode and did not require straight cath. Patient was able to eat lunch and dinner. No BM. Heparin therapeutic.     Plan: Transfer. Continue POC. Monitor for changes.

## 2020-11-19 NOTE — PROGRESS NOTES
Care Management Follow Up    Length of Stay (days): 3    Expected Discharge Date:  TBD     Concerns to be Addressed:     Financial concerns   Patient plan of care discussed at interdisciplinary rounds: Yes    Anticipated Discharge Disposition:  TBD     Anticipated Discharge Services:  TBD  Anticipated Discharge DME:  TBD      Additional Information:  JAMAAL received phone call from pt's daughter Mariana [phone] regarding some financial concerns that she had thought of after this SW completed assessment on 11/17.  Mariana stated that pt lives with her daughter and granddaughter and that pt supports the family. Mariana believes that pt makes around $3100 with her social security and her 's pension.   Mariana stated that pt will sometimes go to [employer]. Mariana was questioning if pt would qualify for any additional services.JAMAAL informed Mariana that JAMAAL can make a referral through [facility] access [facility] to assess if pt meets any requirements for assistance. Mariana was appreciative of this.   Mariana was hoping to get a medical update from the team. JAMAAL paged primary team requesting this.   Mariana also questioned if pt's phone was charging as she has not been able to get ahold of pt. JAMAAL discussed with bedside RN. RN stated that pt's phone is charging right now.    Mariana aware SW available for any discharge planning needs.     CHRIS Espino, [facility]  Adult Acute Care SW  Ph:[phone]  Pager [phone]

## 2020-11-19 NOTE — PLAN OF CARE
ICU End of Shift Summary. See flowsheets for vital signs and detailed assessment.    Changes this shift: Remains on Vapotherm, 45%, 30 LPM. VSS. Up to void via bedside commode. Purewick in place while in bed d/t stress incontinence (w/ coughing). Awaiting AM labs at this time, will watch for Heparin Xa result.     Plan:  Continue to support respiratory status, wean FiO2 as able. Will transfer to  when room is available.

## 2020-11-20 ENCOUNTER — APPOINTMENT (OUTPATIENT)
Dept: PHYSICAL THERAPY | Facility: CLINIC | Age: 75
DRG: 871 | End: 2020-11-20
Attending: PHYSICIAN ASSISTANT
Payer: COMMERCIAL

## 2020-11-20 LAB
ANION GAP SERPL CALCULATED.3IONS-SCNC: 8 MMOL/L (ref 3–14)
BASOPHILS # BLD AUTO: 0 10E9/L (ref 0–0.2)
BASOPHILS NFR BLD AUTO: 0.3 %
BUN SERPL-MCNC: 26 MG/DL (ref 7–30)
CALCIUM SERPL-MCNC: 8.1 MG/DL (ref 8.5–10.1)
CHLORIDE SERPL-SCNC: 110 MMOL/L (ref 94–109)
CO2 SERPL-SCNC: 21 MMOL/L (ref 20–32)
CREAT SERPL-MCNC: 0.97 MG/DL (ref 0.52–1.04)
CRP SERPL-MCNC: 66 MG/L (ref 0–8)
D DIMER PPP FEU-MCNC: 6.7 UG/ML FEU (ref 0–0.5)
DIFFERENTIAL METHOD BLD: ABNORMAL
EOSINOPHIL # BLD AUTO: 0 10E9/L (ref 0–0.7)
EOSINOPHIL NFR BLD AUTO: 0.3 %
ERYTHROCYTE [DISTWIDTH] IN BLOOD BY AUTOMATED COUNT: 13.4 % (ref 10–15)
FIBRINOGEN PPP-MCNC: 543 MG/DL (ref 200–420)
GFR SERPL CREATININE-BSD FRML MDRD: 57 ML/MIN/{1.73_M2}
GLUCOSE SERPL-MCNC: 134 MG/DL (ref 70–99)
HCT VFR BLD AUTO: 33.3 % (ref 35–47)
HGB BLD-MCNC: 10.5 G/DL (ref 11.7–15.7)
IMM GRANULOCYTES # BLD: 0 10E9/L (ref 0–0.4)
IMM GRANULOCYTES NFR BLD: 1.4 %
INR PPP: 1.32 (ref 0.86–1.14)
LDH SERPL L TO P-CCNC: 235 U/L (ref 81–234)
LYMPHOCYTES # BLD AUTO: 0.5 10E9/L (ref 0.8–5.3)
LYMPHOCYTES NFR BLD AUTO: 15.5 %
MAGNESIUM SERPL-MCNC: 1.8 MG/DL (ref 1.6–2.3)
MCH RBC QN AUTO: 27.1 PG (ref 26.5–33)
MCHC RBC AUTO-ENTMCNC: 31.5 G/DL (ref 31.5–36.5)
MCV RBC AUTO: 86 FL (ref 78–100)
MONOCYTES # BLD AUTO: 0.7 10E9/L (ref 0–1.3)
MONOCYTES NFR BLD AUTO: 23.6 %
NEUTROPHILS # BLD AUTO: 1.7 10E9/L (ref 1.6–8.3)
NEUTROPHILS NFR BLD AUTO: 58.9 %
NRBC # BLD AUTO: 0 10*3/UL
NRBC BLD AUTO-RTO: 0 /100
NT-PROBNP SERPL-MCNC: 327 PG/ML (ref 0–900)
PHOSPHATE SERPL-MCNC: 3.4 MG/DL (ref 2.5–4.5)
PLATELET # BLD AUTO: 259 10E9/L (ref 150–450)
POTASSIUM SERPL-SCNC: 3.8 MMOL/L (ref 3.4–5.3)
RBC # BLD AUTO: 3.88 10E12/L (ref 3.8–5.2)
RETICS # AUTO: 50.8 10E9/L (ref 25–95)
RETICS/RBC NFR AUTO: 1.3 % (ref 0.5–2)
SODIUM SERPL-SCNC: 139 MMOL/L (ref 133–144)
TROPONIN I SERPL-MCNC: <0.015 UG/L (ref 0–0.04)
UFH PPP CHRO-ACNC: 0.25 IU/ML
UFH PPP CHRO-ACNC: 0.77 IU/ML
UFH PPP CHRO-ACNC: >1.1 IU/ML
WBC # BLD AUTO: 3 10E9/L (ref 4–11)

## 2020-11-20 PROCEDURE — 83880 ASSAY OF NATRIURETIC PEPTIDE: CPT | Performed by: STUDENT IN AN ORGANIZED HEALTH CARE EDUCATION/TRAINING PROGRAM

## 2020-11-20 PROCEDURE — 85610 PROTHROMBIN TIME: CPT | Performed by: STUDENT IN AN ORGANIZED HEALTH CARE EDUCATION/TRAINING PROGRAM

## 2020-11-20 PROCEDURE — 84100 ASSAY OF PHOSPHORUS: CPT | Performed by: STUDENT IN AN ORGANIZED HEALTH CARE EDUCATION/TRAINING PROGRAM

## 2020-11-20 PROCEDURE — 85520 HEPARIN ASSAY: CPT | Performed by: PHYSICIAN ASSISTANT

## 2020-11-20 PROCEDURE — 250N000011 HC RX IP 250 OP 636: Performed by: PHYSICIAN ASSISTANT

## 2020-11-20 PROCEDURE — 36415 COLL VENOUS BLD VENIPUNCTURE: CPT | Performed by: STUDENT IN AN ORGANIZED HEALTH CARE EDUCATION/TRAINING PROGRAM

## 2020-11-20 PROCEDURE — 36415 COLL VENOUS BLD VENIPUNCTURE: CPT | Performed by: INTERNAL MEDICINE

## 2020-11-20 PROCEDURE — 250N000013 HC RX MED GY IP 250 OP 250 PS 637: Performed by: PHYSICIAN ASSISTANT

## 2020-11-20 PROCEDURE — 97161 PT EVAL LOW COMPLEX 20 MIN: CPT | Mod: GP

## 2020-11-20 PROCEDURE — 99233 SBSQ HOSP IP/OBS HIGH 50: CPT | Performed by: INTERNAL MEDICINE

## 2020-11-20 PROCEDURE — 86140 C-REACTIVE PROTEIN: CPT | Performed by: STUDENT IN AN ORGANIZED HEALTH CARE EDUCATION/TRAINING PROGRAM

## 2020-11-20 PROCEDURE — 85025 COMPLETE CBC W/AUTO DIFF WBC: CPT | Performed by: STUDENT IN AN ORGANIZED HEALTH CARE EDUCATION/TRAINING PROGRAM

## 2020-11-20 PROCEDURE — 258N000003 HC RX IP 258 OP 636: Performed by: EMERGENCY MEDICINE

## 2020-11-20 PROCEDURE — 120N000003 HC R&B IMCU UMMC

## 2020-11-20 PROCEDURE — 250N000012 HC RX MED GY IP 250 OP 636 PS 637: Performed by: STUDENT IN AN ORGANIZED HEALTH CARE EDUCATION/TRAINING PROGRAM

## 2020-11-20 PROCEDURE — 85520 HEPARIN ASSAY: CPT | Performed by: STUDENT IN AN ORGANIZED HEALTH CARE EDUCATION/TRAINING PROGRAM

## 2020-11-20 PROCEDURE — 83615 LACTATE (LD) (LDH) ENZYME: CPT | Performed by: STUDENT IN AN ORGANIZED HEALTH CARE EDUCATION/TRAINING PROGRAM

## 2020-11-20 PROCEDURE — 250N000013 HC RX MED GY IP 250 OP 250 PS 637: Performed by: INTERNAL MEDICINE

## 2020-11-20 PROCEDURE — 85045 AUTOMATED RETICULOCYTE COUNT: CPT | Performed by: STUDENT IN AN ORGANIZED HEALTH CARE EDUCATION/TRAINING PROGRAM

## 2020-11-20 PROCEDURE — 83735 ASSAY OF MAGNESIUM: CPT | Performed by: STUDENT IN AN ORGANIZED HEALTH CARE EDUCATION/TRAINING PROGRAM

## 2020-11-20 PROCEDURE — 999N000215 HC STATISTIC HFNC ADULT NON-CPAP

## 2020-11-20 PROCEDURE — 250N000013 HC RX MED GY IP 250 OP 250 PS 637: Performed by: STUDENT IN AN ORGANIZED HEALTH CARE EDUCATION/TRAINING PROGRAM

## 2020-11-20 PROCEDURE — 250N000009 HC RX 250: Performed by: EMERGENCY MEDICINE

## 2020-11-20 PROCEDURE — 97530 THERAPEUTIC ACTIVITIES: CPT | Mod: GP

## 2020-11-20 PROCEDURE — 85379 FIBRIN DEGRADATION QUANT: CPT | Performed by: STUDENT IN AN ORGANIZED HEALTH CARE EDUCATION/TRAINING PROGRAM

## 2020-11-20 PROCEDURE — 85520 HEPARIN ASSAY: CPT | Performed by: INTERNAL MEDICINE

## 2020-11-20 PROCEDURE — 84484 ASSAY OF TROPONIN QUANT: CPT | Performed by: STUDENT IN AN ORGANIZED HEALTH CARE EDUCATION/TRAINING PROGRAM

## 2020-11-20 PROCEDURE — 85384 FIBRINOGEN ACTIVITY: CPT | Performed by: STUDENT IN AN ORGANIZED HEALTH CARE EDUCATION/TRAINING PROGRAM

## 2020-11-20 PROCEDURE — 80048 BASIC METABOLIC PNL TOTAL CA: CPT | Performed by: STUDENT IN AN ORGANIZED HEALTH CARE EDUCATION/TRAINING PROGRAM

## 2020-11-20 RX ORDER — FAMOTIDINE 20 MG/1
20 TABLET, FILM COATED ORAL 2 TIMES DAILY
Status: DISCONTINUED | OUTPATIENT
Start: 2020-11-20 | End: 2020-11-23

## 2020-11-20 RX ORDER — POLYETHYLENE GLYCOL 3350 17 G/17G
17 POWDER, FOR SOLUTION ORAL 2 TIMES DAILY
Status: DISCONTINUED | OUTPATIENT
Start: 2020-11-20 | End: 2020-11-22

## 2020-11-20 RX ORDER — AMOXICILLIN 250 MG
1 CAPSULE ORAL 2 TIMES DAILY
Status: DISCONTINUED | OUTPATIENT
Start: 2020-11-20 | End: 2020-11-22

## 2020-11-20 RX ORDER — BISACODYL 10 MG
10 SUPPOSITORY, RECTAL RECTAL ONCE
Status: DISCONTINUED | OUTPATIENT
Start: 2020-11-20 | End: 2020-11-24 | Stop reason: HOSPADM

## 2020-11-20 RX ORDER — AMLODIPINE BESYLATE 5 MG/1
5 TABLET ORAL DAILY
Status: DISCONTINUED | OUTPATIENT
Start: 2020-11-20 | End: 2020-11-21

## 2020-11-20 RX ADMIN — FAMOTIDINE 20 MG: 20 TABLET ORAL at 08:05

## 2020-11-20 RX ADMIN — DEXAMETHASONE 6 MG: 2 TABLET ORAL at 08:05

## 2020-11-20 RX ADMIN — ENOXAPARIN SODIUM 100 MG: 100 INJECTION SUBCUTANEOUS at 23:17

## 2020-11-20 RX ADMIN — AMLODIPINE BESYLATE 5 MG: 5 TABLET ORAL at 11:34

## 2020-11-20 RX ADMIN — ENOXAPARIN SODIUM 100 MG: 100 INJECTION SUBCUTANEOUS at 11:34

## 2020-11-20 RX ADMIN — HEPARIN SODIUM 1500 UNITS/HR: 10000 INJECTION, SOLUTION INTRAVENOUS at 00:00

## 2020-11-20 RX ADMIN — DOCUSATE SODIUM 50 MG AND SENNOSIDES 8.6 MG 1 TABLET: 8.6; 5 TABLET, FILM COATED ORAL at 21:59

## 2020-11-20 RX ADMIN — MICONAZOLE NITRATE: 20 POWDER TOPICAL at 08:06

## 2020-11-20 RX ADMIN — LEVOTHYROXINE SODIUM 150 MCG: 0.03 TABLET ORAL at 08:06

## 2020-11-20 RX ADMIN — PANTOPRAZOLE SODIUM 40 MG: 40 TABLET, DELAYED RELEASE ORAL at 08:05

## 2020-11-20 RX ADMIN — ACETAMINOPHEN 650 MG: 325 TABLET, FILM COATED ORAL at 02:39

## 2020-11-20 RX ADMIN — REMDESIVIR 100 MG: 100 INJECTION, POWDER, LYOPHILIZED, FOR SOLUTION INTRAVENOUS at 23:13

## 2020-11-20 RX ADMIN — FAMOTIDINE 20 MG: 20 TABLET ORAL at 21:58

## 2020-11-20 RX ADMIN — MICONAZOLE NITRATE: 20 POWDER TOPICAL at 22:01

## 2020-11-20 RX ADMIN — ATORVASTATIN CALCIUM 10 MG: 10 TABLET, FILM COATED ORAL at 08:06

## 2020-11-20 RX ADMIN — CETIRIZINE HYDROCHLORIDE 10 MG: 10 TABLET, FILM COATED ORAL at 08:05

## 2020-11-20 ASSESSMENT — ACTIVITIES OF DAILY LIVING (ADL)
ADLS_ACUITY_SCORE: 15

## 2020-11-20 NOTE — PLAN OF CARE
Discharge Planner OT   Patient plan for discharge: Defer to PT  Current status: Per chart review and interdisciplinary collaboration, pt is moving well and is primarily limited by decreased activity tolerance. No acute concerns with ability to complete ADLs or concerns with cognition. PT has initiated and will continue following to progress strength and endurance. OT will sign off at this time.   Barriers to return to prior living situation: Medical status.   Recommendations for discharge: Defer to PT  Rationale for recommendations: Defer to PT       Entered by: Alvina Araya 11/20/2020 2:59 PM     OT 6B defer

## 2020-11-20 NOTE — PROGRESS NOTES
-------------------CRITICAL LAB VALUE-------------------    Lab Value:   Time of notification: 1:01 AM  MD notified: Bianca  Patient status: stable, unchanged, AOx4, VSS  Temp:  [97.6  F (36.4  C)-98.3  F (36.8  C)] 97.8  F (36.6  C)  Pulse:  [61-87] 72  Resp:  [17-30] 22  BP: (133-170)/(60-85) 133/77  FiO2 (%):  [40 %-55 %] 40 %  SpO2:  [91 %-96 %] 96 %  Orders received: pt on heparin gtt, monitor anti-Xa factor. Monitor for signs of bleeding. No signs of bleeding at this time.

## 2020-11-20 NOTE — PHARMACY-ADMISSION MEDICATION HISTORY
Admission medication history interview status for the 11/16/2020 admission is complete. See Epic admission navigator for allergy information, pharmacy, prior to admission medications and immunization status.     Medication history interview sources:  Patient Interview and SureScripts    Changes made to PTA medication list (reason)  Added: None    Deleted: None    Changed:   -- updated pepcid to daily per patient     Additional medication history information (including reliability of information, actions taken by pharmacist):None      Prior to Admission medications    Medication Sig Last Dose Taking? Auth Provider   atorvastatin (LIPITOR) 10 MG tablet Take 1 tablet (10 mg) by mouth daily 11/16/2020 at Unknown time Yes Marry Rodriguez MD   cetirizine (ZYRTEC) 10 MG tablet Take 10 mg by mouth daily 11/17/2020 at 0800 Yes Reported, Patient   famotidine (PEPCID) 20 MG tablet Take 20 mg by mouth daily  11/17/2020 at 0800 Yes Reported, Patient   levothyroxine (SYNTHROID/LEVOTHROID) 150 MCG tablet MON to SAT 1 tablet/day; SAT and SUN 1.5 tablet  Patient taking differently: 1 (150mcg) tablet by mouth Monday-Friday and 1.5 (225mcg) tablets Saturday and Sunday 11/16/2020 at Unknown time Yes Kamila Jeffery MD   lisinopril (ZESTRIL) 10 MG tablet Take 1 tablet (10 mg) by mouth daily 11/16/2020 at Unknown time Yes Marry Rodriguez MD   Multiple Vitamin (MULTI-VITAMIN) per tablet Take 1 tablet by mouth daily.   11/17/2020 at 0800 Yes Unknown, Entered By History   TURMERIC PO Take 1 tablet by mouth daily 11/16/2020 Yes Unknown, Entered By History         Medication history completed by:   Romario Rivera, Pharm.D, BCPS

## 2020-11-20 NOTE — PLAN OF CARE
Neuro: A&Ox4.   Cardiac: SR. VSS.   Respiratory: Sating 92% on 45%Fio2/30L. Usually have to increase FiO2 when patient ambulating to commode.   GI/: Adequate urine output. BM X1 using commode   Diet/appetite: Tolerating reg diet. Eating well.  Activity:  SBA, up to commode  Pain: At acceptable level on current regimen.   Skin: Pulsate set up, right buttock mepilex, non blanchable redness, ears blanchable but red from vapotherm. Grey foam applied.   LDA's: PIV with heparin    Plan: Continue with POC. Notify primary team with changes.

## 2020-11-20 NOTE — PROGRESS NOTES
11/20/20 1200   Quick Adds   Type of Visit Initial PT Evaluation   Living Environment   People in home child(radha), adult;grandchild(radha)   Current Living Arrangements house   Home Accessibility stairs to enter home   Number of Stairs, Main Entrance 4   Stair Railings, Main Entrance railings safe and in good condition   Transportation Anticipated family or friend will provide   Living Environment Comments Pt lives with adult grandaughter and adult daughter.  Reports someone is home most of day.  Walks a few blocks around home.  No AD, IND ADLs.     Self-Care   Usual Activity Tolerance good   Current Activity Tolerance moderate   Regular Exercise No   Equipment Currently Used at Home none   Disability/Function   Hearing Difficulty or Deaf no   Difficulty Communicating no   Walking or Climbing Stairs Difficulty no   Toileting no   Doing Errands Independently Difficulty (such as shopping) yes   Fall history within last six months no   General Information   Onset of Illness/Injury or Date of Surgery 11/16/20   Referring Physician Mariana Wells PA-C   Patient/Family Therapy Goals Statement (PT) return home   Pertinent History of Current Problem (include personal factors and/or comorbidities that impact the POC) 75 year old female with history of HTN, HLD, hypothyroidism, CKDIII, and breast cancer (s/p chemo and mastectomy 2008) who was admitted to [facility] MICU 11/17 with COVID-19. Stabilized then transitioned to medicine 11/18 for ongoing care.    Existing Precautions/Restrictions no known precautions/restrictions   Heart Disease Risk Factors Gender;Age;Medical history   Cognition   Orientation Status (Cognition) oriented x 4   Affect/Mental Status (Cognition) WNL   Follows Commands (Cognition) WNL   Posture    Posture Not impaired   Range of Motion (ROM)   ROM Comment WFL   Strength   Strength Comments WFL   Bed Mobility   Comment (Bed Mobility) Supine to sit with CGAx1   Transfers   Transfer Safety Comments STS  with CGAx1   Gait/Stairs (Locomotion)   Comment (Gait/Stairs) Ambulates with CGA-min Ax1, no AD   Balance   Balance Comments minor LOB during retro walking   Clinical Impression   Criteria for Skilled Therapeutic Intervention yes, treatment indicated   PT Diagnosis (PT) impaired functional mobility   Influenced by the following impairments SOB, fatigue   Clinical Presentation Evolving/Changing   Clinical Presentation Rationale clinical judgement   Clinical Decision Making (Complexity) moderate complexity   Therapy Frequency (PT) 4x/week   Predicted Duration of Therapy Intervention (days/wks) 1 wk   Planned Therapy Interventions (PT) balance training;gait training;motor coordination training;neuromuscular re-education;ROM (range of motion);stair training;strengthening;transfer training   Risk & Benefits of therapy have been explained evaluation/treatment results reviewed;care plan/treatment goals reviewed;risks/benefits reviewed;current/potential barriers reviewed;participants voiced agreement with care plan;participants included;patient   PT Discharge Planning    PT Discharge Recommendation (DC Rec) home with assist   PT Rationale for DC Rec pt mobilizing well but very deconditioned.  Sats drop to mid 80s with short distance gait.    PT Brief overview of current status  CGAx1 no AD.    Total Evaluation Time   Total Evaluation Time (Minutes) 10

## 2020-11-20 NOTE — PLAN OF CARE
Neuro: A&Ox4. Calls appropriately.  Cardiac: SR 69-70s. VSS.   Respiratory: Sating 95% on HFNC at 35%. DIAZ, non-productive cough with activity.   GI/: Adequate urine output. Up to BSC. Post void residual less that 5mL. No BM this shift. Passing flatus.  Diet/appetite: Tolerating reg diet. Eating well. Takes pills whole.  Activity:  Assist of 1, up to bsc.  Pain: At acceptable level on current regimen. Gave tylenol for general discomfort.  Skin: No new deficits noted. Foam pads to ear under O2 tubing.  LDA's: PIV x2.    Plan: Continue with POC. Notify primary team with changes.

## 2020-11-21 ENCOUNTER — APPOINTMENT (OUTPATIENT)
Dept: CT IMAGING | Facility: CLINIC | Age: 75
DRG: 871 | End: 2020-11-21
Attending: PHYSICIAN ASSISTANT
Payer: COMMERCIAL

## 2020-11-21 LAB
ALBUMIN SERPL-MCNC: 1.6 G/DL (ref 3.4–5)
ALP SERPL-CCNC: 59 U/L (ref 40–150)
ALT SERPL W P-5'-P-CCNC: 31 U/L (ref 0–50)
ANION GAP SERPL CALCULATED.3IONS-SCNC: 7 MMOL/L (ref 3–14)
AST SERPL W P-5'-P-CCNC: 27 U/L (ref 0–45)
BASOPHILS # BLD AUTO: 0 10E9/L (ref 0–0.2)
BASOPHILS NFR BLD AUTO: 0.4 %
BILIRUB SERPL-MCNC: 0.7 MG/DL (ref 0.2–1.3)
BUN SERPL-MCNC: 22 MG/DL (ref 7–30)
CALCIUM SERPL-MCNC: 8.5 MG/DL (ref 8.5–10.1)
CHLORIDE SERPL-SCNC: 111 MMOL/L (ref 94–109)
CO2 SERPL-SCNC: 21 MMOL/L (ref 20–32)
CREAT SERPL-MCNC: 0.85 MG/DL (ref 0.52–1.04)
CRP SERPL-MCNC: 39 MG/L (ref 0–8)
DIFFERENTIAL METHOD BLD: ABNORMAL
EOSINOPHIL # BLD AUTO: 0 10E9/L (ref 0–0.7)
EOSINOPHIL NFR BLD AUTO: 0.7 %
ERYTHROCYTE [DISTWIDTH] IN BLOOD BY AUTOMATED COUNT: 13.2 % (ref 10–15)
FIBRINOGEN PPP-MCNC: 534 MG/DL (ref 200–420)
GFR SERPL CREATININE-BSD FRML MDRD: 67 ML/MIN/{1.73_M2}
GLUCOSE SERPL-MCNC: 92 MG/DL (ref 70–99)
HCT VFR BLD AUTO: 35.8 % (ref 35–47)
HGB BLD-MCNC: 11.3 G/DL (ref 11.7–15.7)
IMM GRANULOCYTES # BLD: 0.1 10E9/L (ref 0–0.4)
IMM GRANULOCYTES NFR BLD: 4.9 %
LDH SERPL L TO P-CCNC: 246 U/L (ref 81–234)
LYMPHOCYTES # BLD AUTO: 0.5 10E9/L (ref 0.8–5.3)
LYMPHOCYTES NFR BLD AUTO: 18.3 %
MCH RBC QN AUTO: 27.3 PG (ref 26.5–33)
MCHC RBC AUTO-ENTMCNC: 31.6 G/DL (ref 31.5–36.5)
MCV RBC AUTO: 87 FL (ref 78–100)
MONOCYTES # BLD AUTO: 0.7 10E9/L (ref 0–1.3)
MONOCYTES NFR BLD AUTO: 25.7 %
NEUTROPHILS # BLD AUTO: 1.3 10E9/L (ref 1.6–8.3)
NEUTROPHILS NFR BLD AUTO: 50 %
NRBC # BLD AUTO: 0 10*3/UL
NRBC BLD AUTO-RTO: 0 /100
PLATELET # BLD AUTO: 260 10E9/L (ref 150–450)
PLATELET # BLD EST: ABNORMAL 10*3/UL
POTASSIUM SERPL-SCNC: 4.1 MMOL/L (ref 3.4–5.3)
PROT SERPL-MCNC: 6.4 G/DL (ref 6.8–8.8)
RADIOLOGIST FLAGS: ABNORMAL
RBC # BLD AUTO: 4.14 10E12/L (ref 3.8–5.2)
SODIUM SERPL-SCNC: 140 MMOL/L (ref 133–144)
WBC # BLD AUTO: 2.7 10E9/L (ref 4–11)

## 2020-11-21 PROCEDURE — 86140 C-REACTIVE PROTEIN: CPT | Performed by: PHYSICIAN ASSISTANT

## 2020-11-21 PROCEDURE — 85025 COMPLETE CBC W/AUTO DIFF WBC: CPT | Performed by: PHYSICIAN ASSISTANT

## 2020-11-21 PROCEDURE — 71275 CT ANGIOGRAPHY CHEST: CPT | Mod: 26 | Performed by: RADIOLOGY

## 2020-11-21 PROCEDURE — 250N000011 HC RX IP 250 OP 636: Performed by: PHYSICIAN ASSISTANT

## 2020-11-21 PROCEDURE — 250N000013 HC RX MED GY IP 250 OP 250 PS 637: Performed by: INTERNAL MEDICINE

## 2020-11-21 PROCEDURE — 71275 CT ANGIOGRAPHY CHEST: CPT

## 2020-11-21 PROCEDURE — 250N000012 HC RX MED GY IP 250 OP 636 PS 637: Performed by: STUDENT IN AN ORGANIZED HEALTH CARE EDUCATION/TRAINING PROGRAM

## 2020-11-21 PROCEDURE — 94799 UNLISTED PULMONARY SVC/PX: CPT

## 2020-11-21 PROCEDURE — 999N000105 HC STATISTIC NO DOCUMENTATION TO SUPPORT CHARGE

## 2020-11-21 PROCEDURE — 80053 COMPREHEN METABOLIC PANEL: CPT | Performed by: PHYSICIAN ASSISTANT

## 2020-11-21 PROCEDURE — 85384 FIBRINOGEN ACTIVITY: CPT | Performed by: PHYSICIAN ASSISTANT

## 2020-11-21 PROCEDURE — 36415 COLL VENOUS BLD VENIPUNCTURE: CPT | Performed by: PHYSICIAN ASSISTANT

## 2020-11-21 PROCEDURE — 83615 LACTATE (LD) (LDH) ENZYME: CPT | Performed by: PHYSICIAN ASSISTANT

## 2020-11-21 PROCEDURE — 250N000013 HC RX MED GY IP 250 OP 250 PS 637: Performed by: PHYSICIAN ASSISTANT

## 2020-11-21 PROCEDURE — 120N000003 HC R&B IMCU UMMC

## 2020-11-21 PROCEDURE — 250N000011 HC RX IP 250 OP 636: Performed by: STUDENT IN AN ORGANIZED HEALTH CARE EDUCATION/TRAINING PROGRAM

## 2020-11-21 PROCEDURE — 99233 SBSQ HOSP IP/OBS HIGH 50: CPT | Performed by: INTERNAL MEDICINE

## 2020-11-21 PROCEDURE — 999N000157 HC STATISTIC RCP TIME EA 10 MIN

## 2020-11-21 PROCEDURE — 250N000013 HC RX MED GY IP 250 OP 250 PS 637: Performed by: STUDENT IN AN ORGANIZED HEALTH CARE EDUCATION/TRAINING PROGRAM

## 2020-11-21 RX ORDER — AMLODIPINE BESYLATE 5 MG/1
5 TABLET ORAL ONCE
Status: COMPLETED | OUTPATIENT
Start: 2020-11-21 | End: 2020-11-21

## 2020-11-21 RX ORDER — AMLODIPINE BESYLATE 10 MG/1
10 TABLET ORAL DAILY
Status: DISCONTINUED | OUTPATIENT
Start: 2020-11-22 | End: 2020-11-24 | Stop reason: HOSPADM

## 2020-11-21 RX ORDER — IOPAMIDOL 755 MG/ML
72 INJECTION, SOLUTION INTRAVASCULAR ONCE
Status: COMPLETED | OUTPATIENT
Start: 2020-11-21 | End: 2020-11-21

## 2020-11-21 RX ADMIN — ENOXAPARIN SODIUM 100 MG: 100 INJECTION SUBCUTANEOUS at 21:59

## 2020-11-21 RX ADMIN — DOCUSATE SODIUM 50 MG AND SENNOSIDES 8.6 MG 1 TABLET: 8.6; 5 TABLET, FILM COATED ORAL at 09:16

## 2020-11-21 RX ADMIN — AMLODIPINE BESYLATE 5 MG: 5 TABLET ORAL at 11:26

## 2020-11-21 RX ADMIN — ACETAMINOPHEN 650 MG: 325 TABLET, FILM COATED ORAL at 13:17

## 2020-11-21 RX ADMIN — MICONAZOLE NITRATE: 20 POWDER TOPICAL at 09:16

## 2020-11-21 RX ADMIN — IOPAMIDOL 72 ML: 755 INJECTION, SOLUTION INTRAVENOUS at 13:25

## 2020-11-21 RX ADMIN — ACETAMINOPHEN 650 MG: 325 TABLET, FILM COATED ORAL at 04:07

## 2020-11-21 RX ADMIN — LEVOTHYROXINE SODIUM 225 MCG: 75 TABLET ORAL at 09:16

## 2020-11-21 RX ADMIN — DEXAMETHASONE 6 MG: 2 TABLET ORAL at 09:16

## 2020-11-21 RX ADMIN — AMLODIPINE BESYLATE 5 MG: 5 TABLET ORAL at 09:16

## 2020-11-21 RX ADMIN — FAMOTIDINE 20 MG: 20 TABLET ORAL at 19:31

## 2020-11-21 RX ADMIN — MICONAZOLE NITRATE: 20 POWDER TOPICAL at 19:31

## 2020-11-21 RX ADMIN — ACETAMINOPHEN 650 MG: 325 TABLET, FILM COATED ORAL at 21:59

## 2020-11-21 RX ADMIN — CETIRIZINE HYDROCHLORIDE 10 MG: 10 TABLET, FILM COATED ORAL at 09:16

## 2020-11-21 RX ADMIN — ATORVASTATIN CALCIUM 10 MG: 10 TABLET, FILM COATED ORAL at 09:16

## 2020-11-21 RX ADMIN — ENOXAPARIN SODIUM 100 MG: 100 INJECTION SUBCUTANEOUS at 11:26

## 2020-11-21 RX ADMIN — POLYETHYLENE GLYCOL 3350 17 G: 17 POWDER, FOR SOLUTION ORAL at 09:20

## 2020-11-21 RX ADMIN — FAMOTIDINE 20 MG: 20 TABLET ORAL at 09:20

## 2020-11-21 ASSESSMENT — ACTIVITIES OF DAILY LIVING (ADL)
ADLS_ACUITY_SCORE: 15

## 2020-11-21 NOTE — PROGRESS NOTES
[facility] [place]    Internal Medicine Progress Note - Gold Service      Assessment & Plan   Romayne L Sathre is a 75 year old female with history of HTN, HLD, hypothyroidism, CKDIII, and breast cancer (s/p chemo and mastectomy 2008) who was admitted to [facility] MICU 11/17 with COVID-19. Stabilized then transitioned to medicine 11/18 for ongoing care.      # Severe sepsis, acute hypoxic respiratory failure 2/2 COVID-19 PNA: COVID+ 11/5. Admitted 11/17 with acute resp failure requiring 30 LMP HiFlow. Flu, RVS negative 11/116. CXR 11/17 diffuse patchy airspace opacities c/w COVID. D-dimer >20. ABG 7.39/32/79/19.  (170).  (330). Fibrinogen 614 (771). Treated with Dexamethasone, Remdesivir 11/17 to present. Initially on Vanc/Zosyn but abx stopped 11/18 due to low suspicion. Oxygen needs improved - now on 3L via NC and off HFNC. Denies any dyspnea, chest pain or pleuritic chest pain. Did have desat with movement to chair yesterday.  - Continue Remdesivir (day 5/5)  - Continue Dexamethasone 6mg daily for 10 doses (day 4)  - Continue Lovenox 1mg/kg BID - will consider transition to DOAC such as Apixiban    - Daily COVID labs  - Wean O2 as able, continuous pulse ox  - Call patient's daughter Mariana w/ daily updates  - PT/OT following     # Elevated D-dimer  # Non-occlusive thrombus in left popliteal vein:  D-dimer >20 on admission. Started on high-intensity heparin gtt in ED with bolus- and transitioned to low intensity on 11/18. Ultrasound of b/l lower extremities 11/19 notable for non-occlusive thrombus in left popliteal vein- therefore transitioned back to high intensity heparin gtt 11/19. Had elevated hep 10A levels overnight 11/19. Transitioned to Lovenox 1mg/kg BID on 11/20. Denies any chest pain, pleuritic chest pain. No hx of PE.    - If any decompensation would have low threshold to get CT PE study - however given stability will hold off at this time as it would not change  management and to avoid contrast as had HALI on admission.   - Continue Lovenox 1mg/kg BID - will consider transition to DOAC such as Apixiban - will need at least 3 months of therapy - will have her follow up with OP Hematology for duration of anticoagulation and PCP   - ADDENDUM: Patient's weight is 102kg - right at the cut off for Apixiban. Will assess with CT PE protocol to assess for any pulmonary embolism - if no pulmonary embolism- will consider Apixiban- however, if PE, will likely choose another anticoagulant to ensure appropriate absorbance per d/w Dr. Garza.    - CT PE with segmental and subsegmental PE in the right upper and lower lobes w/o evidence of RHS. Also w/ extensive retricular changes and GOO throughout lungs representing inflammation and/or fibrosis r/t recent COVID-19 infection      # HALI on CKD III: BL Cr 1.1-1.2, elevated to 1.53 on presentation with BUN 47. Improved and 0.85 today. Etiology likely prerenal 2/2 COVID. Trend BMP daily. PTA Lisinopril on hold.       # Hypothyroidism: TSH on admission WNL at 0.42. Continue PTA Synthroid      # Normocytic anemia: Mild. BL hgb 12-13. Currently 11.3. No s/sx of bleeding  - Trend CBC In AM      # Lymphopenia: Likely 2/2 COVID. WBC today 2.7. ANC 1.3. Abs lymphs 0.5.   - Trend CBC with diff in AM   - Monitor fever curve      # Urinary retention, resolved: Required straight cath x2 over the past 24 hours. Continue prn straight cath for now. Has not required straight cath today- voiding in commode per RN. PVR now negative. Urinating w/o difficulty.  - Bladder scan for PVR - notify medicine if PVR >300  - Straight cath pr    # Constipation, resolved:  Had constipation on admission - improved with bowel regimen - had moderate BM on 11/21. Passing flatus. No abd pain, N/V.   - Miralax BID  - Sennokot BID  - Monitor      # HTN: PTA on lisinopril. Holding lisinopril in setting of HALI as above. Started Amlodipine 5mg 11/20 for elevated BP - BP remains  hypertensive today.  - Increase Amlodipine from 5mg daily to 10mg daily today  - Holding PTA Lisinopril 20mg daily  - Trend BP      Other Medical Conditions:  # HLD: Continue statin  # H/o breast cancer: S/p mastectomy and chemo 2008. Follow up OP as indicated   # GERD: Continue famotidine     Diet: Regular Diet Adult  Fluids: PO intake  DVT Prophylaxis: Lovenox 1mg/kg BID for DVT   Code Status: Full Code    Disposition Plan   Expected discharge: 2 - 3 days; recommended to TBD once oxygen needs improved, and anticoagulation plan in place and safe disposition plan with PT/OT.     The patient's care was discussed with the Attending Physician, Dr. Garza, Bedside Nurse, Patient and Patient's Family.    Mariana Wells PA-C  Hospitalist Service  [facility]  Pager: [phone]    Interval History   Patient was able to be weaned from 30L 35% FiO2 of HFNC to 3L of oxygen. Did have a desat when she was moving to chair last evening and reported some exertional dyspnea. However, no SOB/dyspnea at rest. No chest pain. No pleuritic chest pain. Had a moderate BM yesterday. No N/V. No abdominal pain. No overnight fevers. Urinating without difficulty. No signs of bleeding, melena or hematochezia.      A 4 point ROS was performed (including CV, Resp, GI, ) and negative unless otherwise noted in HPI.     Physical Exam   /77 (BP Location: Left arm)   Pulse 71   Temp 97.7  F (36.5  C) (Oral)   Resp 18   Wt 102.1 kg (225 lb 1.4 oz)   SpO2 93%   BMI 33.24 kg/m       GENERAL: Alert and oriented x 3. Pleasant. Answering questions appropriately.   HEENT: Anicteric sclera. Mucous membranes moist.   CV: RRR. S1, S2. No murmurs appreciated.   RESPIRATORY: Effort normal on 3L of oxygen via NC. Lungs sounds diminished bilaterally. No rhonchi, rales or wheeze.    GI: Abdomen soft and non distended, bowel sounds present. No tenderness, rebound, guarding. Obese.   NEUROLOGICAL: No focal deficits. Moves all  extremities.    EXTREMITIES: No peripheral edema. Intact bilateral pedal pulses.   SKIN: No jaundice. No rashes.     Lines/Tubes/Drains  PIV     Data reviewed today: I reviewed all medications, new labs and imaging results over the last 24 hours.

## 2020-11-21 NOTE — PLAN OF CARE
Pt A&O X4, VSS, afebrile, HR sinus rhythm 60-80's, BP's 160's/80's, O2 sats > 90% 3L via NC. Unable to auscultate lung/bowel sounds d/t this RN wearing PAPR, CMS intact. Pt slept majority of overnight shift, had few requests/complaints, reported aching back pain, received PRN Tylenol X1. Frequent dry/non-productive cough, pt educated on need to collect sputum sample, collection cup on bedside table. PIV's in L & R arm patent & SL, R PIV used for remdesivir infusion. Mepilex to R buttock CDI. Pt gets up with minimal SBA, ambulates independently to restroom. Voiding clear yellow urine adequately, passing gas, had one medium soft/formed brown BM. Tolerating regular diet with no nausea/emesis. Has call light within reach, able to make needs known, will continue to monitor per POC and update primary team with changes.

## 2020-11-21 NOTE — PLAN OF CARE
Blood pressure (!) 145/87, pulse 75, temperature 97.5  F (36.4  C), temperature source Oral, resp. rate 18, weight 102.1 kg (225 lb 1.4 oz), SpO2 95 %, not currently breastfeeding.  Neuro: A&Ox4.   Cardiac: SR. VSS.   Respiratory: Sating >92% on RA.  GI/: Adequate urine output. No BM today- just smear.  Diet/appetite: Tolerating regular diet. Eating well.  Activity:  Assist of 1 up to chair and in halls.  Pain: At acceptable level on current regimen.   Skin: No new deficits noted.  LDA's: PIVs x2- saline locked.    Plan: Continue with POC. Notify primary team with changes.

## 2020-11-21 NOTE — PLAN OF CARE
Blood pressure (!) 149/90, pulse 65, temperature 98.2  F (36.8  C), temperature source Oral, resp. rate 18, weight 102.4 kg (225 lb 12 oz), SpO2 97 %, not currently breastfeeding.  Neuro: A&Ox4. Calls appropriately.  Cardiac: SR 60s. VSS.       Respiratory: Sating >92% on 2L N.C.DIAZ, non-productive cough with activity.   GI/: Adequate urine output. Up to BSC. Post void residual less that 40mL. No BM this shift. Passing flatus.  Diet/appetite: Tolerating reg diet. Eating well. Takes pills whole.  Activity:  Assist of 1, up to bsc, TP/OT working with patient, will dishcarge home when ready.  Pain: At acceptable level on current regimen. Gave tylenol for general discomfort.  Skin: No new deficits noted. Foam pads to ear under O2 tubing.  LDA's: PIV x2.      Plan: Heparin discontinued, Lovenox started. Continue with POC. Notify primary team with changes.

## 2020-11-21 NOTE — PROGRESS NOTES
[facility], [place]    Internal Medicine Progress Note - Gold Service      Assessment & Plan   Romayne L Sathre is a 75 year old female with history of HTN, HLD, hypothyroidism, CKDIII, and breast cancer (s/p chemo and mastectomy 2008) who was admitted to [facility] MICU 11/17 with COVID-19. Stabilized then transitioned to medicine 11/18 for ongoing care.      # Severe sepsis, acute hypoxic respiratory failure 2/2 COVID-19 PNA: COVID+ 11/5. Admitted 11/17 with acute resp failure requiring 30 LMP HiFlow. Flu, RVS negative 11/116. CXR 11/17 diffuse patchy airspace opacities c/w COVID. D-dimer >20. ABG 7.39/32/79/19.  (170).  (330). Fibrinogen 614 (771). Treated with Dexamethasone, Remdesivir 11/17 to present. Initially on Vanc/Zosyn but abx stopped 11/18 due to low suspicion.  Ultrasound of b/l lower extremities notable for non-occlusive thrombus in left popliteal vein. No DVT in RLE. Patient denies any chest pain or pleuritic chest pain. Stable on FiO2 45-50%. Was initially on high intensity heparin gtt in ED -> changed to low intensity on 11/18 and will now resume high intensity - if any decompensation would have low threshold to get CT PE study - however given stability will hold off at this time as it would not  and to avoid contrast as had HALI on admission. Oxygen needs improving from 50% FiO2 at 30L on 11/19 -> Fi)2 35%.   - Continue Remdesivir (day 4)  - Continue Dexamethasone 6mg daily for 10 doses (day 4)  - Discontinue high intensity heparin gtt --> Transition to Lovenox 1mg/kg BID   - Daily COVID labs  - Wean O2 as able, continuous pulse ox  - Call patient's daughter Mariana w/ daily updates    # Elevated D-dimer  # Non-occlusive thrombus in left popliteal vein:  D-dimer >20 on admission. Started on high-intensity heparin gtt in ED with bolus- and transitioned to low intensity on 11/18. Ultrasound of b/l lower extremities notable for non-occlusive  thrombus in left popliteal vein on 11/19- therefore transitioned back to high intensity heparin gtt 11/19. Had elevated hep 10A levels overnight.   - If any decompensation would have low threshold to get CT PE study - however given stability will hold off at this time as it would not  and to avoid contrast as had HALI on admission.   - Discontinue high intensity heparin gtt --> Transition to Lovenox 1mg/kg BID      # HALI on CKD III: BL Cr 1.1-1.2, elevated to 1.53 on presentation with BUN 47. Improved and 0.97 today. Etiology likely prerenal 2/2 COVID. Trend BMP daily. PTA Lisinopril on hold.       # Hypothyroidism: TSH on admission WNL at 0.42. Continue PTA Synthroid      # Normocytic anemia: Mild. BL hgb 12-13. Currently 10.5. No s/sx of bleeding  - Trend CBC In AM      # Lymphopenia: Likely 2/2 COVID. WBC today 3.0. ANC 1.7. Abs lymphs 0.5.   - Trend CBC with diff in AM      # Urinary retention, resolved: Required straight cath x2 over the past 24 hours. Continue prn straight cath for now. Has not required straight cath today- voiding in commode per RN. PVR now negative. Urinating w/o difficulty.  - Bladder scan for PVR - notify medicine if PVR >300  - Straight cath pr    # Constipation:  Last BM PTA. Passing flatus. No abd pain, N/V.   - Miralax BID  - Sennokot BID  - Bisacodyl suppository today  - Monitor        Other Medical Conditions:  # HTN: PTA on lisinopril. Holding lisinopril in setting of HALI as above. Started Amlodipine 5mg daily for elevated BP today.  # HLD: Continue statin  # H/o breast cancer: S/p mastectomy and chemo 2008. Follow up OP as indicated   # GERD: Continue famotidine     Diet: Regular Diet Adult  Fluids: PO intake  DVT Prophylaxis: Lovenox 1mg/kg BID for DVT   Code Status: Full Code    Disposition Plan   Expected discharge: 4 - 7 days; recommended to TBD once oxygen needs improved, and anticoagulation plan in place and safe disposition plan with PT/OT.     The patient's  care was discussed with the Attending Physician, Dr. Garza, Bedside Nurse, Care Coordinator/, Patient and Patient's Family.    Mariana Wells PA-C  Hospitalist Service  [facility]  Pager: [phone]    Interval History   Patient reports doing well. Denies CP, SOB, dizziness, lightheadedness, signs of bleeding including hematochezia or melena. No dysuria. Has not yet had BM this admission. Passing flatus. No abdominal pain, N/V. Had elevated Hep 10 A level x2 overnight.     A 4 point ROS was performed (including CV, Resp, GI, ) and negative unless otherwise noted in HPI.     Physical Exam   BP (!) 149/90 (BP Location: Left arm)   Pulse 65   Temp 98.2  F (36.8  C) (Oral)   Resp 18   Wt 102.4 kg (225 lb 12 oz)   SpO2 97%   BMI 33.34 kg/m       GENERAL: Alert and oriented x 3. Pleasant. Answering questions appropriately.   HEENT: Anicteric sclera. Mucous membranes moist.   CV: RRR. S1, S2. No murmurs appreciated.   RESPIRATORY: Effort normal on HFNC at 35% FiO2 at 30L. Lungs sounds diminished bilaterally. No rhonchi, rales or wheeze.    GI: Abdomen soft and non distended, bowel sounds present. No tenderness, rebound, guarding. Obese.   NEUROLOGICAL: No focal deficits. Moves all extremities.    EXTREMITIES: 1+ peripheral edema. Intact bilateral pedal pulses.   SKIN: No jaundice. No rashes.     Lines/Tubes/Drains  PIV     Data reviewed today: I reviewed all medications, new labs and imaging results over the last 24 hours.

## 2020-11-22 LAB
ANION GAP SERPL CALCULATED.3IONS-SCNC: 5 MMOL/L (ref 3–14)
BACTERIA SPEC CULT: NO GROWTH
BACTERIA SPEC CULT: NO GROWTH
BASOPHILS # BLD AUTO: 0 10E9/L (ref 0–0.2)
BASOPHILS NFR BLD AUTO: 0.7 %
BUN SERPL-MCNC: 22 MG/DL (ref 7–30)
CALCIUM SERPL-MCNC: 8.4 MG/DL (ref 8.5–10.1)
CHLORIDE SERPL-SCNC: 109 MMOL/L (ref 94–109)
CO2 SERPL-SCNC: 25 MMOL/L (ref 20–32)
CREAT SERPL-MCNC: 0.95 MG/DL (ref 0.52–1.04)
CRP SERPL-MCNC: 26 MG/L (ref 0–8)
DIFFERENTIAL METHOD BLD: ABNORMAL
EOSINOPHIL # BLD AUTO: 0 10E9/L (ref 0–0.7)
EOSINOPHIL NFR BLD AUTO: 0.3 %
ERYTHROCYTE [DISTWIDTH] IN BLOOD BY AUTOMATED COUNT: 13.4 % (ref 10–15)
FIBRINOGEN PPP-MCNC: 488 MG/DL (ref 200–420)
GFR SERPL CREATININE-BSD FRML MDRD: 59 ML/MIN/{1.73_M2}
GLUCOSE SERPL-MCNC: 76 MG/DL (ref 70–99)
HCT VFR BLD AUTO: 37.1 % (ref 35–47)
HGB BLD-MCNC: 11.5 G/DL (ref 11.7–15.7)
IMM GRANULOCYTES # BLD: 0.1 10E9/L (ref 0–0.4)
IMM GRANULOCYTES NFR BLD: 4.4 %
LACTATE BLD-SCNC: 1.4 MMOL/L (ref 0.7–2)
LDH SERPL L TO P-CCNC: 226 U/L (ref 81–234)
LYMPHOCYTES # BLD AUTO: 0.6 10E9/L (ref 0.8–5.3)
LYMPHOCYTES NFR BLD AUTO: 21.4 %
MAGNESIUM SERPL-MCNC: 1.8 MG/DL (ref 1.6–2.3)
MCH RBC QN AUTO: 27.1 PG (ref 26.5–33)
MCHC RBC AUTO-ENTMCNC: 31 G/DL (ref 31.5–36.5)
MCV RBC AUTO: 87 FL (ref 78–100)
MONOCYTES # BLD AUTO: 0.7 10E9/L (ref 0–1.3)
MONOCYTES NFR BLD AUTO: 22.8 %
NEUTROPHILS # BLD AUTO: 1.5 10E9/L (ref 1.6–8.3)
NEUTROPHILS NFR BLD AUTO: 50.4 %
NRBC # BLD AUTO: 0 10*3/UL
NRBC BLD AUTO-RTO: 0 /100
PLATELET # BLD AUTO: 261 10E9/L (ref 150–450)
PLATELET # BLD EST: ABNORMAL 10*3/UL
POTASSIUM SERPL-SCNC: 4.5 MMOL/L (ref 3.4–5.3)
RBC # BLD AUTO: 4.25 10E12/L (ref 3.8–5.2)
SODIUM SERPL-SCNC: 139 MMOL/L (ref 133–144)
SPECIMEN SOURCE: NORMAL
SPECIMEN SOURCE: NORMAL
WBC # BLD AUTO: 2.9 10E9/L (ref 4–11)

## 2020-11-22 PROCEDURE — 36415 COLL VENOUS BLD VENIPUNCTURE: CPT | Performed by: PHYSICIAN ASSISTANT

## 2020-11-22 PROCEDURE — 86140 C-REACTIVE PROTEIN: CPT | Performed by: PHYSICIAN ASSISTANT

## 2020-11-22 PROCEDURE — 250N000013 HC RX MED GY IP 250 OP 250 PS 637: Performed by: INTERNAL MEDICINE

## 2020-11-22 PROCEDURE — 250N000013 HC RX MED GY IP 250 OP 250 PS 637: Performed by: PHYSICIAN ASSISTANT

## 2020-11-22 PROCEDURE — 250N000013 HC RX MED GY IP 250 OP 250 PS 637: Performed by: STUDENT IN AN ORGANIZED HEALTH CARE EDUCATION/TRAINING PROGRAM

## 2020-11-22 PROCEDURE — 250N000012 HC RX MED GY IP 250 OP 636 PS 637: Performed by: STUDENT IN AN ORGANIZED HEALTH CARE EDUCATION/TRAINING PROGRAM

## 2020-11-22 PROCEDURE — 36415 COLL VENOUS BLD VENIPUNCTURE: CPT | Performed by: INTERNAL MEDICINE

## 2020-11-22 PROCEDURE — 250N000011 HC RX IP 250 OP 636: Performed by: PHYSICIAN ASSISTANT

## 2020-11-22 PROCEDURE — 80048 BASIC METABOLIC PNL TOTAL CA: CPT | Performed by: PHYSICIAN ASSISTANT

## 2020-11-22 PROCEDURE — 83735 ASSAY OF MAGNESIUM: CPT | Performed by: PHYSICIAN ASSISTANT

## 2020-11-22 PROCEDURE — 85025 COMPLETE CBC W/AUTO DIFF WBC: CPT | Performed by: PHYSICIAN ASSISTANT

## 2020-11-22 PROCEDURE — 83605 ASSAY OF LACTIC ACID: CPT | Performed by: INTERNAL MEDICINE

## 2020-11-22 PROCEDURE — 85384 FIBRINOGEN ACTIVITY: CPT | Performed by: PHYSICIAN ASSISTANT

## 2020-11-22 PROCEDURE — 83615 LACTATE (LD) (LDH) ENZYME: CPT | Performed by: PHYSICIAN ASSISTANT

## 2020-11-22 PROCEDURE — 99233 SBSQ HOSP IP/OBS HIGH 50: CPT | Performed by: INTERNAL MEDICINE

## 2020-11-22 PROCEDURE — 120N000002 HC R&B MED SURG/OB UMMC

## 2020-11-22 RX ADMIN — AMLODIPINE BESYLATE 10 MG: 10 TABLET ORAL at 07:58

## 2020-11-22 RX ADMIN — DOCUSATE SODIUM 50 MG AND SENNOSIDES 8.6 MG 1 TABLET: 8.6; 5 TABLET, FILM COATED ORAL at 07:58

## 2020-11-22 RX ADMIN — MICONAZOLE NITRATE: 20 POWDER TOPICAL at 07:58

## 2020-11-22 RX ADMIN — DEXAMETHASONE 6 MG: 2 TABLET ORAL at 07:58

## 2020-11-22 RX ADMIN — ATORVASTATIN CALCIUM 10 MG: 10 TABLET, FILM COATED ORAL at 07:57

## 2020-11-22 RX ADMIN — ACETAMINOPHEN 650 MG: 325 TABLET, FILM COATED ORAL at 07:58

## 2020-11-22 RX ADMIN — POLYETHYLENE GLYCOL 3350 17 G: 17 POWDER, FOR SOLUTION ORAL at 07:56

## 2020-11-22 RX ADMIN — APIXABAN 10 MG: 5 TABLET, FILM COATED ORAL at 19:24

## 2020-11-22 RX ADMIN — FAMOTIDINE 20 MG: 20 TABLET ORAL at 07:58

## 2020-11-22 RX ADMIN — ENOXAPARIN SODIUM 100 MG: 100 INJECTION SUBCUTANEOUS at 11:09

## 2020-11-22 RX ADMIN — MICONAZOLE NITRATE: 20 POWDER TOPICAL at 19:25

## 2020-11-22 RX ADMIN — CETIRIZINE HYDROCHLORIDE 10 MG: 10 TABLET, FILM COATED ORAL at 07:58

## 2020-11-22 RX ADMIN — FAMOTIDINE 20 MG: 20 TABLET ORAL at 19:24

## 2020-11-22 RX ADMIN — LEVOTHYROXINE SODIUM 225 MCG: 75 TABLET ORAL at 05:34

## 2020-11-22 RX ADMIN — ACETAMINOPHEN 650 MG: 325 TABLET, FILM COATED ORAL at 12:19

## 2020-11-22 RX ADMIN — ACETAMINOPHEN 650 MG: 325 TABLET, FILM COATED ORAL at 22:24

## 2020-11-22 ASSESSMENT — ACTIVITIES OF DAILY LIVING (ADL)
ADLS_ACUITY_SCORE: 15

## 2020-11-22 NOTE — PROGRESS NOTES
[facility], [place]    Internal Medicine Progress Note - Gold Service      Assessment & Plan   Romayne L Sathre is a 75 year old female with history of HTN, HLD, hypothyroidism, CKDIII, and breast cancer (s/p chemo and mastectomy 2008) who was admitted to [facility] MICU 11/17 with COVID-19. Stabilized then transitioned to medicine 11/18 for ongoing care.     Changes made today:  - Completed 5 day course of Remdesivir  - Continue Dexamethasone 6mg daily x10 days  - PT/OT - needs to see 11/23  - Walk test 24-48hours prior to discharge to assess home O2 needs  - Discontinue Lovenox 1mg/kg BID and transition to Apixiban   - Holding laxatives given loose stools     # Severe sepsis, acute hypoxic respiratory failure 2/2 COVID-19 PNA:   COVID+ 11/5. Admitted 11/17 with acute resp failure requiring 30 LMP HiFlow. Flu, RVS negative 11/116. CXR 11/17 diffuse patchy airspace opacities c/w COVID. D-dimer >20. ABG 7.39/32/79/19.  (170).  (330). Fibrinogen 614 (771). Treated with Dexamethasone, Remdesivir 11/17 to present. Initially on Vanc/Zosyn but abx stopped 11/18 due to low suspicion. Oxygen needs improved - now on 2L of oxygen via NC - has desaturation with movement to chair and DIAZ, but no chest pain/SOB at rest.   - Completed 5 day course of Remdesivir   - Continue Dexamethasone 6mg daily for 10 doses    - Treatment of DVT/PE as below   - Daily COVID labs  - Wean O2 as able, continuous pulse ox  - Call patient's daughter Mariana w/ daily updates  - PT/OT following - will need them to see on 11/23    - Update walk test to determine oxygen needs within 24-48hrs of discharge     # Elevated D-dimer  # Non-occlusive thrombus in left popliteal vein:  D-dimer >20 on admission. Started on high-intensity heparin gtt in ED with bolus- and transitioned to low intensity on 11/18. Ultrasound of b/l lower extremities 11/19 notable for non-occlusive thrombus in left popliteal vein- therefore  transitioned back to high intensity heparin gtt 11/19. Had elevated hep 10A levels overnight 11/19. Transitioned to Lovenox 1mg/kg BID on 11/20. CT PE protocol with segmental and subsegmental PE in the right upper and lower lobes w/o evidence of RHS. Also w/ extensive retricular changes and GOO throughout lungs representing inflammation and/or fibrosis r/t recent COVID-19 infection.   - Discontinue Lovenox 1mg/kg BID and transition to Apixiban 10mg BID x7 days, then 5mg BID thereafter   - Plan for ~ 3 month duration of treatment as likely provoked DVT/PE due to COVID-19 infection  - Will need to call discharge pharmacy to ensure patient has 1 month free coverage for Apixiban prior to discharge     Addendum  # Episodic SVT, resolved:  Cross cover notified that patient had two episodes of SVT at 11/21. No recurrence. Asymptomatic. K WNL. Mag 1.8 on 11/20.   - Monitor, continue telemetry  - Check Mag level      # HALI on CKD III: BL Cr 1.1-1.2, elevated to 1.53 on presentation with BUN 47. Improved and 0.85 11/21. Etiology likely prerenal 2/2 COVID. Trend BMP daily. PTA Lisinopril on hold.    - BMP pending from today  - Holding PTA Lisinopril for now  - Trend BMP in AM     # Hypothyroidism: TSH on admission WNL at 0.42.   - Continue PTA Synthroid      # Normocytic anemia: Mild. BL hgb 12-13. Currently 11.5. No s/sx of bleeding  - Trend CBC In AM      # Lymphopenia: Likely 2/2 COVID. WBC today 2.8. ANC 1.5. Abs lymphs 0.6.   - Trend CBC with diff in AM   - Monitor fever curve   - Will need follow up with PCP to ensure CBC w/ diff normalizes when COVID-19 infection clears      # Urinary retention, resolved: Required straight cath x2 over the past 24 hours. Continue prn straight cath for now. Has not required straight cath today- voiding in commode per RN. PVR now negative. Urinating w/o difficulty.  - Bladder scan for PVR - notify medicine if PVR >300  - Monitor     # Constipation, resolved:  Had constipation on admission  - improved with bowel regimen - had moderate BM on 11/21. Passing flatus. No abd pain, N/V. Had liquid bowel movement today in bed.  - Holding laxatives today given loose stools   - Monitor      # HTN: PTA on lisinopril 20mg daily. Holding lisinopril in setting of HALI as above. Started Amlodipine 11/20 for elevated BP and increased dose to 10mg on 11/21.   - Continue Amlodipine 10mg daily (started this admission)   - Holding PTA Lisinopril 20mg daily  - Trend BP      Other Medical Conditions:  # HLD: Continue statin  # H/o breast cancer: S/p mastectomy and chemo 2008. Follow up OP as indicated   # GERD: Continue famotidine     Diet: Regular Diet Adult  Fluids: PO intake  DVT Prophylaxis: Lovenox 1mg/kg BID for DVT   Code Status: Full Code    Disposition Plan   Expected discharge: 1-2 days; recommended to TBD once oxygen needs improved, and anticoagulation plan in place and safe disposition plan with PT/OT.     The patient's care was discussed with the Attending Physician, Dr. Garza, Bedside Nurse, Patient and Patient's Family.    Mariana Wells PA-C  Hospitalist Service  [facility]  Pager: [phone]    Interval History   Patient is on 2L of oxygen via NC. She reports no SOB at rest, but notes DIAZ. No chest pain. No cough or fever. She did report having a loose stool in bed and would like to hold laxatives. No melena/hematochezia. No abdominal pain, N/V.     A 4 point ROS was performed (including CV, Resp, GI, ) and negative unless otherwise noted in HPI.     Physical Exam   /71 (BP Location: Left arm)   Pulse 71   Temp 97.7  F (36.5  C) (Oral)   Resp 22   Wt 101.7 kg (224 lb 4.8 oz)   SpO2 97%   BMI 33.12 kg/m       GENERAL: Alert and oriented x 3. Pleasant. Answering questions appropriately.   HEENT: Anicteric sclera. Mucous membranes moist.   CV: RRR. S1, S2. No murmurs appreciated.   RESPIRATORY: Effort normal on 2L of oxygen via NC. Lungs sounds with mild expiratory  wheeze on bilateral lower lung bases. No rhonchi or rales.    GI: Abdomen soft and non distended, bowel sounds present. No tenderness, rebound, guarding. Obese.   NEUROLOGICAL: No focal deficits. Moves all extremities.    EXTREMITIES: No peripheral edema. Intact bilateral pedal pulses.   SKIN: No jaundice. No rashes.     Lines/Tubes/Drains  PIV     Data reviewed today: I reviewed all medications, new labs and imaging results over the last 24 hours.

## 2020-11-22 NOTE — PLAN OF CARE
Blood pressure 135/71, pulse 71, temperature 97.7  F (36.5  C), temperature source Oral, resp. rate 22, weight 101.7 kg (224 lb 4.8 oz), SpO2 97 %, not currently breastfeeding.  Transfer  Transferred to: 5A  Via: Wheel chair  Reason for transfer:Pt no longer appropriate for 6B- improved  patient condition  Family: Aware of transfer  Belongings: Packed and sent with pt  Chart: Delivered with pt to next unit  Medications: Meds sent to new unit with pt  Report given to: ALBERTINA LANCE   Pt status:  Neuro: A&Ox4.   Cardiac: SR. VSS.   Respiratory: Sating >92% on 2L  GI/: Adequate urine output. BM X2 today, large loose.   Diet/appetite: Tolerating regular diet. Eating well.  Activity:  Assist of 1 up to chair and in halls.  Pain: At acceptable level on current regimen.   Skin: No new deficits noted.  LDA's: PIV x1-saline locked.    Plan: Lovenox D/C'd, will start Apixaban this evening for the PE/DVT. Continue with POC. Notify primary team with changes.

## 2020-11-22 NOTE — PROGRESS NOTES
7-11:30- Pt A&O X4, VSS, afebrile, HR sinus rhythm 60-70s, O2 sats > 90% 2L via NC, B/P improved. Frequent congestive/non-productive cough. PIV's in L & R arm patent & SL. Buttock abrasion scabbing, pt declined Mepilex. Pt gets up with minimal SBA, to commode and restroom. Voiding clear yellow urine adequately, passing gas, had one medium soft/formed brown BM. Tolerating regular diet with no nausea/emesis. Has call light within reach, able to make needs known, will continue to monitor per POC and update primary team with changes.

## 2020-11-22 NOTE — PLAN OF CARE
BP (!) 148/96 (BP Location: Right arm)   Pulse 67   Temp 97.6  F (36.4  C) (Oral)   Resp 22   Wt 101.7 kg (224 lb 4.8 oz)   SpO2 98%   BMI 33.12 kg/m    Neuro: A&Ox4.   Cardiac: Afebrile, VSS with baseline HTN   Respiratory: 2lpm nc. O2 sats wdl at rest; desat to mid 80 with activity. RR wdl at rest increasing to low 30's with activity.   GI/: Voiding spontaneously. Large BM this shift.   Diet/appetite: Tolerating reg diet. Denies nausea   Activity: Up with stand by assist    Pain: Denies   Skin: No new deficits noted.  Lines: piv x1; sl'd    Rested btwn cares. Able to make needs known. Continue POC.

## 2020-11-22 NOTE — PROGRESS NOTES
Patient has been assessed for Home Oxygen needs. Oxygen readings:    *Pulse oximetry (SpO2) =94% on room air at rest while awake.    *SpO2 improved to97% on 2 liters/minute at rest.    *SpO2 = 84% on room air during activity/with exercise.    *SpO2 improved to 93% on 3liters/minute during activity/with exercise.

## 2020-11-23 ENCOUNTER — APPOINTMENT (OUTPATIENT)
Dept: PHYSICAL THERAPY | Facility: CLINIC | Age: 75
DRG: 871 | End: 2020-11-23
Payer: COMMERCIAL

## 2020-11-23 LAB
ALBUMIN UR-MCNC: NEGATIVE MG/DL
APPEARANCE UR: CLEAR
BACTERIA #/AREA URNS HPF: ABNORMAL /HPF
BASOPHILS # BLD AUTO: 0 10E9/L (ref 0–0.2)
BASOPHILS NFR BLD AUTO: 0.9 %
BILIRUB UR QL STRIP: NEGATIVE
COLOR UR AUTO: YELLOW
CRP SERPL-MCNC: 17 MG/L (ref 0–8)
DIFFERENTIAL METHOD BLD: ABNORMAL
EOSINOPHIL # BLD AUTO: 0 10E9/L (ref 0–0.7)
EOSINOPHIL NFR BLD AUTO: 0.9 %
ERYTHROCYTE [DISTWIDTH] IN BLOOD BY AUTOMATED COUNT: 13.6 % (ref 10–15)
FIBRINOGEN PPP-MCNC: 477 MG/DL (ref 200–420)
GLUCOSE UR STRIP-MCNC: NEGATIVE MG/DL
HCT VFR BLD AUTO: 37.6 % (ref 35–47)
HGB BLD-MCNC: 11.4 G/DL (ref 11.7–15.7)
HGB UR QL STRIP: NEGATIVE
IMM PLASMA CELLS NFR BLD: 0.9 %
KETONES UR STRIP-MCNC: NEGATIVE MG/DL
LACTATE BLD-SCNC: 1.3 MMOL/L (ref 0.7–2)
LDH SERPL L TO P-CCNC: 215 U/L (ref 81–234)
LEUKOCYTE ESTERASE UR QL STRIP: ABNORMAL
LYMPHOCYTES # BLD AUTO: 0.6 10E9/L (ref 0.8–5.3)
LYMPHOCYTES NFR BLD AUTO: 17 %
MCH RBC QN AUTO: 26.9 PG (ref 26.5–33)
MCHC RBC AUTO-ENTMCNC: 30.3 G/DL (ref 31.5–36.5)
MCV RBC AUTO: 89 FL (ref 78–100)
METAMYELOCYTES # BLD: 0.1 10E9/L
METAMYELOCYTES NFR BLD MANUAL: 1.8 %
MONOCYTES # BLD AUTO: 0.6 10E9/L (ref 0–1.3)
MONOCYTES NFR BLD AUTO: 15.2 %
MUCOUS THREADS #/AREA URNS LPF: PRESENT /LPF
NEUTROPHILS # BLD AUTO: 2.3 10E9/L (ref 1.6–8.3)
NEUTROPHILS NFR BLD AUTO: 63.3 %
NITRATE UR QL: NEGATIVE
PH UR STRIP: 5 PH (ref 5–7)
PLASMA CELLS # BLD MANUAL: 0 10E9/L
PLATELET # BLD AUTO: 265 10E9/L (ref 150–450)
RBC # BLD AUTO: 4.24 10E12/L (ref 3.8–5.2)
RBC #/AREA URNS AUTO: 1 /HPF (ref 0–2)
RBC MORPH BLD: NORMAL
SOURCE: ABNORMAL
SP GR UR STRIP: 1.01 (ref 1–1.03)
SQUAMOUS #/AREA URNS AUTO: 4 /HPF (ref 0–1)
TRANS CELLS #/AREA URNS HPF: <1 /HPF (ref 0–1)
UROBILINOGEN UR STRIP-MCNC: NORMAL MG/DL (ref 0–2)
WBC # BLD AUTO: 3.7 10E9/L (ref 4–11)
WBC #/AREA URNS AUTO: 12 /HPF (ref 0–5)

## 2020-11-23 PROCEDURE — 36415 COLL VENOUS BLD VENIPUNCTURE: CPT | Performed by: INTERNAL MEDICINE

## 2020-11-23 PROCEDURE — 250N000013 HC RX MED GY IP 250 OP 250 PS 637: Performed by: PHYSICIAN ASSISTANT

## 2020-11-23 PROCEDURE — 250N000012 HC RX MED GY IP 250 OP 636 PS 637: Performed by: STUDENT IN AN ORGANIZED HEALTH CARE EDUCATION/TRAINING PROGRAM

## 2020-11-23 PROCEDURE — 120N000002 HC R&B MED SURG/OB UMMC

## 2020-11-23 PROCEDURE — 99233 SBSQ HOSP IP/OBS HIGH 50: CPT | Performed by: INTERNAL MEDICINE

## 2020-11-23 PROCEDURE — 85384 FIBRINOGEN ACTIVITY: CPT | Performed by: PHYSICIAN ASSISTANT

## 2020-11-23 PROCEDURE — 85025 COMPLETE CBC W/AUTO DIFF WBC: CPT | Performed by: PHYSICIAN ASSISTANT

## 2020-11-23 PROCEDURE — 250N000013 HC RX MED GY IP 250 OP 250 PS 637: Performed by: INTERNAL MEDICINE

## 2020-11-23 PROCEDURE — 83605 ASSAY OF LACTIC ACID: CPT | Performed by: INTERNAL MEDICINE

## 2020-11-23 PROCEDURE — 86140 C-REACTIVE PROTEIN: CPT | Performed by: PHYSICIAN ASSISTANT

## 2020-11-23 PROCEDURE — 250N000013 HC RX MED GY IP 250 OP 250 PS 637: Performed by: STUDENT IN AN ORGANIZED HEALTH CARE EDUCATION/TRAINING PROGRAM

## 2020-11-23 PROCEDURE — 87086 URINE CULTURE/COLONY COUNT: CPT | Performed by: PHYSICIAN ASSISTANT

## 2020-11-23 PROCEDURE — 36415 COLL VENOUS BLD VENIPUNCTURE: CPT | Performed by: PHYSICIAN ASSISTANT

## 2020-11-23 PROCEDURE — 83615 LACTATE (LD) (LDH) ENZYME: CPT | Performed by: PHYSICIAN ASSISTANT

## 2020-11-23 PROCEDURE — 99207 PR APP CREDIT; MD BILLING SHARED VISIT: CPT | Performed by: PHYSICIAN ASSISTANT

## 2020-11-23 PROCEDURE — 81001 URINALYSIS AUTO W/SCOPE: CPT | Performed by: PHYSICIAN ASSISTANT

## 2020-11-23 PROCEDURE — 97110 THERAPEUTIC EXERCISES: CPT | Mod: GP

## 2020-11-23 RX ORDER — FAMOTIDINE 10 MG
10 TABLET ORAL DAILY PRN
Status: DISCONTINUED | OUTPATIENT
Start: 2020-11-23 | End: 2020-11-24 | Stop reason: HOSPADM

## 2020-11-23 RX ORDER — CALCIUM CARBONATE 500 MG/1
500 TABLET, CHEWABLE ORAL DAILY PRN
Status: DISCONTINUED | OUTPATIENT
Start: 2020-11-23 | End: 2020-11-23

## 2020-11-23 RX ORDER — MAGNESIUM HYDROXIDE/ALUMINUM HYDROXICE/SIMETHICONE 120; 1200; 1200 MG/30ML; MG/30ML; MG/30ML
30 SUSPENSION ORAL EVERY 4 HOURS PRN
Status: DISCONTINUED | OUTPATIENT
Start: 2020-11-23 | End: 2020-11-24 | Stop reason: HOSPADM

## 2020-11-23 RX ORDER — CALCIUM CARBONATE 500 MG/1
500 TABLET, CHEWABLE ORAL 2 TIMES DAILY PRN
Status: DISCONTINUED | OUTPATIENT
Start: 2020-11-23 | End: 2020-11-24 | Stop reason: HOSPADM

## 2020-11-23 RX ORDER — AMLODIPINE BESYLATE 10 MG/1
10 TABLET ORAL DAILY
Qty: 30 TABLET | Refills: 0 | Status: SHIPPED | OUTPATIENT
Start: 2020-11-24 | End: 2020-11-24

## 2020-11-23 RX ORDER — DEXAMETHASONE 6 MG/1
6 TABLET ORAL DAILY
Qty: 4 TABLET | Refills: 0 | Status: SHIPPED | OUTPATIENT
Start: 2020-11-24 | End: 2020-11-24

## 2020-11-23 RX ADMIN — FAMOTIDINE 20 MG: 20 TABLET ORAL at 07:58

## 2020-11-23 RX ADMIN — ACETAMINOPHEN 650 MG: 325 TABLET, FILM COATED ORAL at 06:54

## 2020-11-23 RX ADMIN — ACETAMINOPHEN 650 MG: 325 TABLET, FILM COATED ORAL at 14:28

## 2020-11-23 RX ADMIN — CALCIUM CARBONATE (ANTACID) CHEW TAB 500 MG 500 MG: 500 CHEW TAB at 07:59

## 2020-11-23 RX ADMIN — ACETAMINOPHEN 650 MG: 325 TABLET, FILM COATED ORAL at 19:25

## 2020-11-23 RX ADMIN — APIXABAN 10 MG: 5 TABLET, FILM COATED ORAL at 19:25

## 2020-11-23 RX ADMIN — CETIRIZINE HYDROCHLORIDE 10 MG: 10 TABLET, FILM COATED ORAL at 07:59

## 2020-11-23 RX ADMIN — DEXAMETHASONE 6 MG: 2 TABLET ORAL at 07:58

## 2020-11-23 RX ADMIN — OMEPRAZOLE 20 MG: 20 CAPSULE, DELAYED RELEASE ORAL at 15:41

## 2020-11-23 RX ADMIN — AMLODIPINE BESYLATE 10 MG: 10 TABLET ORAL at 07:57

## 2020-11-23 RX ADMIN — MICONAZOLE NITRATE: 20 POWDER TOPICAL at 14:29

## 2020-11-23 RX ADMIN — APIXABAN 10 MG: 5 TABLET, FILM COATED ORAL at 07:59

## 2020-11-23 RX ADMIN — FAMOTIDINE 10 MG: 10 TABLET ORAL at 22:03

## 2020-11-23 RX ADMIN — LEVOTHYROXINE SODIUM 150 MCG: 0.03 TABLET ORAL at 07:57

## 2020-11-23 RX ADMIN — CALCIUM CARBONATE (ANTACID) CHEW TAB 500 MG 500 MG: 500 CHEW TAB at 19:27

## 2020-11-23 RX ADMIN — ATORVASTATIN CALCIUM 10 MG: 10 TABLET, FILM COATED ORAL at 07:58

## 2020-11-23 ASSESSMENT — ACTIVITIES OF DAILY LIVING (ADL)
ADLS_ACUITY_SCORE: 15

## 2020-11-23 NOTE — PLAN OF CARE
[id number]  Hypertensive within parameters. Other VSS. Tele NSR. Sating 92% on RA at rest, needs 2L NC with activity. Up SBA. Voiding, had BM. PIV SL'd. Tolerating reg diet. Denies pain.

## 2020-11-23 NOTE — PROGRESS NOTES
"[facility]     Medicine Progress Note - Hospitalist Service, Gold 4/5       Date of Admission:  11/16/2020  Assessment & Plan         Romayne L Sathre is a 75 year old female with history of HTN, HLD, hypothyroidism, CKDIII, and breast cancer (s/p chemo and mastectomy 2008) who was admitted to [facility] MICU 11/17 with COVID-19. Stabilized then transitioned to medicine 11/18 for ongoing care.        #Severe Sepsis  #Acute Hypoxic Respiratory Failure 2/2 COVID-19 PNA - COVID+ 11/5. Admitted 11/17 with acute resp failure requiring 30 LMP HiFlow. Flu and RVS negative on 11/116. Chest x-ray on 11/17revealed \"diffuse patchy airspace opacities c/w COVID.\" Treated with Dexamethasone, Remdesivir 11/17 to present. Initially on Vanc/Zosyn but abx stopped 11/18 due to low suspicion. Oxygen needs have improved during her hospital stay- now on 2L of oxygen via NC  - walk test results as in chart  - Completed 5 day course of Remdesivir   - Continue Dexamethasone 6mg daily for 10 doses (ending 11/27)   - Treatment of DVT/PE as below   - Daily COVID labs  - PT/OT following  -Plans for discharge home tomorrow     #Elevated D-dimer  #Non-occlusive thrombus in left popliteal vein  #Pulmonary Embolisms of segmental and subsegmental right upper and lower lobes - D-dimer >20 on admission. Started on high-intensity heparin gtt in ED with bolus- and transitioned to low intensity on 11/18. Ultrasound of b/l lower extremities obtained on 11/19 which were notable for non-occlusive thrombus in left popliteal vein. She was then transitioned back to high intensity heparin gtt on 11/19. Had elevated hep 10A levels overnight 11/19. Transitioned to Lovenox 1mg/kg BID on 11/20. CT PE protocol with segmental and subsegmental PE in the right upper and lower lobes w/o evidence of RHS. Also w/ extensive retricular changes and GOO throughout lungs representing inflammation and/or fibrosis r/t recent COVID-19 " infection.   - Continue Apixiban 10mg BID x7 days, then 5mg BID thereafter   - Plan for ~ 3 month duration of treatment as likely provoked DVT/PE due to COVID-19 infection    #HALI on CKD III - Baseline creatinine 1.1-1.2, elevated to 1.53 on presentation with BUN 47. Now at baseline. Etiology likely prerenal 2/2 COVID.   - Holding PTA Lisinopril while inpatient, may resume at discharge  -BMP in AM     #Episodic SVT, resolved - Patient had two episodes of SVT at 11/21. No recurrence. Asymptomatic. Potassium WNL. Mag 1.8 on 11/22  - Monitor, continue telemetry    #Hypothyroidism: TSH on admission WNL at 0.42.   - Continue PTA Synthroid      #Normocytic anemia - Baseline Hgb 12-13. Currently 11.4. No s/sx of bleeding since initiation of anticoagulation.   -CBC In AM      #Lymphopenia: Wbc today 3.7, Likely 2/2 COVID.   - CBC with diff in AM   - Monitor fever curve   - Will need follow up with PCP to ensure CBC w/ diff normalizes when COVID-19 infection clears      #Urinary retention, resolved: Required straight cath x2 over the past 24 hours. Continue prn straight cath, however patient now urinating w/o difficulty.  - Bladder scan for PVR - notify medicine if PVR >300  - Monitor     #Constipation, resolved - Had constipation on admission - improved with bowel regimen, however developed liquid stool yesterday.  Patient states now resolved.   - Continue to hold bowel regimen    - Monitor      # HTN - Patient managed with lisinopril 20mg daily. Held on during hospital stay due to above HALI. Started on amlodipine 11/20 for elevated BP   - Continue Amlodipine 10mg daily   - Holding PTA Lisinopril 20mg daily  - Plans to resume home lisinopril and stop amlodipine at discharge.      #GERD - Patient endorses increased heartburn since starting Eliquis.  Currently managed with famotidine  -Stop famotidine, start omeprazole 20mg BID for GERD symptoms  -May continue Tums prn     # HLD: Continue statin    # H/o breast cancer: S/p  mastectomy and chemo 2008. Follow up OP as indicated          Diet: Regular Diet Adult    DVT Prophylaxis: Eliquis  Juarez Catheter: not present  Code Status: Full Code           Disposition Plan   Expected discharge: Tomorrow, recommended to prior living arrangement once O2 use less than 1-2 liters/minute.  Entered: Alice Leahy PA-C 11/23/2020, 8:02 AM       The patient's care was discussed with Dr. Greg Leahy PA-C  Hospitalist Service, [address]   Contact information available via [facility] Paging/Directory  Please see sign in/sign out for up to date coverage information  ______________________________________________________________________    Interval History   All overnight events reviewed.  Patient states she had difficulty sleeping last night secondary to heartburn. She states since she was started on Eliquis her GERD symptoms have been worsening.  She notes her Tums provides her with minimal relief.  She is eager to go home.  She is agreeable to oxygen therapy at home in necessary, but would prefer to be without it.  She notes she's had improvement in her diarrhea.  She denies fever, chills, chest pain, palpitation, abdominal pain, change in urinary habits.     Data reviewed today: I reviewed all medications, new labs and imaging results over the last 24 hours.    Physical Exam   Vital Signs: Temp: 97.5  F (36.4  C) Temp src: Oral BP: (!) 155/72 Pulse: 68   Resp: 20 SpO2: 100 % O2 Device: Nasal cannula Oxygen Delivery: 2 LPM  Weight: 224 lbs 4.8 oz  GENERAL: Alert and oriented x 3. NAD. Ambulatory. Cooperative.   HEENT: Anicteric sclera. Mucous membranes moist. NC. AT.   CV: RRR. S1, S2. No murmurs appreciated.   RESPIRATORY: Effort normal on 1-2Ls. Lungs CTAB with no wheezing, rales, rhonchi.   GI: Abdomen soft and non distended with normoactive bowel sounds present in all quadrants. No tenderness, rebound, guarding. No lesions.    NEUROLOGICAL: No focal deficits. Moves all extremities.  CN 2-12 grossly intact.  EXTREMITIES: No peripheral edema. Intact bilateral pedal pulses.   SKIN: No jaundice. No rashes.        Data   Results for SATHRE, ROMAYNE L (MRN [id number]) as of 11/23/2020 08:02   Ref. Range 11/23/2020 05:49   CRP Inflammation Latest Ref Range: 0.0 - 8.0 mg/L 17.0 (H)   Lactate Dehydrogenase Latest Ref Range: 81 - 234 U/L 215   Lactate for Sepsis Protocol Latest Ref Range: 0.7 - 2.0 mmol/L 1.3   WBC Latest Ref Range: 4.0 - 11.0 10e9/L 3.7 (L)   Hemoglobin Latest Ref Range: 11.7 - 15.7 g/dL 11.4 (L)   Hematocrit Latest Ref Range: 35.0 - 47.0 % 37.6   Platelet Count Latest Ref Range: 150 - 450 10e9/L 265   RBC Count Latest Ref Range: 3.8 - 5.2 10e12/L 4.24   MCV Latest Ref Range: 78 - 100 fl 89   MCH Latest Ref Range: 26.5 - 33.0 pg 26.9   MCHC Latest Ref Range: 31.5 - 36.5 g/dL 30.3 (L)   RDW Latest Ref Range: 10.0 - 15.0 % 13.6   Diff Method Unknown Manual Differential   % Neutrophils Latest Units: % 63.3   % Lymphocytes Latest Units: % 17.0   % Monocytes Latest Units: % 15.2   % Eosinophils Latest Units: % 0.9   % Basophils Latest Units: % 0.9   % Metamyelocytes Latest Units: % 1.8   % Plasma Cells Latest Units: % 0.9   Absolute Neutrophil Latest Ref Range: 1.6 - 8.3 10e9/L 2.3   Absolute Lymphocytes Latest Ref Range: 0.8 - 5.3 10e9/L 0.6 (L)   Absolute Monocytes Latest Ref Range: 0.0 - 1.3 10e9/L 0.6   Absolute Eosinophils Latest Ref Range: 0.0 - 0.7 10e9/L 0.0   Absolute Basophils Latest Ref Range: 0.0 - 0.2 10e9/L 0.0   Absolute Metamyelocytes Latest Ref Range: 0 10e9/L 0.1 (H)   Absolute Plasma Cells Latest Ref Range: 0 10e9/L 0.0   RBC Morphology Unknown Normal   Fibrinogen Latest Ref Range: 200 - 420 mg/dL 477 (H)

## 2020-11-23 NOTE — PROGRESS NOTES
Oxygen Documentation:   I certify that this patient, Romayne L Sathre has been under my care (or a nurse practitioner or physican's assistant working with me). This is the face-to-face encounter for oxygen medical necessity.      Romayne L Sathre is now in a chronic stable state and continues to require supplemental oxygen. Patient has continued oxygen desaturation due to COVID-19 associated respiratory failure.    Alternative treatment(s) tried or considered and deemed clinically infective for treatment of COVID-19 associated respiratory failure include steroids.  If portability is ordered, is the patient mobile within the home? yes    **Patients who qualify for home O2 coverage under the CMS guidelines require ABG tests or O2 sat readings obtained closest to, but no earlier than 2 days prior to the discharge, as evidence of the need for home oxygen therapy. Testing must be performed while patient is in the chronic stable state. See notes for O2 sats.**  *Pulse oximetry (SpO2) = 94% on room air at rest while awake.     *SpO2 improved to 97% on  1 liters/minute at rest.     *SpO2 = 81% on room air during activity/with exercise.     *SpO2 improved to 97% on 3 liters/minute during activity/with exercise.        Pt DIAZ with activity on room air, recovers well with O2 within 1-2 minutes.

## 2020-11-23 NOTE — PLAN OF CARE
[id number]: VSS on 1-3L of O2 per nasal cannula. 3L with activity- at rest 1 L O2 - Pt's Pulse ox in 90's.  On room air with activity pt desats into low 80's and becomes DIAZ- recovery with 3 L of O2 1-2 minutes. Pt c/o back pain- managed well with oral Tylenol. Pt denies n/v. Pt tolerating regular diet, appetite good . Coccyx blanchable redness, groin rash- Miconazole applied. Pt B/B independently- SBA to bathroom.   CRP 17.0, WBC 3.7, Fibrinogen 477.  Plan for discharge home with home O2- possibly tomorrow before 1100.

## 2020-11-23 NOTE — PROGRESS NOTES
Patient has been assessed for Home Oxygen needs. Oxygen readings:    *Pulse oximetry (SpO2) = 94% on room air at rest while awake.    *SpO2 improved to 97% on  1 liters/minute at rest.    *SpO2 = 81% on room air during activity/with exercise.    *SpO2 improved to 97% on 3 liters/minute during activity/with exercise.      Pt DIAZ with activity on room air, recovers well with O2 within 1-2 minutes.

## 2020-11-23 NOTE — PLAN OF CARE
Transferred from  around 1400. A&Ox4. On 2L O2 upon arrival. Pt did sat 92% on RA with vitals check. Denies pain or nausea. Home O2 test completed. Walked to bathroom with SBA. Calls appropriately. Home once medically stable.

## 2020-11-23 NOTE — PLAN OF CARE
Pt alert and oriented, VSS on 2 L O2 via nasal cannula. Tele in place reading NSR overnight. Regular diet and tolerating well. COVID positive, isolation maintained. Up with SBA in room and to bathroom. Continuous pulse ox in place. Complaints of heartburn, tums ordered and available if patient needs. Right PIV saline locked. Triggered sepsis at end of shift, Lactic 1.3. Continue with POC.

## 2020-11-24 ENCOUNTER — PATIENT OUTREACH (OUTPATIENT)
Dept: CARE COORDINATION | Facility: CLINIC | Age: 75
End: 2020-11-24

## 2020-11-24 ENCOUNTER — DOCUMENTATION ONLY (OUTPATIENT)
Dept: ONCOLOGY | Facility: CLINIC | Age: 75
End: 2020-11-24

## 2020-11-24 VITALS
WEIGHT: 222.2 LBS | OXYGEN SATURATION: 95 % | TEMPERATURE: 96.3 F | BODY MASS INDEX: 32.81 KG/M2 | SYSTOLIC BLOOD PRESSURE: 152 MMHG | HEART RATE: 58 BPM | DIASTOLIC BLOOD PRESSURE: 74 MMHG | RESPIRATION RATE: 18 BRPM

## 2020-11-24 LAB
BACTERIA SPEC CULT: NORMAL
BASOPHILS # BLD AUTO: 0 10E9/L (ref 0–0.2)
BASOPHILS NFR BLD AUTO: 0.2 %
CRP SERPL-MCNC: 13 MG/L (ref 0–8)
DIFFERENTIAL METHOD BLD: ABNORMAL
EOSINOPHIL # BLD AUTO: 0 10E9/L (ref 0–0.7)
EOSINOPHIL NFR BLD AUTO: 0.6 %
ERYTHROCYTE [DISTWIDTH] IN BLOOD BY AUTOMATED COUNT: 13.7 % (ref 10–15)
FIBRINOGEN PPP-MCNC: 513 MG/DL (ref 200–420)
HCT VFR BLD AUTO: 39.6 % (ref 35–47)
HGB BLD-MCNC: 12.2 G/DL (ref 11.7–15.7)
IMM GRANULOCYTES # BLD: 0.1 10E9/L (ref 0–0.4)
IMM GRANULOCYTES NFR BLD: 2 %
LDH SERPL L TO P-CCNC: 250 U/L (ref 81–234)
LYMPHOCYTES # BLD AUTO: 0.5 10E9/L (ref 0.8–5.3)
LYMPHOCYTES NFR BLD AUTO: 8.6 %
MCH RBC QN AUTO: 27.4 PG (ref 26.5–33)
MCHC RBC AUTO-ENTMCNC: 30.8 G/DL (ref 31.5–36.5)
MCV RBC AUTO: 89 FL (ref 78–100)
MONOCYTES # BLD AUTO: 0.5 10E9/L (ref 0–1.3)
MONOCYTES NFR BLD AUTO: 9 %
NEUTROPHILS # BLD AUTO: 4.3 10E9/L (ref 1.6–8.3)
NEUTROPHILS NFR BLD AUTO: 79.6 %
NRBC # BLD AUTO: 0 10*3/UL
NRBC BLD AUTO-RTO: 0 /100
PLATELET # BLD AUTO: 285 10E9/L (ref 150–450)
RBC # BLD AUTO: 4.46 10E12/L (ref 3.8–5.2)
SPECIMEN SOURCE: NORMAL
WBC # BLD AUTO: 5.4 10E9/L (ref 4–11)

## 2020-11-24 PROCEDURE — 250N000013 HC RX MED GY IP 250 OP 250 PS 637: Performed by: INTERNAL MEDICINE

## 2020-11-24 PROCEDURE — 250N000013 HC RX MED GY IP 250 OP 250 PS 637: Performed by: PHYSICIAN ASSISTANT

## 2020-11-24 PROCEDURE — 250N000013 HC RX MED GY IP 250 OP 250 PS 637: Performed by: STUDENT IN AN ORGANIZED HEALTH CARE EDUCATION/TRAINING PROGRAM

## 2020-11-24 PROCEDURE — 99238 HOSP IP/OBS DSCHRG MGMT 30/<: CPT | Performed by: INTERNAL MEDICINE

## 2020-11-24 PROCEDURE — 250N000012 HC RX MED GY IP 250 OP 636 PS 637: Performed by: STUDENT IN AN ORGANIZED HEALTH CARE EDUCATION/TRAINING PROGRAM

## 2020-11-24 PROCEDURE — 86140 C-REACTIVE PROTEIN: CPT | Performed by: PHYSICIAN ASSISTANT

## 2020-11-24 PROCEDURE — 85384 FIBRINOGEN ACTIVITY: CPT | Performed by: PHYSICIAN ASSISTANT

## 2020-11-24 PROCEDURE — 36415 COLL VENOUS BLD VENIPUNCTURE: CPT | Performed by: PHYSICIAN ASSISTANT

## 2020-11-24 PROCEDURE — 85025 COMPLETE CBC W/AUTO DIFF WBC: CPT | Performed by: PHYSICIAN ASSISTANT

## 2020-11-24 PROCEDURE — 99207 PR APP CREDIT; MD BILLING SHARED VISIT: CPT | Performed by: PHYSICIAN ASSISTANT

## 2020-11-24 PROCEDURE — 83615 LACTATE (LD) (LDH) ENZYME: CPT | Performed by: PHYSICIAN ASSISTANT

## 2020-11-24 RX ORDER — LISINOPRIL 10 MG/1
10 TABLET ORAL DAILY
Qty: 30 TABLET | Refills: 0 | COMMUNITY
Start: 2020-11-24 | End: 2020-12-14

## 2020-11-24 RX ORDER — CEPHALEXIN 500 MG/1
500 CAPSULE ORAL 2 TIMES DAILY
Qty: 10 CAPSULE | Refills: 0 | Status: SHIPPED | OUTPATIENT
Start: 2020-11-24 | End: 2020-11-29

## 2020-11-24 RX ORDER — DEXAMETHASONE 6 MG/1
6 TABLET ORAL DAILY
Qty: 3 TABLET | Refills: 0 | Status: SHIPPED | OUTPATIENT
Start: 2020-11-24 | End: 2020-12-03

## 2020-11-24 RX ADMIN — MICONAZOLE NITRATE: 20 POWDER TOPICAL at 07:59

## 2020-11-24 RX ADMIN — ACETAMINOPHEN 650 MG: 325 TABLET, FILM COATED ORAL at 12:53

## 2020-11-24 RX ADMIN — CETIRIZINE HYDROCHLORIDE 10 MG: 10 TABLET, FILM COATED ORAL at 07:55

## 2020-11-24 RX ADMIN — LEVOTHYROXINE SODIUM 150 MCG: 0.03 TABLET ORAL at 07:54

## 2020-11-24 RX ADMIN — OMEPRAZOLE 20 MG: 20 CAPSULE, DELAYED RELEASE ORAL at 07:54

## 2020-11-24 RX ADMIN — APIXABAN 10 MG: 5 TABLET, FILM COATED ORAL at 07:55

## 2020-11-24 RX ADMIN — ATORVASTATIN CALCIUM 10 MG: 10 TABLET, FILM COATED ORAL at 07:54

## 2020-11-24 RX ADMIN — AMLODIPINE BESYLATE 10 MG: 10 TABLET ORAL at 07:54

## 2020-11-24 RX ADMIN — DEXAMETHASONE 6 MG: 2 TABLET ORAL at 07:54

## 2020-11-24 RX ADMIN — ACETAMINOPHEN 650 MG: 325 TABLET, FILM COATED ORAL at 07:53

## 2020-11-24 ASSESSMENT — ACTIVITIES OF DAILY LIVING (ADL)
ADLS_ACUITY_SCORE: 13
ADLS_ACUITY_SCORE: 13
ADLS_ACUITY_SCORE: 15
ADLS_ACUITY_SCORE: 13

## 2020-11-24 NOTE — PLAN OF CARE
Time: 4615-8983     Reason for admission/Dx:   Hypoxia [R09.02]  2019 novel coronavirus disease (COVID-19) [U07.1]     /69 (BP Location: Left arm)   Pulse 73   Temp 96.8  F (36  C) (Oral)   Resp 24   Wt 101.7 kg (224 lb 3.2 oz)   SpO2 97%   BMI 33.11 kg/m      Shift changes: VSS, on 1-3L NC. DIAZ; 3L O2 with activity. AOx4, SBA d/t tubing/cords. Notes generalized discomfort; Tylenol given, with relief. Tolerating regular diet; 75% dinner. Denies N/V; however endorses heartburn; TUMs given, no relief; MD paged; Pepcid rx'd and given; but Maalox also now available as PRN. Rash to groin minimal; however pt refusing anti-fungal powder. Voiding q1-2hrs approximately 200mL, each time. UA recollected; results pending.     Plan: Discharge to home 11/24.

## 2020-11-24 NOTE — PROGRESS NOTES
"Care Management Discharge Note    Discharge Date: 11/24/20     Discharge Disposition:  home (patient lives with adult grandaughter and adult daughter; someone is home most of day)    Discharge Services:  no home care services have been recommended    Discharge DME:  new home oxygen    Per Care Management Department protocol a referral for new home oxygen has been called into [facility] ([facility]). The [facility] Respiratory Therapy Staff has reviewed the home oxygen assessment, physician oxygen orders, and oxygen face to face documentation for insurance required compliances. Patient new home oxygen has been approved. Due to COVID status the [facility] Staff will drop off the needed equipment at the 5A Nurses station. Nursing staff to instruct patient on equipment use prior to dc home. **Delivery should be within 2 hours (by 11am)    Discharge Transportation: family or friend will provide    Education Provided on the Discharge Plan:  to patient    Persons Notified of Discharge Plans: attempted to call patient's daughter Mariana (ph: [phone]) no answer, left voice message requesting call back. Attempted to call patient in hospital room *[id number], line is busy, will try to call back later. Updated Nurse Ameena *[id number].    Patient in Agreement with the Plan:  yes      Addendum @ 6210: Able to reach patient by hospital phone; she reports no concerns with dc today, has no questions, IMM completed (copy added to dc AVS). Patient reports having used an oxygen tank in the past, feels comfortable to dc with home O2. Denies the need for home care, \" I am completely independent home, I don't need none of that stuff.\" Stated her daughter will pick her up at 2pm.     No further RNCC needs at this time.    Vivienne Vera RN, BSN, PHN  Care Coordinator  [facility]  Direct phone: [phone]  Pager: [phone]    To contact the on-call Weekend Care Coordination Team please page " [phone]

## 2020-11-24 NOTE — PROGRESS NOTES
Received intake call for home oxygen at 8:27am. Reviewed patient's chart; Patient qualifies under relaxed insurance guidelines and all documentation is in the chart including a good order.   9;00am- Called and spoke with care coordinator, Vivienne, Confirmed we received the order and had everything we needed to set her up. Confirmed patient was leaving today and said we should have oxygen delivered to nurse for education in the next 2 hours.   9:05am- Called to offer choice and patient is okay with [facility] setting her up. Discussed equipment with patient process of delivery. Explained where to find our phone number in case she needed help setting up equipment once home. We also discussed billing process and she was okay with that. Told her we would be to bedside shortly with oxygen.

## 2020-11-24 NOTE — DISCHARGE INSTRUCTIONS
Oxygen Provider:  Arranged through [facility], contact number [phone].   If you have any questions or concerns please call the oxygen company directly.    _________________________________________________________________________________________________________________      IMM reviewed with patient by phone at 931am on Tuesday 11/24/2020 by RN Care Coordinator Vivienne Vera ***

## 2020-11-24 NOTE — DISCHARGE SUMMARY
"[facility]   Hospitalist Discharge Summary      Date of Admission:  11/16/2020  Date of Discharge:  11/24/2020  Discharging Provider: Alice Leahy PA-C  Discharge Team: Hospitalist Service, Gold 4/5    Discharge Diagnoses      #Severe Sepsis, resolved  #Acute Hypoxic Respiratory Failure   #COVID 19 Pneumonia  #Pulmonary Embolism  #LLE DVT  #Urinary Tract Infection  #Elevated D-Dimer  #HALI, resolved  #CKD  #Hypothyroidism  #Normocytic anemia  #HTN  #GERD  #Constipation  #HLD      Follow-ups Needed After Discharge   Follow-up Appointments     Adult [facility] Follow-up and recommended labs and tests      Follow up with your primary care provider in one week for your   post-hospitalization visit    Appointments on [place] and/or [facility] (with [facility] or [facility]   provider or service). Call [phone] if you haven't heard regarding   these appointments within 7 days of discharge.             Unresulted Labs Ordered in the Past 30 Days of this Admission     Date and Time Order Name Status Description    11/23/2020 1700 Urine Culture Aerobic Bacterial In process       These results will be followed up by PCP    Discharge Disposition   Discharged to home  Condition at discharge: Stable    Hospital Course   Romayne L Sathre is a 75 year old female with history of HTN, HLD, hypothyroidism, CKDIII, and breast cancer (s/p chemo and mastectomy 2008) who was diagnosed with COVID 19 on 11/5/2020.  Since her diagnosis, she had worsening respiratory symptoms prompting her to seek further evaluation on 11/17 at [facility]. She was found to be in acute hypoxic respiratory failure requiring 30 LPM HFNC.  Chest x-ray at that time revealed \"diffuse patch airspace opacities consistent with COVID.\" She was admitted to the MICU for stabilization overnight and transferred to the general medical floor on 11/18. For her COVID infection, she received dexamethasone and remdesivir. She was " initially treated with broad spectrum antibiotics for possible super-imposed pneumonia, but these were quickly discontinued. Her hospital course was complicated by a LLE DVT and PE's. She was started on Lovenox and transitioned to Apixaban. She was also found to have a UTI on day of discharge and was sent home on oral antibiotics.  The following medical conditions were addressed during her hospital stay:       #Severe Sepsis, resolved   #Acute Hypoxic Respiratory Failure 2/2 COVID-19 PNA - COVID+ 11/5.  She is now COVID recovered as it is beyond 14 days. She was initially requiring 30 LMP HFNC, but was able to wean to 2Ls NC with exertion, 0 to 0.5L at rest. She completed her 5 day course of Remdesivir during her hospital stay.   On Discharge:   -Continue dexamethasone 6mg daily for 10 doses (ending 11/27)   -Continue to wear 2Ls NC O2 with ambulation, 0-0.5L at rest.   - Treatment of DVT/PE as below       #Non-occlusive thrombus in left popliteal vein  #Pulmonary Embolisms of segmental and subsegmental right upper and lower lobes  #Elevated D-dimer - D-dimer >20 on admission. Started on high-intensity heparin gtt in ED with bolus- and transitioned to low intensity on 11/18. Ultrasound of b/l lower extremities obtained on 11/19 which were notable for non-occlusive thrombus in left popliteal vein. She was then transitioned back to high intensity heparin gtt on 11/19. Had elevated hep 10A levels overnight 11/19. Transitioned to Lovenox 1mg/kg BID on 11/20. CT PE protocol with segmental and subsegmental PE in the right upper and lower lobes w/o evidence of RHS. Also w/ extensive retricular changes and GOO throughout lungs representing inflammation and/or fibrosis r/t recent COVID-19 infection.   On Discharge:   - Continue Apixiban 10mg BID x7 days, then 5mg BID thereafter.   - Plan for ~ 3 month duration of treatment as likely provoked DVT/PE due to COVID-19 infection  -Follow up with PCP in one week to discuss  anticoagulation       #Urinary Tract Infection  #Urinary retention, resolved - Required straight cath x2 during her hospital admission. Developed LUTS last day of discharge with urinalysis revealing few bacteria, moderate leukocytes, and 12 Wbcs. However, there were 4 epis.   On discharge:   --Start Keflex 500mg BID for 5 days   --Follow up with PCP in one week to review urine culture results       #HALI, resolved   #CKD III - Baseline creatinine 1.1-1.2, elevated to 1.53 on presentation with BUN 47. Now at baseline. Etiology likely prerenal 2/2 COVID. Lisinopril was held  On discharge:  --May resume Lisinopril       # HTN - Patient's lisinopril held on during hospital stay due to above HALI. Started on amlodipine 11/20 for elevated BP.   On discharge:    - Disontinue Amlodipine 10mg daily   - Resume home Lisinopril 20mg daily    #GERD - Patient endorsed increased heartburn since starting Eliquis.  Currently managed with famotidine. Switched to omeprazole while inpatient  On discharge:   -Stop famotidine, start omeprazole 20mg BID for GERD symptoms  -May continue Tums prn     #Constipation, resolved - Had constipation on admission - improved with bowel regimen, however developed liquid stool which improved by discharge.  - Continue to hold bowel regimen      #Hypothyroidism: TSH on admission WNL at 0.42.   - Continue PTA Synthroid      #Normocytic anemia - Baseline Hgb 12-13. 12.2 at discharge. No s/sx of bleeding since initiation of anticoagulation.      # HLD: Continue statin        Consultations This Hospital Stay   PHARMACY TO DOSE VANCO  PHARMACY IP CONSULT  PHARMACY IP CONSULT  PHARMACY IP CONSULT  PHARMACY IP CONSULT  VASCULAR ACCESS CARE ADULT IP CONSULT  SOCIAL WORK IP CONSULT  PHARMACY IP CONSULT  PHARMACY IP CONSULT  PHARMACY IP CONSULT  PHARMACY IP CONSULT  PHARMACY IP CONSULT  PHARMACY IP CONSULT  PHYSICAL THERAPY ADULT IP CONSULT  OCCUPATIONAL THERAPY ADULT IP CONSULT  PHARMACY IP CONSULT  PHARMACY IP  CONSULT  MEDICATION HISTORY IP PHARMACY CONSULT    Code Status   Full Code    Time Spent on this Encounter   I, Alice Leahy PA-C, personally saw the patient today and spent less than or equal to 30 minutes discharging this patient.       Alice Leahy PA-C  [facility] [address] [address]  Phone: [phone]  ______________________________________________________________________    Physical Exam   Vital Signs: Temp: 96.3  F (35.7  C) Temp src: Oral BP: (!) 152/74 Pulse: 58   Resp: 18 SpO2: 95 % O2 Device: Nasal cannula Oxygen Delivery: 1.5 LPM  Weight: 222 lbs 3.2 oz     GENERAL: Alert and oriented x 3. NAD. Ambulatory. Cooperative.   HEENT: Anicteric sclera. Mucous membranes moist. NC. AT. EOMI. PERRLA  CV: RRR. S1, S2. No murmurs appreciated.   RESPIRATORY: Effort normal on RA. Lungs CTAB with no wheezing, rales, rhonchi.   GI: Abdomen soft and non distended with normoactive bowel sounds present in all quadrants. No tenderness, rebound, guarding. No lesions.   NEUROLOGICAL: No focal deficits. Moves all extremities.  CN 2-12 grossly intact.  EXTREMITIES: No peripheral edema. Intact bilateral pedal pulses.   SKIN: No jaundice. No rashes.  BACK: no CVA tenderness, no spinous tenderness         Primary Care Physician   Sapphire Galss    Discharge Orders      Reason for your hospital stay    You were admitted for respiratory complaints related to your COVID 19 infection.  You were treated with steroids, anti-virals, and antibiotics.  During your admission, you were found to have blood clots in your left leg and lungs.  This is likely secondary to your COVID infection. You were started on an anticoagulant (blood thinner) and will remain on the medication for three months.  While your oxygen levels improved significantly during your stay, you still required supplemental oxygen at discharge.  You were also incidentally found to have an urinary tract infection.  You  will be sent home with oral antibiotics.     Adult [facility] Follow-up and recommended labs and tests    Follow up with your primary care provider in one week for your post-hospitalization visit    Appointments on [place] and/or [facility] (with [facility] or [facility] provider or service). Call [phone] if you haven't heard regarding these appointments within 7 days of discharge.     Activity    Your activity upon discharge: activity as tolerated     When to contact your care team    Call your primary doctor if you have any of the following: temperature >100.4F, chest pain, shortness of breath, low oxygen levels, abdominal pain, persistent diarrhea.     Discharge Instructions    Follow up with your primary care provider in one week to discuss your hospital stay.  At that visit, you will discuss your anticoagulation and urine culture results.     Oxygen Adult/Peds    Oxygen Documentation:   I certify that this patient, Romayne L Sathre has been under my care (or a nurse practitioner or physican's assistant working with me). This is the face-to-face encounter for oxygen medical necessity.      Romayne L Sathre is now in a chronic stable state and continues to require supplemental oxygen. Patient has continued oxygen desaturation due to COVID-19 associated respiratory failure.    Alternative treatment(s) tried or considered and deemed clinically infective for treatment of COVID-19 associated respiratory failure include steroids.  If portability is ordered, is the patient mobile within the home? yes    **Patients who qualify for home O2 coverage under the CMS guidelines require ABG tests or O2 sat readings obtained closest to, but no earlier than 2 days prior to the discharge, as evidence of the need for home oxygen therapy. Testing must be performed while patient is in the chronic stable state. See notes for O2 sats.**  *Pulse oximetry (SpO2) = 94% on room air at rest while awake.     *SpO2 improved to 97% on  1  liters/minute at rest.     *SpO2 = 81% on room air during activity/with exercise.     *SpO2 improved to 97% on 3 liters/minute during activity/with exercise.        Pt DIAZ with activity on room air, recovers well with O2 within 1-2 minutes.     Diet    Follow this diet upon discharge: regular adult diet       Significant Results and Procedures   CT Chest Pulmonary Embolism:   1. Segmental and subsegmental pulmonary emboli in the right upper and  lower lobes. No evidence of right heart strain.   2. Extensive reticular changes and groundglass opacities throughout  both lungs, likely representing inflammation and/or fibrosis related  to recent COVID19 infection.     Ultrasound Bilateral Lower Extremity:   1. Non-occlusive thrombus in the left popliteal vein.  2. No evidence of deep venous thrombosis in the right lower extremity       Discharge Medications   Current Discharge Medication List      START taking these medications    Details   !! apixaban ANTICOAGULANT (ELIQUIS) 5 MG tablet Take 2 tablets (10 mg) by mouth 2 times daily for 5 days  Qty: 10 tablet, Refills: 0    Associated Diagnoses: 2019 novel coronavirus disease (COVID-19)      !! apixaban ANTICOAGULANT (ELIQUIS) 5 MG tablet Take 1 tablet (5 mg) by mouth 2 times daily  Qty: 60 tablet, Refills: 2    Associated Diagnoses: 2019 novel coronavirus disease (COVID-19)      !! apixaban ANTICOAGULANT (ELIQUIS) 5 MG tablet Take 1 tablet (5 mg) by mouth 2 times daily  Qty: 120 tablet, Refills: 0    Comments: Please run this medical by the insurance when it is time for refill, and call me, Dr. Val Garza, at [phone] and I will personally take care of the patient co-pay. Thank you very much.  Associated Diagnoses: 2019 novel coronavirus disease (COVID-19)      cephALEXin (KEFLEX) 500 MG capsule Take 1 capsule (500 mg) by mouth 2 times daily for 5 days  Qty: 10 capsule, Refills: 0    Associated Diagnoses: Acute cystitis without hematuria      dexamethasone  (DECADRON) 6 MG tablet Take 1 tablet (6 mg) by mouth daily for 3 days  Qty: 3 tablet, Refills: 0    Associated Diagnoses: 2019 novel coronavirus disease (COVID-19)      omeprazole (PRILOSEC) 20 MG DR capsule Take 1 capsule (20 mg) by mouth 2 times daily (before meals)  Qty: 60 capsule, Refills: 0    Associated Diagnoses: 2019 novel coronavirus disease (COVID-19)       !! - Potential duplicate medications found. Please discuss with provider.      CONTINUE these medications which have NOT CHANGED    Details   atorvastatin (LIPITOR) 10 MG tablet Take 1 tablet (10 mg) by mouth daily  Qty: 90 tablet, Refills: 1    Comments: Further refills by PCP Quan  Associated Diagnoses: Hyperlipidemia LDL goal <100      cetirizine (ZYRTEC) 10 MG tablet Take 10 mg by mouth daily      levothyroxine (SYNTHROID/LEVOTHROID) 150 MCG tablet MON to SAT 1 tablet/day; SAT and SUN 1.5 tablet  Qty: 100 tablet, Refills: 3    Associated Diagnoses: Acquired hypothyroidism      lisinopril (ZESTRIL) 10 MG tablet Take 1 tablet (10 mg) by mouth daily  Qty: 30 tablet, Refills: 0      Multiple Vitamin (MULTI-VITAMIN) per tablet Take 1 tablet by mouth daily.        TURMERIC PO Take 1 tablet by mouth daily         STOP taking these medications       famotidine (PEPCID) 20 MG tablet Comments:   Reason for Stopping:             Allergies   Allergies   Allergen Reactions     No Known Drug Allergies

## 2020-11-24 NOTE — PLAN OF CARE
Time: 3966-3326    Reason for admit: Covid+ and hypoxia   Vitals: WDL  Activity: Independent at baseline, SBA in the hospital   Neuro: Aox4, pupils unequal in bright light 1mm R 3mm L and this was noted on multiple occasions during her stay.   Mood/Behavior: Calm, pleasant   Lines/Drains: PIV removed prior to discharge   Cardiac: WDL   Resp: 1L NC at rest and 3L NC with activity, home O2 sent with patient at discharge and teaching provided.   Diet: Regular    GI/: Voids without difficulty  Skin: Dry, warm and intact  Pain: Pain managed well with PRN tylenol     New this shift: Patient was discharged home. Education was provided on the patients current condition and medication changes/current medications sent home. Patient was also educated on the take home O2. Receptive and stated that she understood prior to discharge. Daughter is providing the pt a ride home. All belongings gathered and sent with patient.

## 2020-11-24 NOTE — PLAN OF CARE
[id number]: admitted 11/16 with COVID-19.  Pt is vitally stable on 2-3L NC overnight, sats high 90s, decreased to 1L NC this AM.  Afebrile.  SBA to bathroom, voiding WNL, no BM, still need to collect stool sample.  Pt reports generalized chronic pain, declined need for PRNs.  Reports good appetite.  Slept well.  PIV saline locked.  CRP improving.  Possible discharge 11/24 with home O2.  Will continue to monitor     BP (!) 153/76 (BP Location: Left arm)   Pulse 63   Temp 96.4  F (35.8  C) (Oral)   Resp 16   Wt 101.7 kg (224 lb 3.2 oz)   SpO2 98%   BMI 33.11 kg/m

## 2020-11-25 DIAGNOSIS — U07.1 CLINICAL DIAGNOSIS OF COVID-19: Primary | ICD-10-CM

## 2020-11-25 NOTE — PROGRESS NOTES
Attempted to contact the patient x2 for post-hospital call without answer. Will close encounter at this time.    Hayley Champagne CMA, [facility]  Post Hospital Discharge Team

## 2020-11-25 NOTE — PLAN OF CARE
Physical Therapy Discharge Summary    Reason for therapy discharge:    Discharged to home.    Progress towards therapy goal(s). See goals on Care Plan in [facility] electronic health record for goal details.  Goals partially met.  Barriers to achieving goals:   discharge from facility.    Therapy recommendation(s):    Continue home exercise program.

## 2020-11-27 ENCOUNTER — PATIENT OUTREACH (OUTPATIENT)
Dept: CARE COORDINATION | Facility: CLINIC | Age: 75
End: 2020-11-27

## 2020-11-27 NOTE — PROGRESS NOTES
Clinic Care Coordination Contact  [facility]/Voicemail      Clinical Data: Care Coordinator Outreach  Outreach attempted x 1.  Left message on patient's voicemail with call back information and requested return call.  Plan: Care Coordinator will try to reach patient again in 1-2 business days.

## 2020-11-30 ASSESSMENT — ENCOUNTER SYMPTOMS
FEVER: 1
UNEXPECTED WEIGHT CHANGE: 1
NAUSEA: 1
RHINORRHEA: 1
HEMATOLOGIC/LYMPHATIC NEGATIVE: 1
NEUROLOGICAL NEGATIVE: 1
FATIGUE: 1
ABDOMINAL PAIN: 1
SINUS PAIN: 1
PSYCHIATRIC NEGATIVE: 1

## 2020-11-30 NOTE — PROGRESS NOTES
Community Health Worker called and left a message for the patient.  If the patient is returning my call, please transfer the patient to [facility] at ext. [phone].   Patient has been mailed a unreachable letter and was provided with CHW contact information if they are interested in accessing Clinic Care Coordination.  Order for Care Management has been closed, no further outreach will be done at this time and patient can be re-referred.

## 2020-12-01 NOTE — TELEPHONE ENCOUNTER
Called patient to reschedule her eye procedure with Dr. Shelby. Patient declined to reschedule at this time. Patient reports that she would like to be called after [place] to reschedule

## 2020-12-03 ENCOUNTER — OFFICE VISIT (OUTPATIENT)
Dept: FAMILY MEDICINE | Facility: CLINIC | Age: 75
End: 2020-12-03
Payer: COMMERCIAL

## 2020-12-03 VITALS
OXYGEN SATURATION: 96 % | TEMPERATURE: 97.8 F | BODY MASS INDEX: 34.85 KG/M2 | RESPIRATION RATE: 24 BRPM | DIASTOLIC BLOOD PRESSURE: 76 MMHG | SYSTOLIC BLOOD PRESSURE: 138 MMHG | WEIGHT: 236 LBS | HEART RATE: 88 BPM

## 2020-12-03 DIAGNOSIS — Z09 ENCOUNTER FOR EXAMINATION FOLLOWING TREATMENT AT HOSPITAL: Primary | ICD-10-CM

## 2020-12-03 DIAGNOSIS — I10 BENIGN ESSENTIAL HYPERTENSION: ICD-10-CM

## 2020-12-03 DIAGNOSIS — N30.00 ACUTE CYSTITIS WITHOUT HEMATURIA: ICD-10-CM

## 2020-12-03 DIAGNOSIS — I26.94 MULTIPLE SUBSEGMENTAL PULMONARY EMBOLI WITHOUT ACUTE COR PULMONALE (H): ICD-10-CM

## 2020-12-03 DIAGNOSIS — R09.02 HYPOXIA: ICD-10-CM

## 2020-12-03 DIAGNOSIS — I82.432 ACUTE DEEP VEIN THROMBOSIS (DVT) OF POPLITEAL VEIN OF LEFT LOWER EXTREMITY (H): ICD-10-CM

## 2020-12-03 PROCEDURE — 99495 TRANSJ CARE MGMT MOD F2F 14D: CPT | Performed by: FAMILY MEDICINE

## 2020-12-03 NOTE — PROGRESS NOTES
Subjective     Romayne L Sathre is a 75 year old female who presents to clinic today for the following health issues:    HPI      Patient admitted to hospital on 11/16/20 for sepsis acute hypoxic respiratory failure and infection with covid. During her stay she was also found to have DVT in her left popliteal vein along with segmental and subsegmental pulmonary embolisms of her right lung. Discharged on 11/24/20..    Sent home on dexamethasone. 2L O2 with ambulation and 0-0.5L at rest. Patient using supplemental O2 only intermittently. At office visit without O2 and O2 sat is 96% on room air. No issues with breathing. Feeling well overall.    DVT LLE and PEs on apixaban x 3 months. Taking 5 mg BID. Anticoagulation started on 11/18/20. Has refills.    UTI on day of discharge, sent home on abx. Finished course. No symptoms currently.     Amlodipine was discontinued on discharge. BP well controlled with just lisinopril.     Hospital Follow-up Visit:    Hospital/Nursing Home/IP Rehab Facility: [facility]  Date of Admission: 11/16/2020  Date of Discharge: 11/24/2020  Reason(s) for Admission: #Severe Sepsis, resolved  #Acute Hypoxic Respiratory Failure   #COVID 19 Pneumonia  #Pulmonary Embolism  #LLE DVT  #Urinary Tract Infection  #Elevated D-Dimer  #HALI, resolved  #CKD  #Hypothyroidism  #Normocytic anemia  #HTN  #GERD  #Constipation  #HLD      Was your hospitalization related to COVID-19? YES   How are you feeling today? Much better  In the past 24 hours have you had shortness of breath when speaking, walking, or climbing stairs? My breathing issues have stayed the same  Do you have a cough? I don't have a cough  When is the last time you had a fever greater than 100? Patient was in the hospital   Are you having any other symptoms? Possibly some water retention   Do you have any other stressors you would like to discuss with your provider? No       Problems taking medications regularly:   None  Medication changes since discharge: as above  Problems adhering to non-medication therapy:  None    Summary of hospitalization:  [facility] discharge summary reviewed  Diagnostic Tests/Treatments reviewed.  Follow up needed: none  Other Healthcare Providers Involved in Patient s Care:         None  Update since discharge: improved.       Post Discharge Medication Reconciliation: discharge medications reconciled and changed, per note/orders.  Plan of care communicated with patient              Review of Systems   CONSTITUTIONAL: NEGATIVE for fever, chills, change in weight  INTEGUMENTARY/SKIN: NEGATIVE for worrisome rashes, moles or lesions  EYES: NEGATIVE for vision changes or irritation  ENT/MOUTH: NEGATIVE for ear, mouth and throat problems  RESP: NEGATIVE for significant cough or SOB  CV: NEGATIVE for chest pain, palpitations or peripheral edema  GI: NEGATIVE for nausea, abdominal pain, heartburn, or change in bowel habits  : NEGATIVE for frequency, dysuria, or hematuria  MUSCULOSKELETAL: NEGATIVE for significant arthralgias or myalgia  NEURO: NEGATIVE for weakness, dizziness or paresthesias  ENDOCRINE: NEGATIVE for temperature intolerance, skin/hair changes  HEME: NEGATIVE for bleeding problems  PSYCHIATRIC: NEGATIVE for changes in mood or affect      Objective    /76   Pulse 88   Temp 97.8  F (36.6  C) (Tympanic)   Resp 24   Wt 107 kg (236 lb)   SpO2 96%   BMI 34.85 kg/m    Body mass index is 34.85 kg/m .  Physical Exam   GENERAL: healthy, alert and no distress  EYES: Eyes grossly normal to inspection, PERRL and conjunctivae and sclerae normal  NECK: no adenopathy, no asymmetry, masses, or scars and thyroid normal to palpation  RESP: lungs clear to auscultation - no rales, rhonchi or wheezes  CV: regular rate and rhythm, normal S1 S2, no S3 or S4, no murmur, click or rub, no peripheral edema and peripheral pulses strong  ABDOMEN: soft, nontender, no hepatosplenomegaly, no masses and  bowel sounds normal  MS: no gross musculoskeletal defects noted. 1+ edema to shins in LLE. Pulses intact in distal lower extremities.  SKIN: no suspicious lesions or rashes  NEURO: Normal strength and tone, mentation intact and speech normal  PSYCH: mentation appears normal, affect normal/bright    Assessment & Plan     Romayne was seen today for hospital f/u after sepsis and acute hypoxic respiratory failure 2/2 to covid infection.     Diagnoses and all orders for this visit:    Encounter for examination following treatment at hospital  Hypoxia  -O2 use as directed  -Not hypoxic currently at rest on room air    Acute deep vein thrombosis (DVT) of popliteal vein of left lower extremity (H)  Multiple subsegmental pulmonary emboli without acute cor pulmonale (H)  -Continue apixiban 5 mg BID (3 month treatment)  -Follow-up with PCP early 2/2021 to discuss potentially discontinuing anticoagulation  -Discussed bleeding risks    Benign essential hypertension  -Discontinue amlodipine  -Continue lisinopril    UTI -resolved    Leonard Lazo [facility]

## 2020-12-08 ENCOUNTER — MYC REFILL (OUTPATIENT)
Dept: FAMILY MEDICINE | Facility: CLINIC | Age: 75
End: 2020-12-08

## 2020-12-08 DIAGNOSIS — E78.5 HYPERLIPIDEMIA LDL GOAL <100: ICD-10-CM

## 2020-12-08 RX ORDER — ATORVASTATIN CALCIUM 10 MG/1
10 TABLET, FILM COATED ORAL DAILY
Qty: 90 TABLET | Refills: 1 | Status: SHIPPED | OUTPATIENT
Start: 2020-12-08 | End: 2021-02-05

## 2020-12-08 NOTE — TELEPHONE ENCOUNTER
Dr Rodriguez  When should pt recheck her lipids?  Recent Labs   Lab Test 05/21/20  1516 04/05/19  1227 05/05/14  0941 05/05/14  0941 01/22/14  0849   CHOL 162 155   < > 213* 224*   HDL 31* 49*   < > 42* 47*   LDL Cannot estimate LDL when triglyceride exceeds 400 mg/dL  76 81   < > 142* 149*   TRIG 466* 125   < > 142 145   CHOLHDLRATIO  --   --   --  5.1* 4.8    < > = values in this interval not displayed.     Thanks!     Karen Hdz RN

## 2020-12-11 ENCOUNTER — TELEPHONE (OUTPATIENT)
Dept: FAMILY MEDICINE | Facility: CLINIC | Age: 75
End: 2020-12-11

## 2020-12-11 NOTE — TELEPHONE ENCOUNTER
Romayne is calling stating that she no longer needs her oxygen support and wants to know hor to get rid of the supplies. Please call her back at [phone], ok to leave detailed message.

## 2020-12-11 NOTE — TELEPHONE ENCOUNTER
Advised pt to call the [employer] supply [employer] that dropped it off to come and pick it up.     Pt in agreement with plan and verbalized understanding.  Thanks!  Karen Hdz RN  Triage Nurse

## 2020-12-14 ENCOUNTER — MYC MEDICAL ADVICE (OUTPATIENT)
Dept: FAMILY MEDICINE | Facility: CLINIC | Age: 75
End: 2020-12-14

## 2020-12-14 DIAGNOSIS — I10 HYPERTENSION GOAL BP (BLOOD PRESSURE) < 140/90: Primary | ICD-10-CM

## 2020-12-14 RX ORDER — LISINOPRIL 10 MG/1
10 TABLET ORAL DAILY
Qty: 90 TABLET | Refills: 0 | Status: SHIPPED | OUTPATIENT
Start: 2020-12-14 | End: 2021-02-05

## 2021-01-15 ENCOUNTER — HEALTH MAINTENANCE LETTER (OUTPATIENT)
Age: 76
End: 2021-01-15

## 2021-02-03 NOTE — PROGRESS NOTES
"SUBJECTIVE:   Romayne L Sathre is a 75 year old female who presents for Preventive Visit.      Patient has been advised of split billing requirements and indicates understanding: Yes   Are you in the first 12 months of your Medicare coverage?  No    Healthy Habits:     In general, how would you rate your overall health?  Excellent    Frequency of exercise:  2-3 days/week    Duration of exercise:  15-30 minutes    Do you usually eat at least 4 servings of fruit and vegetables a day, include whole grains    & fiber and avoid regularly eating high fat or \"junk\" foods?  Yes    Taking medications regularly:  Yes    Barriers to taking medications:  None    Medication side effects:  None    Ability to successfully perform activities of daily living:  No assistance needed    Home Safety:  No safety concerns identified    Hearing Impairment:  No hearing concerns    In the past 6 months, have you been bothered by leaking of urine?  No    In general, how would you rate your overall mental or emotional health?  Excellent      PHQ-2 Total Score: 0    Additional concerns today:  No    Do you feel safe in your environment? Yes    Have you ever done Advance Care Planning? (For example, a Health Directive, POLST, or a discussion with a medical provider or your loved ones about your wishes): No, advance care planning information given to patient to review.  Advanced care planning was discussed at today's visit.       Fall risk  Fallen 2 or more times in the past year?: No  Any fall with injury in the past year?: No    Cognitive Screening   1) Repeat 3 items (Leader, Season, Table)    2) Clock draw: NORMAL  3) 3 item recall: Recalls 3 objects  Results: NORMAL clock, 1-2 items recalled: COGNITIVE IMPAIRMENT LESS LIKELY    Mini-CogTM Copyright OLIVIA Lorenzo. Licensed by the author for use in [facility]; reprinted with permission (rober@.[facility]). All rights reserved.      Do you have sleep apnea, excessive snoring or daytime " drowsiness?: no    Reviewed and updated as needed this visit by clinical staff  Tobacco  Allergies  Meds   Med Hx  Surg Hx  Fam Hx  Soc Hx        Reviewed and updated as needed this visit by Provider    Acute concerns:   Recent hospitalization 11/16-11/24 for covid-related acute resp failure.  Admitted to MICU on 30 LMP HFNC.  received dexamethasone and remdesivir.  Her hospital course was complicated by a LLE DVT and PE's. She was started on Lovenox and transitioned to Apixaban. She was also found to have a UTI on day of discharge and was sent home on oral antibiotics.  Plan for ~ 3 month duration of treatment as likely provoked DVT/PE due to COVID-19 infection.  Amlodipine was discontinued on discharge. BP well controlled with just lisinopril.     Had follow up with Dr. Lazo 12/3/20.  No longer required O2.  Continued on apixaban with plans to stop in February.  UTI symptoms had resolved.    She is completely recovered from covid infection, no ongoing shortness of breath, cough.  No left leg pain.  Has some chronic peripheral edema, improves with elevation and compression stockings.      Chronic Problems:   Saw endo 7/2020- Osteoporosis by DXA 2014.  Untreated.  - recommend D[facility] follow [facility] at [facility].    Hypothyroid- she is on levothyroxine 150mcg daily.  Elevated TSH 6/2020 with normal T4.    HTN- she is on lisinopril 10mg daily as well as statin.  No chest pain, palpitations, or lightheadedness.      Was discharged on PPI, ran out, taking over the counter pepcid with good symptoms control.      History of invasive ductal carcinoma, status post right mastectomy in 2006      Preventative screening:  Mammo: 5/2018  [place]: FIT 6/2020  Dexa: due    Function: lives in a house with daughter and granddaughter.  Se functions independently. Daughter does most of the cooking.  Not doing much for exercise.  Trying to walk every day, hard with snow and ice.  Goes up and down the stairs.      Advanced directive: going  to talk to daughters about it, has paperwork      To do:  Zoster  mammo  tdap                    Social History     Tobacco Use     Smoking status: Passive Smoke Exposure - Never Smoker     Smokeless tobacco: Never Used     Tobacco comment: family members smoke; daughter   Substance Use Topics     Alcohol use: Yes     Alcohol/week: 10.0 standard drinks     Comment: 0-1 drink per month     If you drink alcohol do you typically have >3 drinks per day or >7 drinks per week? Yes      Alcohol Use 2/5/2021   Prescreen: >3 drinks/day or >7 drinks/week? Yes   AUDIT SCORE  6     AUDIT - Alcohol Use Disorders Identification Test - Reproduced from the World Health Organization Audit 2001 (Second Edition) 2/5/2021   1.  How often do you have a drink containing alcohol? 2 to 4 times a month   2.  How many drinks containing alcohol do you have on a typical day when you are drinking? 1 or 2   3.  How often do you have five or more drinks on one occasion? Less than monthly   4.  How often during the last year have you found that you were not able to stop drinking once you had started? Less than monthly   5.  How often during the last year have you failed to do what was normally expected of you because of drinking? Less than monthly   6.  How often during the last year have you needed a first drink in the morning to get yourself going after a heavy drinking session? Never   7.  How often during the last year have you had a feeling of guilt or remorse after drinking? Less than monthly   8.  How often during the last year have you been unable to remember what happened the night before because of your drinking? Never   9.  Have you or someone else been injured because of your drinking? No   10. Has a relative, friend, doctor or other health care worker been concerned about your drinking or suggested you cut down? No   TOTAL SCORE 6         Current providers sharing in care for this patient include:   Patient Care Team:  Sapphire Glass  PHOENIX Wallace CNP as PCP - General (Nurse Practitioner - Family)  Marry Rodriguez MD as Referring Physician (Family Practice)  Kamila Jeffery MD as MD (INTERNAL MEDICINE - ENDOCRINOLOGY, DIABETES & METABOLISM)  Ruben Shelby MD as MD (Ophthalmology)  Ruben Shelby MD as Assigned Surgical Provider  Kamila Jeffery MD as Assigned Endocrinology Provider  Leonard Lazo DO as Assigned PCP    The following health maintenance items are reviewed in Epic and correct as of today:  Health Maintenance   Topic Date Due     ZOSTER IMMUNIZATION (1 of 2) 09/10/1996     DTAP/TDAP/TD IMMUNIZATION (2 - Td) 11/04/2018     ADVANCE CARE PLANNING  01/22/2019     MAMMO SCREENING  05/01/2020     LIPID  05/21/2021     MICROALBUMIN  05/21/2021     COLORECTAL CANCER SCREENING  06/08/2021     FALL RISK ASSESSMENT  09/29/2021     TSH W/FREE T4 REFLEX  11/18/2021     BMP  11/22/2021     MEDICARE ANNUAL WELLNESS VISIT  02/05/2022     DEXA  01/22/2029     HEPATITIS C SCREENING  Completed     PHQ-2  Completed     INFLUENZA VACCINE  Completed     Pneumococcal Vaccine: 65+ Years  Completed     Pneumococcal Vaccine: Pediatrics (0 to 5 Years) and At-Risk Patients (6 to 64 Years)  Aged Out     IPV IMMUNIZATION  Aged Out     MENINGITIS IMMUNIZATION  Aged Out     HEPATITIS B IMMUNIZATION  Aged Out     Labs reviewed in [facility]  Mammogram Screening: Recommended mammography every 1-2 years with patient discussion and risk factor consideration    Review of Systems   Constitutional: Negative for chills and fever.   HENT: Negative for congestion, ear pain, hearing loss and sore throat.    Eyes: Positive for visual disturbance. Negative for pain.   Respiratory: Negative for cough and shortness of breath.    Cardiovascular: Positive for peripheral edema. Negative for chest pain and palpitations.   Gastrointestinal: Positive for heartburn. Negative for abdominal pain, constipation, diarrhea, hematochezia and nausea.   Breasts:  Negative for  "tenderness, breast mass and discharge.   Genitourinary: Positive for urgency. Negative for dysuria, frequency, genital sores, hematuria, pelvic pain, vaginal bleeding and vaginal discharge.   Musculoskeletal: Negative for arthralgias, joint swelling and myalgias.   Skin: Negative for rash.   Neurological: Negative for dizziness, weakness, headaches and paresthesias.   Psychiatric/Behavioral: Negative for mood changes. The patient is not nervous/anxious.        OBJECTIVE:   /68 (BP Location: Right arm, Patient Position: Sitting, Cuff Size: Adult Regular)   Pulse 89   Temp 97.1  F (36.2  C) (Tympanic)   Resp 16   Ht 1.753 m (5' 9\")   Wt 105.2 kg (232 lb)   SpO2 98%   BMI 34.26 kg/m   Estimated body mass index is 34.26 kg/m  as calculated from the following:    Height as of this encounter: 1.753 m (5' 9\").    Weight as of this encounter: 105.2 kg (232 lb).  Physical Exam  GENERAL APPEARANCE: healthy, alert and no distress  EYES: Eyes grossly normal to inspection, PERRL and conjunctivae and sclerae normal  HENT: ear canals and TM's normal, nose and mouth without ulcers or lesions, oropharynx clear and oral mucous membranes moist.  Poor dentition  NECK: no adenopathy, no asymmetry, masses, or scars and thyroid normal to palpation  RESP: lungs clear to auscultation - no rales, rhonchi or wheezes  BREAST: right breast surgically absent.  Bilateral breast exam normal without masses or lymphadenopathy  CV: regular rate and rhythm, normal S1 S2, no S3 or S4, no murmur, click or rub, no peripheral edema and peripheral pulses strong  ABDOMEN: soft, nontender, no hepatosplenomegaly, no masses and bowel sounds normal  MS: no musculoskeletal defects are noted and gait is age appropriate without ataxia  SKIN: no suspicious lesions or rashes  NEURO: Normal strength and tone, sensory exam grossly normal, mentation intact and speech normal  PSYCH: mentation appears normal and affect normal/bright      ASSESSMENT / PLAN: " "  (Z00.00) Encounter for Medicare annual wellness exam  (primary encounter diagnosis)  Comment:   Plan: T4 free        tdap today, discussed dexa and advanced care directive.  Discussed daily exercise    (U07.1) 2019 novel coronavirus disease (COVID-19)  Comment:   Plan: recovered    (I82.4Y2) Acute deep vein thrombosis (DVT) of proximal vein of left lower extremity (H)  Comment:   Plan: US Lower Extremity Venous Duplex Left        Follow up US scheduled today, if stable will stop eliquis after 3mo course (this month)    (I10) Hypertension goal BP (blood pressure) < 140/90  Comment: controlled, amlodipine stopped during hospitalization  Plan: lisinopril (ZESTRIL) 10 MG tablet, Albumin         Random Urine Quantitative with Creat Ratio,         Basic metabolic panel, Lipid panel reflex to         direct LDL Fasting        Cont lisinopril    (R60.9) Dependent edema  Comment:   Plan: managed with compression and elevation    (N18.30) Stage 3 chronic kidney disease, unspecified whether stage 3a or 3b CKD  Comment:   Plan: monitor    (E78.5) Hyperlipidemia LDL goal <100  Comment:   Plan: atorvastatin (LIPITOR) 10 MG tablet        Cont statin    (E03.9) Acquired hypothyroidism  Comment:   Plan: TSH with free T4 reflex        recheck    (E89.40) Asymptomatic postsurgical menopause  Comment:   Plan: DX Hip/Pelvis/Spine            (Z23) Need for diphtheria-tetanus-pertussis (Tdap) vaccine  Comment:   Plan: TDAP VACCINE (Adacel, Boostrix)  [[id number]]                Patient has been advised of split billing requirements and indicates understanding: No  COUNSELING:  Reviewed preventive health counseling, as reflected in patient instructions    Estimated body mass index is 34.26 kg/m  as calculated from the following:    Height as of this encounter: 1.753 m (5' 9\").    Weight as of this encounter: 105.2 kg (232 lb).    Weight management plan: Discussed healthy diet and exercise guidelines    She reports that she is a non-smoker " but has been exposed to tobacco smoke. She has never used smokeless tobacco.      Appropriate preventive services were discussed with this patient, including applicable screening as appropriate for cardiovascular disease, diabetes, osteopenia/osteoporosis, and glaucoma.  As appropriate for age/gender, discussed screening for colorectal cancer, prostate cancer, breast cancer, and cervical cancer. Checklist reviewing preventive services available has been given to the patient.    Reviewed patients plan of care and provided an AVS. The Basic Care Plan (routine screening as documented in Health Maintenance) for Romayne meets the Care Plan requirement. This Care Plan has been established and reviewed with the Patient.    Counseling Resources:  ATP IV Guidelines  Pooled Cohorts Equation Calculator  Breast Cancer Risk Calculator  Breast Cancer: Medication to Reduce Risk  FRAX Risk Assessment  ICSI Preventive Guidelines  Dietary Guidelines for Americans, 2010  USDA's MyPlate  ASA Prophylaxis  Lung CA Screening    PHOENIX Castle CNP  [facility]    Identified Health Risks:

## 2021-02-05 ENCOUNTER — OFFICE VISIT (OUTPATIENT)
Dept: FAMILY MEDICINE | Facility: CLINIC | Age: 76
End: 2021-02-05
Payer: COMMERCIAL

## 2021-02-05 VITALS
TEMPERATURE: 97.1 F | OXYGEN SATURATION: 98 % | HEIGHT: 69 IN | BODY MASS INDEX: 34.36 KG/M2 | RESPIRATION RATE: 16 BRPM | WEIGHT: 232 LBS | HEART RATE: 89 BPM | SYSTOLIC BLOOD PRESSURE: 112 MMHG | DIASTOLIC BLOOD PRESSURE: 68 MMHG

## 2021-02-05 DIAGNOSIS — I10 HYPERTENSION GOAL BP (BLOOD PRESSURE) < 140/90: ICD-10-CM

## 2021-02-05 DIAGNOSIS — U07.1 2019 NOVEL CORONAVIRUS DISEASE (COVID-19): ICD-10-CM

## 2021-02-05 DIAGNOSIS — Z00.00 ENCOUNTER FOR MEDICARE ANNUAL WELLNESS EXAM: Primary | ICD-10-CM

## 2021-02-05 DIAGNOSIS — Z23 NEED FOR DIPHTHERIA-TETANUS-PERTUSSIS (TDAP) VACCINE: ICD-10-CM

## 2021-02-05 DIAGNOSIS — I82.4Y2 ACUTE DEEP VEIN THROMBOSIS (DVT) OF PROXIMAL VEIN OF LEFT LOWER EXTREMITY (H): ICD-10-CM

## 2021-02-05 DIAGNOSIS — E03.9 ACQUIRED HYPOTHYROIDISM: ICD-10-CM

## 2021-02-05 DIAGNOSIS — N18.30 STAGE 3 CHRONIC KIDNEY DISEASE, UNSPECIFIED WHETHER STAGE 3A OR 3B CKD (H): ICD-10-CM

## 2021-02-05 DIAGNOSIS — R60.9 DEPENDENT EDEMA: ICD-10-CM

## 2021-02-05 DIAGNOSIS — E78.5 HYPERLIPIDEMIA LDL GOAL <100: ICD-10-CM

## 2021-02-05 DIAGNOSIS — E89.40 ASYMPTOMATIC POSTSURGICAL MENOPAUSE: ICD-10-CM

## 2021-02-05 LAB
ANION GAP SERPL CALCULATED.3IONS-SCNC: 4 MMOL/L (ref 3–14)
BUN SERPL-MCNC: 21 MG/DL (ref 7–30)
CALCIUM SERPL-MCNC: 10 MG/DL (ref 8.5–10.1)
CHLORIDE SERPL-SCNC: 106 MMOL/L (ref 94–109)
CHOLEST SERPL-MCNC: 170 MG/DL
CO2 SERPL-SCNC: 28 MMOL/L (ref 20–32)
CREAT SERPL-MCNC: 1.26 MG/DL (ref 0.52–1.04)
CREAT UR-MCNC: 82 MG/DL
GFR SERPL CREATININE-BSD FRML MDRD: 42 ML/MIN/{1.73_M2}
GLUCOSE SERPL-MCNC: 85 MG/DL (ref 70–99)
HDLC SERPL-MCNC: 45 MG/DL
LDLC SERPL CALC-MCNC: 90 MG/DL
MICROALBUMIN UR-MCNC: 21 MG/L
MICROALBUMIN/CREAT UR: 25.43 MG/G CR (ref 0–25)
NONHDLC SERPL-MCNC: 125 MG/DL
POTASSIUM SERPL-SCNC: 4.9 MMOL/L (ref 3.4–5.3)
SODIUM SERPL-SCNC: 138 MMOL/L (ref 133–144)
T4 FREE SERPL-MCNC: 1.42 NG/DL (ref 0.76–1.46)
TRIGL SERPL-MCNC: 176 MG/DL
TSH SERPL DL<=0.005 MIU/L-ACNC: 0.28 MU/L (ref 0.4–4)

## 2021-02-05 PROCEDURE — 80061 LIPID PANEL: CPT | Performed by: NURSE PRACTITIONER

## 2021-02-05 PROCEDURE — 90715 TDAP VACCINE 7 YRS/> IM: CPT | Performed by: NURSE PRACTITIONER

## 2021-02-05 PROCEDURE — G0438 PPPS, INITIAL VISIT: HCPCS | Performed by: NURSE PRACTITIONER

## 2021-02-05 PROCEDURE — 84439 ASSAY OF FREE THYROXINE: CPT | Performed by: NURSE PRACTITIONER

## 2021-02-05 PROCEDURE — 84443 ASSAY THYROID STIM HORMONE: CPT | Performed by: NURSE PRACTITIONER

## 2021-02-05 PROCEDURE — 36415 COLL VENOUS BLD VENIPUNCTURE: CPT | Performed by: NURSE PRACTITIONER

## 2021-02-05 PROCEDURE — 82043 UR ALBUMIN QUANTITATIVE: CPT | Performed by: NURSE PRACTITIONER

## 2021-02-05 PROCEDURE — 90471 IMMUNIZATION ADMIN: CPT | Performed by: NURSE PRACTITIONER

## 2021-02-05 PROCEDURE — 80048 BASIC METABOLIC PNL TOTAL CA: CPT | Performed by: NURSE PRACTITIONER

## 2021-02-05 RX ORDER — LISINOPRIL 10 MG/1
10 TABLET ORAL DAILY
Qty: 90 TABLET | Refills: 3 | Status: SHIPPED | OUTPATIENT
Start: 2021-02-05 | End: 2021-03-23

## 2021-02-05 RX ORDER — ATORVASTATIN CALCIUM 10 MG/1
10 TABLET, FILM COATED ORAL DAILY
Qty: 90 TABLET | Refills: 3 | Status: SHIPPED | OUTPATIENT
Start: 2021-02-05 | End: 2022-03-22

## 2021-02-05 RX ORDER — FAMOTIDINE 20 MG/1
20 TABLET, FILM COATED ORAL DAILY
COMMUNITY

## 2021-02-05 ASSESSMENT — ENCOUNTER SYMPTOMS
ABDOMINAL PAIN: 0
NERVOUS/ANXIOUS: 0
HEMATURIA: 0
DIZZINESS: 0
ARTHRALGIAS: 0
COUGH: 0
NAUSEA: 0
JOINT SWELLING: 0
FREQUENCY: 0
BREAST MASS: 0
CHILLS: 0
HEMATOCHEZIA: 0
CONSTIPATION: 0
PALPITATIONS: 0
MYALGIAS: 0
PARESTHESIAS: 0
EYE PAIN: 0
HEARTBURN: 1
HEADACHES: 0
DYSURIA: 0
SHORTNESS OF BREATH: 0
DIARRHEA: 0
WEAKNESS: 0
FEVER: 0
SORE THROAT: 0

## 2021-02-05 ASSESSMENT — MIFFLIN-ST. JEOR: SCORE: 1616.73

## 2021-02-05 ASSESSMENT — ACTIVITIES OF DAILY LIVING (ADL): CURRENT_FUNCTION: NO ASSISTANCE NEEDED

## 2021-02-05 NOTE — PATIENT INSTRUCTIONS
1.  Update labs today, refilled medications    2.  Lets get ultrasound of your leg to make sure the clot is stable.  If all looks good go ahead and finish out the eliquis then stop it when you run out    3.  In the next year schedule the dexa bone density scan, call [phone]    4.  Call Central Scheduling [phone] to schedule mammogram    5.  tdap vaccine today, check with insurance on bone density scan    6.  Think about something you can do every day for exercise    7.  I highly recommend completing you advanced health care directive    Patient Education   Personalized Prevention Plan  You are due for the preventive services outlined below.  Your care team is available to assist you in scheduling these services.  If you have already completed any of these items, please share that information with your care team to update in your medical record.  Health Maintenance Due   Topic Date Due     Zoster (Shingles) Vaccine (1 of 2) 09/10/1996     Annual Wellness Visit  12/12/2012     Diptheria Tetanus Pertussis (DTAP/TDAP/TD) Vaccine (2 - Td) 11/04/2018     Discuss Advance Care Planning  01/22/2019     Mammogram  05/01/2020     PHQ-2  01/01/2021

## 2021-02-07 ENCOUNTER — TELEPHONE (OUTPATIENT)
Dept: FAMILY MEDICINE | Facility: CLINIC | Age: 76
End: 2021-02-07

## 2021-02-07 DIAGNOSIS — E03.9 ACQUIRED HYPOTHYROIDISM: ICD-10-CM

## 2021-02-07 RX ORDER — LEVOTHYROXINE SODIUM 150 UG/1
150 TABLET ORAL DAILY
Qty: 100 TABLET | Refills: 3 | COMMUNITY
Start: 2021-02-07 | End: 2021-08-10

## 2021-02-07 NOTE — TELEPHONE ENCOUNTER
Please call pt to inform her thyroid levels were off indicating she is on too high of a dose of levothyroxine.  I recommend we reduce her dose to 150mcg every day (instead of the 1.5 tabs on the weekend) and recheck levels in 6 weeks.    Sapphire Glass, CNP

## 2021-02-07 NOTE — RESULT ENCOUNTER NOTE
Romayne,    Thyroid levels are slightly off, I recommend we reduce your levothyroxine dose to 150mcg every day instead of taking the higher dose on the weekend.  We should recheck levels in 6 weeks.  I will have a nurse call you to discuss this.    Kidney function is down slightly.  Avoiding over the counter NSAID medications (ibuprofen, advil, aleve, naproxen), staying hydrated with water, and taking your lisinopril medication helps protect your kidneys.    Sapphire Glass, CNP

## 2021-02-09 ENCOUNTER — HOSPITAL ENCOUNTER (OUTPATIENT)
Dept: ULTRASOUND IMAGING | Facility: CLINIC | Age: 76
Discharge: HOME | End: 2021-02-09
Attending: NURSE PRACTITIONER | Admitting: NURSE PRACTITIONER
Payer: COMMERCIAL

## 2021-02-09 DIAGNOSIS — I82.4Y2 ACUTE DEEP VEIN THROMBOSIS (DVT) OF PROXIMAL VEIN OF LEFT LOWER EXTREMITY (H): ICD-10-CM

## 2021-02-09 PROBLEM — H25.812 COMBINED FORM OF AGE-RELATED CATARACT, LEFT EYE: Status: ACTIVE | Noted: 2020-10-20

## 2021-02-09 PROCEDURE — 93971 EXTREMITY STUDY: CPT | Mod: LT

## 2021-02-10 ENCOUNTER — TELEPHONE (OUTPATIENT)
Dept: FAMILY MEDICINE | Facility: CLINIC | Age: 76
End: 2021-02-10

## 2021-02-10 DIAGNOSIS — R32 URINARY INCONTINENCE, UNSPECIFIED TYPE: Primary | ICD-10-CM

## 2021-02-10 DIAGNOSIS — Z11.59 ENCOUNTER FOR SCREENING FOR OTHER VIRAL DISEASES: ICD-10-CM

## 2021-02-10 NOTE — TELEPHONE ENCOUNTER
TC please fax DME order to [facility] medical [phone].    Please follow up to see if there is anything else [facility] needs for this Rx and also see if pads can be mailed to pt or if she needs to pick them up.  Please follow up with pt.    [facility] phone: [phone]    Sapphire Glass, CNP

## 2021-02-10 NOTE — TELEPHONE ENCOUNTER
Called to review US results, recommend she finish out eliquis Rx for total 3mo treatment then she can stop.    She notes she has leg cramps, wondering if she should take potassium.  They occur during the day as well as at night.  Reviewed recent normal potassium result.  Recommend leg stretching three times a day and apply heat during cramping.  Follow up in 2mo if ongoing symptoms, consider trial of gabapentin.    She would like Rx for urinary incont pads, goes through one a day.    Needs Rx for

## 2021-02-10 NOTE — TELEPHONE ENCOUNTER
Spoke with patient to schedule left eye surgery with Dr. Shelby    Surgery was scheduled on 2/22 at [facility]  Patient will have H&P at [facility]     Patient tested positive for covid in December. Patient should not be retested for covid for 90 days.      Post-Op visit was scheduled on 3/11  Patient was advised a / is needed day of surgery. As well as, for 24 hours after their surgery procedure.  Surgery packet was mailed 2/9, patient has my direct contact information for any further questions [phone].

## 2021-02-11 ENCOUNTER — HOSPITAL ENCOUNTER (OUTPATIENT)
Dept: BONE DENSITY | Facility: CLINIC | Age: 76
Discharge: HOME | End: 2021-02-11
Attending: NURSE PRACTITIONER | Admitting: NURSE PRACTITIONER
Payer: COMMERCIAL

## 2021-02-11 DIAGNOSIS — E89.40 ASYMPTOMATIC POSTSURGICAL MENOPAUSE: ICD-10-CM

## 2021-02-11 PROCEDURE — 77085 DXA BONE DENSITY AXL VRT FX: CPT

## 2021-02-12 NOTE — RESULT ENCOUNTER NOTE
Hi Romayne,    Compa for getting the bone density scan done.  The bone density scan shows osteopenia or thinning of the bones.  No osteoporosis at this time which is good news.    To maintain bone density international unit(s) recommend daily weight bearing exercise like walking and making sure you get at least 1,200mg calcium and 800-1,000 units of vitamin D every day.    We should repeat this can in 2 years.    Sapphire Glass, CNP

## 2021-02-18 DIAGNOSIS — Z11.59 ENCOUNTER FOR SCREENING FOR OTHER VIRAL DISEASES: ICD-10-CM

## 2021-02-18 LAB
SARS-COV-2 RNA RESP QL NAA+PROBE: NORMAL
SPECIMEN SOURCE: NORMAL

## 2021-02-18 PROCEDURE — U0005 INFEC AGEN DETEC AMPLI PROBE: HCPCS | Performed by: OPHTHALMOLOGY

## 2021-02-18 PROCEDURE — U0003 INFECTIOUS AGENT DETECTION BY NUCLEIC ACID (DNA OR RNA); SEVERE ACUTE RESPIRATORY SYNDROME CORONAVIRUS 2 (SARS-COV-2) (CORONAVIRUS DISEASE [COVID-19]), AMPLIFIED PROBE TECHNIQUE, MAKING USE OF HIGH THROUGHPUT TECHNOLOGIES AS DESCRIBED BY CMS-2020-01-R: HCPCS | Performed by: OPHTHALMOLOGY

## 2021-02-18 NOTE — TELEPHONE ENCOUNTER
Called [facility] medical and they confirmed that they will  Deliver to patient. And patient is aware of this

## 2021-02-19 ENCOUNTER — ANESTHESIA EVENT (OUTPATIENT)
Dept: SURGERY | Facility: AMBULATORY SURGERY CENTER | Age: 76
End: 2021-02-19

## 2021-02-19 LAB
LABORATORY COMMENT REPORT: NORMAL
SARS-COV-2 RNA RESP QL NAA+PROBE: NEGATIVE
SPECIMEN SOURCE: NORMAL

## 2021-02-22 ENCOUNTER — HOSPITAL ENCOUNTER (OUTPATIENT)
Facility: AMBULATORY SURGERY CENTER | Age: 76
Discharge: HOME | End: 2021-02-22
Attending: OPHTHALMOLOGY | Admitting: OPHTHALMOLOGY
Payer: COMMERCIAL

## 2021-02-22 ENCOUNTER — OFFICE VISIT (OUTPATIENT)
Dept: OPHTHALMOLOGY | Facility: CLINIC | Age: 76
End: 2021-02-22
Payer: COMMERCIAL

## 2021-02-22 ENCOUNTER — ANESTHESIA (OUTPATIENT)
Dept: SURGERY | Facility: AMBULATORY SURGERY CENTER | Age: 76
End: 2021-02-22

## 2021-02-22 VITALS
SYSTOLIC BLOOD PRESSURE: 164 MMHG | OXYGEN SATURATION: 99 % | TEMPERATURE: 96.8 F | BODY MASS INDEX: 32.48 KG/M2 | HEART RATE: 69 BPM | HEIGHT: 71 IN | RESPIRATION RATE: 18 BRPM | DIASTOLIC BLOOD PRESSURE: 71 MMHG | WEIGHT: 232 LBS

## 2021-02-22 DIAGNOSIS — H25.812 COMBINED FORM OF AGE-RELATED CATARACT, LEFT EYE: Primary | ICD-10-CM

## 2021-02-22 DIAGNOSIS — Z96.1 PSEUDOPHAKIA: Primary | ICD-10-CM

## 2021-02-22 PROCEDURE — 99024 POSTOP FOLLOW-UP VISIT: CPT | Mod: GC | Performed by: STUDENT IN AN ORGANIZED HEALTH CARE EDUCATION/TRAINING PROGRAM

## 2021-02-22 PROCEDURE — 66984 XCAPSL CTRC RMVL W/O ECP: CPT | Mod: LT

## 2021-02-22 DEVICE — EYE IMP IOL AMO PCL TECNIS ZCB00 16.5: Type: IMPLANTABLE DEVICE | Site: EYE | Status: FUNCTIONAL

## 2021-02-22 RX ORDER — FENTANYL CITRATE 50 UG/ML
25-50 INJECTION, SOLUTION INTRAMUSCULAR; INTRAVENOUS
Status: DISCONTINUED | OUTPATIENT
Start: 2021-02-22 | End: 2021-02-22 | Stop reason: HOSPADM

## 2021-02-22 RX ORDER — OXYCODONE HYDROCHLORIDE 5 MG/1
5 TABLET ORAL EVERY 4 HOURS PRN
Status: DISCONTINUED | OUTPATIENT
Start: 2021-02-22 | End: 2021-02-23 | Stop reason: HOSPADM

## 2021-02-22 RX ORDER — DEXAMETHASONE SODIUM PHOSPHATE 4 MG/ML
INJECTION, SOLUTION INTRA-ARTICULAR; INTRALESIONAL; INTRAMUSCULAR; INTRAVENOUS; SOFT TISSUE PRN
Status: DISCONTINUED | OUTPATIENT
Start: 2021-02-22 | End: 2021-02-22 | Stop reason: HOSPADM

## 2021-02-22 RX ORDER — TETRACAINE HYDROCHLORIDE 5 MG/ML
SOLUTION OPHTHALMIC PRN
Status: DISCONTINUED | OUTPATIENT
Start: 2021-02-22 | End: 2021-02-22 | Stop reason: HOSPADM

## 2021-02-22 RX ORDER — SODIUM CHLORIDE, SODIUM LACTATE, POTASSIUM CHLORIDE, CALCIUM CHLORIDE 600; 310; 30; 20 MG/100ML; MG/100ML; MG/100ML; MG/100ML
500 INJECTION, SOLUTION INTRAVENOUS CONTINUOUS
Status: DISCONTINUED | OUTPATIENT
Start: 2021-02-22 | End: 2021-02-22 | Stop reason: HOSPADM

## 2021-02-22 RX ORDER — PREDNISOLONE ACETATE 10 MG/ML
1 SUSPENSION/ DROPS OPHTHALMIC 4 TIMES DAILY
Qty: 10 ML | Refills: 1 | Status: SHIPPED | OUTPATIENT
Start: 2021-02-22 | End: 2021-03-11

## 2021-02-22 RX ORDER — MOXIFLOXACIN 5 MG/ML
1 SOLUTION/ DROPS OPHTHALMIC 3 TIMES DAILY
Qty: 3 ML | Refills: 1 | Status: SHIPPED | OUTPATIENT
Start: 2021-02-22 | End: 2021-03-11

## 2021-02-22 RX ORDER — CYCLOPENTOLAT/TROPIC/PHENYLEPH 1%-1%-2.5%
1 DROPS (EA) OPHTHALMIC (EYE)
Status: COMPLETED | OUTPATIENT
Start: 2021-02-22 | End: 2021-02-22

## 2021-02-22 RX ORDER — ACETAMINOPHEN 325 MG/1
975 TABLET ORAL ONCE
Status: COMPLETED | OUTPATIENT
Start: 2021-02-22 | End: 2021-02-22

## 2021-02-22 RX ORDER — SODIUM CHLORIDE, SODIUM LACTATE, POTASSIUM CHLORIDE, CALCIUM CHLORIDE 600; 310; 30; 20 MG/100ML; MG/100ML; MG/100ML; MG/100ML
INJECTION, SOLUTION INTRAVENOUS CONTINUOUS
Status: DISCONTINUED | OUTPATIENT
Start: 2021-02-22 | End: 2021-02-23 | Stop reason: HOSPADM

## 2021-02-22 RX ORDER — TIMOLOL 5 MG/ML
SOLUTION/ DROPS OPHTHALMIC PRN
Status: DISCONTINUED | OUTPATIENT
Start: 2021-02-22 | End: 2021-02-22 | Stop reason: HOSPADM

## 2021-02-22 RX ORDER — ONDANSETRON 4 MG/1
4 TABLET, ORALLY DISINTEGRATING ORAL EVERY 30 MIN PRN
Status: DISCONTINUED | OUTPATIENT
Start: 2021-02-22 | End: 2021-02-23 | Stop reason: HOSPADM

## 2021-02-22 RX ORDER — NALOXONE HYDROCHLORIDE 0.4 MG/ML
0.4 INJECTION, SOLUTION INTRAMUSCULAR; INTRAVENOUS; SUBCUTANEOUS
Status: DISCONTINUED | OUTPATIENT
Start: 2021-02-22 | End: 2021-02-23 | Stop reason: HOSPADM

## 2021-02-22 RX ORDER — LIDOCAINE 40 MG/G
CREAM TOPICAL
Status: DISCONTINUED | OUTPATIENT
Start: 2021-02-22 | End: 2021-02-22 | Stop reason: HOSPADM

## 2021-02-22 RX ORDER — MOXIFLOXACIN IN NACL,ISO-OS/PF 0.3MG/0.3
SYRINGE (ML) INTRAOCULAR PRN
Status: DISCONTINUED | OUTPATIENT
Start: 2021-02-22 | End: 2021-02-22 | Stop reason: HOSPADM

## 2021-02-22 RX ORDER — NALOXONE HYDROCHLORIDE 0.4 MG/ML
0.2 INJECTION, SOLUTION INTRAMUSCULAR; INTRAVENOUS; SUBCUTANEOUS
Status: DISCONTINUED | OUTPATIENT
Start: 2021-02-22 | End: 2021-02-23 | Stop reason: HOSPADM

## 2021-02-22 RX ORDER — MEPERIDINE HYDROCHLORIDE 25 MG/ML
12.5 INJECTION INTRAMUSCULAR; INTRAVENOUS; SUBCUTANEOUS
Status: DISCONTINUED | OUTPATIENT
Start: 2021-02-22 | End: 2021-02-23 | Stop reason: HOSPADM

## 2021-02-22 RX ORDER — BALANCED SALT SOLUTION 6.4; .75; .48; .3; 3.9; 1.7 MG/ML; MG/ML; MG/ML; MG/ML; MG/ML; MG/ML
SOLUTION OPHTHALMIC PRN
Status: DISCONTINUED | OUTPATIENT
Start: 2021-02-22 | End: 2021-02-22 | Stop reason: HOSPADM

## 2021-02-22 RX ORDER — PROPARACAINE HYDROCHLORIDE 5 MG/ML
1 SOLUTION/ DROPS OPHTHALMIC ONCE
Status: COMPLETED | OUTPATIENT
Start: 2021-02-22 | End: 2021-02-22

## 2021-02-22 RX ORDER — ALBUTEROL SULFATE 0.83 MG/ML
2.5 SOLUTION RESPIRATORY (INHALATION) EVERY 4 HOURS PRN
Status: DISCONTINUED | OUTPATIENT
Start: 2021-02-22 | End: 2021-02-22 | Stop reason: HOSPADM

## 2021-02-22 RX ORDER — LIDOCAINE HYDROCHLORIDE 10 MG/ML
INJECTION, SOLUTION EPIDURAL; INFILTRATION; INTRACAUDAL; PERINEURAL PRN
Status: DISCONTINUED | OUTPATIENT
Start: 2021-02-22 | End: 2021-02-22 | Stop reason: HOSPADM

## 2021-02-22 RX ORDER — ONDANSETRON 2 MG/ML
4 INJECTION INTRAMUSCULAR; INTRAVENOUS EVERY 30 MIN PRN
Status: DISCONTINUED | OUTPATIENT
Start: 2021-02-22 | End: 2021-02-23 | Stop reason: HOSPADM

## 2021-02-22 RX ADMIN — Medication 1 DROP: at 09:48

## 2021-02-22 RX ADMIN — ACETAMINOPHEN 975 MG: 325 TABLET ORAL at 09:40

## 2021-02-22 RX ADMIN — SODIUM CHLORIDE, SODIUM LACTATE, POTASSIUM CHLORIDE, CALCIUM CHLORIDE 500 ML: 600; 310; 30; 20 INJECTION, SOLUTION INTRAVENOUS at 09:49

## 2021-02-22 RX ADMIN — PROPARACAINE HYDROCHLORIDE 1 DROP: 5 SOLUTION/ DROPS OPHTHALMIC at 09:42

## 2021-02-22 RX ADMIN — Medication 1 DROP: at 09:51

## 2021-02-22 RX ADMIN — Medication 1 DROP: at 09:42

## 2021-02-22 ASSESSMENT — VISUAL ACUITY
METHOD: SNELLEN - LINEAR
OS_SC: 20/25
OS_SC+: -2

## 2021-02-22 ASSESSMENT — MIFFLIN-ST. JEOR: SCORE: 1645.3

## 2021-02-22 ASSESSMENT — TONOMETRY
OS_IOP_MMHG: 15
IOP_METHOD: TONOPEN

## 2021-02-22 ASSESSMENT — SLIT LAMP EXAM - LIDS: COMMENTS: NORMAL

## 2021-02-22 NOTE — PROGRESS NOTES
PostOp, left eye, day 0    Doing well  Keep patch in place at night for 5 days  Start post-operative drops and taper according to instructions  Post-operative do's and don'ts reviewed, questions answered    Recheck 2-3 weeks with refraction    Marlene Rich MD  Ophthalmology Resident, PGY-2    Attending Physician Attestation:  Complete documentation of historical and exam elements from today's encounter can be found in the full encounter summary report (not reduplicated in this progress note).  I personally obtained the chief complaint(s) and history of present illness.  I confirmed and edited as necessary the review of systems, past medical/surgical history, family history, social history, and examination findings as documented by others; and I examined the patient myself.  I personally reviewed the relevant tests, images, and reports as documented above.  I formulated and edited as necessary the assessment and plan and discussed the findings and management plan with the patient and family. . - Ruben Shelby MD

## 2021-02-22 NOTE — ANESTHESIA PREPROCEDURE EVALUATION
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]   Procedure : Procedure(s):  LEFT EYE PHACOEMULSIFICATION, CATARACT, WITH INTRAOCULAR LENS IMPLANT     Past Medical History:   Diagnosis Date     Antiplatelet or antithrombotic long-term use      Breast cancer (H) 2008    Right Breast     CKD (chronic kidney disease) stage 3, GFR 30-59 ml/min 2010     Hyperlipidemia LDL goal <100 2010     Hypertension goal BP (blood pressure) < 140/90 2010     Hypothyroid      Nonsenile cataract      Osteoporosis 2014     Other psoriasis       Past Surgical History:   Procedure Laterality Date     HC REMV CATARACT EXTRACAP,INSERT LENS, W/O ECP Right 2002    Dr. Clinton Lopez ([id number], Power:  20.0D)     SURGICAL HISTORY OF -   Oct 3, 2008    Rt saline implant      Allergies   Allergen Reactions     No Known Drug Allergies       Social History     Tobacco Use     Smoking status: Passive Smoke Exposure - Never Smoker     Smokeless tobacco: Never Used     Tobacco comment: family members smoke; daughter   Substance Use Topics     Alcohol use: Yes     Alcohol/week: 10.0 standard drinks     Comment: 0-1 drink per month      Wt Readings from Last 1 Encounters:   21 105.2 kg (232 lb)        Anesthesia Evaluation            ROS/MED HX  ENT/Pulmonary:       Neurologic:       Cardiovascular:     (+) Dyslipidemia hypertension-----    METS/Exercise Tolerance:     Hematologic:     (+) History of blood clots,     Musculoskeletal:       GI/Hepatic:       Renal/Genitourinary:     (+) renal disease, type: CRI,     Endo:     (+) thyroid problem, hypothyroidism, Obesity,     Psychiatric/Substance Use:       Infectious Disease:       Malignancy:       Other:            Physical Exam    Airway        Mallampati: II       Respiratory Devices and Support         Dental            Cardiovascular          Rhythm and rate: regular and normal     Pulmonary           breath sounds clear to auscultation           OUTSIDE LABS:  CBC:   Lab Results   Component Value Date    WBC 5.4 11/24/2020    WBC 3.7 (L) 11/23/2020    HGB 12.2 11/24/2020    HGB 11.4 (L) 11/23/2020    HCT 39.6 11/24/2020    HCT 37.6 11/23/2020     11/24/2020     11/23/2020     BMP:   Lab Results   Component Value Date     02/05/2021     11/22/2020    POTASSIUM 4.9 02/05/2021    POTASSIUM 4.5 11/22/2020    CHLORIDE 106 02/05/2021    CHLORIDE 109 11/22/2020    CO2 28 02/05/2021    CO2 25 11/22/2020    BUN 21 02/05/2021    BUN 22 11/22/2020    CR 1.26 (H) 02/05/2021    CR 0.95 11/22/2020    GLC 85 02/05/2021    GLC 76 11/22/2020     COAGS:   Lab Results   Component Value Date     (HH) 11/19/2020    INR 1.32 (H) 11/20/2020    FIBR 513 (H) 11/24/2020     POC:   Lab Results   Component Value Date    BGM 78 11/18/2020     HEPATIC:   Lab Results   Component Value Date    ALBUMIN 1.6 (L) 11/21/2020    PROTTOTAL 6.4 (L) 11/21/2020    ALT 31 11/21/2020    AST 27 11/21/2020    ALKPHOS 59 11/21/2020    BILITOTAL 0.7 11/21/2020     OTHER:   Lab Results   Component Value Date    PH 7.39 11/17/2020    LACT 2.7 (H) 11/16/2020    A1C 5.5 06/08/2020    GALLO 10.0 02/05/2021    PHOS 3.4 11/20/2020    MAG 1.8 11/22/2020    TSH 0.28 (L) 02/05/2021    T4 1.42 02/05/2021    CRP 13.0 (H) 11/24/2020    SED 20 07/16/2014       Anesthesia Plan    ASA Status:  3   NPO Status:  NPO Appropriate    Anesthesia Type: MAC.      Maintenance: TIVA.        Consents    Anesthesia Plan(s) and associated risks, benefits, and realistic alternatives discussed. Questions answered and patient/representative(s) expressed understanding.     - Discussed with:  Patient         Postoperative Care    Pain management: Oral pain medications.   PONV prophylaxis: Ondansetron (or other 5HT-3)     Comments:                Alverto Arceo MD

## 2021-02-22 NOTE — ANESTHESIA POSTPROCEDURE EVALUATION
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    Anesthesia Type:  MAC    Note:  Disposition: Outpatient   Postop Pain Control: Uneventful            Sign Out: Well controlled pain   PONV: No   Neuro/Psych: Uneventful            Sign Out: Acceptable/Baseline neuro status   Airway/Respiratory: Uneventful            Sign Out: Acceptable/Baseline resp. status   CV/Hemodynamics: Uneventful            Sign Out: Acceptable CV status   Other NRE: NONE   DID A NON-ROUTINE EVENT OCCUR? No         Last vitals:  Vitals:    02/22/21 0919 02/22/21 1215 02/22/21 1223   BP: (!) 168/85 (!) 164/84 (!) 164/71   Pulse: 67 63 69   Resp: 18 18 18   Temp: 36.4  C (97.5  F) 36  C (96.8  F)    SpO2: 97% 100% 99%       Last vitals prior to Anesthesia Care Transfer:  CRNA VITALS  2/22/2021 1142 - 2/22/2021 1238      2/22/2021             Resp Rate (set):  10          Electronically Signed By: Alverto Arceo MD  February 22, 2021  12:38 PM

## 2021-02-22 NOTE — ANESTHESIA CARE TRANSFER NOTE
Patient: Romayne L Sathre    Procedure(s):  LEFT EYE PHACOEMULSIFICATION, CATARACT, WITH INTRAOCULAR LENS IMPLANT    Diagnosis: Combined form of age-related cataract, left eye [H25.812]  Diagnosis Additional Information: No value filed.    Anesthesia Type:   MAC     Note:      Level of Consciousness: awake  Oxygen Supplementation: room air    Independent Airway: airway patency satisfactory and stable  Dentition: dentition unchanged  Vital Signs Stable: post-procedure vital signs reviewed and stable  Report to RN Given: handoff report given  Patient transferred to: Phase II    Handoff Report: Identifed the Patient, Identified the Reponsible Provider, Reviewed the pertinent medical history, Discussed the surgical course, Reviewed Intra-OP anesthesia mangement and issues during anesthesia, Set expectations for post-procedure period and Allowed opportunity for questions and acknowledgement of understanding      Vitals: (Last set prior to Anesthesia Care Transfer)  CRNA VITALS  2/22/2021 1142 - 2/22/2021 1212      2/22/2021             Pulse:  67    Ht Rate:  66    SpO2:  99 %    Resp Rate (set):  10        Electronically Signed By: PHOENIX Fernandez CRNA  February 22, 2021  12:12 PM

## 2021-02-22 NOTE — DISCHARGE INSTRUCTIONS
[facility]  Home Care Following Anesthesia  For 24 hours after surgery:  1. Get plenty of rest.  A responsible adult must stay with you for at least 24 hours after you leave the surgery center.  2. Do not drive or use heavy equipment.  If you have weakness or tingling, don't drive or use heavy equipment until this feeling goes away.   3. Do not drink alcohol.   4. Avoid strenuous or risky activities.  Ask for help when climbing stairs.  5. You may feel lightheaded.  IF so, sit for a few minutes before standing.  Have someone help you get up.   6. If you have nausea (feel sick to your stomach): Drink only clear liquids such as apple juice, ginger ale, broth or 7-Up.  Rest may also help.  Be sure to drink enough fluids.  Move to a regular diet as you feel able.   7. You may have a slight fever.  Call the doctor if your fever is over 100 F (37.7 C) (taken under the tongue) or lasts longer than 24 hours.  8. You may have a dry mouth, a sore throat, muscle aches or trouble sleeping. These should go away after 24 hours.  9. Do not make important or legal decisions.               Tips for taking pain medications  To get the best pain relief possible, remember these points:    Take pain medications as directed, before pain becomes severe.    Pain medication can upset your stomach: taking it with food may help.    Constipation is a common side effect of pain medication. Drink plenty of  fluids.    Eat foods high in fiber. Take a stool softener if recommended by your doctor or pharmacist.    Do not drink alcohol, drive or operate machinery while taking pain medications.    Ask about other ways to control pain, such as with heat, ice or relaxation.    Tylenol/Acetaminophen Consumption  To help encourage the safe use of acetaminophen, the makers of TYLENOL  have lowered the maximum daily dose for single-ingredient Extra Strength TYLENOL  (acetaminophen) products sold in the [place] from 8  pills per day (4,000 mg) to 6 pills per day (3,000 mg). The dosing interval has also changed from 2 pills every 4-6 hours to 2 pills every 6 hours.    If you feel your pain relief is insufficient, you may take Tylenol/Acetaminophen in addition to your narcotic pain medication.     Be careful not to exceed 3,000 mg of Tylenol/Acetaminophen in a 24 hour period from all sources.    If you are taking extra strength Tylenol/acetaminophen (500 mg), the maximum dose is 6 tablets in 24 hours.    If you are taking regular strength acetaminophen (325 mg), the maximum dose is 9 tablets in 24 hours.    Call a doctor for any of the followin. Signs of infection (fever, growing tenderness at the surgery site, a large amount of drainage or bleeding, severe pain, foul-smelling drainage, redness, swelling).  2. It has been over 8 to 10 hours since surgery and you are still not able to urinate (pass water).  3. Headache for over 24 hours.  4. Numbness, tingling or weakness the day after surgery (if you had spinal anesthesia).  5. Signs of Covid-19 infection (temperature over 100 degrees, shortness of breath, cough, loss of taste/smell, generalized body aches, persistent headache, chills, sore throat, nausea/vomiting/diarrhea)  Your doctor is:       Dr. Ruben Shelby, Ophthalmology: [phone]               Or dial [phone] and ask for the resident on call for:  Ophthalmology  For emergency care, call the:  [facility]:  [phone] (TTY for hearing impaired: [phone])

## 2021-02-22 NOTE — OP NOTE
PREOPERATIVE DIAGNOSIS:   1. Combined form of age-related cataract, left eye    POSTOPERATIVE DIAGNOSIS: Same   PROCEDURES:   1. Cataract extraction with intraocular lens implant Left eye.  SURGEON: Ruben Shelby M.D.  ASSISTANT: Denny De Leon M.D.  INDICATIONS: The patient Romayne L Sathre presented to the eye clinic with decreased vision secondary to cataract in the Left eye. The risks, benefits and alternatives to cataract extraction were discussed. The patient elected to proceed. All questions were answered to the patient's satisfaction.   DESCRIPTION OF PROCEDURE:   Prior to the procedure, appropriate cardiac and respiratory monitors were applied to the patient.  In the pre-operative holding area, a drop of topical tetracaine followed by lidocaine gel followed by povidone iodine.  The patient was brought to the operating room where a surgical pause was carried out to identify with all members of the surgical team the correct surgical site.  With adequate anesthesia, the Left eye was prepped and draped in the usual sterile fashion. A lid speculum was placed, and the operating microscope was rotated into position. A paracentesis was created.  Through this limbal paracentesis, the anterior chamber was filled with preservative-free lidocaine followed by viscoelastic.  A temporal wound was created at the limbus using a 2.6 mm blade. A capsulorrhexis was initiated using a bent 25-gauge needle and was completed in continuous and circular fashion using the capsulorrhexis forceps. The lens nucleus was hydrodissected using balanced salt solution.  The lens nucleus was rotated and removed using phacoemulsification in a stop and chop technique.  Residual cortical material was removed using irrigation-aspiration.  The capsular bag was reinflated to its maximal extent with cohesive viscoelastic.  A 16.5 diopter ZCBOO inserted into the capsular bag.  The lens power selected was reviewed using the intraocular lens power  measurements that were obtained preoperatively to confirm that the correct lens was selected for the desired post-operative refractive state. The residual viscoelastic was removed in its entirety, the wound were hydrated and found to be self-sealing.  Intracameral moxifloxacin was administered. Subconjunctival Dexamethasone was administered inferiorly. Tactile pressure was confirmed to be in a normal range.  The lid speculum was removed and a shield was applied.  The patient tolerated the procedure well, and there were no complications.    PLAN: The patient will be discharged to home and will follow up as scheduled in the eye clinic.  EBL:  None  Complications:  None  Implant Name Type Inv. Item Serial No.  Lot No. LRB No. Used Action   EYE IMP IOL AYANNA PCL TECNIS ZCB00 16.5 Lens/Eye Implant EYE IMP IOL AYANNA PCL TECNIS ZCB00 16.5 [id number] [employer]  Left 1 Implanted       Attending Physician Procedure Attestation: I was present for the entire procedure       Ruben Shelby MD  , Comprehensive Ophthalmology  Department of Ophthalmology and Visual Neurosciences  [facility]

## 2021-03-08 ENCOUNTER — IMMUNIZATION (OUTPATIENT)
Dept: NURSING | Facility: CLINIC | Age: 76
End: 2021-03-08
Payer: COMMERCIAL

## 2021-03-08 PROCEDURE — 91303 PR COVID VAC JANSSEN AD26 0.5ML: CPT

## 2021-03-08 PROCEDURE — 0031A PR COVID VAC JANSSEN AD26 0.5ML: CPT

## 2021-03-11 ENCOUNTER — OFFICE VISIT (OUTPATIENT)
Dept: OPHTHALMOLOGY | Facility: CLINIC | Age: 76
End: 2021-03-11
Attending: OPHTHALMOLOGY
Payer: COMMERCIAL

## 2021-03-11 DIAGNOSIS — H40.003 GLAUCOMA SUSPECT OF BOTH EYES: ICD-10-CM

## 2021-03-11 DIAGNOSIS — Z96.1 PSEUDOPHAKIA OF BOTH EYES: Primary | ICD-10-CM

## 2021-03-11 DIAGNOSIS — H43.821 VITREOMACULAR ADHESION OF RIGHT EYE: ICD-10-CM

## 2021-03-11 PROCEDURE — 99024 POSTOP FOLLOW-UP VISIT: CPT | Mod: GC | Performed by: OPHTHALMOLOGY

## 2021-03-11 PROCEDURE — G0463 HOSPITAL OUTPT CLINIC VISIT: HCPCS

## 2021-03-11 PROCEDURE — 999N000103 HC STATISTIC NO CHARGE FACILITY FEE

## 2021-03-11 ASSESSMENT — REFRACTION_MANIFEST
OS_SPHERE: PLANO
OS_AXIS: 170
OS_CYLINDER: +1.00
OS_ADD: +2.50

## 2021-03-11 ASSESSMENT — CONF VISUAL FIELD
OD_INFERIOR_NASAL_RESTRICTION: 3
OS_NORMAL: 1
METHOD: COUNTING FINGERS

## 2021-03-11 ASSESSMENT — TONOMETRY
OS_IOP_MMHG: 10
IOP_METHOD: ICARE
OD_IOP_MMHG: 10

## 2021-03-11 ASSESSMENT — VISUAL ACUITY
METHOD: SNELLEN - LINEAR
OD_PH_SC: 20/80
OD_SC: 20/300
OS_SC: 20/25
OS_SC+: +2

## 2021-03-11 ASSESSMENT — EXTERNAL EXAM - LEFT EYE: OS_EXAM: NORMAL

## 2021-03-11 ASSESSMENT — SLIT LAMP EXAM - LIDS
COMMENTS: MILD PTOSIS
COMMENTS: NORMAL

## 2021-03-11 ASSESSMENT — EXTERNAL EXAM - RIGHT EYE: OD_EXAM: NORMAL

## 2021-03-11 ASSESSMENT — CUP TO DISC RATIO: OS_RATIO: 0.6

## 2021-03-11 NOTE — PROGRESS NOTES
"Chief Complaint(s) and History of Present Illness(es)     Post Op (Ophthalmology) Left Eye     Laterality: left eye    Associated symptoms: Negative for eye pain, redness and tearing              Comments     2-3 week post op of IOL left eye 02/22/2021.  Says left eye vision is \"perfect\".  Eye meds: Vigamox 2x daily left eye  Cheryan Ontiveros, [place] 3/11/2021 11:01 AM              Review of systems for the eyes was negative other than the pertinent positives/negatives listed in the HPI.      Assessment & Plan      Romayne L Sathre is a 75 year old female with the following diagnoses:   1. Pseudophakia of both eyes    2. Vitreomacular adhesion of right eye    3. Glaucoma suspect of both eyes         Doing well  Ok to resume normal activities  Taper Predforte as directed  Glasses prescription updated  Artificial tears as needed      Use OTC readers PRN    Patient disposition:   Return for Dr. Donovan for retinal surgery right eye; Afsaneh for glaucoma recheck in 1 year (DFE, OCT NFL).       Attending Physician Attestation:  Complete documentation of historical and exam elements from today's encounter can be found in the full encounter summary report (not reduplicated in this progress note).  I personally obtained the chief complaint(s) and history of present illness.  I confirmed and edited as necessary the review of systems, past medical/surgical history, family history, social history, and examination findings as documented by others; and I examined the patient myself.  I personally reviewed the relevant tests, images, and reports as documented above.  I formulated and edited as necessary the assessment and plan and discussed the findings and management plan with the patient and family. . - Ruben Shelby MD     "

## 2021-03-11 NOTE — NURSING NOTE
"Chief Complaints and History of Present Illnesses   Patient presents with     Post Op (Ophthalmology) Left Eye      Chief Complaint(s) and History of Present Illness(es)     Post Op (Ophthalmology) Left Eye     Laterality: left eye    Associated symptoms: Negative for eye pain, redness and tearing              Comments     2-3 week post op of IOL left eye 02/22/2021.  Says left eye vision is \"perfect\".  Eye meds: Vigamox 2x daily left eye  JOSH Vela 3/11/2021 11:01 AM                  "

## 2021-03-15 DIAGNOSIS — E03.9 ACQUIRED HYPOTHYROIDISM: ICD-10-CM

## 2021-03-15 LAB — TSH SERPL DL<=0.005 MIU/L-ACNC: 2.76 MU/L (ref 0.4–4)

## 2021-03-15 PROCEDURE — 36415 COLL VENOUS BLD VENIPUNCTURE: CPT | Performed by: NURSE PRACTITIONER

## 2021-03-15 PROCEDURE — 84443 ASSAY THYROID STIM HORMONE: CPT | Performed by: NURSE PRACTITIONER

## 2021-03-16 NOTE — RESULT ENCOUNTER NOTE
Romayne,    Thyroid levels are normal.  I recommend we continue your current dose of levothyroxine.    Sapphire Glass, CNP

## 2021-03-20 DIAGNOSIS — I10 HYPERTENSION GOAL BP (BLOOD PRESSURE) < 140/90: ICD-10-CM

## 2021-03-23 RX ORDER — LISINOPRIL 10 MG/1
TABLET ORAL
Qty: 90 TABLET | Refills: 3 | Status: SHIPPED | OUTPATIENT
Start: 2021-03-23 | End: 2022-03-22

## 2021-03-23 NOTE — TELEPHONE ENCOUNTER
A year's worth of Lisinopril ordered by TERI Glass CNP, on 2/5/21.    Prescription approved per [facility] Refill Protocol.  MARY PartidaN, RN  [facility]

## 2021-04-07 DIAGNOSIS — H43.821 VITREOMACULAR ADHESION OF RIGHT EYE: Primary | ICD-10-CM

## 2021-04-13 ENCOUNTER — OFFICE VISIT (OUTPATIENT)
Dept: OPHTHALMOLOGY | Facility: CLINIC | Age: 76
End: 2021-04-13
Attending: OPHTHALMOLOGY
Payer: COMMERCIAL

## 2021-04-13 DIAGNOSIS — H40.003 GLAUCOMA SUSPECT OF BOTH EYES: ICD-10-CM

## 2021-04-13 DIAGNOSIS — H43.821 VITREOMACULAR ADHESION OF RIGHT EYE: ICD-10-CM

## 2021-04-13 DIAGNOSIS — Z96.1 PSEUDOPHAKIA OF BOTH EYES: ICD-10-CM

## 2021-04-13 DIAGNOSIS — H35.371 EPIRETINAL MEMBRANE, RIGHT: Primary | ICD-10-CM

## 2021-04-13 PROCEDURE — 92134 CPTRZ OPH DX IMG PST SGM RTA: CPT | Performed by: OPHTHALMOLOGY

## 2021-04-13 PROCEDURE — G0463 HOSPITAL OUTPT CLINIC VISIT: HCPCS

## 2021-04-13 PROCEDURE — 92014 COMPRE OPH EXAM EST PT 1/>: CPT | Performed by: OPHTHALMOLOGY

## 2021-04-13 ASSESSMENT — EXTERNAL EXAM - RIGHT EYE: OD_EXAM: NORMAL

## 2021-04-13 ASSESSMENT — CUP TO DISC RATIO
OS_RATIO: 0.6
OD_RATIO: 0.5

## 2021-04-13 ASSESSMENT — SLIT LAMP EXAM - LIDS
COMMENTS: MILD PTOSIS
COMMENTS: NORMAL

## 2021-04-13 ASSESSMENT — TONOMETRY
OS_IOP_MMHG: 10
OD_IOP_MMHG: 09
IOP_METHOD: TONOPEN

## 2021-04-13 ASSESSMENT — CONF VISUAL FIELD
OD_NORMAL: 1
METHOD: COUNTING FINGERS
OS_NORMAL: 1

## 2021-04-13 ASSESSMENT — VISUAL ACUITY
OS_SC+: -2
OS_SC: 20/25
OD_SC: 20/150
METHOD: SNELLEN - LINEAR
OD_PH_SC: 20/80
OD_PH_SC+: +1

## 2021-04-13 ASSESSMENT — EXTERNAL EXAM - LEFT EYE: OS_EXAM: NORMAL

## 2021-04-13 NOTE — PROGRESS NOTES
CC -   Blurred vision ou    INTERVAL HISTORY - received cataract surgery ou with Dr. Shelby but vision right eye is still very blurry. Denies pain or discomfort, denies floaters or flashes.  Using tear drops PRN    HPI -   Romayne L Sathre is a  75 year old year-old patient presenting for referral from Dr Shelby for VMT right eye. Problem with focusing at distance and near. No complain from metamorphopsia. Deneis seeing floaters or flashes.       PAST OCULAR SURGERY  CE IOL OU  S/P PPV? Right eye     RETINAL IMAGING:  OCT 4-13-21  OD - VMT with traction over fovea; traction worse with IRF   OS - normal foveal contour ; small ERM inferior macula    Optos:   Right eye 360 peripheral laser with a superotemporal tear surrounded by laser scars. Nasal mid peripheral atrophy (likely a posterior drainage retinotomy site)  Left eye within normal limits    ASSESSMENT & PLAN    1. Vitreoretinal adhesion right eye  2. ERM right eye   Worsening blurred vision but denies metamorphopsia  Retina appearance is compatible with a previous retinal surgery (PPV for RRD with a drainage retinotomy site nasal to disc) but patient doesn't recall it  Recommended right eye PPV and MS; R/B/A discussed with patient she agreed and wanted to be scheduled for surgery    I explained that with epiretinal membranes, the average vision recovery was usually only partial, with patients experiencing a 2 line improvement on average.  I explained that the vision might not improve at all after surgery, that there could be persistent defects, distortion, or blurriness, and that it was possible the vision could be substantially worse or blind after surgery due to unexpected complications.    I explained that it was possible I might need to place a gas bubble in the eye after surgery and that this could require positioning, including face-down positioning, for up to a week after surgery.  I explained that with a gas bubble, air travel and travel to high  altitudes would be forbidden for up to 3 months after surgery. I explained that a retinal detachment might develop either during or after surgery, and that this could require further surgeries and could result in severe vision loss despite further surgeries.  I explained that cataract surgery might be required much sooner after this surgery than it would otherwise, since vitrectomies greatly accelerate the development of cataracts.   I explained that this is a training facility, and residents or fellows might participate in the surgery under supervision.      2. Pseudophakia each eye   observe    3. Glaucoma suspect ou  Large disc with large cup each eye  Being followed by Dr. Shelby    Disposition:    Schedule for right eye PPV/MS      Complete documentation of historical and exam elements from today's encounter can be found in the full encounter summary report (not reduplicated in this progress note). I personally obtained the chief complaint(s) and history of present illness.  I confirmed and edited as necessary the review of systems, past medical/surgical history, family history, social history, and examination findings as documented by others; and I examined the patient myself. I personally reviewed the relevant tests, images, and reports as documented above. I formulated and edited as necessary the assessment and plan and discussed the findings and management plan with the patient and family.     Fede Donovan MD

## 2021-04-26 ENCOUNTER — TELEPHONE (OUTPATIENT)
Dept: OPHTHALMOLOGY | Facility: CLINIC | Age: 76
End: 2021-04-26

## 2021-04-26 NOTE — TELEPHONE ENCOUNTER
I called patient to schedule surgery with Dr. Fede Howard, I left a voicemail with callback # [phone]

## 2021-04-28 NOTE — TELEPHONE ENCOUNTER
I called patient to schedule surgery with Dr. Fede Howard, I left a voicemail with callback # [phone]

## 2021-04-30 NOTE — TELEPHONE ENCOUNTER
I called patient to schedule surgery with Dr. Fede Howard, I left a voicemail with callback # [phone]

## 2021-05-11 ENCOUNTER — TELEPHONE (OUTPATIENT)
Dept: OPHTHALMOLOGY | Facility: CLINIC | Age: 76
End: 2021-05-11
Payer: COMMERCIAL

## 2021-05-11 ENCOUNTER — TELEPHONE (OUTPATIENT)
Dept: OPHTHALMOLOGY | Facility: CLINIC | Age: 76
End: 2021-05-11

## 2021-05-11 PROBLEM — H35.371 EPIRETINAL MEMBRANE, RIGHT: Status: ACTIVE | Noted: 2021-05-11

## 2021-05-11 NOTE — TELEPHONE ENCOUNTER
Patient is scheduled for surgery with Dr. Fede Howard     Spoke with: Romayne     Date of Surgery: 06/07/21     Location: [facility] [place]:  [address] [place] [id number]     Informed patient they will need an adult : Yes     H&P will be completed at: [facility]     COVID testing: [facility] 06/03    Post Op scheduled on 06/08, and 06/15     Surgery packet was mailed 05/11     Additional comments: Advised RN will call 1 - 2 business days prior with arrival time and instructions.

## 2021-05-17 NOTE — PROGRESS NOTES
CrCl cannot be calculated (Patient's most recent lab result is older than the maximum 30 days allowed.).

## 2021-05-18 ENCOUNTER — OFFICE VISIT (OUTPATIENT)
Dept: FAMILY MEDICINE | Facility: CLINIC | Age: 76
End: 2021-05-18
Payer: COMMERCIAL

## 2021-05-18 VITALS
DIASTOLIC BLOOD PRESSURE: 75 MMHG | HEART RATE: 69 BPM | SYSTOLIC BLOOD PRESSURE: 132 MMHG | OXYGEN SATURATION: 99 % | BODY MASS INDEX: 34.5 KG/M2 | TEMPERATURE: 97.5 F | WEIGHT: 246 LBS

## 2021-05-18 DIAGNOSIS — E78.5 HYPERLIPIDEMIA LDL GOAL <100: ICD-10-CM

## 2021-05-18 DIAGNOSIS — I10 HYPERTENSION GOAL BP (BLOOD PRESSURE) < 140/90: ICD-10-CM

## 2021-05-18 DIAGNOSIS — N18.30 STAGE 3 CHRONIC KIDNEY DISEASE, UNSPECIFIED WHETHER STAGE 3A OR 3B CKD (H): ICD-10-CM

## 2021-05-18 DIAGNOSIS — E03.9 ACQUIRED HYPOTHYROIDISM: ICD-10-CM

## 2021-05-18 DIAGNOSIS — H43.9 VITREORETINAL DISORDER: ICD-10-CM

## 2021-05-18 DIAGNOSIS — H35.9 VITREORETINAL DISORDER: ICD-10-CM

## 2021-05-18 DIAGNOSIS — R12 HEART BURN: ICD-10-CM

## 2021-05-18 DIAGNOSIS — E66.01 MORBID OBESITY (H): ICD-10-CM

## 2021-05-18 DIAGNOSIS — Z01.818 PREOP GENERAL PHYSICAL EXAM: Primary | ICD-10-CM

## 2021-05-18 LAB
ANION GAP SERPL CALCULATED.3IONS-SCNC: 3 MMOL/L (ref 3–14)
BUN SERPL-MCNC: 21 MG/DL (ref 7–30)
CALCIUM SERPL-MCNC: 9.6 MG/DL (ref 8.5–10.1)
CHLORIDE SERPL-SCNC: 106 MMOL/L (ref 94–109)
CO2 SERPL-SCNC: 29 MMOL/L (ref 20–32)
CREAT SERPL-MCNC: 1.3 MG/DL (ref 0.52–1.04)
GFR SERPL CREATININE-BSD FRML MDRD: 40 ML/MIN/{1.73_M2}
GLUCOSE SERPL-MCNC: 87 MG/DL (ref 70–99)
POTASSIUM SERPL-SCNC: 5.1 MMOL/L (ref 3.4–5.3)
SODIUM SERPL-SCNC: 138 MMOL/L (ref 133–144)

## 2021-05-18 PROCEDURE — 99214 OFFICE O/P EST MOD 30 MIN: CPT | Performed by: STUDENT IN AN ORGANIZED HEALTH CARE EDUCATION/TRAINING PROGRAM

## 2021-05-18 PROCEDURE — 80048 BASIC METABOLIC PNL TOTAL CA: CPT | Performed by: STUDENT IN AN ORGANIZED HEALTH CARE EDUCATION/TRAINING PROGRAM

## 2021-05-18 PROCEDURE — 36415 COLL VENOUS BLD VENIPUNCTURE: CPT | Performed by: STUDENT IN AN ORGANIZED HEALTH CARE EDUCATION/TRAINING PROGRAM

## 2021-05-18 NOTE — PROGRESS NOTES
[facility] [address] [place] [address]  Phone: [phone]  Primary Provider: Sapphire Glass  Pre-op Performing Provider: ERICA [email or web address]}  PREOPERATIVE EVALUATION:  Today's date: 5/18/2021    Romayne L Sathre is a 75 year old female who presents for a preoperative evaluation.    Surgical Information:  Surgery/Procedure: VITRECTOMY  [place] [facility] [place]geon: Venus Howard  Surgery Date: 6-7-21  Time of Surgery: 12:35 PM  Where patient plans to recover: At home with family  Fax number for surgical facility: Note does not need to be faxed, will be available electronically in Epic.    Type of Anesthesia Anticipated: Local with MAC    Assessment & Plan     The proposed surgical procedure is considered LOW risk.    Preop general physical exam  Vitreoretinal disorder  Preop for vitreoretinal adhesion repair completed today with no concerns.     Stage 3 chronic kidney disease, unspecified whether stage 3a or 3b CKD  Recheck kidney function for routine monitoring.   - Basic metabolic panel  (Ca, Cl, CO2, Creat, Gluc, K, Na, BUN)    Obesity (BMI 35.0-39.9) with comorbidity (H)    Heart burn    Hyperlipidemia LDL goal <100    Hypertension goal BP (blood pressure) < 140/90    Acquired hypothyroidism       Risks and Recommendations:  The patient has the following additional risks and recommendations for perioperative complications:   - No identified additional risk factors other than previously addressed    Medication Instructions:  Patient is to take all scheduled medications on the day of surgery EXCEPT for modifications listed below:   Hold Zyrtec and any other medications holds as directed by surgeon. Discontinue turmeric 2 weeks before surgery.    RECOMMENDATION:  APPROVAL GIVEN to proceed with proposed procedure, without further diagnostic evaluation.      Subjective     HPI related to upcoming procedure: Here for surgery of right eye. Having blurred  vision in this eye that started about 3 months ago. Issues with focusing near and far. No eye pain. Diagnosed with vitreoretinal adhesion and ERM in right eye.     Preop Questions 5/17/2021   1. Have you ever had a heart attack or stroke? No   2. Have you ever had surgery on your heart or blood vessels, such as a stent placement, a coronary artery bypass, or surgery on an artery in your head, neck, heart, or legs? No   3. Do you have chest pain with activity? No   4. Do you have a history of  heart failure? No   5. Do you currently have a cold, bronchitis or symptoms of other infection? No   6. Do you have a cough, shortness of breath, or wheezing? No   7. Do you or anyone in your family have previous history of blood clots? No   8. Do you or does anyone in your family have a serious bleeding problem such as prolonged bleeding following surgeries or cuts? No   9. Have you ever had problems with anemia or been told to take iron pills? No   10. Have you had any abnormal blood loss such as black, tarry or bloody stools, or abnormal vaginal bleeding? No   11. Have you ever had a blood transfusion? No   12. Are you willing to have a blood transfusion if it is medically needed before, during, or after your surgery? Yes   13. Have you or any of your relatives ever had problems with anesthesia? No   14. Do you have sleep apnea, excessive snoring or daytime drowsiness? No   15. Do you have any artifical heart valves or other implanted medical devices like a pacemaker, defibrillator, or continuous glucose monitor? No   16. Do you have artificial joints? No   17. Are you allergic to latex? No       Health Care Directive:  Patient has a Health Care Directive on file      Preoperative Review of :   reviewed - no record of controlled substances prescribed.      Status of Chronic Conditions:  See problem list for active medical problems.  Problems all longstanding and stable, except as noted/documented.  See ROS for  pertinent symptoms related to these conditions.    History of breast cancer - S/p mastectomy in 2006. In remission for several years.  Hypothyroidism - Stable on Synthroid. Last TSH 2.76 and recent.  HLD - Stable on Lipitor  History of left lower extremity DVT, provoked - Finished course of Eliquis after 3 months in February as recommended.  GERD - Symptoms are controlled with Pepcid.   CKD stage 3 - Stable.       Review of Systems  Constitutional, neuro, ENT, endocrine, pulmonary, cardiac, gastrointestinal, genitourinary, musculoskeletal, integument and psychiatric systems are negative, except as otherwise noted.    Patient Active Problem List    Diagnosis Date Noted     Epiretinal membrane, right 05/11/2021     Priority: Medium     Added automatically from request for surgery [id number]       Vitreomacular adhesion of right eye 05/11/2021     Priority: Medium     Added automatically from request for surgery [id number]       Hypoxia 11/16/2020     Priority: Medium     2019 novel coronavirus disease (COVID-19) 11/16/2020     Priority: Medium     Combined form of age-related cataract, left eye 10/20/2020     Priority: Medium     Added automatically from request for surgery [id number]       Dependent edema 05/19/2020     Priority: Medium     Chronic fatigue 05/19/2020     Priority: Medium     Physical deconditioning 05/19/2020     Priority: Medium     Heart burn 05/19/2020     Priority: Medium     Obesity (BMI 35.0-39.9) with comorbidity (H) 04/05/2019     Priority: Medium     Memory deficit 10/03/2012     Priority: Medium     Health Care Home 07/17/2012     Priority: Medium     [facility]PA [employer] for .  Margarita Lawler RN-BSN, [facility]  [phone]   DX V65.8 REPLACED WITH [id number] (04/08/2013)       Leucopenia 07/13/2012     Priority: Medium     Idiopathic - s/p hematology workup       Advanced directives, counseling/discussion 12/12/2011     Priority: Medium     Advance Directive Problem List  Overview:   Name Relationship Phone    Primary Health Care Agent            Alternative Health Care Agent          Discussed advance care planning with patient; information given to patient to review. 12/12/2011          History of breast cancer 12/12/2011     Priority: Medium     Hyperlipidemia LDL goal <100 11/29/2010     Priority: Medium     Hypertension goal BP (blood pressure) < 140/90 09/17/2010     Priority: Medium     CKD (chronic kidney disease) stage 3, GFR 30-59 ml/min 09/17/2010     Priority: Medium     Hypothyroidism 11/19/2004     Priority: Medium     Problem list name updated by automated process. Provider to review       Lipoma of other skin and subcutaneous tissue 11/19/2004     Priority: Medium      Past Medical History:   Diagnosis Date     Antiplatelet or antithrombotic long-term use      Breast cancer (H) 8/26/2008    Right Breast     CKD (chronic kidney disease) stage 3, GFR 30-59 ml/min 9/17/2010     Hyperlipidemia LDL goal <100 11/29/2010     Hypertension goal BP (blood pressure) < 140/90 9/17/2010     Hypothyroid 2002     Nonsenile cataract      Osteoporosis 2014     Other psoriasis      Past Surgical History:   Procedure Laterality Date     HC REMV CATARACT EXTRACAP,INSERT LENS, W/O ECP Right 05/29/2002    Dr. Clinton Lopez ([id number], Power:  20.0D)     PHACOEMULSIFICATION CLEAR CORNEA WITH STANDARD INTRAOCULAR LENS IMPLANT Left 2/22/2021    Procedure: LEFT EYE PHACOEMULSIFICATION, CATARACT, WITH INTRAOCULAR LENS IMPLANT;  Surgeon: Ruben Shelby MD;  Location: [facility] OR     SURGICAL HISTORY OF -   Oct 3, 2008    Rt saline implant     Current Outpatient Medications   Medication Sig Dispense Refill     atorvastatin (LIPITOR) 10 MG tablet Take 1 tablet (10 mg) by mouth daily 90 tablet 3     cetirizine (ZYRTEC) 10 MG tablet Take 10 mg by mouth daily       famotidine (PEPCID) 20 MG tablet Take 20 mg by mouth daily       levothyroxine (SYNTHROID/LEVOTHROID) 150 MCG tablet Take 1 tablet (150  mcg) by mouth daily 100 tablet 3     lisinopril (ZESTRIL) 10 MG tablet TAKE 1 TABLET(10 MG) BY MOUTH DAILY 90 tablet 3     Multiple Vitamin (MULTI-VITAMIN) per tablet Take 1 tablet by mouth daily.         TURMERIC PO Take 1 tablet by mouth daily         Allergies   Allergen Reactions     No Known Drug Allergies         Social History     Tobacco Use     Smoking status: Passive Smoke Exposure - Never Smoker     Smokeless tobacco: Never Used     Tobacco comment: family members smoke; daughter   Substance Use Topics     Alcohol use: Yes     Alcohol/week: 10.0 standard drinks     Comment: 0-1 drink per month       History   Drug Use No         Objective     /75   Pulse 69   Temp 97.5  F (36.4  C) (Tympanic)   Wt 111.6 kg (246 lb)   SpO2 99%   BMI 34.50 kg/m      Physical Exam    GENERAL APPEARANCE: healthy, alert and no distress     EYES: EOMI, PERRL     HENT: ear canals and TM's normal and nose and mouth without ulcers or lesions     NECK: no adenopathy, no asymmetry, masses, or scars and thyroid normal to palpation     RESP: lungs clear to auscultation - no rales, rhonchi or wheezes     CV: regular rates and rhythm, normal S1 S2, no S3 or S4 and no murmur, click or rub     ABDOMEN:  soft, nontender, no HSM or masses and bowel sounds normal     MS: extremities normal- no gross deformities noted, no evidence of inflammation in joints, FROM in all extremities.     SKIN: no suspicious lesions or rashes     NEURO: Normal strength and tone, sensory exam grossly normal, mentation intact and speech normal     PSYCH: mentation appears normal. and affect normal/bright     LYMPHATICS: No cervical adenopathy    Recent Labs   Lab Test 02/05/21  1119 11/24/20  1029 11/23/20  0549 11/22/20  0556 11/20/20  0540 11/20/20  0540 11/19/20  0546 11/19/20  0546 06/08/20  1441 06/08/20  1441   HGB  --  12.2 11.4* 11.5*   < > 10.5*   < > 11.6*   < >  --    PLT  --  285 265 261   < > 259   < > 267   < >  --    INR  --   --   --    --   --  1.32*  --  1.35*   < >  --      --   --  139   < > 139  --  139   < >  --    POTASSIUM 4.9  --   --  4.5   < > 3.8  --  4.0   < >  --    CR 1.26*  --   --  0.95   < > 0.97  --  1.09*   < >  --    A1C  --   --   --   --   --   --   --   --   --  5.5    < > = values in this interval not displayed.        Diagnostics:  Results for orders placed or performed in visit on 05/18/21   Basic metabolic panel  (Ca, Cl, CO2, Creat, Gluc, K, Na, BUN)     Status: Abnormal   Result Value Ref Range    Sodium 138 133 - 144 mmol/L    Potassium 5.1 3.4 - 5.3 mmol/L    Chloride 106 94 - 109 mmol/L    Carbon Dioxide 29 20 - 32 mmol/L    Anion Gap 3 3 - 14 mmol/L    Glucose 87 70 - 99 mg/dL    Urea Nitrogen 21 7 - 30 mg/dL    Creatinine 1.30 (H) 0.52 - 1.04 mg/dL    GFR Estimate 40 (L) >60 mL/min/[1.73_m2]    GFR Estimate If Black 47 (L) >60 mL/min/[1.73_m2]    Calcium 9.6 8.5 - 10.1 mg/dL       No EKG required for low risk surgery (cataract, skin procedure, breast biopsy, etc).    Revised Cardiac Risk Index (RCRI):  The patient has the following serious cardiovascular risks for perioperative complications:   - No serious cardiac risks = 0 points     RCRI Interpretation: 0 points: Class I (very low risk - 0.4% complication rate)           Signed Electronically by: Adela Avila MD  Copy of this evaluation report is provided to requesting physician.

## 2021-05-18 NOTE — PATIENT INSTRUCTIONS

## 2021-05-19 NOTE — RESULT ENCOUNTER NOTE
Hi Romayne,    Kidney function is similar to last check 3 months ago. Please avoid use of ibuprofen/NSAIDs and only use tylenol if needed for pain. Please follow up with your doctor in 3 months for blood pressure check.    Adela Avila MD  [facility]  [phone]

## 2021-05-20 DIAGNOSIS — Z11.59 ENCOUNTER FOR SCREENING FOR OTHER VIRAL DISEASES: ICD-10-CM

## 2021-06-03 DIAGNOSIS — Z11.59 ENCOUNTER FOR SCREENING FOR OTHER VIRAL DISEASES: ICD-10-CM

## 2021-06-03 LAB
LABORATORY COMMENT REPORT: NORMAL
SARS-COV-2 RNA RESP QL NAA+PROBE: NEGATIVE
SARS-COV-2 RNA RESP QL NAA+PROBE: NORMAL
SPECIMEN SOURCE: NORMAL
SPECIMEN SOURCE: NORMAL

## 2021-06-03 PROCEDURE — U0005 INFEC AGEN DETEC AMPLI PROBE: HCPCS | Performed by: OPHTHALMOLOGY

## 2021-06-03 PROCEDURE — U0003 INFECTIOUS AGENT DETECTION BY NUCLEIC ACID (DNA OR RNA); SEVERE ACUTE RESPIRATORY SYNDROME CORONAVIRUS 2 (SARS-COV-2) (CORONAVIRUS DISEASE [COVID-19]), AMPLIFIED PROBE TECHNIQUE, MAKING USE OF HIGH THROUGHPUT TECHNOLOGIES AS DESCRIBED BY CMS-2020-01-R: HCPCS | Performed by: OPHTHALMOLOGY

## 2021-06-07 ENCOUNTER — HOSPITAL ENCOUNTER (OUTPATIENT)
Facility: CLINIC | Age: 76
Discharge: HOME | End: 2021-06-07
Attending: OPHTHALMOLOGY | Admitting: OPHTHALMOLOGY
Payer: COMMERCIAL

## 2021-06-07 ENCOUNTER — ANESTHESIA (OUTPATIENT)
Dept: SURGERY | Facility: CLINIC | Age: 76
End: 2021-06-07
Payer: COMMERCIAL

## 2021-06-07 ENCOUNTER — ANESTHESIA EVENT (OUTPATIENT)
Dept: SURGERY | Facility: CLINIC | Age: 76
End: 2021-06-07
Payer: COMMERCIAL

## 2021-06-07 VITALS
HEART RATE: 62 BPM | TEMPERATURE: 98.2 F | OXYGEN SATURATION: 98 % | RESPIRATION RATE: 24 BRPM | HEIGHT: 69 IN | BODY MASS INDEX: 36.28 KG/M2 | WEIGHT: 244.93 LBS | SYSTOLIC BLOOD PRESSURE: 150 MMHG | DIASTOLIC BLOOD PRESSURE: 71 MMHG

## 2021-06-07 DIAGNOSIS — H43.821 VITREOMACULAR ADHESION OF RIGHT EYE: ICD-10-CM

## 2021-06-07 DIAGNOSIS — Z48.810 AFTERCARE FOLLOWING SURGERY OF A SENSE ORGAN: Primary | ICD-10-CM

## 2021-06-07 DIAGNOSIS — H35.371 EPIRETINAL MEMBRANE, RIGHT: ICD-10-CM

## 2021-06-07 LAB — GLUCOSE BLDC GLUCOMTR-MCNC: 93 MG/DL (ref 70–99)

## 2021-06-07 PROCEDURE — 250N000009 HC RX 250: Performed by: OPHTHALMOLOGY

## 2021-06-07 PROCEDURE — 250N000011 HC RX IP 250 OP 636: Performed by: OPHTHALMOLOGY

## 2021-06-07 PROCEDURE — 999N000141 HC STATISTIC PRE-PROCEDURE NURSING ASSESSMENT: Performed by: OPHTHALMOLOGY

## 2021-06-07 PROCEDURE — 258N000003 HC RX IP 258 OP 636: Performed by: NURSE ANESTHETIST, CERTIFIED REGISTERED

## 2021-06-07 PROCEDURE — 360N000077 HC SURGERY LEVEL 4, PER MIN: Performed by: OPHTHALMOLOGY

## 2021-06-07 PROCEDURE — 82962 GLUCOSE BLOOD TEST: CPT

## 2021-06-07 PROCEDURE — 250N000009 HC RX 250: Performed by: NURSE ANESTHETIST, CERTIFIED REGISTERED

## 2021-06-07 PROCEDURE — 710N000012 HC RECOVERY PHASE 2, PER MINUTE: Performed by: OPHTHALMOLOGY

## 2021-06-07 PROCEDURE — 272N000001 HC OR GENERAL SUPPLY STERILE: Performed by: OPHTHALMOLOGY

## 2021-06-07 PROCEDURE — 250N000011 HC RX IP 250 OP 636: Performed by: NURSE ANESTHETIST, CERTIFIED REGISTERED

## 2021-06-07 PROCEDURE — 370N000017 HC ANESTHESIA TECHNICAL FEE, PER MIN: Performed by: OPHTHALMOLOGY

## 2021-06-07 RX ORDER — CYCLOPENTOLAT/TROPIC/PHENYLEPH 1%-1%-2.5%
1 DROPS (EA) OPHTHALMIC (EYE)
Status: COMPLETED | OUTPATIENT
Start: 2021-06-07 | End: 2021-06-07

## 2021-06-07 RX ORDER — NALOXONE HYDROCHLORIDE 0.4 MG/ML
0.4 INJECTION, SOLUTION INTRAMUSCULAR; INTRAVENOUS; SUBCUTANEOUS
Status: DISCONTINUED | OUTPATIENT
Start: 2021-06-07 | End: 2021-06-07 | Stop reason: HOSPADM

## 2021-06-07 RX ORDER — ONDANSETRON 2 MG/ML
INJECTION INTRAMUSCULAR; INTRAVENOUS PRN
Status: DISCONTINUED | OUTPATIENT
Start: 2021-06-07 | End: 2021-06-07

## 2021-06-07 RX ORDER — ERYTHROMYCIN 5 MG/G
OINTMENT OPHTHALMIC PRN
Status: DISCONTINUED | OUTPATIENT
Start: 2021-06-07 | End: 2021-06-07 | Stop reason: HOSPADM

## 2021-06-07 RX ORDER — ONDANSETRON 4 MG/1
4 TABLET, ORALLY DISINTEGRATING ORAL EVERY 30 MIN PRN
Status: DISCONTINUED | OUTPATIENT
Start: 2021-06-07 | End: 2021-06-07 | Stop reason: HOSPADM

## 2021-06-07 RX ORDER — BALANCED SALT SOLUTION 6.4; .75; .48; .3; 3.9; 1.7 MG/ML; MG/ML; MG/ML; MG/ML; MG/ML; MG/ML
SOLUTION OPHTHALMIC PRN
Status: DISCONTINUED | OUTPATIENT
Start: 2021-06-07 | End: 2021-06-07 | Stop reason: HOSPADM

## 2021-06-07 RX ORDER — ONDANSETRON 2 MG/ML
4 INJECTION INTRAMUSCULAR; INTRAVENOUS EVERY 30 MIN PRN
Status: DISCONTINUED | OUTPATIENT
Start: 2021-06-07 | End: 2021-06-07 | Stop reason: HOSPADM

## 2021-06-07 RX ORDER — FENTANYL CITRATE 50 UG/ML
INJECTION, SOLUTION INTRAMUSCULAR; INTRAVENOUS PRN
Status: DISCONTINUED | OUTPATIENT
Start: 2021-06-07 | End: 2021-06-07

## 2021-06-07 RX ORDER — NALOXONE HYDROCHLORIDE 0.4 MG/ML
0.2 INJECTION, SOLUTION INTRAMUSCULAR; INTRAVENOUS; SUBCUTANEOUS
Status: DISCONTINUED | OUTPATIENT
Start: 2021-06-07 | End: 2021-06-07 | Stop reason: HOSPADM

## 2021-06-07 RX ORDER — MEPERIDINE HYDROCHLORIDE 25 MG/ML
12.5 INJECTION INTRAMUSCULAR; INTRAVENOUS; SUBCUTANEOUS
Status: DISCONTINUED | OUTPATIENT
Start: 2021-06-07 | End: 2021-06-07 | Stop reason: HOSPADM

## 2021-06-07 RX ORDER — HYDROMORPHONE HYDROCHLORIDE 1 MG/ML
.3-.5 INJECTION, SOLUTION INTRAMUSCULAR; INTRAVENOUS; SUBCUTANEOUS EVERY 10 MIN PRN
Status: DISCONTINUED | OUTPATIENT
Start: 2021-06-07 | End: 2021-06-07 | Stop reason: HOSPADM

## 2021-06-07 RX ORDER — PROPOFOL 10 MG/ML
INJECTION, EMULSION INTRAVENOUS PRN
Status: DISCONTINUED | OUTPATIENT
Start: 2021-06-07 | End: 2021-06-07

## 2021-06-07 RX ORDER — POLYMYXIN B SULFATE AND TRIMETHOPRIM 1; 10000 MG/ML; [USP'U]/ML
1 SOLUTION OPHTHALMIC 4 TIMES DAILY
Qty: 10 ML | Refills: 0 | Status: SHIPPED | OUTPATIENT
Start: 2021-06-07 | End: 2021-07-20

## 2021-06-07 RX ORDER — SODIUM CHLORIDE, SODIUM LACTATE, POTASSIUM CHLORIDE, CALCIUM CHLORIDE 600; 310; 30; 20 MG/100ML; MG/100ML; MG/100ML; MG/100ML
INJECTION, SOLUTION INTRAVENOUS CONTINUOUS PRN
Status: DISCONTINUED | OUTPATIENT
Start: 2021-06-07 | End: 2021-06-07

## 2021-06-07 RX ORDER — LIDOCAINE HYDROCHLORIDE 20 MG/ML
INJECTION, SOLUTION INFILTRATION; PERINEURAL PRN
Status: DISCONTINUED | OUTPATIENT
Start: 2021-06-07 | End: 2021-06-07

## 2021-06-07 RX ORDER — FENTANYL CITRATE 50 UG/ML
25-50 INJECTION, SOLUTION INTRAMUSCULAR; INTRAVENOUS
Status: DISCONTINUED | OUTPATIENT
Start: 2021-06-07 | End: 2021-06-07 | Stop reason: HOSPADM

## 2021-06-07 RX ORDER — SODIUM CHLORIDE, SODIUM LACTATE, POTASSIUM CHLORIDE, CALCIUM CHLORIDE 600; 310; 30; 20 MG/100ML; MG/100ML; MG/100ML; MG/100ML
INJECTION, SOLUTION INTRAVENOUS CONTINUOUS
Status: DISCONTINUED | OUTPATIENT
Start: 2021-06-07 | End: 2021-06-07 | Stop reason: HOSPADM

## 2021-06-07 RX ORDER — ATROPINE SULFATE 10 MG/ML
SOLUTION/ DROPS OPHTHALMIC PRN
Status: DISCONTINUED | OUTPATIENT
Start: 2021-06-07 | End: 2021-06-07 | Stop reason: HOSPADM

## 2021-06-07 RX ORDER — PREDNISOLONE ACETATE 10 MG/ML
1 SUSPENSION/ DROPS OPHTHALMIC 4 TIMES DAILY
Qty: 5 ML | Refills: 0 | Status: SHIPPED | OUTPATIENT
Start: 2021-06-07 | End: 2021-07-20

## 2021-06-07 RX ADMIN — Medication 1 DROP: at 09:34

## 2021-06-07 RX ADMIN — SODIUM CHLORIDE, POTASSIUM CHLORIDE, SODIUM LACTATE AND CALCIUM CHLORIDE: 600; 310; 30; 20 INJECTION, SOLUTION INTRAVENOUS at 10:26

## 2021-06-07 RX ADMIN — Medication 1 DROP: at 09:38

## 2021-06-07 RX ADMIN — FENTANYL CITRATE 25 MCG: 50 INJECTION, SOLUTION INTRAMUSCULAR; INTRAVENOUS at 10:36

## 2021-06-07 RX ADMIN — ONDANSETRON 4 MG: 2 INJECTION INTRAMUSCULAR; INTRAVENOUS at 11:30

## 2021-06-07 RX ADMIN — Medication 1 DROP: at 09:29

## 2021-06-07 RX ADMIN — PROPOFOL 75 MG: 10 INJECTION, EMULSION INTRAVENOUS at 10:38

## 2021-06-07 RX ADMIN — LIDOCAINE HYDROCHLORIDE 40 MG: 20 INJECTION, SOLUTION INFILTRATION; PERINEURAL at 10:36

## 2021-06-07 ASSESSMENT — MIFFLIN-ST. JEOR: SCORE: 1675.38

## 2021-06-07 NOTE — ANESTHESIA CARE TRANSFER NOTE
Patient: Romayne L Sathre    Procedure(s):  RIGHT VITRECTOMY, PARS PLANA APPROACH, USING 25-GAUGE INSTRUMENTS, MEMBRANE PEEL    Diagnosis: Epiretinal membrane, right [H35.371]  Vitreomacular adhesion of right eye [H43.821]  Diagnosis Additional Information: No value filed.    Anesthesia Type:   MAC     Note:    Oropharynx: oropharynx clear of all foreign objects  Level of Consciousness: awake  Oxygen Supplementation: room air    Independent Airway: airway patency satisfactory and stable  Dentition: dentition unchanged  Vital Signs Stable: post-procedure vital signs reviewed and stable  Report to RN Given: handoff report given  Patient transferred to: Phase II    Handoff Report: Identifed the Patient, Identified the Reponsible Provider, Reviewed the pertinent medical history, Discussed the surgical course, Reviewed Intra-OP anesthesia mangement and issues during anesthesia, Set expectations for post-procedure period and Allowed opportunity for questions and acknowledgement of understanding      Vitals: (Last set prior to Anesthesia Care Transfer)  CRNA VITALS  6/7/2021 1114 - 6/7/2021 1150      6/7/2021             Pulse:  62    SpO2:  100 %        Electronically Signed By: PHOENIX Tripathi CRNA  June 7, 2021  11:50 AM

## 2021-06-07 NOTE — DISCHARGE INSTRUCTIONS
Same-Day Surgery   Adult Discharge Orders & Instructions     For 24 hours after surgery:  1. Get plenty of rest.  A responsible adult must stay with you for at least 24 hours after you leave the hospital.   2. Pain medication can slow your reflexes. Do not drive or use heavy equipment.  If you have weakness or tingling, don't drive or use heavy equipment until this feeling goes away.  3. Mixing alcohol and pain medication can cause dizziness and slow your breathing. It can even be fatal. Do not drink alcohol while taking pain medication.  4. Avoid strenuous or risky activities.  Ask for help when climbing stairs.   5. You may feel lightheaded.  If so, sit for a few minutes before standing.  Have someone help you get up.   6. If you have nausea (feel sick to your stomach), drink only clear liquids such as apple juice, ginger ale, broth or 7-Up.  Rest may also help.  Be sure to drink enough fluids.  Move to a regular diet as you feel able. Take pain medications with a small amount of solid food, such as toast or crackers, to avoid nausea.   7. A slight fever is normal. Call the doctor if your fever is over 100 F (37.7 C) (taken under the tongue) or lasts longer than 24 hours.  8. You may have a dry mouth, muscle aches, trouble sleeping or a sore throat.  These symptoms should go away after 24 hours.  9. Do not make important or legal decisions.   Pain Management:      1. Take pain medication (if prescribed) for pain as directed by your physician.        2. WARNING: If the pain medication you have been prescribed contains Tylenol  (acetaminophen), DO NOT take additional doses of Tylenol (acetaminophen).     Call your doctor for any of the followin.  Signs of infection (fever, growing tenderness at the surgery site, severe pain, a large amount of drainage or bleeding, foul-smelling drainage, redness, swelling).    2.  It has been over 8 to 10 hours since surgery and you are still not able to urinate (pee).    3.   Headache for over 24 hours.    4.  Numbness, tingling or weakness the day after surgery (if you had spinal anesthesia).  To contact a doctor, call [phone]  or:      [phone] and ask for the Resident On Call for:         Ophthalmology  (answered 24 hours a day)      Emergency Department:  [facility]: [phone]  [facility] [facility]: [phone]               Rev. 10/2014

## 2021-06-07 NOTE — ANESTHESIA PREPROCEDURE EVALUATION
Anesthesia Pre-Procedure Evaluation    Patient: Romayne L Sathre   MRN: [id number] : 1945        Preoperative Diagnosis: Epiretinal membrane, right [H35.371]  Vitreomacular adhesion of right eye [H43.821]   Procedure : Procedure(s):  RIGHT VITRECTOMY, PARS PLANA APPROACH, USING 25-GAUGE INSTRUMENTS     Past Medical History:   Diagnosis Date     Antiplatelet or antithrombotic long-term use      Breast cancer (H) 2008    Right Breast     CKD (chronic kidney disease) stage 3, GFR 30-59 ml/min 2010     Hyperlipidemia LDL goal <100 2010     Hypertension goal BP (blood pressure) < 140/90 2010     Hypothyroid      Nonsenile cataract      Osteoporosis 2014     Other psoriasis       Past Surgical History:   Procedure Laterality Date     HC REMV CATARACT EXTRACAP,INSERT LENS, W/O ECP Right 2002    Dr. Clinton Lopez ([id number], Power:  20.0D)     PHACOEMULSIFICATION CLEAR CORNEA WITH STANDARD INTRAOCULAR LENS IMPLANT Left 2021    Procedure: LEFT EYE PHACOEMULSIFICATION, CATARACT, WITH INTRAOCULAR LENS IMPLANT;  Surgeon: Ruben Shelby MD;  [place] [facility] [place]     SURGICAL HISTORY OF -   Oct 3, 2008    Rt saline implant      Allergies   Allergen Reactions     No Known Drug Allergies       Social History     Tobacco Use     Smoking status: Passive Smoke Exposure - Never Smoker     Smokeless tobacco: Never Used     Tobacco comment: family members smoke; daughter   Substance Use Topics     Alcohol use: Yes     Alcohol/week: 10.0 standard drinks     Comment: 0-1 drink per month      Wt Readings from Last 1 Encounters:   21 111.1 kg (244 lb 14.9 oz)        Anesthesia Evaluation            ROS/MED HX  ENT/Pulmonary:       Neurologic:       Cardiovascular:     (+) hypertension-----Taking blood thinners     METS/Exercise Tolerance:     Hematologic:       Musculoskeletal:       GI/Hepatic:       Renal/Genitourinary:     (+) renal disease,     Endo:     (+) thyroid problem, Obesity,      Psychiatric/Substance Use:       Infectious Disease:       Malignancy:       Other:            Physical Exam    Airway        Mallampati: II   TM distance: > 3 FB   Neck ROM: full   Mouth opening: > 3 cm    Respiratory Devices and Support         Dental  no notable dental history         Cardiovascular   cardiovascular exam normal          Pulmonary   pulmonary exam normal                OUTSIDE LABS:  CBC:   Lab Results   Component Value Date    WBC 5.4 11/24/2020    WBC 3.7 (L) 11/23/2020    HGB 12.2 11/24/2020    HGB 11.4 (L) 11/23/2020    HCT 39.6 11/24/2020    HCT 37.6 11/23/2020     11/24/2020     11/23/2020     BMP:   Lab Results   Component Value Date     05/18/2021     02/05/2021    POTASSIUM 5.1 05/18/2021    POTASSIUM 4.9 02/05/2021    CHLORIDE 106 05/18/2021    CHLORIDE 106 02/05/2021    CO2 29 05/18/2021    CO2 28 02/05/2021    BUN 21 05/18/2021    BUN 21 02/05/2021    CR 1.30 (H) 05/18/2021    CR 1.26 (H) 02/05/2021    GLC 87 05/18/2021    GLC 85 02/05/2021     COAGS:   Lab Results   Component Value Date     (HH) 11/19/2020    INR 1.32 (H) 11/20/2020    FIBR 513 (H) 11/24/2020     POC:   Lab Results   Component Value Date    BGM 93 06/07/2021     HEPATIC:   Lab Results   Component Value Date    ALBUMIN 1.6 (L) 11/21/2020    PROTTOTAL 6.4 (L) 11/21/2020    ALT 31 11/21/2020    AST 27 11/21/2020    ALKPHOS 59 11/21/2020    BILITOTAL 0.7 11/21/2020     OTHER:   Lab Results   Component Value Date    PH 7.39 11/17/2020    LACT 2.7 (H) 11/16/2020    A1C 5.5 06/08/2020    GALLO 9.6 05/18/2021    PHOS 3.4 11/20/2020    MAG 1.8 11/22/2020    TSH 2.76 03/15/2021    T4 1.42 02/05/2021    CRP 13.0 (H) 11/24/2020    SED 20 07/16/2014       Anesthesia Plan    ASA Status:  3   NPO Status:  NPO Appropriate    Anesthesia Type: MAC.      Maintenance: Balanced.        Consents    Anesthesia Plan(s) and associated risks, benefits, and realistic alternatives discussed. Questions answered  and patient/representative(s) expressed understanding.     - Discussed with:  Patient      - Extended Intubation/Ventilatory Support Discussed: No.      - Patient is DNR/DNI Status: No    Use of blood products discussed: No .     Postoperative Care    Pain management: Multi-modal analgesia.   PONV prophylaxis: Ondansetron (or other 5HT-3), Dexamethasone or Solumedrol     Comments:                Goldie Renner MD

## 2021-06-07 NOTE — ANESTHESIA POSTPROCEDURE EVALUATION
Patient: Romayne L Sathre    Procedure(s):  RIGHT VITRECTOMY, PARS PLANA APPROACH, USING 25-GAUGE INSTRUMENTS, MEMBRANE PEEL    Diagnosis:Epiretinal membrane, right [H35.371]  Vitreomacular adhesion of right eye [H43.821]  Diagnosis Additional Information: No value filed.    Anesthesia Type:  MAC    Note:  Disposition: Outpatient   Postop Pain Control: Uneventful            Sign Out: Well controlled pain   PONV: No   Neuro/Psych: Uneventful            Sign Out: Acceptable/Baseline neuro status   Airway/Respiratory: Uneventful            Sign Out: Acceptable/Baseline resp. status   CV/Hemodynamics: Uneventful            Sign Out: Acceptable CV status; No obvious hypovolemia; No obvious fluid overload   Other NRE:    DID A NON-ROUTINE EVENT OCCUR?            Last vitals:  Vitals:    06/07/21 1215 06/07/21 1230 06/07/21 1245   BP: (!) 136/111 (!) 141/75 (!) 150/71   Pulse: 74 62    Resp:   24   Temp:   36.8  C (98.2  F)   SpO2: 100% 97% 98%       Last vitals prior to Anesthesia Care Transfer:  CRNA VITALS  6/7/2021 1114 - 6/7/2021 1214      6/7/2021             Pulse:  62    SpO2:  100 %          Electronically Signed By: Goldie Renner MD  June 7, 2021  3:39 PM

## 2021-06-07 NOTE — OP NOTE
PRE-OP Dx:    1) vitreomacular traction right eye    2) Epiretinal membrane right eye    Post-OP Dx: same  + pseudophacodonesis     Attending: MILE Donovan MD  Fellow: PRICILLA Sharma MD     Anesthesia: MAC with RB  Procedures:   1) Pars plana vitrectomy (PPV) 25g Right eye   2) Membrane stripping and stripping of  internal limiting membrane      Findings: Epiretinal membrane and pseudophacodonesis  Right eye     EBL: scant  Specimens: none  Complications: none      Procedure Description:    Romayne L Sathre is a AGE: 75 year old year old patient with a history of epiretinal membrane of the right eye.  After informed consent was obtained, they were brought into the OR where retrobulbar anesthesia was administered.  The eye was then prepped and draped in the usual fashion for ophthalmic surgery.    Attention was then turned to the vitrectomy.  Marks were made on the sclera inferotemporally, superotemporally, and superonasally 3 mm posterior to the limbus.  The 25g transscleral cannulas were inserted through the sclera using the trocars.  The infusion cannula was connected to the inferonasal cannula and directly visualized to verify it was in the correct location.     The patient was noted to have capsular phymosis and pseudophacodonesis, evidence of compromised zonules.    A core vitrectomy was performed and the vitreous was stained with kenalog.     Next Briliant blue was instilled into the eye to stain the ERM/ILM complex.  Forceps were used to peel the ERM/ILM complex in a broad diameter around the fovea.       The periphery was examined with depression. No breaks or tears were found.        The cannulas were removed and the sclerotomies were tight.  The intraocular pressure was appropriate.   Decadron and Ancef were injected subconjunctivally.  Atropine was placed in the eye with erythromycin ointment.  An eyepad and singh shield were taped over the eye.    The patient left the OR with no complications.    The surgery was  assisted by Zackary Sharma MD, because no qualified resident was available to assist on the day of surgery.  Due to the delicate and complex nature of this surgery, a skilled assistant like Dr. Sharma was required.  Dr. Sharma assisted with vitrectomy.  I was present for the entire surgery.

## 2021-06-08 ENCOUNTER — OFFICE VISIT (OUTPATIENT)
Dept: OPHTHALMOLOGY | Facility: CLINIC | Age: 76
End: 2021-06-08
Attending: OPHTHALMOLOGY
Payer: COMMERCIAL

## 2021-06-08 DIAGNOSIS — Z48.810 AFTERCARE FOLLOWING SURGERY OF A SENSE ORGAN: Primary | ICD-10-CM

## 2021-06-08 PROCEDURE — 99024 POSTOP FOLLOW-UP VISIT: CPT | Performed by: OPHTHALMOLOGY

## 2021-06-08 PROCEDURE — G0463 HOSPITAL OUTPT CLINIC VISIT: HCPCS

## 2021-06-08 ASSESSMENT — EXTERNAL EXAM - RIGHT EYE: OD_EXAM: NORMAL

## 2021-06-08 ASSESSMENT — VISUAL ACUITY
OS_SC+: -1
OS_PH_SC: 20/25
METHOD: SNELLEN - LINEAR
OS_PH_SC+: +1
OD_SC: 20/300
OD_PH_SC: 20/125
OS_SC: 20/30
OD_PH_SC+: -1

## 2021-06-08 ASSESSMENT — TONOMETRY
IOP_METHOD: ICARE
OD_IOP_MMHG: 16
OS_IOP_MMHG: 13

## 2021-06-08 ASSESSMENT — SLIT LAMP EXAM - LIDS: COMMENTS: 1+ EDEMA

## 2021-06-08 NOTE — PROGRESS NOTES
Postoperative day 1 status post PPV/MS right eye     Slept well  Retina attached  Doing well    Plan:  No heavy lifting   Eckert shield at all times  Retina detachment and endophthalmitis precautions were discussed with the patient and was asked to return if any of the those occur    Medications to operative eye  Prednisolone four times a day    polytrim four times a day     Follow up in one week    Complete documentation of historical and exam elements from today's encounter can be found in the full encounter summary report (not reduplicated in this progress note). I personally obtained the chief complaint(s) and history of present illness.  I confirmed and edited as necessary the review of systems, past medical/surgical history, family history, social history, and examination findings as documented by others; and I examined the patient myself. I personally reviewed the relevant tests, images, and reports as documented above. I formulated and edited as necessary the assessment and plan and discussed the findings and management plan with the patient and family.    Fede Donovan MD  , Vitreoretinal surgery   Department of Ophthalmology  [facility]

## 2021-06-08 NOTE — NURSING NOTE
Chief Complaints and History of Present Illnesses   Patient presents with     Post Op (Ophthalmology) Right Eye     Chief Complaint(s) and History of Present Illness(es)     Post Op (Ophthalmology) Right Eye     Laterality: right eye    Onset: sudden    Onset: 1 day ago    Associated symptoms: dryness, itching, tearing and redness.  Negative for eye pain and photophobia    Pain scale: 0/10              Comments     RIGHT VITRECTOMY, PARS PLANA APPROACH, USING 25-GAUGE INSTRUMENTS, MEMBRANE PEEL.  6/7/21.  Pt slept ok.  Shield stayed on all night.  She has not started any drops.    CARLEY Ward June 8, 2021 9:55 AM

## 2021-06-15 ENCOUNTER — OFFICE VISIT (OUTPATIENT)
Dept: OPHTHALMOLOGY | Facility: CLINIC | Age: 76
End: 2021-06-15
Attending: OPHTHALMOLOGY
Payer: COMMERCIAL

## 2021-06-15 DIAGNOSIS — Z48.810 AFTERCARE FOLLOWING SURGERY OF A SENSE ORGAN: Primary | ICD-10-CM

## 2021-06-15 PROCEDURE — 99024 POSTOP FOLLOW-UP VISIT: CPT | Performed by: OPHTHALMOLOGY

## 2021-06-15 PROCEDURE — G0463 HOSPITAL OUTPT CLINIC VISIT: HCPCS

## 2021-06-15 ASSESSMENT — SLIT LAMP EXAM - LIDS: COMMENTS: 1+ EDEMA

## 2021-06-15 ASSESSMENT — VISUAL ACUITY
OD_PH_SC+: -1
OD_SC+: -1
METHOD: SNELLEN - LINEAR
OD_SC: 20/250
OS_SC+: -2
OS_SC: 20/40
OD_PH_SC: 20/100

## 2021-06-15 ASSESSMENT — EXTERNAL EXAM - RIGHT EYE: OD_EXAM: NORMAL

## 2021-06-15 ASSESSMENT — TONOMETRY
OS_IOP_MMHG: 15
OD_IOP_MMHG: 12
IOP_METHOD: TONOPEN

## 2021-06-15 NOTE — NURSING NOTE
Chief Complaints and History of Present Illnesses   Patient presents with     Post Op (Ophthalmology) Right Eye     Chief Complaint(s) and History of Present Illness(es)     Post Op (Ophthalmology) Right Eye     Laterality: right eye    Quality: blurred vision    Frequency: constantly    Timing: throughout the day    Course: gradually improving    Associated symptoms: foreign body sensation (sensation).  Negative for eye pain, flashes, floaters, tearing and discharge    Pain scale: 0/10              Comments     POW #1 status post PPV/MS right eye (6/7/2021)  Soreness in RE about half the day last Thursday. States sore with a FB, not completely resolved.  VA has improved slightly.  Pt states tolerating post op drops well:   Prednisolone four times a day    polytrim four times a day       Jennifer , COT COT 9:40 AM 06/15/2021

## 2021-06-15 NOTE — PROGRESS NOTES
Postoperative week 1 status post PPV/MS right eye 6/7/21    Vision improving  Retina attached  Doing well    Plan:  No heavy lifting   Eckert shield at all times  Retina detachment and endophthalmitis precautions were discussed with the patient and was asked to return if any of the those occur    Medications to operative eye  Taper Prednisolone 3/2/1   Stop polytrim four times a day     Follow up in for weeks for DFE and OCT right eye only    Complete documentation of historical and exam elements from today's encounter can be found in the full encounter summary report (not reduplicated in this progress note). I personally obtained the chief complaint(s) and history of present illness.  I confirmed and edited as necessary the review of systems, past medical/surgical history, family history, social history, and examination findings as documented by others; and I examined the patient myself. I personally reviewed the relevant tests, images, and reports as documented above. I formulated and edited as necessary the assessment and plan and discussed the findings and management plan with the patient and family.    Fede Donovan MD  , Vitreoretinal surgery   Department of Ophthalmology  [facility]

## 2021-07-07 DIAGNOSIS — H35.371 EPIRETINAL MEMBRANE, RIGHT: Primary | ICD-10-CM

## 2021-07-20 ENCOUNTER — OFFICE VISIT (OUTPATIENT)
Dept: OPHTHALMOLOGY | Facility: CLINIC | Age: 76
End: 2021-07-20
Attending: OPHTHALMOLOGY
Payer: COMMERCIAL

## 2021-07-20 DIAGNOSIS — H35.371 EPIRETINAL MEMBRANE, RIGHT: ICD-10-CM

## 2021-07-20 DIAGNOSIS — Z48.810 AFTERCARE FOLLOWING SURGERY OF A SENSE ORGAN: Primary | ICD-10-CM

## 2021-07-20 PROCEDURE — G0463 HOSPITAL OUTPT CLINIC VISIT: HCPCS

## 2021-07-20 PROCEDURE — 99024 POSTOP FOLLOW-UP VISIT: CPT | Performed by: OPHTHALMOLOGY

## 2021-07-20 PROCEDURE — 92134 CPTRZ OPH DX IMG PST SGM RTA: CPT | Performed by: OPHTHALMOLOGY

## 2021-07-20 ASSESSMENT — SLIT LAMP EXAM - LIDS: COMMENTS: NORMAL

## 2021-07-20 ASSESSMENT — VISUAL ACUITY
OD_SC: 20/250
OS_SC: 20/25
METHOD: SNELLEN - LINEAR
OS_SC+: -2/+2
OD_PH_SC: 20/125

## 2021-07-20 ASSESSMENT — TONOMETRY
OS_IOP_MMHG: 18
IOP_METHOD: TONOPEN
OD_IOP_MMHG: 15

## 2021-07-20 ASSESSMENT — EXTERNAL EXAM - RIGHT EYE: OD_EXAM: NORMAL

## 2021-07-20 NOTE — PROGRESS NOTES
Postoperative week 6 status post PPV/MS right eye 6/7/21    Vision improving  Retina attached  Doing well    IMAGING  OCT 7-20-21  right eye almost normal foveal contour; outer retina irregularities at fovea; left over ERM inferior macula    Plan:  Stop all drops  RTC 2-3 months    Complete documentation of historical and exam elements from today's encounter can be found in the full encounter summary report (not reduplicated in this progress note). I personally obtained the chief complaint(s) and history of present illness.  I confirmed and edited as necessary the review of systems, past medical/surgical history, family history, social history, and examination findings as documented by others; and I examined the patient myself. I personally reviewed the relevant tests, images, and reports as documented above. I formulated and edited as necessary the assessment and plan and discussed the findings and management plan with the patient and family.    Fede Donovan MD  , Vitreoretinal surgery   Department of Ophthalmology  [facility]

## 2021-07-20 NOTE — NURSING NOTE
Chief Complaints and History of Present Illnesses   Patient presents with     Post Op (Ophthalmology) Right Eye     Chief Complaint(s) and History of Present Illness(es)     Post Op (Ophthalmology) Right Eye     Laterality: right eye    Quality: blurred vision    Frequency: constantly    Timing: throughout the day    Course: gradually improving    Associated symptoms: photophobia.  Negative for floaters, flashes and eye pain    Pain scale: 0/10              Comments     POW # 6 status post PPV/MS right eye (6/7/2021)   Pt states VA seems better and she reports no pain. Denies new floaters/flashes.  Pt states stopped eye drops as instructed.  Pt notes that really needs sunglasses in bright sunlight/even needs to use hands to shield brightness.  Jennifer Barnes, COT COT 7:56 AM 07/20/2021

## 2021-08-06 DIAGNOSIS — E03.9 ACQUIRED HYPOTHYROIDISM: ICD-10-CM

## 2021-08-10 RX ORDER — LEVOTHYROXINE SODIUM 150 UG/1
TABLET ORAL
Qty: 100 TABLET | Refills: 4 | Status: SHIPPED | OUTPATIENT
Start: 2021-08-10 | End: 2022-03-22

## 2021-08-10 NOTE — TELEPHONE ENCOUNTER
levothyroxine (SYNTHROID/LEVOTHROID) 150 MCG tablet      Last Written Prescription Date:  7/8/2020 *[facility]  Last Fill Quantity: 100 tab,   # refills: 3  Last Office Visit : 7/8/2020  Future Office visit:  None    Current on med list prescription Date:  2/7/2021 *[facility]  Last Fill Quantity: 100 tab,   # refills: *historical  - Take 1 tab QD    Routing refill request to provider for review/approval because:  Provider request.  - pended Rx as last filled by Dr Jeffery

## 2021-10-20 ENCOUNTER — OFFICE VISIT (OUTPATIENT)
Dept: OPHTHALMOLOGY | Facility: CLINIC | Age: 76
End: 2021-10-20
Attending: OPHTHALMOLOGY
Payer: COMMERCIAL

## 2021-10-20 DIAGNOSIS — H25.812 COMBINED FORM OF AGE-RELATED CATARACT, LEFT EYE: ICD-10-CM

## 2021-10-20 DIAGNOSIS — H43.821 VITREOMACULAR ADHESION OF RIGHT EYE: Primary | ICD-10-CM

## 2021-10-20 DIAGNOSIS — Z96.1 PSEUDOPHAKIA: ICD-10-CM

## 2021-10-20 DIAGNOSIS — Z96.1 PSEUDOPHAKIA OF BOTH EYES: ICD-10-CM

## 2021-10-20 PROCEDURE — 92134 CPTRZ OPH DX IMG PST SGM RTA: CPT | Performed by: OPHTHALMOLOGY

## 2021-10-20 PROCEDURE — 92014 COMPRE OPH EXAM EST PT 1/>: CPT | Performed by: OPHTHALMOLOGY

## 2021-10-20 PROCEDURE — G0463 HOSPITAL OUTPT CLINIC VISIT: HCPCS

## 2021-10-20 PROCEDURE — 92015 DETERMINE REFRACTIVE STATE: CPT

## 2021-10-20 ASSESSMENT — VISUAL ACUITY
OD_SC: 20/125
OS_SC+: +2
OD_SC+: -1
OD_PH_SC: 20/80
METHOD: SNELLEN - LINEAR
OS_SC: 20/25
OD_PH_SC+: -1

## 2021-10-20 ASSESSMENT — EXTERNAL EXAM - RIGHT EYE: OD_EXAM: NORMAL

## 2021-10-20 ASSESSMENT — CONF VISUAL FIELD
OD_NORMAL: 1
METHOD: COUNTING FINGERS
OS_NORMAL: 1

## 2021-10-20 ASSESSMENT — REFRACTION_MANIFEST
OS_CYLINDER: SPHERE
OS_SPHERE: +0.50
OD_CYLINDER: SPHERE
OD_SPHERE: -3.00
OS_ADD: +2.75
OD_ADD: +2.75

## 2021-10-20 ASSESSMENT — CUP TO DISC RATIO
OD_RATIO: 0.5
OS_RATIO: 0.6

## 2021-10-20 ASSESSMENT — TONOMETRY
IOP_METHOD: TONOPEN
OS_IOP_MMHG: 17
OD_IOP_MMHG: 15

## 2021-10-20 ASSESSMENT — EXTERNAL EXAM - LEFT EYE: OS_EXAM: NORMAL

## 2021-10-20 ASSESSMENT — SLIT LAMP EXAM - LIDS
COMMENTS: NORMAL
COMMENTS: NORMAL

## 2021-10-20 NOTE — PROGRESS NOTES
CC: blurred vision     INTERVAL HISTORY - status post PPV/MS for ERM and VMT right eye 6/7/21  Vision improved but still blurry  S/P CE IOL left eye 2/2021    HPI -   Romayne L Sathre is a  76 year old year-old patient presenting for referral from Dr Shelby for VMT right eye. Problem with focusing at distance and near. No complain from metamorphopsia. Deneis seeing floaters or flashes.       PAST OCULAR SURGERY  CE IOL OU  S/P PPV? Right eye     RETINAL IMAGING:  OCT 10-20-21  OD - VMT with traction over fovea; traction worse with IRF   OS - normal foveal contour ; small ERM inferior macula    ASSESSMENT & PLAN    1. Vitreoretinal adhesion right eye  2. ERM right eye   S/P PPV/MP right eye 6/2021  Foveal anatomy improved significantly; BCVA 20/50 limited likely due to subtle IS/OS disruption at fovea (+drusen)  -3.0 refraction right eye and sees well up close; instructed her to try to use right eye as monovision for near    2. Pseudophakia each eye   observe    3. Glaucoma suspect ou  Large disc with large cup each eye  Being followed by Dr. Shelby    Disposition:    Continue to follow with Dr. Shelby  RTC with me for DFE and repeat OCT in 12 year     Complete documentation of historical and exam elements from today's encounter can be found in the full encounter summary report (not reduplicated in this progress note). I personally obtained the chief complaint(s) and history of present illness.  I confirmed and edited as necessary the review of systems, past medical/surgical history, family history, social history, and examination findings as documented by others; and I examined the patient myself. I personally reviewed the relevant tests, images, and reports as documented above. I formulated and edited as necessary the assessment and plan and discussed the findings and management plan with the patient and family.     Fede Donovan MD

## 2021-10-20 NOTE — NURSING NOTE
Chief Complaints and History of Present Illnesses   Patient presents with     Post Op (Ophthalmology) Right Eye     Chief Complaint(s) and History of Present Illness(es)     Post Op (Ophthalmology) Right Eye     Laterality: right eye    Associated symptoms: Negative for eye pain, dryness, itching, burning, floaters and flashes    Response to treatment: mild improvement    Pain scale: 0/10              Comments     Romayne is here three months post vitrectomy RE. She feels vision RE is good but not as good  As SHMUEL Ding COT 8:37 AM October 20, 2021

## 2021-10-24 ENCOUNTER — HEALTH MAINTENANCE LETTER (OUTPATIENT)
Age: 76
End: 2021-10-24

## 2022-03-17 ASSESSMENT — ENCOUNTER SYMPTOMS
PARESTHESIAS: 0
FEVER: 0
NAUSEA: 0
COUGH: 0
ARTHRALGIAS: 0
JOINT SWELLING: 0
HEMATURIA: 0
NERVOUS/ANXIOUS: 0
FREQUENCY: 0
HEADACHES: 0
WEAKNESS: 0
HEMATOCHEZIA: 0
CHILLS: 0
SORE THROAT: 0
HEARTBURN: 0
CONSTIPATION: 0
DYSURIA: 0
SHORTNESS OF BREATH: 0
EYE PAIN: 0
MYALGIAS: 0
DIARRHEA: 0
DIZZINESS: 0
ABDOMINAL PAIN: 0
BREAST MASS: 0
PALPITATIONS: 0

## 2022-03-17 ASSESSMENT — ACTIVITIES OF DAILY LIVING (ADL): CURRENT_FUNCTION: NO ASSISTANCE NEEDED

## 2022-03-22 ENCOUNTER — OFFICE VISIT (OUTPATIENT)
Dept: FAMILY MEDICINE | Facility: CLINIC | Age: 77
End: 2022-03-22
Payer: COMMERCIAL

## 2022-03-22 VITALS
HEIGHT: 69 IN | SYSTOLIC BLOOD PRESSURE: 136 MMHG | BODY MASS INDEX: 37.47 KG/M2 | WEIGHT: 253 LBS | HEART RATE: 65 BPM | DIASTOLIC BLOOD PRESSURE: 79 MMHG | TEMPERATURE: 97.4 F | OXYGEN SATURATION: 97 %

## 2022-03-22 DIAGNOSIS — N18.30 STAGE 3 CHRONIC KIDNEY DISEASE, UNSPECIFIED WHETHER STAGE 3A OR 3B CKD (H): ICD-10-CM

## 2022-03-22 DIAGNOSIS — I10 HYPERTENSION GOAL BP (BLOOD PRESSURE) < 140/90: ICD-10-CM

## 2022-03-22 DIAGNOSIS — R12 HEART BURN: ICD-10-CM

## 2022-03-22 DIAGNOSIS — R73.03 PREDIABETES: ICD-10-CM

## 2022-03-22 DIAGNOSIS — R53.81 PHYSICAL DECONDITIONING: ICD-10-CM

## 2022-03-22 DIAGNOSIS — R41.3 MEMORY DEFICIT: ICD-10-CM

## 2022-03-22 DIAGNOSIS — R79.9 ABNORMAL FINDING OF BLOOD CHEMISTRY, UNSPECIFIED: ICD-10-CM

## 2022-03-22 DIAGNOSIS — H40.003 GLAUCOMA SUSPECT, BILATERAL: ICD-10-CM

## 2022-03-22 DIAGNOSIS — E78.2 MIXED HYPERLIPIDEMIA: ICD-10-CM

## 2022-03-22 DIAGNOSIS — Z71.89 ADVANCED DIRECTIVES, COUNSELING/DISCUSSION: ICD-10-CM

## 2022-03-22 DIAGNOSIS — Z12.11 COLON CANCER SCREENING: ICD-10-CM

## 2022-03-22 DIAGNOSIS — Z86.2 HISTORY OF ANEMIA: ICD-10-CM

## 2022-03-22 DIAGNOSIS — K59.00 CONSTIPATION, UNSPECIFIED CONSTIPATION TYPE: ICD-10-CM

## 2022-03-22 DIAGNOSIS — E66.01 MORBID OBESITY (H): ICD-10-CM

## 2022-03-22 DIAGNOSIS — Z12.31 ENCOUNTER FOR SCREENING MAMMOGRAM FOR MALIGNANT NEOPLASM OF BREAST: ICD-10-CM

## 2022-03-22 DIAGNOSIS — R06.83 SNORING: ICD-10-CM

## 2022-03-22 DIAGNOSIS — E03.9 ACQUIRED HYPOTHYROIDISM: ICD-10-CM

## 2022-03-22 DIAGNOSIS — M85.80 OSTEOPENIA, UNSPECIFIED LOCATION: ICD-10-CM

## 2022-03-22 DIAGNOSIS — Z87.09 HISTORY OF ACUTE RESPIRATORY FAILURE: ICD-10-CM

## 2022-03-22 DIAGNOSIS — Z00.00 HEALTH CARE MAINTENANCE: ICD-10-CM

## 2022-03-22 DIAGNOSIS — Z00.00 MEDICARE ANNUAL WELLNESS VISIT, SUBSEQUENT: Primary | ICD-10-CM

## 2022-03-22 DIAGNOSIS — R53.82 CHRONIC FATIGUE: ICD-10-CM

## 2022-03-22 DIAGNOSIS — R60.9 DEPENDENT EDEMA: ICD-10-CM

## 2022-03-22 DIAGNOSIS — H35.371 EPIRETINAL MEMBRANE, RIGHT: ICD-10-CM

## 2022-03-22 DIAGNOSIS — D70.9 NEUTROPENIA, UNSPECIFIED TYPE (H): ICD-10-CM

## 2022-03-22 DIAGNOSIS — Z85.3 HISTORY OF BREAST CANCER: ICD-10-CM

## 2022-03-22 DIAGNOSIS — Z86.16 HISTORY OF 2019 NOVEL CORONAVIRUS DISEASE (COVID-19): ICD-10-CM

## 2022-03-22 DIAGNOSIS — Z13.1 SCREENING FOR DIABETES MELLITUS: ICD-10-CM

## 2022-03-22 DIAGNOSIS — H43.821 VITREOMACULAR ADHESION OF RIGHT EYE: ICD-10-CM

## 2022-03-22 DIAGNOSIS — Z23 NEED FOR COVID-19 VACCINE: ICD-10-CM

## 2022-03-22 DIAGNOSIS — Z23 NEED FOR SHINGLES VACCINE: ICD-10-CM

## 2022-03-22 PROBLEM — H25.812 COMBINED FORM OF AGE-RELATED CATARACT, LEFT EYE: Status: RESOLVED | Noted: 2020-10-20 | Resolved: 2022-03-22

## 2022-03-22 PROBLEM — U07.1 2019 NOVEL CORONAVIRUS DISEASE (COVID-19): Status: ACTIVE | Noted: 2020-11-16

## 2022-03-22 PROBLEM — R09.02 HYPOXIA: Status: ACTIVE | Noted: 2020-11-16

## 2022-03-22 LAB
ALBUMIN SERPL-MCNC: 3.4 G/DL (ref 3.4–5)
ALP SERPL-CCNC: 48 U/L (ref 40–150)
ALT SERPL W P-5'-P-CCNC: 28 U/L (ref 0–50)
ANION GAP SERPL CALCULATED.3IONS-SCNC: 8 MMOL/L (ref 3–14)
AST SERPL W P-5'-P-CCNC: 20 U/L (ref 0–45)
BASOPHILS # BLD MANUAL: 0 10E3/UL (ref 0–0.2)
BASOPHILS NFR BLD MANUAL: 0 %
BILIRUB SERPL-MCNC: 0.4 MG/DL (ref 0.2–1.3)
BUN SERPL-MCNC: 22 MG/DL (ref 7–30)
CALCIUM SERPL-MCNC: 9.6 MG/DL (ref 8.5–10.1)
CHLORIDE BLD-SCNC: 108 MMOL/L (ref 94–109)
CHOLEST SERPL-MCNC: 178 MG/DL
CO2 SERPL-SCNC: 25 MMOL/L (ref 20–32)
CREAT SERPL-MCNC: 1.22 MG/DL (ref 0.52–1.04)
CREAT UR-MCNC: 118 MG/DL
DEPRECATED CALCIDIOL+CALCIFEROL SERPL-MC: 30 UG/L (ref 20–75)
EOSINOPHIL # BLD MANUAL: 0.1 10E3/UL (ref 0–0.7)
EOSINOPHIL NFR BLD MANUAL: 4 %
ERYTHROCYTE [DISTWIDTH] IN BLOOD BY AUTOMATED COUNT: 13.8 % (ref 10–15)
FASTING STATUS PATIENT QL REPORTED: ABNORMAL
FERRITIN SERPL-MCNC: 179 NG/ML (ref 8–252)
GFR SERPL CREATININE-BSD FRML MDRD: 46 ML/MIN/1.73M2
GLUCOSE BLD-MCNC: 98 MG/DL (ref 70–99)
HBA1C MFR BLD: 5.7 % (ref 0–5.6)
HCT VFR BLD AUTO: 40.8 % (ref 35–47)
HDLC SERPL-MCNC: 48 MG/DL
HGB BLD-MCNC: 13.1 G/DL (ref 11.7–15.7)
IRON SATN MFR SERPL: 26 % (ref 15–46)
IRON SERPL-MCNC: 76 UG/DL (ref 35–180)
LDLC SERPL CALC-MCNC: 100 MG/DL
LYMPHOCYTES # BLD MANUAL: 0.9 10E3/UL (ref 0.8–5.3)
LYMPHOCYTES NFR BLD MANUAL: 41 %
MCH RBC QN AUTO: 27.9 PG (ref 26.5–33)
MCHC RBC AUTO-ENTMCNC: 32.1 G/DL (ref 31.5–36.5)
MCV RBC AUTO: 87 FL (ref 78–100)
MICROALBUMIN UR-MCNC: 27 MG/L
MICROALBUMIN/CREAT UR: 22.88 MG/G CR (ref 0–25)
MONOCYTES # BLD MANUAL: 0.7 10E3/UL (ref 0–1.3)
MONOCYTES NFR BLD MANUAL: 30 %
NEUTROPHILS # BLD MANUAL: 0.6 10E3/UL (ref 1.6–8.3)
NEUTROPHILS NFR BLD MANUAL: 25 %
NONHDLC SERPL-MCNC: 130 MG/DL
PLAT MORPH BLD: ABNORMAL
PLATELET # BLD AUTO: 201 10E3/UL (ref 150–450)
POTASSIUM BLD-SCNC: 4.8 MMOL/L (ref 3.4–5.3)
PROT SERPL-MCNC: 7.6 G/DL (ref 6.8–8.8)
RBC # BLD AUTO: 4.7 10E6/UL (ref 3.8–5.2)
RBC MORPH BLD: ABNORMAL
SODIUM SERPL-SCNC: 141 MMOL/L (ref 133–144)
T4 FREE SERPL-MCNC: 1.24 NG/DL (ref 0.76–1.46)
TIBC SERPL-MCNC: 292 UG/DL (ref 240–430)
TRIGL SERPL-MCNC: 150 MG/DL
TSH SERPL DL<=0.005 MIU/L-ACNC: 4.95 MU/L (ref 0.4–4)
VIT B12 SERPL-MCNC: 391 PG/ML (ref 193–986)
WBC # BLD AUTO: 2.2 10E3/UL (ref 4–11)

## 2022-03-22 PROCEDURE — 36415 COLL VENOUS BLD VENIPUNCTURE: CPT | Performed by: FAMILY MEDICINE

## 2022-03-22 PROCEDURE — 91305 COVID-19,PF,PFIZER (12+ YRS): CPT | Performed by: FAMILY MEDICINE

## 2022-03-22 PROCEDURE — 80061 LIPID PANEL: CPT | Performed by: FAMILY MEDICINE

## 2022-03-22 PROCEDURE — 82306 VITAMIN D 25 HYDROXY: CPT | Performed by: FAMILY MEDICINE

## 2022-03-22 PROCEDURE — 82043 UR ALBUMIN QUANTITATIVE: CPT | Performed by: FAMILY MEDICINE

## 2022-03-22 PROCEDURE — 84443 ASSAY THYROID STIM HORMONE: CPT | Performed by: FAMILY MEDICINE

## 2022-03-22 PROCEDURE — 0052A COVID-19,PF,PFIZER (12+ YRS): CPT | Performed by: FAMILY MEDICINE

## 2022-03-22 PROCEDURE — 80053 COMPREHEN METABOLIC PANEL: CPT | Performed by: FAMILY MEDICINE

## 2022-03-22 PROCEDURE — 99397 PER PM REEVAL EST PAT 65+ YR: CPT | Performed by: FAMILY MEDICINE

## 2022-03-22 PROCEDURE — 99214 OFFICE O/P EST MOD 30 MIN: CPT | Mod: 25 | Performed by: FAMILY MEDICINE

## 2022-03-22 PROCEDURE — 83036 HEMOGLOBIN GLYCOSYLATED A1C: CPT | Performed by: FAMILY MEDICINE

## 2022-03-22 PROCEDURE — 84439 ASSAY OF FREE THYROXINE: CPT | Performed by: FAMILY MEDICINE

## 2022-03-22 PROCEDURE — 85027 COMPLETE CBC AUTOMATED: CPT | Performed by: FAMILY MEDICINE

## 2022-03-22 PROCEDURE — 82607 VITAMIN B-12: CPT | Performed by: FAMILY MEDICINE

## 2022-03-22 PROCEDURE — 83550 IRON BINDING TEST: CPT | Performed by: FAMILY MEDICINE

## 2022-03-22 PROCEDURE — 82728 ASSAY OF FERRITIN: CPT | Performed by: FAMILY MEDICINE

## 2022-03-22 RX ORDER — ATORVASTATIN CALCIUM 20 MG/1
20 TABLET, FILM COATED ORAL DAILY
Qty: 90 TABLET | Refills: 1 | Status: SHIPPED | OUTPATIENT
Start: 2022-03-22 | End: 2022-09-23

## 2022-03-22 RX ORDER — LEVOTHYROXINE SODIUM 150 UG/1
150 TABLET ORAL DAILY
Qty: 90 TABLET | Refills: 0 | Status: SHIPPED | OUTPATIENT
Start: 2022-03-22 | End: 2022-06-26

## 2022-03-22 RX ORDER — LISINOPRIL 10 MG/1
10 TABLET ORAL DAILY
Qty: 90 TABLET | Refills: 3 | Status: SHIPPED | OUTPATIENT
Start: 2022-03-22 | End: 2022-06-23

## 2022-03-22 RX ORDER — LEVOTHYROXINE SODIUM 150 UG/1
150 TABLET ORAL DAILY
COMMUNITY
End: 2022-03-22

## 2022-03-22 NOTE — RESULT ENCOUNTER NOTE
Estevan Ms. Roth,  Your results came back showing  White count is low at 2.2 similar to what you had 1 year ago.  This has been noted to be evaluated by hematologist in the past and assessed to be idiopathic meaning no cause found.  It is not concerning.  Monitor for any new symptoms and will plan to recheck in 3 months when I see you back again.Hemoglobin is normal no longer anemic.Platelets are normal no bleeding issue found.Vitamin B12 is in the low normal range recommend taking vitamin B12 supplement daily at least 500 mcg daily.  We can recheck this in 3 months when I see you back again.  Normal iron &  ferritin level which means your iron stores are normal.  Sodium, potassium, electrolytes, liver tests are normal.Kidney function is decreased but stable to prior you have chronic kidney disease stage III.  Continue to stay well-hydrated, keep blood pressure under control and avoid anti-inflammatories as much as possible.  The TSH is slightly out of range but the second thyroid test is not back yet so we will wait on that to make recommendations on your thyroid medication.-HDL(good) cholesterol level is low and your triglycerides are elevated which can increase your heart disease risk.  A diet high in fat and simple carbohydrates, genetics and being overweight can contribute to this. LDL(bad) cholesterol level is normal.  ADVISE:exercising 150 minutes of aerobic exercise per week (30 minutes 5 days per week or 50 minutes 3 days per week are options), and omega-3 fatty acids (fish oil) 4595-7448 mg daily are helpful to improve this.  The 10-year ASCVD risk score (Elyse DAVENPORT Jr., et al., 2013) is: 23.2%    Values used to calculate the score:      Age: 76 years      Sex: Female      Is Non- : No      Diabetic: No      Tobacco smoker: No      Systolic Blood Pressure: 136 mmHg      Is BP treated: Yes      HDL Cholesterol: 48 mg/dL      Total Cholesterol: 178 mg/dL  Overall cardiovascular risk of  having a heart attack or stroke is about 24% in the next 10 years.  Recommend increasing atorvastatin to 20 mg bedtime and rechecking cholesterol in 3 months when I see you back again.  I will send this new prescription in  . If you have any further concerns please do not hesitate to contact us by message, phone or making an appointment.  Have a good day   Dr Michael SCOTT

## 2022-03-22 NOTE — PROGRESS NOTES
"  SUBJECTIVE:   Romayne L Sathre is a 76 year old female who presents for Preventive Visit.    Patient has been advised of split billing requirements and indicates understanding: Yes  Are you in the first 12 months of your Medicare Part B coverage?  No  Physical Health:    In general, how would you rate your overall physical health? good    Outside of work, how many days during the week do you exercise? 1 day/week    Outside of work, approximately how many minutes a day do you exercise?15-30 minutes    If you drink alcohol do you typically have >3 drinks per day or >7 drinks per week? No    Do you usually eat at least 4 servings of fruit and vegetables a day, include whole grains & fiber and avoid regularly eating high fat or \"junk\" foods? NO    Do you have any problems taking medications regularly?  No    Do you have any side effects from medications? none    Needs assistance for the following daily activities: no assistance needed    Which of the following safety concerns are present in your home?  none identified     Hearing impairment: No    In the past 6 months, have you been bothered by leaking of urine? yes  Mental Health:    In general, how would you rate your overall mental or emotional health? excellent  PHQ-2 Score: (P) 0  Do you feel safe in your environment? Yes  Have you ever done Advance Care Planning? (For example, a Health Directive, POLST, or a discussion with a medical provider or your loved ones about your wishes): No, advance care planning information given to patient to review.  Patient declined advance care planning discussion at this time.  Additional concerns to address?  YES  Fall risk:  Fallen 2 or more times in the past year?: No  Any fall with injury in the past year?: No  Cognitive Screenin) Repeat 3 items (Leader, Season, Table)    2) Clock draw: ABNORMAL can not show clock   3) 3 item recall: Recalls 1 object   Results: ABNORMAL clock, 1-2 items recalled: PROBABLE COGNITIVE " IMPAIRMENT, **INFORM PROVIDER**  Mini-CogTM Copyright OLIVIA Lorenzo. Licensed by the author for use in [facility]; reprinted with permission ([email or web address]). All rights reserved.    Do you have sleep apnea, excessive snoring or daytime drowsiness?: yes    CURRENTLY  Here for a Medicare wellness, additional concerns.  Here alone.  New to this provider.  Only time I visited with her was on a telephone encounter in 2020 for blood pressure med renewal.   Vision patient improved after the procedure feels can see well only may need readers for close-up which gets blurry.  Feels his hearing okay.  Failed the mini cog test recalled 1 word and clock drawing was abnormal.  Has been noted to have memory deficits in the past.  Daughter drove her here but she is in the clinic alone.  She lives with her daughter and granddaughter.  It is okay to get a referral to the neurologist.     reviewed  No ACP on file   Flu shot utd  Covid vaccine [place] vaccine in 2021.  Recovered from Covid November 2020.  Agreeable to getting Pfizer Covid shot as a booster today.  Lives with granddaughter who is immunosuppressed.  Also lives with her daughter who smokes    BMI 37, mostly sedentary.  Discussed lifestyle and will do labs today.  Hyperlipidemia on atorvastatin 10 mg bedtime has enough meds till can get lab work reviewed today.  Hypertension on lisinopril 10 mg daily previously was on 20 blood pressure currently 136/79.  Asymptomatic no side effects we will do lab work and needs a refill.  CKD 3 asymptomatic avoiding NSAID's we will do labs today.    Hypothyroid on levothyroxine 150 mcg daily deviously on differing doses on the weekend and weekdays but been on this dose since February last year.  We will do lab work today has enough meds till labs can be reviewed in case dose needs to be changed.    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  Currently no symptoms agreeable to getting  rechecked today.  History of anemia noted when had COVID asymptomatic no GI bleeding not on anticoagulation we will recheck labs today.    History of Covid in November 2020 was hospitalized requiring oxygen and received steroids and antivirals.  Had a DVT and PE requiring anticoagulation 3 months.  Resolved sepsis and HALI and treated for UTI at that time was in the ICU and discharged after a week in hospital.  Reports made a full recovery no longer on oxygen or anticoagulation.    Resolved acute respiratory failure on no oxygen since January 2021.    History of snoring has been referred to sleep in the past.  Does have obesity hyperlipidemia hypertension and forgetfulness.  Does have chronic deconditioning.  Agreeable to a sleep referral again.    Chronic fatigue feels improved will check labs today.    Bilateral lower leg swelling managed with compression socks daily.    Physical deconditioning.  Is very sedentary.  Prior echo and stress test before had COVID was normal.  CT during COVID in 2020 did not show right heart strain.  Reports no symptoms at rest or with mild exertion.    Hx of breast cancer had right mastectomy and chemotherapy initially tried a saline implant but this was removed.  Reports no new lumps or ulcers on scar tissue.  Reports no left breast lumps bumps skin changes nipple discharge.  Has been in remission long time and not seen oncology in a while.  Is due for mammogram from the left.    Heartburn controlled on Pepcid daily.    Constipation improved since before.    History of osteopenia.  Prior untreated osteoporosis but DEXA scan done 2021 showed osteopenia and was advised to continue calcium and vitamin D.  She thinks she is getting calcium but this is not clear.    Memory deficit failed mini cog today okay to see neurology.    History of vitreal macular adhesion right eye and epiretinal membrane s/p surgery vitrectomy and stripping in June 2021.  Has a bilateral glaucoma suspect and under  care of eye.    Healthcare maintenance reviewed.  No ACP on file.  Due for COVID booster given today.  Discussed Shingrix vaccine will check cost first.  Colon cancer screening due options discussed we will do Cologuard.  Is fasting for lab work today    Currently reports no fever or chills, no headache or dizziness, no double or blurry vision, no facial pain, earache, sore throat, runny nose, post nasal drip, no trouble hearing, smelling, tasting or swallowing, no cough , no chest pain, trouble breathing or palpitations, No abdominal pain, heart burn, reflux, nausea or vomiting or diarrhea or constipation, no blood in stools or black stools, no weight loss or night sweats. No dysuria, hematuria, frequency, urgency, hesitancy, incontinence, No pelvic complaints. No leg swelling or joint pain. No rash.    BACKGROUND  76 yr lady with History of passive smoke exposure, obesity, hypertension on lisinopril, hyperlipidemia on atorvastatin, hypothyroidism on levothyroxine, CKD 3, history of memory deficit per epic, history of chronic leg edema, history of prior maxillary sinusitis, resolved cellulitis, history of idiopathic leukopenia s/p work-up that was negative by hematology in 2012, history of lipoma noted in the past, history of prior right breast cancer in 2008 s/p right mastectomy and saline implants in remission no longer followed by oncology, history of resolved anemia, history of Covid infection in November 2020 with respiratory failure sepsis, hypoxia, Covid pneumonia, PE and left lower extremity DVT, elevated D-dimer, UTI, resolved HALI, resolved anemia, with complete resolution of Covid symptoms noted by February 2021.  No longer on oxygen.  History of snoring, chronic fatigue, physical deconditioning, GERD on Pepcid, and constipation, history of untreated osteoporosis DEXA scan in 2021 showed osteopenia, history of vitreal macular adhesion right eye and epiretinal membrane s/p cataract surgery and remote right  "eye injury, s/p vitrectomy being managed by eye & being monitored as a glaucoma suspect  on multivitamin recently under care of Dr. Sheridan then Sapphire Glass then Adela Avila,     Seen by PCP in April 2019 for dyspnea on exertion.  Examination was unremarkable, chest x-ray was negative.  EKG was unremarkable.  Echo showed a normal EF.  Stress test done was normal.  Her lisinopril was increased and suspected to be deconditioned and advised weight loss and increase activity and referred to sleep for her history of fatigue for possible obstructive sleep apnea but was never seen by sleep.  Fit test never done.  Not seen till called for medication refill on 4/30/2020 and appointment made with this writer, had an appointment by telephone with this writer for the first time on 5/19/2020 for blood pressure med refill.  Advise labs and follow-up in clinic.  Fit test done 6/8/2020 was negative.  TSH was elevated at 10 with free T4 normal and a normal hemoglobin A1c.   Visited with endocrine virtually on 7/8/2020 and noted had been treated for subclinical hypothyroidism with levothyroxine since 2004.  Medications adjusted and DEXA scan ordered.  Seen by eye 9/29/2020 for cataracts glaucoma check, prior right eye injury and which showed retinal membrane adhesion.  Seen by eye again 10/19/2020.    Seen in urgent care on 11/5/2020 for fever cough dizziness abdominal pain nausea was given Bactrim for cystitis tested for COVID discharged home and testing came back positive for Covid.  Seen in ER 11/16/2020 for shortness of breath sinus tachycardia and severe hypoxia.  Was admitted for worsening respiratory symptoms prompting her to seek further evaluation on 11/17/20 at [facility]. She was found to be in acute hypoxic respiratory failure requiring 30 LPM HFNC.  Chest x-ray at that time revealed \"diffuse patch airspace opacities consistent with COVID.\" She was admitted to the MICU for stabilization overnight and transferred to the " general medical floor on 11/18/20. For her COVID infection, she received dexamethasone and remdesivir. She was initially treated with broad spectrum antibiotics for possible super-imposed pneumonia, but these were quickly discontinued. Her hospital course was complicated by a LLE DVT and PE's. She was started on Lovenox and transitioned to Apixaban. She was also found to have a UTI on day of discharge and was sent home on oral antibiotics.  Her severe Sepsis, resolved. For acute Hypoxic Respiratory Failure 2/2 COVID-19 PNA - COVID+ 11/5.  She was COVID recovered,  initially requiring 30 LMP HFNC, but was able to wean to 2Ls NC with exertion, 0 to 0.5L at rest. She completed her 5 day course of Remdesivir during her hospital stay. On discharge was continued on dexamethasone 6mg daily for 10 doses (ending 11/27) & continued on 2Ls NC O2 with ambulation, 0-0.5L at rest. For the Non-occlusive thrombus in left popliteal vein & Pulmonary Embolisms of segmental and subsegmental right upper and lower lobes with Elevated D-dimer - D-dimer >20 on admission. Started on high-intensity heparin gtt in ED with bolus- and transitioned to low intensity on 11/18/20. Ultrasound of b/l lower extremities obtained on 11/19 which were notable for non-occlusive thrombus in left popliteal vein. She was then transitioned back to high intensity heparin gtt on 11/19. Had elevated hep 10A levels overnight 11/19. Transitioned to Lovenox 1mg/kg BID on 11/20/20. CT PE protocol with segmental and subsegmental PE in the right upper and lower lobes w/o evidence of RHS. Also w/ extensive reticular changes and GOO throughout lungs representing inflammation and/or fibrosis r/t recent COVID-19 infection. on Discharge continued on Apixiban 10mg BID x7 days, then 5mg BID thereafter for ~ 3 month duration of treatment as likely provoked DVT/PE due to COVID-19 infection. Was to follow up with PCP in one week to discuss anticoagulation. Noted Urinary Tract  Infection & Urinary retention, resolved - Required straight cath x2 during her hospital admission. Developed LUTS last day of discharge with urinalysis revealing few bacteria, moderate leukocytes, and 12 Wbcs. However, there were 4 epis.  & discharged on Keflex 500mg BID for 5 days & to follow up with PCP in one week to review urine culture results. Her HALI, resolved. Noted CKD III - Baseline creatinine 1.1-1.2, elevated to 1.53 on presentation with BUN 47 then returned to baseline. Etiology likely prerenal 2/2 COVID. Lisinopril was held & resumed on discharge. For her HTN - Patient's lisinopril held on during hospital stay due to HALI. Started on amlodipine 11/20 for elevated BP but on discharge, amlodipine was discontinued and resumed home lisinopril 20 mg daily. She noted increased heartburn since starting Eliquis, while inpatient was on omeprazole this was switched to Pepcid once off the Eliquis and continued on Tums as needed. Had constipation on admission - improved with bowel regimen, however developed liquid stool which improved by discharge. Hypothyroidism: TSH on admission WNL at 0.42 & continued on  PTA Synthroid. Noted Normocytic anemia - Baseline Hgb 12-13. & was 12.2 at discharge. No s/sx of bleeding since initiation of anicoagulation. for HLD was continued on a statin.  She was discharged home on 11/24/2020.    Seen 12/3/2020 by Dr. Lazo for hospital discharge noted was no longer on oxygen during the day and satting fine & remained on Eliquis.  Seen by Sapphire Glass her PCP on 2/5/2021 for routine physical and noted had completely recovered from Covid & no longer short of breath, had no cough & not on oxygen, no longer with leg pain & was wearing compression socks for chronic leg swelling. and Doppler done on 2//22 was negative for DVT on Eliquis. DEXA scan ordered for prior untreated osteoporosis and continued on Pepcid for heartburn.  Labs later showed she was overcorrected on thyroid and  levothyroxine was changed from double dosing on the weekend to daily simple dosing of 150 mcg daily and recheck TSH 6 weeks later was within normal range and continued on same dose.    DEXA scan done 2/11/2021 showed osteopenia and continued on calcium and vitamin D.  Cataracts removed 2/22/2021.  Seen by 3/11/2021.  TSH normal 3/15/2021.  Noted blurry vision 4/13/2021.  Had stable CKD 5/18/2021 and advised to avoid NSAID's.  Seen 5/18/2021 by Dr. Avila for preop for vitrectomy.   On 6/7/2021 had vitrectomy and membrane stripping for history of vitreoretinal adhesion right eye.  Seen by the eye doctor postop day 1, 1 week postop and at 6-week postop guero and noted was doing well.  Last seen by eye on 10/20/2021 for hx of Vitreoretinal adhesion right eye, ERM right eye S/P PPV/MP right eye 6/2021. Foveal anatomy improved significantly; BCVA 20/50 limited likely due to subtle IS/OS disruption at fovea (+drusen) -3.0 refraction right eye and seeing well up close; instructed her to try to use right eye as monovision for near.  Was to observe pseudophakia each eye.  Noted to be a glaucoma suspect with large disc and large cupping child being followed by Dr. Shelby.     Reviewed and updated as needed this visit by clinical staff   Tobacco  Allergies  Meds   Med Hx  Surg Hx  Fam Hx  Soc Hx        Reviewed and updated as needed this visit by Provider   Tobacco  Allergies  Meds   Med Hx  Surg Hx  Fam Hx  Soc Hx       Social History     Tobacco Use     Smoking status: Passive Smoke Exposure - Never Smoker     Smokeless tobacco: Never Used     Tobacco comment: family members smoke; daughter   Substance Use Topics     Alcohol use: Yes     Alcohol/week: 10.0 standard drinks     Comment: 0-1 drink per month     Current providers sharing in care for this patient include:   Patient Care Team:  Adela Avila MD as PCP - General (Family Medicine)  Marry Rodriguez MD as Referring Physician (Family Practice)  Latia  Kamila ELLIS MD as MD (INTERNAL MEDICINE - ENDOCRINOLOGY, DIABETES & METABOLISM)  Ruben Shelby MD as MD (Ophthalmology)  Fede Danielson MD as Assigned Surgical Provider  Adela Avila MD as Assigned PCP    The following health maintenance items are reviewed in Epic and correct as of today:  Health Maintenance   Topic Date Due     ZOSTER IMMUNIZATION (1 of 2) Never done     MAMMO SCREENING  05/01/2020     COLORECTAL CANCER SCREENING  06/08/2021     HEMOGLOBIN  11/24/2021     FALL RISK ASSESSMENT  06/08/2022     MEDICARE ANNUAL WELLNESS VISIT  03/22/2023     BMP  03/22/2023     LIPID  03/22/2023     MICROALBUMIN  03/22/2023     TSH W/FREE T4 REFLEX  03/22/2023     ANNUAL REVIEW OF HM ORDERS  03/22/2023     ADVANCE CARE PLANNING  03/22/2027     DTAP/TDAP/TD IMMUNIZATION (3 - Td or Tdap) 02/05/2031     DEXA  02/11/2036     HEPATITIS C SCREENING  Completed     PHQ-2 (once per calendar year)  Completed     INFLUENZA VACCINE  Completed     Pneumococcal Vaccine: 65+ Years  Completed     URINALYSIS  Completed     COVID-19 Vaccine  Completed     IPV IMMUNIZATION  Aged Out     MENINGITIS IMMUNIZATION  Aged Out     HEPATITIS B IMMUNIZATION  Aged Out     Lab work is in process  Labs reviewed in EPIC  BP Readings from Last 3 Encounters:   03/22/22 136/79   06/07/21 (!) 150/71   05/18/21 132/75    Wt Readings from Last 3 Encounters:   03/22/22 114.8 kg (253 lb)   06/07/21 111.1 kg (244 lb 14.9 oz)   05/18/21 111.6 kg (246 lb)                  Patient Active Problem List   Diagnosis     Hypothyroidism     Hypertension goal BP (blood pressure) < 140/90     CKD (chronic kidney disease) stage 3, GFR 30-59 ml/min (H)     Mixed hyperlipidemia     Advanced directives, counseling/discussion     History of breast cancer     Leucopenia     Memory deficit     Obesity (BMI 35.0-39.9) with comorbidity (H)     Dependent edema     Epiretinal membrane, right     Vitreomacular adhesion of right eye     Glaucoma suspect,  bilateral     Prediabetes     Past Surgical History:   Procedure Laterality Date     HC REMV CATARACT EXTRACAP,INSERT LENS, W/O ECP Right 05/29/2002    Dr. Clinton Lopez ([id number], Power:  20.0D)     PHACOEMULSIFICATION CLEAR CORNEA WITH STANDARD INTRAOCULAR LENS IMPLANT Left 2/22/2021    Procedure: LEFT EYE PHACOEMULSIFICATION, CATARACT, WITH INTRAOCULAR LENS IMPLANT;  Surgeon: Ruben Shelby MD;  Location: [facility] OR     SURGICAL HISTORY OF -   Oct 3, 2008    Rt saline implant     VITRECTOMY PARSPLANA WITH 25 GAUGE SYSTEM Right 6/7/2021    Procedure: 1) Pars plana vitrectomy (PPV) 25g, Right eye,  2) Membrane stripping and stripping of  internal limiting membrane,;  Surgeon: Fede Danielson MD;  Location:  OR       Social History     Tobacco Use     Smoking status: Passive Smoke Exposure - Never Smoker     Smokeless tobacco: Never Used     Tobacco comment: family members smoke; daughter   Substance Use Topics     Alcohol use: Yes     Alcohol/week: 10.0 standard drinks     Comment: 0-1 drink per month     Family History   Problem Relation Age of Onset     Diabetes Mother         dec     Thyroid Disease Mother         unsure     Respiratory Father         emphysema     Emphysema Father      Diabetes Sister      Family History Negative Sister         x 2     Kidney Disease Sister         ESRD on dialysis, due to DM     Suicide Brother      Diabetes Brother      Family History Negative Brother         x 2     Psychotic Disorder Brother         depression     Thyroid Disease Daughter      Goiter No family hx of      Glaucoma No family hx of      Macular Degeneration No family hx of      Hypertension No family hx of          Current Outpatient Medications   Medication Sig Dispense Refill     atorvastatin (LIPITOR) 20 MG tablet Take 1 tablet (20 mg) by mouth daily 90 tablet 1     cetirizine (ZYRTEC) 10 MG tablet Take 10 mg by mouth daily       famotidine (PEPCID) 20 MG tablet Take 20 mg by mouth daily        "levothyroxine (SYNTHROID/LEVOTHROID) 150 MCG tablet Take 1 tablet (150 mcg) by mouth daily 90 tablet 0     lisinopril (ZESTRIL) 10 MG tablet Take 1 tablet (10 mg) by mouth daily 90 tablet 3     Multiple Vitamin (MULTI-VITAMIN) per tablet Take 1 tablet by mouth daily.         TURMERIC PO Take 1 tablet by mouth daily       Allergies   Allergen Reactions     No Known Drug Allergies      Recent Labs   Lab Test 03/22/22  0921 05/18/21  0942 03/15/21  0940 02/05/21  1119 11/22/20  0556 11/21/20  0534 11/17/20  0333 11/16/20 2010 06/08/20  1441 05/21/20  1516 07/23/18  0943 06/07/18  0944   A1C 5.7*  --   --   --   --   --   --   --  5.5  --   --  5.4     --   --  90  --   --   --   --   --  Cannot estimate LDL when triglyceride exceeds 400 mg/dL  76   < >  --    HDL 48*  --   --  45*  --   --   --   --   --  31*   < >  --    TRIG 150*  --   --  176*  --   --   --   --   --  466*   < >  --    ALT 28  --   --   --   --  31  --  24  --  32  --   --    CR 1.22* 1.30*  --  1.26*   < > 0.85   < > 1.55*  --  1.15*   < >  --    GFRESTIMATED 46* 40*  --  42*   < > 67   < > 33*  --  47*   < >  --    GFRESTBLACK  --  47*  --  48*   < > 78   < > 38*  --  54*   < >  --    POTASSIUM 4.8 5.1  --  4.9   < > 4.1   < > 4.4  --  4.4   < >  --    TSH 4.95*  --  2.76 0.28*  --   --    < >  --  10.38* 10.36*   < > 0.28*    < > = values in this interval not displayed.   ROS:  Constitutional, HEENT, cardiovascular, pulmonary, GI, , musculoskeletal, neuro, skin, endocrine and psych systems are negative, except as otherwise noted.  See screening questionnaires that patient fell today in this encounter.    OBJECTIVE:   /79 (BP Location: Right arm, Patient Position: Sitting, Cuff Size: Adult Large)   Pulse 65   Temp 97.4  F (36.3  C) (Temporal)   Ht 1.753 m (5' 9\")   Wt 114.8 kg (253 lb)   SpO2 97%   BMI 37.36 kg/m   Estimated body mass index is 37.36 kg/m  as calculated from the following:    Height as of this encounter: " "1.753 m (5' 9\").    Weight as of this encounter: 114.8 kg (253 lb).  EXAM:   GENERAL: alert, no distress, obese and elderly  EYES: Eyes grossly normal to inspection, PERRL and conjunctivae and sclerae normal  HENT: ear canals and TM's normal, nose and mouth without ulcers or lesions, poor dentition almost edentulous  NECK: no adenopathy, no asymmetry, masses, or scars and thyroid normal to palpation  RESP: lungs clear to auscultation - no rales, rhonchi or wheezes  Left BREAST: normal without masses, tenderness or nipple discharge and no palpable axillary masses or adenopathy  BREAST: normal without masses, tenderness or nipple discharge and s/p mastectomy scar tissue looks okay no lumps noted in axilla  CV: regular rates and rhythm, normal S1 S2, no S3 or S4, no murmur, click or rub, peripheral pulses strong and no peripheral edema but wearing compression socks bilaterally  ABDOMEN: soft, nontender, no hepatosplenomegaly, no masses and bowel sounds normal  MS: no gross musculoskeletal defects noted, no edema  SKIN: no suspicious lesions or rashes, right upper back scapular area there is a 1 cm cyst/keloid-like cyst.  It does not look inflamed or draining.  NEURO: Normal strength and tone, mentation intact and speech normal  PSYCH: mentation appears normal, affect normal/bright, judgement and insight intact, appearance well groomed and a bit forgetful responds appropriately    Diagnostic Test Results:  Labs reviewed in Epic  Results for orders placed or performed in visit on 03/22/22 (from the past 24 hour(s))   CBC with platelets and differential    Narrative    The following orders were created for panel order CBC with platelets and differential.  Procedure                               Abnormality         Status                     ---------                               -----------         ------                     CBC with platelets and d...[[id number]]  Abnormal            Final result               Manual " Differential[[id number]]          Abnormal            Final result                 Please view results for these tests on the individual orders.   Comprehensive metabolic panel (BMP + Alb, Alk Phos, ALT, AST, Total. Bili, TP)   Result Value Ref Range    Sodium 141 133 - 144 mmol/L    Potassium 4.8 3.4 - 5.3 mmol/L    Chloride 108 94 - 109 mmol/L    Carbon Dioxide (CO2) 25 20 - 32 mmol/L    Anion Gap 8 3 - 14 mmol/L    Urea Nitrogen 22 7 - 30 mg/dL    Creatinine 1.22 (H) 0.52 - 1.04 mg/dL    Calcium 9.6 8.5 - 10.1 mg/dL    Glucose 98 70 - 99 mg/dL    Alkaline Phosphatase 48 40 - 150 U/L    AST 20 0 - 45 U/L    ALT 28 0 - 50 U/L    Protein Total 7.6 6.8 - 8.8 g/dL    Albumin 3.4 3.4 - 5.0 g/dL    Bilirubin Total 0.4 0.2 - 1.3 mg/dL    GFR Estimate 46 (L) >60 mL/min/1.73m2   Lipid Profile (Chol, Trig, HDL, LDL calc)   Result Value Ref Range    Cholesterol 178 <200 mg/dL    Triglycerides 150 (H) <150 mg/dL    Direct Measure HDL 48 (L) >=50 mg/dL    LDL Cholesterol Calculated 100 <=100 mg/dL    Non HDL Cholesterol 130 (H) <130 mg/dL    Patient Fasting > 8hrs? Unknown     Narrative    Cholesterol  Desirable:  <200 mg/dL    Triglycerides  Normal:  Less than 150 mg/dL  Borderline High:  150-199 mg/dL  High:  200-499 mg/dL  Very High:  Greater than or equal to 500 mg/dL    Direct Measure HDL  Female:  Greater than or equal to 50 mg/dL   Male:  Greater than or equal to 40 mg/dL    LDL Cholesterol  Desirable:  <100mg/dL  Above Desirable:  100-129 mg/dL   Borderline High:  130-159 mg/dL   High:  160-189 mg/dL   Very High:  >= 190 mg/dL    Non HDL Cholesterol  Desirable:  130 mg/dL  Above Desirable:  130-159 mg/dL  Borderline High:  160-189 mg/dL  High:  190-219 mg/dL  Very High:  Greater than or equal to 220 mg/dL   TSH with free T4 reflex   Result Value Ref Range    TSH 4.95 (H) 0.40 - 4.00 mU/L   Albumin Random Urine Quantitative with Creat Ratio   Result Value Ref Range    Creatinine Urine mg/dL 118 mg/dL    Albumin Urine  mg/L 27 mg/L    Albumin Urine mg/g Cr 22.88 0.00 - 25.00 mg/g Cr   Hemoglobin A1c   Result Value Ref Range    Hemoglobin A1C 5.7 (H) 0.0 - 5.6 %   Vitamin D Deficiency   Result Value Ref Range    Vitamin D, Total (25-Hydroxy) 30 20 - 75 ug/L    Narrative    Season, race, dietary intake, and treatment affect the concentration of 25-hydroxy-Vitamin D. Values may decrease during winter months and increase during summer months. Values 20-29 ug/L may indicate Vitamin D insufficiency and values <20 ug/L may indicate Vitamin D deficiency.    Vitamin D determination is routinely performed by an immunoassay specific for 25 hydroxyvitamin D3.  If an individual is on vitamin D2(ergocalciferol) supplementation, please specify 25 OH vitamin D2 and D3 level determination by LCMSMS test [id number].     Iron and iron binding capacity   Result Value Ref Range    Iron 76 35 - 180 ug/dL    Iron Binding Capacity 292 240 - 430 ug/dL    Iron Sat Index 26 15 - 46 %   Ferritin   Result Value Ref Range    Ferritin 179 8 - 252 ng/mL   Vitamin B12   Result Value Ref Range    Vitamin B12 391 193 - 986 pg/mL   CBC with platelets and differential   Result Value Ref Range    WBC Count 2.2 (L) 4.0 - 11.0 10e3/uL    RBC Count 4.70 3.80 - 5.20 10e6/uL    Hemoglobin 13.1 11.7 - 15.7 g/dL    Hematocrit 40.8 35.0 - 47.0 %    MCV 87 78 - 100 fL    MCH 27.9 26.5 - 33.0 pg    MCHC 32.1 31.5 - 36.5 g/dL    RDW 13.8 10.0 - 15.0 %    Platelet Count 201 150 - 450 10e3/uL   T4 free   Result Value Ref Range    Free T4 1.24 0.76 - 1.46 ng/dL   Manual Differential   Result Value Ref Range    % Neutrophils 25 %    % Lymphocytes 41 %    % Monocytes 30 %    % Eosinophils 4 %    % Basophils 0 %    Absolute Neutrophils 0.6 (L) 1.6 - 8.3 10e3/uL    Absolute Lymphocytes 0.9 0.8 - 5.3 10e3/uL    Absolute Monocytes 0.7 0.0 - 1.3 10e3/uL    Absolute Eosinophils 0.1 0.0 - 0.7 10e3/uL    Absolute Basophils 0.0 0.0 - 0.2 10e3/uL    RBC Morphology Confirmed RBC Indices      Platelet Assessment  Automated Count Confirmed. Platelet morphology is normal.     Automated Count Confirmed. Platelet morphology is normal.       ASSESSMENT / PLAN:       ICD-10-CM    1. Medicare annual wellness visit, subsequent  Z00.00 COVID-19,PF,PFIZER (12+ Yrs GRAY LABEL)     COLOGUARD([employer]ACT SCIENCES)     Hemoglobin A1c     Hemoglobin A1c   2. Obesity (BMI 35.0-39.9) with comorbidity (H)  E66.01 Comprehensive metabolic panel (BMP + Alb, Alk Phos, ALT, AST, Total. Bili, TP)     Lipid Profile (Chol, Trig, HDL, LDL calc)     TSH with free T4 reflex     Comprehensive metabolic panel (BMP + Alb, Alk Phos, ALT, AST, Total. Bili, TP)     Lipid Profile (Chol, Trig, HDL, LDL calc)     TSH with free T4 reflex     T4 free   3. Mixed hyperlipidemia  E78.2 Lipid Profile (Chol, Trig, HDL, LDL calc)     TSH with free T4 reflex     Lipid Profile (Chol, Trig, HDL, LDL calc)     TSH with free T4 reflex     T4 free     atorvastatin (LIPITOR) 20 MG tablet   4. Hypertension goal BP (blood pressure) < 140/90  I10 Comprehensive metabolic panel (BMP + Alb, Alk Phos, ALT, AST, Total. Bili, TP)     Lipid Profile (Chol, Trig, HDL, LDL calc)     TSH with free T4 reflex     Albumin Random Urine Quantitative with Creat Ratio     lisinopril (ZESTRIL) 10 MG tablet     Comprehensive metabolic panel (BMP + Alb, Alk Phos, ALT, AST, Total. Bili, TP)     Lipid Profile (Chol, Trig, HDL, LDL calc)     TSH with free T4 reflex     Albumin Random Urine Quantitative with Creat Ratio     T4 free   5. Stage 3 chronic kidney disease, unspecified whether stage 3a or 3b CKD (H)  N18.30 CBC with platelets and differential     Comprehensive metabolic panel (BMP + Alb, Alk Phos, ALT, AST, Total. Bili, TP)     Lipid Profile (Chol, Trig, HDL, LDL calc)     Albumin Random Urine Quantitative with Creat Ratio     CBC with platelets and differential     Comprehensive metabolic panel (BMP + Alb, Alk Phos, ALT, AST, Total. Bili, TP)     Lipid Profile (Chol, Trig, HDL,  LDL calc)     Albumin Random Urine Quantitative with Creat Ratio   6. Acquired hypothyroidism  E03.9 Lipid Profile (Chol, Trig, HDL, LDL calc)     TSH with free T4 reflex     Lipid Profile (Chol, Trig, HDL, LDL calc)     TSH with free T4 reflex     T4 free     levothyroxine (SYNTHROID/LEVOTHROID) 150 MCG tablet   7. Neutropenia, unspecified type (H)  D70.9 CBC with platelets and differential     CBC with platelets and differential   8. History of anemia  Z86.2 CBC with platelets and differential     Iron and iron binding capacity     Ferritin     Vitamin B12     CBC with platelets and differential     Iron and iron binding capacity     Ferritin     Vitamin B12   9. History of 2019 novel coronavirus disease (COVID-19)  Z86.16 CBC with platelets and differential     Comprehensive metabolic panel (BMP + Alb, Alk Phos, ALT, AST, Total. Bili, TP)     CBC with platelets and differential     Comprehensive metabolic panel (BMP + Alb, Alk Phos, ALT, AST, Total. Bili, TP)    nov 2020 status post hospitalization oxygen anticoagulation for DVT and PE, resolved sepsis, HALI UTI treated with radium severe and steroids was in the ICU   10. History of acute respiratory failure  Z87.09 CBC with platelets and differential     Comprehensive metabolic panel (BMP + Alb, Alk Phos, ALT, AST, Total. Bili, TP)     Adult Sleep Eval & Management  Referral     CBC with platelets and differential     Comprehensive metabolic panel (BMP + Alb, Alk Phos, ALT, AST, Total. Bili, TP)   11. Snoring  R06.83 Adult Sleep Eval & Management  Referral   12. Chronic fatigue  R53.82 CBC with platelets and differential     Comprehensive metabolic panel (BMP + Alb, Alk Phos, ALT, AST, Total. Bili, TP)     TSH with free T4 reflex     Vitamin D Deficiency     Iron and iron binding capacity     Ferritin     Vitamin B12     Adult Sleep Eval & Management  Referral     CBC with platelets and differential     Comprehensive metabolic panel  (BMP + Alb, Alk Phos, ALT, AST, Total. Bili, TP)     TSH with free T4 reflex     Vitamin D Deficiency     Iron and iron binding capacity     Ferritin     Vitamin B12     T4 free   13. Dependent edema  R60.9 Comprehensive metabolic panel (BMP + Alb, Alk Phos, ALT, AST, Total. Bili, TP)     TSH with free T4 reflex     Comprehensive metabolic panel (BMP + Alb, Alk Phos, ALT, AST, Total. Bili, TP)     TSH with free T4 reflex     T4 free   14. Physical deconditioning  R53.81 CBC with platelets and differential     Comprehensive metabolic panel (BMP + Alb, Alk Phos, ALT, AST, Total. Bili, TP)     TSH with free T4 reflex     Vitamin D Deficiency     CBC with platelets and differential     Comprehensive metabolic panel (BMP + Alb, Alk Phos, ALT, AST, Total. Bili, TP)     TSH with free T4 reflex     Vitamin D Deficiency     T4 free   15. History of breast cancer  Z85.3 MA Screening Digital Bilateral     CBC with platelets and differential     Comprehensive metabolic panel (BMP + Alb, Alk Phos, ALT, AST, Total. Bili, TP)     CBC with platelets and differential     Comprehensive metabolic panel (BMP + Alb, Alk Phos, ALT, AST, Total. Bili, TP)   16. Heart burn  R12    17. Constipation, unspecified constipation type  K59.00    18. Osteopenia, unspecified location  M85.80 Vitamin D Deficiency     Vitamin D Deficiency   19. 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 Epiretinal membrane, right  H35.371    22. Glaucoma suspect, bilateral  H40.003    23. Health care maintenance  Z00.00 REVIEW OF HEALTH MAINTENANCE PROTOCOL ORDERS   24. Advanced directives, counseling/discussion  Z71.89    25. Need for COVID-19 vaccine  Z23 COVID-19,PF,PFIZER (12+ Yrs GRAY LABEL)   26. Need for shingles vaccine  Z23    27. Colon cancer screening   Z12.11 NAM(EXACT SCIENCES)   28. Screening for diabetes mellitus  Z13.1 Hemoglobin A1c     Hemoglobin A1c   29. Encounter for screening mammogram for malignant neoplasm of breast   Z12.31 MA Screening Digital Bilateral   30. Abnormal finding of blood chemistry, unspecified   R79.9 Hemoglobin A1c     Hemoglobin A1c   31. Prediabetes  R73.03      Seen for medicare wellness/ preventive health and additional concerns today   Self breast check regularly   Consider referral to a  to assess personal risk if have any family hx of breast, ovarian or bowel cancer  Mammogram due and ordered  Pelvic / PAP no longer needed  Health care maintenance reviewed  Consider working on health care directives & given honoring choices  And Anthon vaccine in 2021.  No booster recorded anywhere will get Pfizer Covid booster today  Recommend Flu shot yearly & utd  Recommend Tdap every 10 yrs & utd  Recommend Shingles vaccine starting at 50. Consider SHINGREX.  (series of 2 shots 2 to 6 months apart that can cause couple days of flu like symptoms but is expensive so check with insurance and get at pharmacy where cheaper). Will check cost first   Pneumonia vaccine at 65 or earlier any risk factors, currently utd, reconsider 2023  If travelling out of the country recommend seeing the travel clinic to update appropriate shots etc  Labs today and will make further recommendations once reviewed    BMI 37, mostly sedentary.  Discussed lifestyle and will do labs today.  Encouraged to work on diet, exercise and losing at least 5 to 10% of total body weight.  Hyperlipidemia on atorvastatin 10 mg bedtime has enough meds till can get lab work reviewed today.  LDL later came back normal but triglycerides were elevated and overall cardiovascular risk is 23.2 so atorvastatin increased to 20 mg bedtime and will recheck cholesterol fasting in 3 months when I see her back again.  Hypertension on lisinopril 10 mg daily previously was on 20,  blood pressure currently 136/79.  Asymptomatic no side effects we will do lab work and needs a refill.  Refill lisinopril 10 mg daily #90 with 3 refills.   CKD 3 asymptomatic avoiding NSAID's we will do labs today.  Kidney function came back stable with normal microalbumin.    Hypothyroid on levothyroxine 150 mcg daily deviously on differing doses on the weekend and weekdays but been on this dose since February last year.  We will do lab work today has enough meds till labs can be reviewed in case dose needs to be changed.    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  Currently no symptoms agreeable to getting rechecked today.  Count came back low at 2.2 similar to 1 year ago.  With neutropenia.  Stable to prior continue to monitor.  History of anemia noted when had COVID asymptomatic no GI bleeding not on anticoagulation we will recheck labs today.  Globin and hematocrit came back normal.    History of Covid in November 2020 was hospitalized requiring oxygen and received steroids and antivirals.  Had a DVT and PE requiring anticoagulation 3 months.  Resolved thromboembolism and sepsis and HALI and treated for UTI at that time was in the ICU and discharged after a week in hospital.  Reports made a full recovery no longer on oxygen or anticoagulation.    Resolved acute respiratory failure on no oxygen since January 2021.    History of snoring has been referred to sleep in the past.  Does have obesity hyperlipidemia hypertension and forgetfulness.  Does have chronic deconditioning.  Agreeable to a sleep referral again.  This referral was placed.    Chronic fatigue feels improved will check labs today.    Bilateral lower leg swelling managed with compression socks daily.  Could be related to BMI and untreated sleep apnea    Physical deconditioning.  Is very sedentary.  Prior echo and stress test before had COVID was normal.  CT during COVID in 2020 did not show right heart  strain.  Reports no symptoms at rest or with mild exertion.  Encouraged to be more active.    Hx of breast cancer had right mastectomy and chemotherapy initially tried a saline implant but reports this was removed.  Reports no new lumps or ulcers on scar tissue.  Reports no left breast lumps, bumps, skin changes or nipple discharge.  Has been in remission long time and not seen oncology in a while.  Is due for mammogram for the left in order placed    Heartburn controlled on Pepcid OTC daily.    Constipation improved since before.  Continue with high-fiber diet.    History of osteopenia.  Prior untreated osteoporosis but DEXA scan done 2021 showed osteopenia and was advised to continue calcium and vitamin D.  She thinks she is getting calcium but this is not clear.  Recommend taking calcium citrate 1200 mg total a day and vitamin D at least 2000 units daily and rechecking DEXA scan in 2023.    Memory deficit failed mini cog today okay to see neurology.  Referral placed.    History of vitreal macular adhesion right eye and epiretinal membrane s/p surgery vitrectomy and stripping in June 2021.  Has a bilateral glaucoma suspect and under care of eye.  See the dentist regularly   Right back 1 cm cyst not a concern, monitor for worsening    Healthcare maintenance reviewed.  No ACP on file.  Due for COVID booster&  given today.  Discussed Shingrix vaccine will check cost first.  Colon cancer screening due options discussed we will do Cologuard.  Is fasting for lab work today.  Return in 3 months to follow-up from today &  1 year for a physical    Patient has been advised of split billing requirements and indicates understanding: Yes    COUNSELING:  Reviewed preventive health counseling, as reflected in patient instructions       Regular exercise       Healthy diet/nutrition       Vision screening       Hearing screening       Dental care       Bladder control       Fall risk prevention       Immunizations       Alcohol  "Use        Osteoporosis prevention/bone health       Colon cancer screening       (Mohini)menopause management       The 10-year ASCVD risk score (Elyse DAVENPORT JrFrantz, et al., 2013) is: 23.2%    Values used to calculate the score:      Age: 76 years      Sex: Female      Is Non- : No      Diabetic: No      Tobacco smoker: No      Systolic Blood Pressure: 136 mmHg      Is BP treated: Yes      HDL Cholesterol: 48 mg/dL      Total Cholesterol: 178 mg/dL       Advanced Planning     Estimated body mass index is 37.36 kg/m  as calculated from the following:    Height as of this encounter: 1.753 m (5' 9\").    Weight as of this encounter: 114.8 kg (253 lb).    Weight management plan: Discussed healthy diet and exercise guidelines    She reports that she is a non-smoker but has been exposed to tobacco smoke. She has never used smokeless tobacco.    Appropriate preventive services were discussed with this patient, including applicable screening as appropriate for cardiovascular disease, diabetes, osteopenia/osteoporosis, and glaucoma.  As appropriate for age/gender, discussed screening for colorectal cancer, prostate cancer, breast cancer, and cervical cancer. Checklist reviewing preventive services available has been given to the patient.    Reviewed patients plan of care and provided an AVS. The Complex Care Plan (for patients with higher acuity and needing more deliberate coordination of services) for Romayne meets the Care Plan requirement. This Care Plan has been established and reviewed with the Patient.    Counseling Resources:  ATP IV Guidelines  Pooled Cohorts Equation Calculator  Breast Cancer Risk Calculator  BRCA-Related Cancer Risk Assessment: FHS-7 Tool  FRAX Risk Assessment  ICSI Preventive Guidelines  Dietary Guidelines for Americans, 2010  USDA's MyPlate  ASA Prophylaxis  Lung CA Screening    Marry Rodriguez MD  "

## 2022-03-22 NOTE — RESULT ENCOUNTER NOTE
Estevan SalehFrantz Roth,  Some of your results came back showing  Hemoglobin A1c is elevated at 5.7 indicating prediabetes.  Recommend low carbohydrate diet, weight loss at least 10% of total body weight and smaller portion intake and rechecking this in 3 months when I see you back again.  If you have any further concerns please do not hesitate to contact us by message, phone or making an appointment.  Have a good day   Dr Michael SCOTT

## 2022-03-22 NOTE — PATIENT INSTRUCTIONS
Seen for medicare wellness/ preventive health and additional concerns today   Self breast check regularly   Consider referral to a  to assess personal risk if have any family hx of breast, ovarian or bowel cancer  Mammogram due and ordered  Pelvic / PAP no longer needed  Health care maintenance reviewed  Consider working on health care directives & given honoring choices  Recommend Flu shot yearly & utd  Recommend Tdap every 10 yrs & utd  Recommend Shingles vaccine starting at 50. Consider SHINGREX.  (series of 2 shots 2 to 6 months apart that can cause couple days of flu like symptoms but is expensive so check with insurance and get at pharmacy where cheaper).  Pneumonia vaccine at 65 or earlier any risk factors  If travelling out of the country recommend seeing the travel clinic to update appropriate shots etc  Labs today and will make further recommendations once reviewed    BMI 37 work on diet , exercise, weight loss  Will wait to see labs before refilling cholesterol meds  HTN stable and lisinopril refilled  Avoid NSAIDS due to chronic kidney disease  Will check thyroid before refilling meds in case dose needs changing  Check labs due to hx of low white count and anemia  recovered from Covid, resolved dvt and Clot in lungs  Due to snoring, edema in legs, lwo oxygen with covid in past recommend sleep eval for apnea  Due to failed memory screen referred to neurology  Chronic fatigue improved  dependant edema related to weight possible apnea, manageable with compression socks  Will see what mammogrm shows    Heart burn stable on pepcid  Increase fiber to help with constipation  dexa next year for hx of osteoporosis, osteopenia. advise calcium citrate 1200 mg total a day and vit d at least 2000 units a day     Continue are with eye  See the dentist regularily   Right back 1 cm cyst not a concern, monitor for worsening  See you back in 3 months to follow up from today     Patient Education   Personalized  Prevention Plan  You are due for the preventive services outlined below.  Your care team is available to assist you in scheduling these services.  If you have already completed any of these items, please share that information with your care team to update in your medical record.  Health Maintenance Due   Topic Date Due     ANNUAL REVIEW OF HM ORDERS  Never done     Zoster (Shingles) Vaccine (1 of 2) Never done     Mammogram  05/01/2020     COVID-19 Vaccine (2 - Booster for Gilbert series) 05/03/2021     Colorectal Cancer Screening  06/08/2021     Annual Wellness Visit  02/05/2022     Cholesterol Lab  02/05/2022     Kidney Microalbumin Urine Test  02/05/2022     Hemoglobin  11/24/2021     Thyroid Function Lab  03/15/2022

## 2022-03-22 NOTE — RESULT ENCOUNTER NOTE
Estevan Ms. Roth,  Your results came back and though the TSH was minimally over the range suggesting possibly mild under correction the free T4 which is the active form of thyroid in your body was in the normal range so recommend continuing 150 mcg of levothyroxine daily and we will recheck this in 3 months when I see you back again.  Refill sent in for 3 months.Microalbumin which was previously elevated 1 year ago ( a sign of effect of blood pressure on your kidneys) is now improved, continue with lisinopril current dosing which protects your kidneys and avoid anti-inflammatory meds as much as possible.    If you have any further concerns please do not hesitate to contact us by message, phone or making an appointment.  Have a good day   Dr Michael SCOTT

## 2022-03-22 NOTE — RESULT ENCOUNTER NOTE
Estevan SalehFrantz Roth,  Your results came back    -Vitamin D level is normal and getting 2000 IU daily in your diet or supplements is recommended. . If you have any further concerns please do not hesitate to contact us by message, phone or making an appointment.  Have a good day   Dr Michael SCOTT

## 2022-03-28 ENCOUNTER — TRANSFERRED RECORDS (OUTPATIENT)
Dept: FAMILY MEDICINE | Facility: CLINIC | Age: 77
End: 2022-03-28

## 2022-03-28 LAB — COLOGUARD-ABSTRACT: POSITIVE

## 2022-04-12 ENCOUNTER — ANCILLARY PROCEDURE (OUTPATIENT)
Dept: MAMMOGRAPHY | Facility: CLINIC | Age: 77
End: 2022-04-12
Attending: FAMILY MEDICINE
Payer: COMMERCIAL

## 2022-04-12 DIAGNOSIS — Z12.31 ENCOUNTER FOR SCREENING MAMMOGRAM FOR MALIGNANT NEOPLASM OF BREAST: ICD-10-CM

## 2022-04-12 DIAGNOSIS — Z85.3 HISTORY OF BREAST CANCER: ICD-10-CM

## 2022-04-12 PROCEDURE — 77067 SCR MAMMO BI INCL CAD: CPT | Performed by: STUDENT IN AN ORGANIZED HEALTH CARE EDUCATION/TRAINING PROGRAM

## 2022-04-18 DIAGNOSIS — H40.003 GLAUCOMA SUSPECT OF BOTH EYES: Primary | ICD-10-CM

## 2022-04-20 ENCOUNTER — OFFICE VISIT (OUTPATIENT)
Dept: OPHTHALMOLOGY | Facility: CLINIC | Age: 77
End: 2022-04-20
Attending: OPHTHALMOLOGY
Payer: COMMERCIAL

## 2022-04-20 DIAGNOSIS — H40.003 GLAUCOMA SUSPECT OF BOTH EYES: Primary | ICD-10-CM

## 2022-04-20 DIAGNOSIS — H43.821 VITREOMACULAR ADHESION OF RIGHT EYE: ICD-10-CM

## 2022-04-20 DIAGNOSIS — H35.371 EPIRETINAL MEMBRANE, RIGHT: ICD-10-CM

## 2022-04-20 DIAGNOSIS — Z96.1 PSEUDOPHAKIA OF BOTH EYES: ICD-10-CM

## 2022-04-20 PROCEDURE — G0463 HOSPITAL OUTPT CLINIC VISIT: HCPCS | Mod: 25

## 2022-04-20 PROCEDURE — 92014 COMPRE OPH EXAM EST PT 1/>: CPT | Performed by: OPHTHALMOLOGY

## 2022-04-20 PROCEDURE — 92133 CPTRZD OPH DX IMG PST SGM ON: CPT | Performed by: OPHTHALMOLOGY

## 2022-04-20 ASSESSMENT — REFRACTION_WEARINGRX
OD_ADD: +2.75
OS_ADD: +2.75
OD_SPHERE: -3.00
OD_CYLINDER: SPHERE
OS_CYLINDER: SPHERE
OS_SPHERE: +0.50

## 2022-04-20 ASSESSMENT — TONOMETRY
OS_IOP_MMHG: 16
IOP_METHOD: APPLANATION
OD_IOP_MMHG: 12

## 2022-04-20 ASSESSMENT — VISUAL ACUITY
OS_CC: 20/20-2
OD_CC: 20/50-2
CORRECTION_TYPE: GLASSES

## 2022-04-20 ASSESSMENT — EXTERNAL EXAM - RIGHT EYE: OD_EXAM: NORMAL

## 2022-04-20 ASSESSMENT — SLIT LAMP EXAM - LIDS
COMMENTS: MILD PTOSIS
COMMENTS: NORMAL

## 2022-04-20 ASSESSMENT — CUP TO DISC RATIO: OS_RATIO: 0.6

## 2022-04-20 ASSESSMENT — CONF VISUAL FIELD
METHOD: COUNTING FINGERS
OS_NORMAL: 1
OD_NORMAL: 1

## 2022-04-20 ASSESSMENT — EXTERNAL EXAM - LEFT EYE: OS_EXAM: NORMAL

## 2022-04-20 NOTE — PROGRESS NOTES
Chief Complaint(s) and History of Present Illness(es)     Glaucoma Follow-Up     Laterality: both eyes    Associated symptoms: floaters.  Negative for flashes    Pain scale: 0/10              Comments     Pt noticed a small floater in left eye yesterday, not seeing it now.  No   flashing lights..  Overall her eyes seem stable since she saw Dr. Donovan   in October of 2021.   Uses AT's PRN -rare     Pili ZAC Urias, COT 7:55 AM 04/20/2022                Review of systems for the eyes was negative other than the pertinent positives/negatives listed in the HPI.      Assessment & Plan      Romayne L Sathre is a 76 year old female with the following diagnoses:   1. Glaucoma suspect of both eyes    2. Pseudophakia of both eyes    3. Vitreomacular adhesion of right eye    4. Epiretinal membrane, right         Here for glaucoma suspect recheck   Now s/p pars plana vitrectomy (PPV) right eye   Intraocular lens still with phacodenesis right eye, but appears stable  Left eye looks good    TMax unknown  Family history: negative  Trauma history: positive - right eye with remote hx of trauma.   Pachmetry: 571/568    OCT Nerve fiber layer stable (low quality right eye)   Intraocular pressure within normal limits - acceptable  Monitor without treatment at this time  Discussed early posterior capsular opacity (PCO), monitor      Patient disposition:   Return in about 1 year (around 4/20/2023) for DFE, OCT NFL.           Attending Physician Attestation:  Complete documentation of historical and exam elements from today's encounter can be found in the full encounter summary report (not reduplicated in this progress note).  I personally obtained the chief complaint(s) and history of present illness.  I confirmed and edited as necessary the review of systems, past medical/surgical history, family history, social history, and examination findings as documented by others; and I examined the patient myself.  I personally reviewed the  relevant tests, images, and reports as documented above.  I formulated and edited as necessary the assessment and plan and discussed the findings and management plan with the patient and family. . - Ruben Shelby MD

## 2022-06-23 ENCOUNTER — TELEPHONE (OUTPATIENT)
Dept: FAMILY MEDICINE | Facility: CLINIC | Age: 77
End: 2022-06-23

## 2022-06-23 ENCOUNTER — OFFICE VISIT (OUTPATIENT)
Dept: FAMILY MEDICINE | Facility: CLINIC | Age: 77
End: 2022-06-23
Payer: COMMERCIAL

## 2022-06-23 VITALS
BODY MASS INDEX: 37.07 KG/M2 | RESPIRATION RATE: 20 BRPM | DIASTOLIC BLOOD PRESSURE: 76 MMHG | HEART RATE: 64 BPM | OXYGEN SATURATION: 98 % | SYSTOLIC BLOOD PRESSURE: 159 MMHG | TEMPERATURE: 97.4 F | WEIGHT: 251 LBS

## 2022-06-23 DIAGNOSIS — N18.30 STAGE 3 CHRONIC KIDNEY DISEASE, UNSPECIFIED WHETHER STAGE 3A OR 3B CKD (H): ICD-10-CM

## 2022-06-23 DIAGNOSIS — E78.2 MIXED HYPERLIPIDEMIA: ICD-10-CM

## 2022-06-23 DIAGNOSIS — R53.81 PHYSICAL DECONDITIONING: ICD-10-CM

## 2022-06-23 DIAGNOSIS — R19.5 POSITIVE COLORECTAL CANCER SCREENING USING COLOGUARD TEST: Primary | ICD-10-CM

## 2022-06-23 DIAGNOSIS — E66.01 MORBID OBESITY (H): ICD-10-CM

## 2022-06-23 DIAGNOSIS — Z12.11 COLON CANCER SCREENING: ICD-10-CM

## 2022-06-23 DIAGNOSIS — R41.3 MEMORY DEFICIT: ICD-10-CM

## 2022-06-23 DIAGNOSIS — R60.9 DEPENDENT EDEMA: ICD-10-CM

## 2022-06-23 DIAGNOSIS — M85.80 OSTEOPENIA, UNSPECIFIED LOCATION: ICD-10-CM

## 2022-06-23 DIAGNOSIS — D70.9 NEUTROPENIA, UNSPECIFIED TYPE (H): ICD-10-CM

## 2022-06-23 DIAGNOSIS — Z85.3 HISTORY OF BREAST CANCER: ICD-10-CM

## 2022-06-23 DIAGNOSIS — R73.03 PREDIABETES: ICD-10-CM

## 2022-06-23 DIAGNOSIS — I10 HYPERTENSION GOAL BP (BLOOD PRESSURE) < 140/90: Primary | ICD-10-CM

## 2022-06-23 DIAGNOSIS — Z23 NEED FOR SHINGLES VACCINE: ICD-10-CM

## 2022-06-23 DIAGNOSIS — R19.5 POSITIVE COLORECTAL CANCER SCREENING USING COLOGUARD TEST: ICD-10-CM

## 2022-06-23 DIAGNOSIS — R06.83 SNORING: ICD-10-CM

## 2022-06-23 DIAGNOSIS — E03.9 ACQUIRED HYPOTHYROIDISM: ICD-10-CM

## 2022-06-23 LAB
BASOPHILS # BLD AUTO: 0 10E3/UL (ref 0–0.2)
BASOPHILS NFR BLD AUTO: 1 %
EOSINOPHIL # BLD AUTO: 0.1 10E3/UL (ref 0–0.7)
EOSINOPHIL NFR BLD AUTO: 3 %
ERYTHROCYTE [DISTWIDTH] IN BLOOD BY AUTOMATED COUNT: 13.3 % (ref 10–15)
HCT VFR BLD AUTO: 41.7 % (ref 35–47)
HGB BLD-MCNC: 13.4 G/DL (ref 11.7–15.7)
IMM GRANULOCYTES # BLD: 0 10E3/UL
IMM GRANULOCYTES NFR BLD: 0 %
LYMPHOCYTES # BLD AUTO: 0.7 10E3/UL (ref 0.8–5.3)
LYMPHOCYTES NFR BLD AUTO: 24 %
MCH RBC QN AUTO: 28.7 PG (ref 26.5–33)
MCHC RBC AUTO-ENTMCNC: 32.1 G/DL (ref 31.5–36.5)
MCV RBC AUTO: 89 FL (ref 78–100)
MONOCYTES # BLD AUTO: 0.7 10E3/UL (ref 0–1.3)
MONOCYTES NFR BLD AUTO: 23 %
NEUTROPHILS # BLD AUTO: 1.5 10E3/UL (ref 1.6–8.3)
NEUTROPHILS NFR BLD AUTO: 49 %
NRBC # BLD AUTO: 0 10E3/UL
NRBC BLD AUTO-RTO: 0 /100
PLAT MORPH BLD: NORMAL
PLATELET # BLD AUTO: 197 10E3/UL (ref 150–450)
RBC # BLD AUTO: 4.67 10E6/UL (ref 3.8–5.2)
RBC MORPH BLD: NORMAL
VIT B12 SERPL-MCNC: 429 PG/ML (ref 232–1245)
WBC # BLD AUTO: 3 10E3/UL (ref 4–11)

## 2022-06-23 PROCEDURE — 84443 ASSAY THYROID STIM HORMONE: CPT | Performed by: FAMILY MEDICINE

## 2022-06-23 PROCEDURE — 82607 VITAMIN B-12: CPT | Performed by: FAMILY MEDICINE

## 2022-06-23 PROCEDURE — 99214 OFFICE O/P EST MOD 30 MIN: CPT | Performed by: FAMILY MEDICINE

## 2022-06-23 PROCEDURE — 85025 COMPLETE CBC W/AUTO DIFF WBC: CPT | Performed by: FAMILY MEDICINE

## 2022-06-23 PROCEDURE — 80053 COMPREHEN METABOLIC PANEL: CPT | Performed by: FAMILY MEDICINE

## 2022-06-23 PROCEDURE — 80061 LIPID PANEL: CPT | Performed by: FAMILY MEDICINE

## 2022-06-23 PROCEDURE — 36415 COLL VENOUS BLD VENIPUNCTURE: CPT | Performed by: FAMILY MEDICINE

## 2022-06-23 RX ORDER — LISINOPRIL 20 MG/1
20 TABLET ORAL AT BEDTIME
Qty: 90 TABLET | Refills: 1 | Status: SHIPPED | OUTPATIENT
Start: 2022-06-23 | End: 2022-09-23

## 2022-06-23 NOTE — TELEPHONE ENCOUNTER
Please let patient know the [facility] reported her test is positive and she needs to get a colonoscopy done.  Referral has been placed.

## 2022-06-23 NOTE — TELEPHONE ENCOUNTER
Left message on patient's voicemail to call back and speak with a triage nurse.     MARY FergusonN RN  [facility]

## 2022-06-23 NOTE — LETTER
July 1, 2022      Romayne L Ira  [address]  [facility] [address]        Dear ,      We are writing to inform you of your test results. Stable low white count around 3.  Hemoglobin hematocrit and platelets similar to prior.  Rest of labs pending.     Resulted Orders   CBC with platelets and differential   Result Value Ref Range    WBC Count 3.0 (L) 4.0 - 11.0 10e3/uL    RBC Count 4.67 3.80 - 5.20 10e6/uL    Hemoglobin 13.4 11.7 - 15.7 g/dL    Hematocrit 41.7 35.0 - 47.0 %    MCV 89 78 - 100 fL    MCH 28.7 26.5 - 33.0 pg    MCHC 32.1 31.5 - 36.5 g/dL    RDW 13.3 10.0 - 15.0 %    Platelet Count 197 150 - 450 10e3/uL    % Neutrophils 49 %    % Lymphocytes 24 %    % Monocytes 23 %    % Eosinophils 3 %    % Basophils 1 %    % Immature Granulocytes 0 %    NRBCs per 100 WBC 0 <1 /100    Absolute Neutrophils 1.5 (L) 1.6 - 8.3 10e3/uL    Absolute Lymphocytes 0.7 (L) 0.8 - 5.3 10e3/uL    Absolute Monocytes 0.7 0.0 - 1.3 10e3/uL    Absolute Eosinophils 0.1 0.0 - 0.7 10e3/uL    Absolute Basophils 0.0 0.0 - 0.2 10e3/uL    Absolute Immature Granulocytes 0.0 <=0.4 10e3/uL    Absolute NRBCs 0.0 10e3/uL       If you have any questions or concerns, please call the clinic at the number listed above.       Sincerely,      Marry Rodriguez MD

## 2022-06-23 NOTE — TELEPHONE ENCOUNTER
Dr. Rodriguez-Please review.    Patient had office visit with Dr. Rodriguez today and stated she had not yet received Cologuard results.    Patient reported completing test in March/April 2022.    Writer called [facility] and spoke with Ivan:  1. Received kit on 3/29/22  2. Positive result verified on 4/1/22  3. Result will be faxed to clinic: [phone]      Thank you!  MARY PartidaN, RN  [facility]

## 2022-06-23 NOTE — PATIENT INSTRUCTIONS
Blood pressure not goal increase lisinopril to 20 mg bedtime ( may take two of the 10 mg left until can start the new script of 20 mg dose) . Avoid gatorade as high in slat, drink tea in moderation. Check BP at home, goal should be less than 130/85  Recheck BP in 2 to 3 weeks with medical assistant and lab at that time for med monitoring  Work on diet , exercise, low carb , smaller portions, avoid alcohol to help Blood pressure, weight and cholesterol  Avoid antiinflammatories due to chronic kidney disease  Will see what thyroid result is and adjust med if needed  Due to prediabetes will check HBA1c today, increase exercise, eat less carbs, avoid alcohol and work on loosing 10 % of total body weight   Stable low white count  Resolved anemia  Recovered from covid  Opting to defer seeing neuro for memory concern and snoring for now, will monitor  Fatigue resolved  Continue use of compression socks for leg swelling  Mammogram in April was normal  Working on health care directives  Covid # 3 due mid July  Dexa due in 2023  Heart burn and constipation resolved  Will contact cologrd about test result done and no result given to me yet  Continue care with dentist and eye

## 2022-06-23 NOTE — PROGRESS NOTES
Assessment & Plan     Hypertension goal BP (blood pressure) < 140/90  Blood pressure not goal increase lisinopril to 20 mg bedtime ( may take two of the 10 mg left until can start the new script of 20 mg dose) . Avoid Gatorade as high in salt, drink tea in moderation, avoid alcohol. Check BP at home, goal should be less than 130/85. Recheck BP in 2 to 3 weeks with medical assistant and lab at that time for med monitoring with a bmp.   To work on diet , exercise, low carb , smaller portions, avoid alcohol to help Blood pressure, weight and cholesterol    BMI > 37  Discuss diet, exercise and weight loss    Continue atorvastatin 20 mg increased at last visit and see if with better BP control and repeat lipids if needs to be adjusted further in the future.    CKD 3 stable resolved microalbuminuria, on an ACE, Avoid antiinflammatories due to chronic kidney disease    Euthyroid on levothyroxine 150 mcg daily. Will see what thyroid result is and adjust med if needed. Later tsh was wnl and continued on same dose.     Due to prediabetes will check HBA1c today, encouraged to  increase exercise, eat less carb's, avoid alcohol and work on loosing 10 % of total body weight     Memory deficits, currently tracking and able to drive locally . Here alone without family. Declines need to see neurology. Continue to monitor.     Hx of snoring, suspected undiagnosed HUGO, opting to not see sleep for now due to cost. Resolved anemia. Recovered from Covid. Opting to defer seeing neuro for memory concern and snoring for now, will monitor. Reporting Fatigue resolved    Stable asymptomatic low white count.     Hx of breast cancer but Mammogram in April was normal    Continue use of compression socks for dependant edema.     Dexa due in 2023 for hx of osteopenia/ osteoporosis, continue calcium and vit d supp.   Start vit b12 500 mcg OTC daily for low normal B 12 noted in march and physical deconditioning ( forgot to start).     Working on  health care directives  Covid # 3 due mid July  Declines Shingrex for now.   Continue care with dentist and eye     Heart burn and constipation resolved  Will contact cologuard about missing test result. This was later reported as positive triage advised to contact patient and diagnostic colonoscopy ordered.   - lisinopril (ZESTRIL) 20 MG tablet; Take 1 tablet (20 mg) by mouth At Bedtime  - Comprehensive metabolic panel (BMP + Alb, Alk Phos, ALT, AST, Total. Bili, TP); Future  - TSH with free T4 reflex; Future  - Comprehensive metabolic panel (BMP + Alb, Alk Phos, ALT, AST, Total. Bili, TP)  - TSH with free T4 reflex    Obesity (BMI 35.0-39.9) with comorbidity (H)  BMI > 37  Discuss diet, exercise and weight loss   - TSH with free T4 reflex; Future  - Lipid Profile (Chol, Trig, HDL, LDL calc); Future  - TSH with free T4 reflex  - Lipid Profile (Chol, Trig, HDL, LDL calc)    Mixed hyperlipidemia  Continue atorvastatin 20 mg increased at last visit and see if with better BP control and repeat lipids if needs to be adjusted further in the future.  - TSH with free T4 reflex; Future  - Lipid Profile (Chol, Trig, HDL, LDL calc); Future  - TSH with free T4 reflex  - Lipid Profile (Chol, Trig, HDL, LDL calc)    Stage 3 chronic kidney disease, unspecified whether stage 3a or 3b CKD (H)  CKD 3 stable resolved microalbuminuria, on an ACE, Avoid antiinflammatories due to chronic kidney disease  - Comprehensive metabolic panel (BMP + Alb, Alk Phos, ALT, AST, Total. Bili, TP); Future  - Comprehensive metabolic panel (BMP + Alb, Alk Phos, ALT, AST, Total. Bili, TP)    Acquired hypothyroidism  Euthyroid on levothyroxine 150 mcg daily. Will see what thyroid result is and adjust med if needed. Later TSH was wnl and continued on same dose.   - TSH with free T4 reflex; Future  - TSH with free T4 reflex  - levothyroxine (SYNTHROID/LEVOTHROID) 150 MCG tablet; Take 1 tablet (150 mcg) by mouth daily    Prediabetes  Due to prediabetes will  check HBA1c today, encouraged to  increase exercise, eat less carb's, avoid alcohol and work on loosing 10 % of total body weight    Memory deficit  Memory deficits, currently tracking and able to drive locally . Here alone without family. Declines need to see neurology. Continue to monitor.   - CBC with platelets and differential; Future  - Vitamin B12; Future  - CBC with platelets and differential  - Vitamin B12    Snoring  Hx of snoring, suspected undiagnosed HUGO, opting to not see sleep for now due to cost. Resolved anemia. Recovered from Covid. Opting to defer seeing neuro for memory concern and snoring for now, will monitor. Reporting Fatigue resolved  - CBC with platelets and differential; Future  - CBC with platelets and differential    Neutropenia, unspecified type (H)  Stable asymptomatic low white count.   - CBC with platelets and differential; Future  - CBC with platelets and differential    History of breast cancer  Hx of breast cancer but Mammogram in April was normal  - CBC with platelets and differential; Future  - CBC with platelets and differential    Dependent edema  Continue use of compression socks for dependant edema.   - Comprehensive metabolic panel (BMP + Alb, Alk Phos, ALT, AST, Total. Bili, TP); Future  - CBC with platelets and differential; Future  - Comprehensive metabolic panel (BMP + Alb, Alk Phos, ALT, AST, Total. Bili, TP)  - CBC with platelets and differential    Osteopenia, unspecified location  Dexa due in 2023 for hx of osteopenia/ osteoporosis, continue calcium and vit d supp.   - Comprehensive metabolic panel (BMP + Alb, Alk Phos, ALT, AST, Total. Bili, TP); Future  - Comprehensive metabolic panel (BMP + Alb, Alk Phos, ALT, AST, Total. Bili, TP)    Physical deconditioning  Start vit b12 500 mcg OTC daily for low normal B 12 noted in march and physical deconditioning ( forgot to start).   Increase physical activity   - Vitamin B12; Future  - Vitamin B12    Need for shingles  vaccine  Working on health care directives  Covid # 3 due mid July  Declines Shingrex for now.     Colon cancer screening  Positive colorectal cancer screening using Cologuard test  Continue care with dentist and eye     Heart burn and constipation resolved  [facility]. This was later reported as positive triage advised to contact patient and diagnostic colonoscopy ordered.    Review of the result(s) of each unique test - labs since 3/2022  Diagnosis or treatment significantly limited by social determinants of health - forgetfullness, cost of care  Ordering of each unique test  Prescription drug management  30 minutes spent on the date of the encounter doing chart review, history and exam, documentation and further activities per the note     Work on weight loss  Regular exercise  See Patient Instructions    Return in about 3 months (around 9/23/2022) for Follow up, with me, in person.    Marry Rodriguez MD  [facility]    Subjective Romayne is a 76 year old, presenting for the following health issues:  Hypertension, Thyroid Problem, and Medication Follow-up      History of Present Illness       Hyperlipidemia:  She presents for follow up of hyperlipidemia.  She is taking medication to lower cholesterol. She is not having myalgia or other side effects to statin medications.    Hypertension: She presents for follow up of hypertension.  She does not check blood pressure  regularly outside of the clinic. Outside blood pressures have been over 140/90. She does not follow a low salt diet.     Hypothyroidism:     Since last visit, patient describes the following symptoms::  None    She eats 2-3 servings of fruits and vegetables daily.She consumes 0 sweetened beverage(s) daily.She exercises with enough effort to increase her heart rate 9 or less minutes per day.  She exercises with enough effort to increase her heart rate 3 or less days per week.   She is  taking medications regularly.     CURRENTLY   Here for follow up.   HTN: BP elevated, takes lisinopril 10 at night. Trending high last few visits. previously was on 20, has CKD3,  Asymptomatic. Under stress brother  recently from pancreatic cancer. drinking Gatorade, likes her ice tea. Alcohol one a day maybe.   BMI 37, mostly sedentary.  Discussed lifestyle and will do labs today.  Encouraged to work on diet, exercise and losing at least 5 to 10% of total body weight.  Hyperlipidemia on atorvastatin 20 mg bedtime.  CKD 3 asymptomatic avoiding NSAID's we will do labs today. Micro albumin improved at last visit in march  Hypothyroid on levothyroxine 150 mcg daily previously on differing doses on the weekend and weekdays but been on this dose since 2021. In march TSH was above 4 but ft 4 normal so no change in med made. Will recheck TSH today .   Prediabetes; to check today   Memory concerns here alone, driving short familiar distances in day light. Not loosing self. Had bene referred to neurology but apt never set up. Feels doing ok and declines seeing neurology for now.  History of snoring has been referred to sleep in the past.  Does have obesity, hyperlipidemia, hypertension and forgetfulness.  Does have chronic deconditioning.  Referred to sleep a last visit but never set up. Feels its all too much money & declines for now. Reports fatigue improved.   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  Currently no symptoms   History of Covid in 2020 was hospitalized requiring oxygen and received steroids and antivirals.  Had a DVT and PE requiring anticoagulation 3 months.  Resolved thromboembolism and sepsis and HALI and treated for UTI at that time was in the ICU and discharged after a week in hospital.  Reported made a full recovery no longer on oxygen or anticoagulation.since 2021.    Hx of breast cancer had right mastectomy and chemotherapy  initially tried a saline implant but reported this was removed.  Mammogram in April normal      Bilateral lower leg swelling Felt could be related to BMI and untreated sleep apnea, managed with compression socks daily.      History of osteopenia.  Prior untreated osteoporosis but DEXA scan done 2021 showed osteopenia and was advised to continue calcium and vitamin D.  She thinks she is getting calcium but this is not clear.  DEXA scan due in 2023.     Physical deconditioning.  Is very sedentary.  Prior echo and stress test before had COVID was normal.  CT during COVID in 2020 did not show right heart strain.  Reports no symptoms at rest or with mild exertion.  Had a low B 12 and advised supp OTC but never started, will start now.     Declines shingrex for now.     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 Is  a bilateral glaucoma suspect and under care of eye. Seen by them in April.     Reports did cologuard but no results reviewed, will have triage contact cologuard.    No fever or chills, no headache or dizziness, no double or blurry vision, no facial pain, earache, sore throat, runny nose, post nasal drip, no trouble hearing, smelling, tasting or swallowing, no cough , no chest pain, trouble breathing or palpitations, No abdominal pain, heart burn, reflux, nausea or vomiting or diarrhea or constipation, no blood in stools or black stools, no weight loss or night sweats. No dysuria, hematuria, frequency, urgency, hesitancy, incontinence, No pelvic complaints. No leg swelling or joint pain. No rash.    BACKGROUND  76 yr lady with History of passive smoke exposure, obesity, hypertension on lisinopril, hyperlipidemia on atorvastatin, hypothyroidism on levothyroxine, CKD 3, history of memory deficit per epic, history of chronic leg edema, history of prior maxillary sinusitis, resolved  cellulitis, history of idiopathic leukopenia S/p work-up that was negative by hematology in 2012, history of lipoma noted in the past, history of prior right breast cancer in 2008 S/p right mastectomy and saline implants in remission no longer followed by oncology, history of resolved anemia, history of Covid infection in November 2020 with respiratory failure sepsis, hypoxia, Covid pneumonia, PE and left lower extremity DVT, elevated D-dimer, UTI, resolved HALI, resolved anemia, with complete resolution of Covid symptoms noted by February 2021.  No longer on oxygen.  History of snoring, chronic fatigue, physical deconditioning, GERD on Pepcid, and constipation, history of untreated osteoporosis DEXA scan in 2021 showed osteopenia, history of vitreal macular adhesion right eye and epiretinal membrane S/p cataract surgery and remote right eye injury, S/p vitrectomy being managed by eye & being monitored as a glaucoma suspect  on multivitamin recently under care of Dr. Sheridan then Sapphire Glass then Adela Avila,      Seen by PCP in April 2019 for dyspnea on exertion.  Examination was unremarkable, chest x-ray was negative.  EKG was unremarkable.  Echo showed a normal EF.  Stress test done was normal.  Her lisinopril was increased and suspected to be deconditioned and advised weight loss and increase activity and referred to sleep for her history of fatigue for possible obstructive sleep apnea but was never seen by sleep.  Fit test never done.  Not seen till called for medication refill on 4/30/2020 and appointment made with this writer, had an appointment by telephone with this writer for the first time on 5/19/2020 for blood pressure med refill.  Advise labs and follow-up in clinic.  Fit test done 6/8/2020 was negative.  TSH was elevated at 10 with free T4 normal and a normal hemoglobin A1c.   Visited with endocrine virtually on 7/8/2020 and noted had been treated for subclinical hypothyroidism with levothyroxine  "since 2004.  Medications adjusted and DEXA scan ordered.  Seen by eye 9/29/2020 for cataracts glaucoma check, prior right eye injury and which showed retinal membrane adhesion.  Seen by eye again 10/19/2020.     Seen in urgent care on 11/5/2020 for fever cough dizziness abdominal pain nausea was given Bactrim for cystitis tested for COVID discharged home and testing came back positive for Covid.  Seen in ER 11/16/2020 for shortness of breath sinus tachycardia and severe hypoxia.  Was admitted for worsening respiratory symptoms prompting her to seek further evaluation on 11/17/20 at [facility]. She was found to be in acute hypoxic respiratory failure requiring 30 LPM HFNC.  Chest x-ray at that time revealed \"diffuse patch airspace opacities consistent with COVID.\" She was admitted to the MICU for stabilization overnight and transferred to the general medical floor on 11/18/20. For her COVID infection, she received dexamethasone and remdesivir. She was initially treated with broad spectrum antibiotics for possible super-imposed pneumonia, but these were quickly discontinued. Her hospital course was complicated by a LLE DVT and PE's. She was started on Lovenox and transitioned to Apixaban. She was also found to have a UTI on day of discharge and was sent home on oral antibiotics.  Her severe Sepsis, resolved. For acute Hypoxic Respiratory Failure 2/2 COVID-19 PNA - COVID+ 11/5.  She was COVID recovered,  initially requiring 30 LMP HFNC, but was able to wean to 2Ls NC with exertion, 0 to 0.5L at rest. She completed her 5 day course of Remdesivir during her hospital stay. On discharge was continued on dexamethasone 6mg daily for 10 doses (ending 11/27) & continued on 2Ls NC O2 with ambulation, 0-0.5L at rest. For the Non-occlusive thrombus in left popliteal vein & Pulmonary Embolisms of segmental and subsegmental right upper and lower lobes with Elevated D-dimer - D-dimer >20 on admission. Started on high-intensity heparin gtt " in ED with bolus- and transitioned to low intensity on 11/18/20. Ultrasound of B/l lower extremities obtained on 11/19 which were notable for non-occlusive thrombus in left popliteal vein. She was then transitioned back to high intensity heparin gtt on 11/19. Had elevated hep 10A levels overnight 11/19. Transitioned to Lovenox 1mg/kg BID on 11/20/20. CT PE protocol with segmental and subsegmental PE in the right upper and lower lobes w/o evidence of RHS. Also w/ extensive reticular changes and GOO throughout lungs representing inflammation and/or fibrosis r/t recent COVID-19 infection. on Discharge continued on Apixiban 10mg BID x7 days, then 5mg BID thereafter for ~ 3 month duration of treatment as likely provoked DVT/PE due to COVID-19 infection. Was to follow up with PCP in one week to discuss anticoagulation. Noted Urinary Tract Infection & Urinary retention, resolved - Required straight cath x2 during her hospital admission. Developed LUTS last day of discharge with urinalysis revealing few bacteria, moderate leukocytes, and 12 Wbcs. However, there were 4 epis.  & discharged on Keflex 500mg BID for 5 days & to follow up with PCP in one week to review urine culture results. Her HALI, resolved. Noted CKD III - Baseline creatinine 1.1-1.2, elevated to 1.53 on presentation with BUN 47 then returned to baseline. Etiology likely prerenal 2/2 COVID. Lisinopril was held & resumed on discharge. For her HTN - Patient's lisinopril held on during hospital stay due to HALI. Started on amlodipine 11/20 for elevated BP but on discharge, amlodipine was discontinued and resumed home lisinopril 20 mg daily. She noted increased heartburn since starting Eliquis, while inpatient was on omeprazole this was switched to Pepcid once off the Eliquis and continued on Tums as needed. Had constipation on admission - improved with bowel regimen, however developed liquid stool which improved by discharge. Hypothyroidism: TSH on admission WNL  at 0.42 & continued on  PTA Synthroid. Noted Normocytic anemia - Baseline Hgb 12-13. & was 12.2 at discharge. No S/sx of bleeding since initiation of anicoagulation. for HLD was continued on a statin.  She was discharged home on 11/24/2020.     Seen 12/3/2020 by Dr. Lazo for hospital discharge noted was no longer on oxygen during the day and satting fine & remained on Eliquis.  Seen by Sapphire Glass her PCP on 2/5/2021 for routine physical and noted had completely recovered from Covid & no longer short of breath, had no cough & not on oxygen, no longer with leg pain & was wearing compression socks for chronic leg swelling. and Doppler done on 2//22 was negative for DVT on Eliquis. DEXA scan ordered for prior untreated osteoporosis and continued on Pepcid for heartburn.  Labs later showed she was overcorrected on thyroid and levothyroxine was changed from double dosing on the weekend to daily simple dosing of 150 mcg daily and recheck TSH 6 weeks later was within normal range and continued on same dose.     DEXA scan done 2/11/2021 showed osteopenia and continued on calcium and vitamin D.  Cataracts removed 2/22/2021.  Seen by 3/11/2021.  TSH normal 3/15/2021.  Noted blurry vision 4/13/2021.  Had stable CKD 5/18/2021 and advised to avoid NSAID's.  Seen 5/18/2021 by Dr. Avila for preop for vitrectomy.   On 6/7/2021 had vitrectomy and membrane stripping for history of vitreoretinal adhesion right eye.  Seen by the eye doctor postop day 1, 1 week postop and at 6-week postop guero and noted was doing well.  Last seen by eye on 10/20/2021 for hx of Vitreoretinal adhesion right eye, ERM right eye S/P PPV/MP right eye 6/2021. Foveal anatomy improved significantly; BCVA 20/50 limited likely due to subtle IS/OS disruption at fovea (+drusen) -3.0 refraction right eye and seeing well up close; instructed her to try to use right eye as monovision for near.  Was to observe pseudophakia each eye.  Noted to be a glaucoma suspect  with large disc and large cupping child being followed by Dr. Shelby.    Seen 3/22/22 for medicare wellness/ preventive health and additional concerns. Discussed slef breast checks, mammogram ordered, Pelvic / PAP no longer needed  Health care maintenance reviewed. To Consider working on health care directives & given honoring choices. Given Covid pfizer booster for prior received Aron vaccine in 2021.  Discussed Shingrex.   BMI 37, mostly sedentary.  Discussed lifestyle. Encouraged to work on diet, exercise and losing at least 5 to 10% of total body weight.  Hyperlipidemia on atorvastatin 10 mg bedtime had enough meds till could get lab work reviewed. LDL later came back normal but triglycerides were elevated and overall cardiovascular risk is 23.2 so atorvastatin increased to 20 mg bedtime and was to recheck cholesterol fasting in 3 months when I saw her back again.  Hypertension on lisinopril 10 mg daily previously was on 20, blood pressure was  136/79.  Asymptomatic no side effects & refilled at same dose  #90 with 3 refills.   CKD 3 asymptomatic reminded to avoid NSAID's Kidney function came back stable with normal micro albumin.  Hypothyroid on levothyroxine 150 mcg daily deviously on differing doses on the weekend and weekdays but had been on this dose since February 2021.    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  Had no symptoms. Count came back low at 2.2 similar with neutropenia.  Stable to prior. History of anemia noted when had COVID asymptomatic no GI bleeding not on anticoagulation. hemoglobin and hematocrit came back normal.  History of Covid in November 2020 was hospitalized requiring oxygen and received steroids and antivirals.  Had a DVT and PE requiring anticoagulation 3 months.  Resolved thromboembolism and sepsis and HALI and treated for UTI at that time was in the ICU and discharged after a week in hospital.  Reported made a full recovery & no  longer on oxygen or anticoagulation.  Resolved acute respiratory failure on no oxygen since January 2021.  History of snoring had been referred to sleep in the past.  Did have obesity, hyperlipidemia, hypertension and forgetfulness.  Did have chronic deconditioning. Referral placed to sleep again. Chronic fatigue reported improved.  Had bilateral lower leg swelling managed with compression socks daily felt related to BMI and untreated sleep apnea  Physical deconditioning.  Was very sedentary.  Prior echo and stress test before had COVID was normal.  CT during COVID in 2020 did not show right heart strain.  Reported no symptoms at rest or with mild exertion.  Encouraged to be more active.  Hx of breast cancer & had had right mastectomy and chemotherapy,  initially tried a saline implant but reported this had since been removed.  Reported no new lumps or ulcers on scar tissue.  Reported no left breast lumps, bumps, skin changes or nipple discharge.  Had been in remission long time and not seen oncology in a while.  Mammogram for the left placed  Heartburn controlled on Pepcid OTC daily. Constipation improved & was to continue with a high-fiber diet.  History of osteopenia.  Prior untreated osteoporosis but DEXA scan done 2021 showed osteopenia and was advised to continue calcium and vitamin D.  Unclear if taking calcium & recommended taking calcium citrate 1200 mg total a day and vitamin D at least 2000 units daily and rechecking DEXA scan in 2023.  Noted memory deficits, failed mini cog, seen alone, referred to neurology to further evaluate.   History of vitreal macular adhesion right eye and epiretinal membrane S/p surgery vitrectomy and stripping in June 2021. Was a bilateral glaucoma suspect and under care of eye. Was to see the dentist regularly. The 1 cm cyst on her right back was not a concern, & to monitor for worsening  Colon cancer screening due options discussed & Cologuard.ordered.     Labs showed normal  Vit d, TSH was mildly out of range but ft 4 was normal & continued on same dose for 3 months before rechecking. micro albumin had improved. Wbc was 2.2, not anemia, platelets normal. B 12 was low normal and advised 500 mcg of vit B12.CMP showed stable CKD. Lipids not goal and atorvastatin increased to 20 mg. HBA1c was 5.7 and advised on diet and lifestyle changes. cologuard result not received. No apt made with sleep or neuro. Seen by eye 4/20/22 for glaucoma monitoring.     Review of Systems   Constitutional, HEENT, cardiovascular, pulmonary, GI, , musculoskeletal, neuro, skin, endocrine and psych systems are negative, except as otherwise noted.      Objective    BP (!) 159/76 (BP Location: Right arm, Patient Position: Chair, Cuff Size: Adult Large)   Pulse 64   Temp 97.4  F (36.3  C) (Temporal)   Resp 20   Wt 113.9 kg (251 lb)   SpO2 98%   BMI 37.07 kg/m    Body mass index is 37.07 kg/m .  Physical Exam   GENERAL: alert, no distress and obese  EYES: Eyes grossly normal to inspection, PERRL and conjunctivae and sclerae normal  HENT: ear canals and TM's normal, nose and mouth without ulcers or lesions. Poor dentition  NECK: no adenopathy, no asymmetry, masses, or scars and thyroid normal to palpation  RESP: lungs clear to auscultation - no rales, rhonchi or wheezes  CV: regular rate and rhythm, normal S1 S2, no S3 or S4, no murmur, click or rub, no peripheral edema and peripheral pulses strong  ABDOMEN: soft, nontender, difficult exam given large pannus  MS: no gross musculoskeletal defects noted, no edema  , wearing B/l compression socks  SKIN: no suspicious lesions or rashes  NEURO: Normal strength and tone, mentation intact and speech normal  PSYCH: mentation appears normal, affect normal/bright    Results for orders placed or performed in visit on 06/23/22   Comprehensive metabolic panel (BMP + Alb, Alk Phos, ALT, AST, Total. Bili, TP)     Status: Abnormal   Result Value Ref Range    Sodium 137 133 - 144  mmol/L    Potassium 5.2 3.4 - 5.3 mmol/L    Chloride 106 94 - 109 mmol/L    Carbon Dioxide (CO2) 22 20 - 32 mmol/L    Anion Gap 9 3 - 14 mmol/L    Urea Nitrogen 20 7 - 30 mg/dL    Creatinine 1.29 (H) 0.52 - 1.04 mg/dL    Calcium 8.8 8.5 - 10.1 mg/dL    Glucose 89 70 - 99 mg/dL    Alkaline Phosphatase 49 40 - 150 U/L    AST 26 0 - 45 U/L    ALT 28 0 - 50 U/L    Protein Total 8.0 6.8 - 8.8 g/dL    Albumin 3.9 3.4 - 5.0 g/dL    Bilirubin Total 0.5 0.2 - 1.3 mg/dL    GFR Estimate 43 (L) >60 mL/min/1.73m2   TSH with free T4 reflex     Status: Normal   Result Value Ref Range    TSH 3.55 0.40 - 4.00 mU/L   Lipid Profile (Chol, Trig, HDL, LDL calc)     Status: Abnormal   Result Value Ref Range    Cholesterol 164 <200 mg/dL    Triglycerides 184 (H) <150 mg/dL    Direct Measure HDL 43 (L) >=50 mg/dL    LDL Cholesterol Calculated 84 <=100 mg/dL    Non HDL Cholesterol 121 <130 mg/dL    Patient Fasting > 8hrs? Yes     Narrative    Cholesterol  Desirable:  <200 mg/dL    Triglycerides  Normal:  Less than 150 mg/dL  Borderline High:  150-199 mg/dL  High:  200-499 mg/dL  Very High:  Greater than or equal to 500 mg/dL    Direct Measure HDL  Female:  Greater than or equal to 50 mg/dL   Male:  Greater than or equal to 40 mg/dL    LDL Cholesterol  Desirable:  <100mg/dL  Above Desirable:  100-129 mg/dL   Borderline High:  130-159 mg/dL   High:  160-189 mg/dL   Very High:  >= 190 mg/dL    Non HDL Cholesterol  Desirable:  130 mg/dL  Above Desirable:  130-159 mg/dL  Borderline High:  160-189 mg/dL  High:  190-219 mg/dL  Very High:  Greater than or equal to 220 mg/dL   Vitamin B12     Status: Normal   Result Value Ref Range    Vitamin B12 429 232 - 1,245 pg/mL   CBC with platelets and differential     Status: Abnormal   Result Value Ref Range    WBC Count 3.0 (L) 4.0 - 11.0 10e3/uL    RBC Count 4.67 3.80 - 5.20 10e6/uL    Hemoglobin 13.4 11.7 - 15.7 g/dL    Hematocrit 41.7 35.0 - 47.0 %    MCV 89 78 - 100 fL    MCH 28.7 26.5 - 33.0 pg     MCHC 32.1 31.5 - 36.5 g/dL    RDW 13.3 10.0 - 15.0 %    Platelet Count 197 150 - 450 10e3/uL    % Neutrophils 49 %    % Lymphocytes 24 %    % Monocytes 23 %    % Eosinophils 3 %    % Basophils 1 %    % Immature Granulocytes 0 %    NRBCs per 100 WBC 0 <1 /100    Absolute Neutrophils 1.5 (L) 1.6 - 8.3 10e3/uL    Absolute Lymphocytes 0.7 (L) 0.8 - 5.3 10e3/uL    Absolute Monocytes 0.7 0.0 - 1.3 10e3/uL    Absolute Eosinophils 0.1 0.0 - 0.7 10e3/uL    Absolute Basophils 0.0 0.0 - 0.2 10e3/uL    Absolute Immature Granulocytes 0.0 <=0.4 10e3/uL    Absolute NRBCs 0.0 10e3/uL   RBC and Platelet Morphology     Status: None   Result Value Ref Range    Platelet Assessment  Automated Count Confirmed. Platelet morphology is normal.     Automated Count Confirmed. Platelet morphology is normal.    RBC Morphology Confirmed RBC Indices    CBC with platelets and differential     Status: Abnormal    Narrative    The following orders were created for panel order CBC with platelets and differential.  Procedure                               Abnormality         Status                     ---------                               -----------         ------                     CBC with platelets and d...[[id number]]  Abnormal            Final result               RBC and Platelet Morphology[[id number]]                      Final result                 Please view results for these tests on the individual orders.               .  ..

## 2022-06-23 NOTE — RESULT ENCOUNTER NOTE
Stable low white count around 3.  Hemoglobin hematocrit and platelets similar to prior.  Rest of labs pending.

## 2022-06-23 NOTE — RESULT ENCOUNTER NOTE
Estevan Ms. Roth,  Your results came back and your vitamin B12 is normal at 429.  May take vitamin B12 to 250 mcg daily , I plan to recheck this in 3 months when I see you back again.  If you have any further concerns please do not hesitate to contact us by message, phone or making an appointment.  Have a good day   Dr Michael SCOTT

## 2022-06-24 ENCOUNTER — TELEPHONE (OUTPATIENT)
Dept: GASTROENTEROLOGY | Facility: CLINIC | Age: 77
End: 2022-06-24

## 2022-06-24 ENCOUNTER — HOSPITAL ENCOUNTER (OUTPATIENT)
Facility: AMBULATORY SURGERY CENTER | Age: 77
End: 2022-06-24
Attending: INTERNAL MEDICINE
Payer: COMMERCIAL

## 2022-06-24 NOTE — TELEPHONE ENCOUNTER
Pt resting quietly   Screening Questions    BlueKIND OF PREP RedLOCATION [review exclusion criteria] GreenSEDATION TYPE      1. Are you active on mychart? Y    2. What insurance is in the chart? [employer]     3.   Ordering/Referring Provider: Marry Rodriguez MD    4. BMI   (If greater than 40 review exclusion criteria [PAC APPT IF [MAC] @ [facility])  37.1  [If yes, BMI OVER 40-EXTENDED PREP]      **(Sedation review/consideration needed)**  Do you have a legal guardian or Medical Power of    and/or are you able to give consent for your medical care?     Y    5. Have you had a positive covid test in the last 90 days?   N -     6.  Are you currently on dialysis?   N [ If yes, G-PREP & HOSPITAL setting ONLY]     7.  Do you have chronic kidney disease?  Y [ If yes, G-PREP ]    8.   Do you have a diagnosis of diabetes?   N   [ If yes, G-PREP ]    9.  On a regular basis do you go 3-5 days between bowel movements?   N   [ If yes, EXTENDED PREP]    10.  Are you taking any prescription pain medications on a routine schedule?    N -  [ If yes, EXTENDED PREP] [If yes, MAC]      11.   Do you have any chemical dependencies such as alcohol, street drugs, or methadone?    N [If yes, MAC]    12.   Do you have any history of post-traumatic stress syndrome, severe anxiety or history of psychosis?    N  [If yes, MAC]    13.  [FEMALES] Are you currently pregnant? N    If yes, how many weeks?       Respiratory/Heart Screening:  [If yes to any of the following HOSPITAL setting only]     14. Do you have Pulmonary Hypertension [Lungs]?   N       15. Do you have UNCONTROLLED asthma?   N     16.  Do you use daily home oxygen?  N      17. Do you have mod to severe Obstructive Sleep Apnea?         (OKAY @ [facility]  [facility]  SH  PH  RI  MG - if pt is not on OXYGEN)  N      18.   Have you had a heart or lung transplant?   N      19.   Have you had a stroke or Transient ischemic attack (TIA - aka  mini stroke ) within 6 months?  (If yes, please review exclusion  criteria)  N     20.   In the past 6 months, have you had any heart related issues including cardiomyopathy or heart attack?   N           If yes, did it require cardiac stenting or other implantable device?   N      21.   Do you have any implantable devices in your body (pacemaker, defib, LVAD)? (If yes, please review exclusion criteria)  N     22. Do you take nitroglycerin?   N           If yes, how often? N  (if yes, HOSPITAL setting ONLY)    23.  Are you currently taking any blood thinners?    [If yes, INFORM patient to follw up w/ ORDERING PROVIDER FOR BRIDGING INSTRUCTIONS]     N    24.   Do you transfer independently?                (If NO, please HOSPITAL setting ONLY)  Y    25.   Preferred LOCAL Pharmacy for Pre Prescription:      [id number] [place], [place] - [address] AT [address]    Scheduling Details  (Please ask for phone number if not scheduled by patient)      Caller : Romayne L Sathre    Date of Procedure: 8/17  Surgeon: L[place]hal  [place] [facility]        Sedation Type: CS l   Conscious Sedation- Needs  for 6 hours after the procedure  MAC/General-Needs  for 24 hours after procedure    N :[Pre-op Required] at [facility]  SH  MG and OR for MAC sedation   (advise patient they will need a pre-op WITH IN 30 DAYS of procedure date)     Type of Procedure Scheduled:   Lower Endoscopy [Colonoscopy]    Which Colonoscopy Prep was Sent?:   G - Prep    [facility] PATIENTS & [facility] PATIENTS NEEDS EXTENDED PREP       Informed patient they will need an adult  Y  Cannot take any type of public or medical transportation alone    Pre-Procedure Covid test to be completed at [facility] or Externally: Y  **INFORMED OF HOME TESTING & LAB OPTION**        Confirmed Nurse will call to complete assessment Y    Additional comments:

## 2022-06-26 PROBLEM — R19.5 POSITIVE COLORECTAL CANCER SCREENING USING COLOGUARD TEST: Status: ACTIVE | Noted: 2022-06-26

## 2022-06-26 LAB
ALBUMIN SERPL-MCNC: 3.9 G/DL (ref 3.4–5)
ALP SERPL-CCNC: 49 U/L (ref 40–150)
ALT SERPL W P-5'-P-CCNC: 28 U/L (ref 0–50)
ANION GAP SERPL CALCULATED.3IONS-SCNC: 9 MMOL/L (ref 3–14)
AST SERPL W P-5'-P-CCNC: 26 U/L (ref 0–45)
BILIRUB SERPL-MCNC: 0.5 MG/DL (ref 0.2–1.3)
BUN SERPL-MCNC: 20 MG/DL (ref 7–30)
CALCIUM SERPL-MCNC: 8.8 MG/DL (ref 8.5–10.1)
CHLORIDE BLD-SCNC: 106 MMOL/L (ref 94–109)
CHOLEST SERPL-MCNC: 164 MG/DL
CO2 SERPL-SCNC: 22 MMOL/L (ref 20–32)
CREAT SERPL-MCNC: 1.29 MG/DL (ref 0.52–1.04)
FASTING STATUS PATIENT QL REPORTED: YES
GFR SERPL CREATININE-BSD FRML MDRD: 43 ML/MIN/1.73M2
GLUCOSE BLD-MCNC: 89 MG/DL (ref 70–99)
HDLC SERPL-MCNC: 43 MG/DL
LDLC SERPL CALC-MCNC: 84 MG/DL
NONHDLC SERPL-MCNC: 121 MG/DL
POTASSIUM BLD-SCNC: 5.2 MMOL/L (ref 3.4–5.3)
PROT SERPL-MCNC: 8 G/DL (ref 6.8–8.8)
SODIUM SERPL-SCNC: 137 MMOL/L (ref 133–144)
TRIGL SERPL-MCNC: 184 MG/DL
TSH SERPL DL<=0.005 MIU/L-ACNC: 3.55 MU/L (ref 0.4–4)

## 2022-06-26 RX ORDER — LEVOTHYROXINE SODIUM 150 UG/1
150 TABLET ORAL DAILY
Qty: 90 TABLET | Refills: 1 | Status: SHIPPED | OUTPATIENT
Start: 2022-06-26 | End: 2022-09-23

## 2022-06-27 NOTE — TELEPHONE ENCOUNTER
Left message to call back and ask to speak with an available triage nurse on home line.    Called mobile number and pt answered stating she was busy and would call back.  Let her know there was a message on her home phone and she states she will check that and return our call.    Colonoscopy: If you don t hear from a representative within 2 business days, please call [phone].    VEL Jose RN  [facility]

## 2022-06-27 NOTE — RESULT ENCOUNTER NOTE
Estevan Ms. Roth,  Your results came back and show   The 10-year ASCVD risk score (Elyse DAVENPORT Jr., et al., 2013) is: 30.1%    Values used to calculate the score:      Age: 76 years      Sex: Female      Is Non- : No      Diabetic: No      Tobacco smoker: No      Systolic Blood Pressure: 159 mmHg      Is BP treated: Yes      HDL Cholesterol: 43 mg/dL      Total Cholesterol: 164 mg/dL  -HDL(good) cholesterol level is low and your triglycerides are elevated which can increase your heart disease risk.  A diet high in fat and simple carbohydrates, genetics and being overweight can contribute to this. LDL(bad) cholesterol level is normal.  ADVISE:exercising 150 minutes of aerobic exercise per week (30 minutes 5 days per week or 50 minutes 3 days per week are options), and omega-3 fatty acids (fish oil) 9521-2909 mg daily are helpful to improve this.  In 3 months, you should recheck your fasting cholesterol panel & if cardiovascular risk is still high we should go upon your cholesterol med for now continue on the same dosing  -Liver and gallbladder tests (ALT,AST, Alk phos,bilirubin) are normal.  -Kidney function (GFR) is decreased.  But stable to before  -Sodium is normal.  -Potassium is normal.  -Calcium is normal.  -Glucose is normal.  -TSH (thyroid stimulating hormone) level is normal which indicates appropriate thyroid replacement dosing.  ADVISE: continuing same replacement dose and rechecking this in 3 months.. If you have any further concerns please do not hesitate to contact us by message, phone or making an appointment.  Have a good day   Dr Michael SCOTT

## 2022-07-07 ENCOUNTER — TELEPHONE (OUTPATIENT)
Dept: GASTROENTEROLOGY | Facility: CLINIC | Age: 77
End: 2022-07-07

## 2022-07-07 ENCOUNTER — ALLIED HEALTH/NURSE VISIT (OUTPATIENT)
Dept: FAMILY MEDICINE | Facility: CLINIC | Age: 77
End: 2022-07-07
Payer: COMMERCIAL

## 2022-07-07 VITALS — SYSTOLIC BLOOD PRESSURE: 154 MMHG | DIASTOLIC BLOOD PRESSURE: 82 MMHG

## 2022-07-07 DIAGNOSIS — I10 HYPERTENSION GOAL BP (BLOOD PRESSURE) < 140/90: ICD-10-CM

## 2022-07-07 DIAGNOSIS — I10 HYPERTENSION GOAL BP (BLOOD PRESSURE) < 140/90: Primary | ICD-10-CM

## 2022-07-07 LAB
ANION GAP SERPL CALCULATED.3IONS-SCNC: 6 MMOL/L (ref 3–14)
BUN SERPL-MCNC: 17 MG/DL (ref 7–30)
CALCIUM SERPL-MCNC: 9.3 MG/DL (ref 8.5–10.1)
CHLORIDE BLD-SCNC: 109 MMOL/L (ref 94–109)
CO2 SERPL-SCNC: 25 MMOL/L (ref 20–32)
CREAT SERPL-MCNC: 1.09 MG/DL (ref 0.52–1.04)
GFR SERPL CREATININE-BSD FRML MDRD: 53 ML/MIN/1.73M2
GLUCOSE BLD-MCNC: 88 MG/DL (ref 70–99)
POTASSIUM BLD-SCNC: 4.5 MMOL/L (ref 3.4–5.3)
SODIUM SERPL-SCNC: 140 MMOL/L (ref 133–144)

## 2022-07-07 PROCEDURE — 80048 BASIC METABOLIC PNL TOTAL CA: CPT

## 2022-07-07 PROCEDURE — 36415 COLL VENOUS BLD VENIPUNCTURE: CPT

## 2022-07-07 PROCEDURE — 99207 PR NO CHARGE NURSE ONLY: CPT

## 2022-07-07 NOTE — NURSING NOTE
S-(situation):   BP Readings from Last 3 Encounters:   07/07/22 (!) 154/82   06/23/22 (!) 159/76   03/22/22 136/79         B-(background): pt does report taking lisinopril 20 mg at HS. Cannot check BP at home as she does not have cuff.     A-(assessment): will review with Dr Rodriguez    R-(recommendations):   Discussed with Dr Rodriguez.   Plan is for pt to make f/u appt with her and in the meantime continue on same dosing    This RN also discussed again low salt diet, daily walk if possible, relaxation techniques    Nasrin Scott, RN, BSN  [facility]

## 2022-07-07 NOTE — TELEPHONE ENCOUNTER
Caller: ROMAYNE     Procedure: COLON    Date, Location, and Surgeon of Procedure Cancelled: 8/17/2022, COLON, [facility]EVENTHAL     Ordering Provider:DAYA    Reason for cancel (please be detailed, any staff messages or encounters to note?): PROVIDER WILL NOT BE HERE

## 2022-07-07 NOTE — RESULT ENCOUNTER NOTE
Estevan SalehFrantz Roth,  Your results came back and are within acceptable limits. -Kidney function is normal (Cr, GFR), Sodium is normal, Potassium is normal, Calcium is normal, Glucose is normal. . If you have any further concerns please do not hesitate to contact us by message, phone or making an appointment.  Have a good day   Dr Michael SCOTT

## 2022-07-07 NOTE — PROGRESS NOTES
Patient came into clinic today for a blood pressure check. Her blood pressure readings measured >140/90. RN notified.    Vania Martins LPN

## 2022-07-18 ENCOUNTER — OFFICE VISIT (OUTPATIENT)
Dept: PHARMACY | Facility: CLINIC | Age: 77
End: 2022-07-18
Payer: COMMERCIAL

## 2022-07-18 VITALS
WEIGHT: 249 LBS | DIASTOLIC BLOOD PRESSURE: 60 MMHG | BODY MASS INDEX: 36.77 KG/M2 | HEART RATE: 71 BPM | OXYGEN SATURATION: 93 % | SYSTOLIC BLOOD PRESSURE: 115 MMHG

## 2022-07-18 DIAGNOSIS — E78.2 MIXED HYPERLIPIDEMIA: ICD-10-CM

## 2022-07-18 DIAGNOSIS — E03.9 ACQUIRED HYPOTHYROIDISM: ICD-10-CM

## 2022-07-18 DIAGNOSIS — I10 HYPERTENSION GOAL BP (BLOOD PRESSURE) < 140/90: Primary | ICD-10-CM

## 2022-07-18 PROCEDURE — 99605 MTMS BY PHARM NP 15 MIN: CPT | Performed by: PHARMACIST

## 2022-07-18 NOTE — LETTER
_  Medication List        Prepared on: 7/18/2022     Bring your Medication List when you go to the doctor, hospital, or   emergency room. And, share it with your family or caregivers.     Note any changes to how you take your medications.  Cross out medications when you no longer use them.    Medication How I take it Why I use it Prescriber   atorvastatin (LIPITOR) 20 MG tablet Take 1 tablet (20 mg) by mouth daily Mixed Hyperlipidemia Marry Rodriguez MD   cetirizine (ZYRTEC) 10 MG tablet Take 10 mg by mouth daily allergies Patient Reported   famotidine (PEPCID) 20 MG tablet Take 20 mg by mouth daily heartburn Patient Reported   levothyroxine (SYNTHROID/LEVOTHROID) 150 MCG tablet Take 1 tablet (150 mcg) by mouth daily Acquired Hypothyroidism Marry Rodriguez MD   lisinopril (ZESTRIL) 20 MG tablet Take 1 tablet (20 mg) by mouth At Bedtime Hypertension Goal BP (Blood Pressure) < 140/90 Marry Rodriguez MD   Multiple Vitamin (MULTI-VITAMIN) per tablet Take 1 tablet by mouth daily Supplement  Entered By History Unknown   TURMERIC PO Take 1 tablet by mouth daily Supplement  Entered By History Unknown         Add new medications, over-the-counter drugs, herbals, vitamins, or  minerals in the blank rows below.    Medication How I take it Why I use it Prescriber                          Allergies:      no known drug allergies        Side effects I have had:               Other Information:              My notes and questions:

## 2022-07-18 NOTE — LETTER
July 18, 2022  Romayne L Sathre  [address]  [place] [id number]    Dear KRISTA [facility]        Thank you for talking with me on Jul 18, 2022 about your health and medications. As a follow-up to our conversation, I have included two documents:      1. Your Recommended To-Do List has steps you should take to get the best results from your medications.  2. Your Medication List will help you keep track of your medications and how to take them.    If you want to talk about these documents, please call Doris Ritter RPH at phone: [phone], Monday-Friday 8-4:30pm.    I look forward to working with you and your doctors to make sure your medications work well for you.    Sincerely,    Doris Ritter RPH  [facility] Pharmacist, [facility]

## 2022-07-18 NOTE — Clinical Note
Marry--brayan for mtm referral --BP excellent today after ace drug added --she will see you in sept--for bp recheck and BMP lab recheck.  Doris Collins/Jenifer

## 2022-07-18 NOTE — LETTER
"Recommended To-Do List      Prepared on: 7/18/2022     You can get the best results from your medications by completing the items on this \"To-Do List.\"      Bring your To-Do List when you go to your doctor. And, share it with your family or caregivers.    My To-Do List:  What we talked about: What I should do:    Your blood pressure    continue Lisinopril daily as is --it is working well --your Bp today was 115/60mmhg                "

## 2022-07-18 NOTE — PATIENT INSTRUCTIONS
"Recommendations from today's MTM visit:                                                    MTM (medication therapy management) is a service provided by a clinical pharmacist designed to help you get the most of out of your medicines.   Today we reviewed what your medicines are for, how to know if they are working, that your medicines are safe and how to make your medicine regimen as easy as possible.      1. Congratulations! Blood pressure today was 115/60, continue taking the lisinopril (when you see Dr. Rodriguez on 9/23/22 have a repeat kidney lab to check your kidneys and potassium).   2. Send me a My Chart message if you have any questions or concerns about your medications.     Follow-up: My Chart with any questions & schedule an appointment for a yearly med review if needed.     It was great speaking with you today.  I value your experience and would be very thankful for your time in providing feedback in our clinic survey. In the next few days, you may receive an email or text message from [employer] with a link to a survey related to your  clinical pharmacist.\"     To schedule another MTM appointment, please call the clinic directly or you may call the MTM scheduling line at [phone] or toll-free at [phone].     My Clinical Pharmacist's contact information:                                                      Please feel free to contact me with any questions or concerns you have.      Doris Ritter [facility].  Medication Therapy Management Provider  [phone]     Jenifer Morris , Pharm-D . Resident        "

## 2022-07-18 NOTE — PROGRESS NOTES
Medication Therapy Management (MTM) Encounter    ASSESSMENT:                            Medication Adherence/Access: No issues identified    Hypertension: Patient recently started on lisinopril for blood pressure control. The patient's blood pressure is at goal of <140/80 with no concerns. Should continue lisinopril therapy but would recommend a potassium and kidney lab follow up as lisinopril can increase potassium and Scr levels.     Hyperlipidemia: Patient currently taking a moderate intensity stain. LDL is at goal of <100, would recommend continuing to obtain and evaluate lipid lab values.     Hypothyroidism: Stable     PLAN:                            1. Blood pressure today was 115/60, continue taking the lisinopril (when you see Dr. Rodriguez on 9/23/22 have a repeat kidney lab to check your kidneys and potassium).   2. Send me a My Chart message if you have any questions or concerns about your medications.     Follow-up: Patient will My Chart message with any questions & schedule an appointment for a yearly med review if needed.     SUBJECTIVE/OBJECTIVE:                          Romayne Sathre is a 76 year old female coming in for blood pressure check She was referred to me from Dr. Marry Rodriguez MD.       Reason for visit: BP recheck/med review.      Allergies/ADRs: None  Past Medical History: Reviewed in chart  Tobacco: She reports that she is a non-smoker but has been exposed to tobacco smoke. She has never used smokeless tobacco.   Alcohol: about 1 beverage 2 times a month     Medication Adherence/Access: Patient uses a pill dispenser, takes medications in the morning and evening.        Hypertension: Current medications include lisinopril 20mg.  Patient does not self-monitor blood pressure.  Patient reports no current medication side effects.  BP Readings from Last 3 Encounters:   07/18/22 115/60   07/07/22 (!) 154/82   06/23/22 (!) 159/76       Hyperlipidemia: Current therapy includes atorvastatin 20mg  daily.  Patient reports no significant myalgias or other side effects.  The 10-year ASCVD risk score (Elyse ETHEL Jr., et al., 2013) is: 17%    Values used to calculate the score:      Age: 76 years      Sex: Female      Is Non- : No      Diabetic: No      Tobacco smoker: No      Systolic Blood Pressure: 115 mmHg      Is BP treated: Yes      HDL Cholesterol: 43 mg/dL      Total Cholesterol: 164 mg/dL  Recent Labs   Lab Test 06/23/22  1001 03/22/22  0921 01/07/16  1534 05/05/14  0941   CHOL 164 178   < > 213*   HDL 43* 48*   < > 42*   LDL 84 100   < > 142*   TRIG 184* 150*   < > 142   CHOLHDLRATIO  --   --   --  5.1*    < > = values in this interval not displayed.       Hypothyroidism: Patient is taking levothyroxine 150 mcg daily. Patient is having the following symptoms: none.   TSH   Date Value Ref Range Status   06/23/2022 3.55 0.40 - 4.00 mU/L Final   03/15/2021 2.76 0.40 - 4.00 mU/L Final       Today's Vitals: /60   Pulse 71   Wt 249 lb (112.9 kg)   SpO2 93%   BMI 36.77 kg/m    ----------------    I spent 30 minutes with this patient today. All changes were made via collaborative practice agreement with Dr. Avila. A copy of the visit note was provided to the patient's provider(s).    The patient was given a summary of these recommendations.     Jenifer Morris, Pharmacy Resident  Doris Ritter [facility].  Medication Therapy Management Provider  [phone]       Medication Therapy Recommendations  No medication therapy recommendations to display

## 2022-07-19 ENCOUNTER — TELEPHONE (OUTPATIENT)
Dept: FAMILY MEDICINE | Facility: CLINIC | Age: 77
End: 2022-07-19

## 2022-07-19 NOTE — TELEPHONE ENCOUNTER
Adela Avila MD   7/19/2022  4:17 PM CDT Back to Top        Can we please call this patient to remind her to schedule a colonoscopy? Thanks!    Adela Avila MD   7/12/2022  4:14 PM CDT         Please send letter.     Hi Romayne,     Our office contact you to let you know that your Cologuard test was positive and therefore you will need a colonoscopy. Please call us if you need help setting up your colonoscopy and to confirm your received this message.      Adela Avila MD  [facility]  [phone]

## 2022-07-19 NOTE — TELEPHONE ENCOUNTER
Writer called home number:  Left message to call back and ask to speak with an available triage nurse.    Writer called mobile number, spoke with patient and reviewed result message per Dr. Avila, along with colonoscopy scheduling information.    Patient verbalized understanding and in agreement with plan.    MARY PartidaN, RN  [facility]

## 2022-07-20 ENCOUNTER — TELEPHONE (OUTPATIENT)
Dept: GASTROENTEROLOGY | Facility: CLINIC | Age: 77
End: 2022-07-20

## 2022-07-20 NOTE — TELEPHONE ENCOUNTER
Caller: Romayne    Procedure: Colon    Date, Location, and Surgeon of Procedure Cancelled: 8/17/22 [facility] L[facility]hal-preciously canceled    Ordering Provider:Michael    Reason for cancel (please be detailed, any staff messages or encounters to note?): Provider not available per note on 7/7/22        Rescheduled: Yes     If rescheduled:    Date: 9/7/22   Location: [facility]   Prep Resent: No changes(changes to prep?)   Covid Test Rescheduled: HOME TEST   Note any change or update to original order/sedation: None

## 2022-08-29 ENCOUNTER — TELEPHONE (OUTPATIENT)
Dept: GASTROENTEROLOGY | Facility: CLINIC | Age: 77
End: 2022-08-29

## 2022-08-29 DIAGNOSIS — R19.5 POSITIVE COLORECTAL CANCER SCREENING USING COLOGUARD TEST: Primary | ICD-10-CM

## 2022-08-29 RX ORDER — BISACODYL 5 MG/1
TABLET, DELAYED RELEASE ORAL
Qty: 4 TABLET | Refills: 0 | Status: SHIPPED | OUTPATIENT
Start: 2022-08-29 | End: 2022-09-23

## 2022-08-29 NOTE — TELEPHONE ENCOUNTER
Pre assessment questions completed for upcoming colonoscopy  procedure scheduled on 9/7/22    COVID policy reviewed. Patient to complete rapid antigen test one to two days before their scheduled procedure. Patient to bring photo of the results when they come in for their procedure.    Reviewed procedural arrival time 1000 and facility location [facility].    Designated  policy reviewed. Instructed to have someone stay 24 hours post procedure.     Procedure indication: +cologuard     Bowel prep recommendation: Golytely d/t CKD    Golytely prep script sent to [id number] - [place] [id number] AT [address] pharmacy. Prep instructions sent via D[employer].    Reviewed Golytely prep instructions with patient. No fiber/iron supplements or foods that contain nuts/seeds 7 days prior to procedure.     Anticoagulation/blood thinners? no    Patient verbalized understanding and had no questions or concerns at this time.    Carla Padilla RN

## 2022-09-07 ENCOUNTER — ANESTHESIA (OUTPATIENT)
Dept: SURGERY | Facility: AMBULATORY SURGERY CENTER | Age: 77
End: 2022-09-07
Payer: COMMERCIAL

## 2022-09-07 ENCOUNTER — HOSPITAL ENCOUNTER (OUTPATIENT)
Facility: AMBULATORY SURGERY CENTER | Age: 77
Discharge: HOME | End: 2022-09-07
Attending: INTERNAL MEDICINE
Payer: COMMERCIAL

## 2022-09-07 ENCOUNTER — ANESTHESIA EVENT (OUTPATIENT)
Dept: SURGERY | Facility: AMBULATORY SURGERY CENTER | Age: 77
End: 2022-09-07
Payer: COMMERCIAL

## 2022-09-07 VITALS
TEMPERATURE: 97.6 F | HEIGHT: 69 IN | SYSTOLIC BLOOD PRESSURE: 138 MMHG | WEIGHT: 235 LBS | BODY MASS INDEX: 34.8 KG/M2 | RESPIRATION RATE: 14 BRPM | HEART RATE: 68 BPM | DIASTOLIC BLOOD PRESSURE: 49 MMHG | OXYGEN SATURATION: 98 %

## 2022-09-07 VITALS — HEART RATE: 62 BPM

## 2022-09-07 DIAGNOSIS — R19.5 POSITIVE COLORECTAL CANCER SCREENING USING COLOGUARD TEST: Primary | ICD-10-CM

## 2022-09-07 LAB — COLONOSCOPY: NORMAL

## 2022-09-07 PROCEDURE — 45385 COLONOSCOPY W/LESION REMOVAL: CPT

## 2022-09-07 PROCEDURE — 88305 TISSUE EXAM BY PATHOLOGIST: CPT | Mod: TC | Performed by: INTERNAL MEDICINE

## 2022-09-07 PROCEDURE — 88305 TISSUE EXAM BY PATHOLOGIST: CPT | Mod: 26 | Performed by: PATHOLOGY

## 2022-09-07 PROCEDURE — 45390 COLONOSCOPY W/RESECTION: CPT | Mod: 59

## 2022-09-07 RX ORDER — LIDOCAINE HYDROCHLORIDE 20 MG/ML
INJECTION, SOLUTION INFILTRATION; PERINEURAL PRN
Status: DISCONTINUED | OUTPATIENT
Start: 2022-09-07 | End: 2022-09-07

## 2022-09-07 RX ORDER — FLUMAZENIL 0.1 MG/ML
0.2 INJECTION, SOLUTION INTRAVENOUS
Status: CANCELLED | OUTPATIENT
Start: 2022-09-07 | End: 2022-09-08

## 2022-09-07 RX ORDER — NALOXONE HYDROCHLORIDE 0.4 MG/ML
0.2 INJECTION, SOLUTION INTRAMUSCULAR; INTRAVENOUS; SUBCUTANEOUS
Status: CANCELLED | OUTPATIENT
Start: 2022-09-07

## 2022-09-07 RX ORDER — PROPOFOL 10 MG/ML
INJECTION, EMULSION INTRAVENOUS PRN
Status: DISCONTINUED | OUTPATIENT
Start: 2022-09-07 | End: 2022-09-07

## 2022-09-07 RX ORDER — PROPOFOL 10 MG/ML
INJECTION, EMULSION INTRAVENOUS CONTINUOUS PRN
Status: DISCONTINUED | OUTPATIENT
Start: 2022-09-07 | End: 2022-09-07

## 2022-09-07 RX ORDER — ONDANSETRON 2 MG/ML
4 INJECTION INTRAMUSCULAR; INTRAVENOUS
Status: DISCONTINUED | OUTPATIENT
Start: 2022-09-07 | End: 2022-09-08 | Stop reason: HOSPADM

## 2022-09-07 RX ORDER — PROCHLORPERAZINE MALEATE 5 MG
5 TABLET ORAL EVERY 6 HOURS PRN
Status: CANCELLED | OUTPATIENT
Start: 2022-09-07

## 2022-09-07 RX ORDER — ONDANSETRON 2 MG/ML
4 INJECTION INTRAMUSCULAR; INTRAVENOUS EVERY 6 HOURS PRN
Status: CANCELLED | OUTPATIENT
Start: 2022-09-07

## 2022-09-07 RX ORDER — ONDANSETRON 4 MG/1
4 TABLET, ORALLY DISINTEGRATING ORAL EVERY 6 HOURS PRN
Status: CANCELLED | OUTPATIENT
Start: 2022-09-07

## 2022-09-07 RX ORDER — NALOXONE HYDROCHLORIDE 0.4 MG/ML
0.4 INJECTION, SOLUTION INTRAMUSCULAR; INTRAVENOUS; SUBCUTANEOUS
Status: CANCELLED | OUTPATIENT
Start: 2022-09-07

## 2022-09-07 RX ORDER — LIDOCAINE 40 MG/G
CREAM TOPICAL
Status: DISCONTINUED | OUTPATIENT
Start: 2022-09-07 | End: 2022-09-08 | Stop reason: HOSPADM

## 2022-09-07 RX ORDER — SODIUM CHLORIDE, SODIUM LACTATE, POTASSIUM CHLORIDE, CALCIUM CHLORIDE 600; 310; 30; 20 MG/100ML; MG/100ML; MG/100ML; MG/100ML
INJECTION, SOLUTION INTRAVENOUS CONTINUOUS PRN
Status: DISCONTINUED | OUTPATIENT
Start: 2022-09-07 | End: 2022-09-07

## 2022-09-07 RX ADMIN — SODIUM CHLORIDE, SODIUM LACTATE, POTASSIUM CHLORIDE, CALCIUM CHLORIDE: 600; 310; 30; 20 INJECTION, SOLUTION INTRAVENOUS at 11:25

## 2022-09-07 RX ADMIN — LIDOCAINE HYDROCHLORIDE 50 MG: 20 INJECTION, SOLUTION INFILTRATION; PERINEURAL at 11:26

## 2022-09-07 RX ADMIN — PROPOFOL 100 MCG/KG/MIN: 10 INJECTION, EMULSION INTRAVENOUS at 10:41

## 2022-09-07 RX ADMIN — PROPOFOL 50 MG: 10 INJECTION, EMULSION INTRAVENOUS at 11:26

## 2022-09-07 RX ADMIN — PROPOFOL 150 MCG/KG/MIN: 10 INJECTION, EMULSION INTRAVENOUS at 11:26

## 2022-09-07 RX ADMIN — PROPOFOL 30 MG: 10 INJECTION, EMULSION INTRAVENOUS at 12:10

## 2022-09-07 NOTE — ANESTHESIA POSTPROCEDURE EVALUATION
Patient: Romayne L Sathre    Procedure: Procedure(s):  COLONOSCOPY, WITH POLYPECTOMY       Anesthesia Type:  MAC    Note:  Disposition: Outpatient   Postop Pain Control: Uneventful            Sign Out: Well controlled pain   PONV: No   Neuro/Psych: Uneventful            Sign Out: Acceptable/Baseline neuro status   Airway/Respiratory: Uneventful            Sign Out: Acceptable/Baseline resp. status   CV/Hemodynamics: Uneventful            Sign Out: Acceptable CV status; No obvious hypovolemia; No obvious fluid overload   Other NRE: NONE   DID A NON-ROUTINE EVENT OCCUR? No           Last vitals:  Vitals Value Taken Time   /49 09/07/22 1245   Temp 36.4  C (97.6  F) 09/07/22 1245   Pulse 68 09/07/22 1245   Resp 14 09/07/22 1245   SpO2 98 % 09/07/22 1245       Electronically Signed By: Say Mcrae MD  September 7, 2022  2:31 PM

## 2022-09-07 NOTE — DISCHARGE INSTRUCTIONS
Discharge Instructions after Colonoscopy  or Sigmoidoscopy    Today you had a ____ Colonoscopy ____ Sigmoidoscopy    Activity and Diet  You were given medicine for pain. You may be dizzy or sleepy.  For 24 hours:   Do not drive or use heavy equipment.   Do not make important decisions.   Do not drink any alcohol.  You may return to your normal diet and medicines.    Discomfort   Air was placed in your colon during the exam in order to see it. Walking helps to pass the air.   You may take Tylenol (acetaminophen) for pain unless your doctor has told you not to.  Do not take aspirin or ibuprofen (Advil, Motrin, or other anti-inflammatory  drugs) for _____ days.    Follow-up  ____ We took small tissue samples or polyps to study. Your doctor will call you with the results  within two weeks.    When to call:    Call right away if you have:   Unusual pain in belly or chest pain not relieved with passing air.   More than 1 to 2 Tablespoons of bleeding from your rectum.   Fever above 100.6  F (37.5  C).    If you have severe pain, bleeding, or shortness of breath, go to an emergency room.    If you have questions, call:  Monday to Friday, 8 a.m. to 4:30 p.m.  Central Scheduling Department: [phone]    After hours  [facility]: [phone] (Ask for the GI fellow on call)

## 2022-09-07 NOTE — ANESTHESIA CARE TRANSFER NOTE
Patient: Romayne L Sathre    Procedure: Procedure(s):  COLONOSCOPY, WITH POLYPECTOMY       Diagnosis: Positive colorectal cancer screening using Cologuard test [R19.5]  Diagnosis Additional Information: No value filed.    Anesthesia Type:   MAC     Note:    Oropharynx: oropharynx clear of all foreign objects  Level of Consciousness: awake  Oxygen Supplementation: room air    Independent Airway: airway patency satisfactory and stable  Dentition: dentition unchanged  Vital Signs Stable: post-procedure vital signs reviewed and stable  Report to RN Given: handoff report given  Patient transferred to: Phase II    Handoff Report: Identifed the Patient, Identified the Reponsible Provider, Reviewed the pertinent medical history, Discussed the surgical course, Reviewed Intra-OP anesthesia mangement and issues during anesthesia, Set expectations for post-procedure period and Allowed opportunity for questions and acknowledgement of understanding      Vitals:  Vitals Value Taken Time   BP     Temp     Pulse     Resp     SpO2         Electronically Signed By: PHOENIX Bey CRNA  September 7, 2022  12:27 PM

## 2022-09-07 NOTE — ANESTHESIA PREPROCEDURE EVALUATION
Anesthesia Pre-Procedure Evaluation    Patient: Romayne L Sathre   MRN: [id number] : 1945        Procedure : Procedure(s):  COLONOSCOPY          Past Medical History:   Diagnosis Date     Antiplatelet or antithrombotic long-term use      Breast cancer (H) 2008    Right Breast     Chronic fatigue 2020     CKD (chronic kidney disease) stage 3, GFR 30-59 ml/min (H) 2010     Combined form of age-related cataract, left eye 10/20/2020    Added automatically from request for surgery [id number]     [facility] [place] 2012    [facility] for . Margarita Lawler RN-BSN, [facility] [phone]  DX V65.8 REPLACED WITH [facility] (2013)     Heart burn 2020     History of 2019 novel coronavirus disease (COVID-19) 2020    Seen in urgent care on 2020 for fever cough dizziness abdominal pain nausea was given Bactrim for cystitis tested for COVID discharged home and testing came back positive for Covid.  Seen in ER 2020 for shortness of breath sinus tachycardia and severe hypoxia.  Was admitted for worsening respiratory symptoms prompting her to seek further evaluation on 20 at [facility]. She was found to      History of acute respiratory failure 2020    With covid , complicated by PE and DVT, on oxygen and eliquis 3 months, completely resolved as of 2021     Hyperlipidemia LDL goal <100 2010     Hypertension goal BP (blood pressure) < 140/90 2010     Hypothyroid 2002     Lipoma of other skin and subcutaneous tissue 2004     Nonsenile cataract      Osteoporosis 2014     Other psoriasis      Physical deconditioning 2020      Past Surgical History:   Procedure Laterality Date     HC REMV CATARACT EXTRACAP,INSERT LENS, W/O ECP Right 2002    Dr. Clinton Lopez ([id number], Power:  20.0D)     PHACOEMULSIFICATION CLEAR CORNEA WITH STANDARD INTRAOCULAR LENS IMPLANT Left 2021    Procedure: LEFT EYE PHACOEMULSIFICATION, CATARACT,  WITH INTRAOCULAR LENS IMPLANT;  Surgeon: Ruben Shelby MD;  Location: [facility] OR     SURGICAL HISTORY OF -   Oct 3, 2008    Rt saline implant     VITRECTOMY PARSPLANA WITH 25 GAUGE SYSTEM Right 6/7/2021    Procedure: 1) Pars plana vitrectomy (PPV) 25g, Right eye,  2) Membrane stripping and stripping of  internal limiting membrane,;  Surgeon: Fede Danielson MD;  Location: [facility] OR      Allergies   Allergen Reactions     No Known Drug Allergies       Social History     Tobacco Use     Smoking status: Passive Smoke Exposure - Never Smoker     Smokeless tobacco: Never Used     Tobacco comment: family members smoke; daughter   Substance Use Topics     Alcohol use: Yes     Alcohol/week: 10.0 standard drinks     Comment: 0-1 drink per month      Wt Readings from Last 1 Encounters:   09/07/22 106.6 kg (235 lb)        Anesthesia Evaluation   Pt has had prior anesthetic.     No history of anesthetic complications       ROS/MED HX  ENT/Pulmonary:       Neurologic:       Cardiovascular:     (+) Dyslipidemia hypertension-----Taking blood thinners     METS/Exercise Tolerance:     Hematologic:       Musculoskeletal:       GI/Hepatic:       Renal/Genitourinary:     (+) renal disease, type: CRI,     Endo:     (+) thyroid problem, hypothyroidism, Obesity,     Psychiatric/Substance Use:       Infectious Disease:       Malignancy:       Other:            Physical Exam    Airway        Mallampati: II   TM distance: > 3 FB   Neck ROM: full   Mouth opening: > 3 cm    Respiratory Devices and Support         Dental     Comment: Most teeth missing or broken. Poor overall dentition    (+) missing and chipped      Cardiovascular             Pulmonary                   OUTSIDE LABS:  CBC:   Lab Results   Component Value Date    WBC 3.0 (L) 06/23/2022    WBC 2.2 (L) 03/22/2022    HGB 13.4 06/23/2022    HGB 13.1 03/22/2022    HCT 41.7 06/23/2022    HCT 40.8 03/22/2022     06/23/2022     03/22/2022     BMP:   Lab  Results   Component Value Date     07/07/2022     06/23/2022    POTASSIUM 4.5 07/07/2022    POTASSIUM 5.2 06/23/2022    CHLORIDE 109 07/07/2022    CHLORIDE 106 06/23/2022    CO2 25 07/07/2022    CO2 22 06/23/2022    BUN 17 07/07/2022    BUN 20 06/23/2022    CR 1.09 (H) 07/07/2022    CR 1.29 (H) 06/23/2022    GLC 88 07/07/2022    GLC 89 06/23/2022     COAGS:   Lab Results   Component Value Date     (HH) 11/19/2020    INR 1.32 (H) 11/20/2020    FIBR 513 (H) 11/24/2020     POC:   Lab Results   Component Value Date    BGM 93 06/07/2021     HEPATIC:   Lab Results   Component Value Date    ALBUMIN 3.9 06/23/2022    PROTTOTAL 8.0 06/23/2022    ALT 28 06/23/2022    AST 26 06/23/2022    ALKPHOS 49 06/23/2022    BILITOTAL 0.5 06/23/2022     OTHER:   Lab Results   Component Value Date    PH 7.39 11/17/2020    LACT 2.7 (H) 11/16/2020    A1C 5.7 (H) 03/22/2022    GALLO 9.3 07/07/2022    PHOS 3.4 11/20/2020    MAG 1.8 11/22/2020    TSH 3.55 06/23/2022    T4 1.24 03/22/2022    CRP 13.0 (H) 11/24/2020    SED 20 07/16/2014       Anesthesia Plan    ASA Status:  3   NPO Status:  NPO Appropriate    Anesthesia Type: MAC.     - Reason for MAC: straight local not clinically adequate   Induction: Intravenous, Propofol.   Maintenance: TIVA.        Consents    Anesthesia Plan(s) and associated risks, benefits, and realistic alternatives discussed. Questions answered and patient/representative(s) expressed understanding.    - Discussed:     - Discussed with:  Patient      - Extended Intubation/Ventilatory Support Discussed: No.      - Patient is DNR/DNI Status: No    Use of blood products discussed: No .     Postoperative Care       PONV prophylaxis: Background Propofol Infusion     Comments:           H&P reviewed: Unable to attach H&P to encounter due to EHR limitations. H&P Update: appropriate H&P reviewed, patient examined. No interval changes since H&P (within 30 days).         Say Mcrae MD

## 2022-09-08 LAB
PATH REPORT.COMMENTS IMP SPEC: NORMAL
PATH REPORT.COMMENTS IMP SPEC: NORMAL
PATH REPORT.FINAL DX SPEC: NORMAL
PATH REPORT.GROSS SPEC: NORMAL
PATH REPORT.MICROSCOPIC SPEC OTHER STN: NORMAL
PATH REPORT.RELEVANT HX SPEC: NORMAL
PHOTO IMAGE: NORMAL

## 2022-09-19 DIAGNOSIS — E78.2 MIXED HYPERLIPIDEMIA: ICD-10-CM

## 2022-09-23 ENCOUNTER — OFFICE VISIT (OUTPATIENT)
Dept: FAMILY MEDICINE | Facility: CLINIC | Age: 77
End: 2022-09-23
Payer: COMMERCIAL

## 2022-09-23 ENCOUNTER — TELEPHONE (OUTPATIENT)
Dept: FAMILY MEDICINE | Facility: CLINIC | Age: 77
End: 2022-09-23

## 2022-09-23 ENCOUNTER — NURSE TRIAGE (OUTPATIENT)
Dept: NURSING | Facility: CLINIC | Age: 77
End: 2022-09-23

## 2022-09-23 VITALS
TEMPERATURE: 97.3 F | WEIGHT: 254.4 LBS | HEIGHT: 69 IN | RESPIRATION RATE: 18 BRPM | SYSTOLIC BLOOD PRESSURE: 131 MMHG | OXYGEN SATURATION: 98 % | HEART RATE: 71 BPM | BODY MASS INDEX: 37.68 KG/M2 | DIASTOLIC BLOOD PRESSURE: 82 MMHG

## 2022-09-23 DIAGNOSIS — M85.80 OSTEOPENIA, UNSPECIFIED LOCATION: ICD-10-CM

## 2022-09-23 DIAGNOSIS — D12.6 TUBULAR ADENOMA OF COLON: ICD-10-CM

## 2022-09-23 DIAGNOSIS — I10 HYPERTENSION GOAL BP (BLOOD PRESSURE) < 140/90: Primary | ICD-10-CM

## 2022-09-23 DIAGNOSIS — Z23 NEED FOR SHINGLES VACCINE: ICD-10-CM

## 2022-09-23 DIAGNOSIS — R10.13 EPIGASTRIC PAIN: ICD-10-CM

## 2022-09-23 DIAGNOSIS — Z23 NEED FOR PROPHYLACTIC VACCINATION AND INOCULATION AGAINST INFLUENZA: ICD-10-CM

## 2022-09-23 DIAGNOSIS — Z23 NEED FOR COVID-19 VACCINE: ICD-10-CM

## 2022-09-23 DIAGNOSIS — R73.03 PREDIABETES: ICD-10-CM

## 2022-09-23 DIAGNOSIS — Z85.3 HISTORY OF BREAST CANCER: ICD-10-CM

## 2022-09-23 DIAGNOSIS — D12.6 SERRATED ADENOMA OF COLON: ICD-10-CM

## 2022-09-23 DIAGNOSIS — R53.81 PHYSICAL DECONDITIONING: ICD-10-CM

## 2022-09-23 DIAGNOSIS — R63.5 WEIGHT GAIN: ICD-10-CM

## 2022-09-23 DIAGNOSIS — E03.9 ACQUIRED HYPOTHYROIDISM: ICD-10-CM

## 2022-09-23 DIAGNOSIS — D70.9 NEUTROPENIA, UNSPECIFIED TYPE (H): ICD-10-CM

## 2022-09-23 DIAGNOSIS — E78.2 MIXED HYPERLIPIDEMIA: ICD-10-CM

## 2022-09-23 DIAGNOSIS — R41.3 MEMORY DEFICIT: ICD-10-CM

## 2022-09-23 DIAGNOSIS — R74.8 ELEVATED LIPASE: Primary | ICD-10-CM

## 2022-09-23 DIAGNOSIS — E66.01 MORBID OBESITY (H): ICD-10-CM

## 2022-09-23 DIAGNOSIS — N18.30 STAGE 3 CHRONIC KIDNEY DISEASE, UNSPECIFIED WHETHER STAGE 3A OR 3B CKD (H): ICD-10-CM

## 2022-09-23 DIAGNOSIS — R06.83 SNORING: ICD-10-CM

## 2022-09-23 DIAGNOSIS — R60.9 DEPENDENT EDEMA: ICD-10-CM

## 2022-09-23 LAB
ANION GAP SERPL CALCULATED.3IONS-SCNC: 6 MMOL/L (ref 3–14)
BASOPHILS # BLD AUTO: 0 10E3/UL (ref 0–0.2)
BASOPHILS NFR BLD AUTO: 2 %
BUN SERPL-MCNC: 21 MG/DL (ref 7–30)
CALCIUM SERPL-MCNC: 9.3 MG/DL (ref 8.5–10.1)
CHLORIDE BLD-SCNC: 106 MMOL/L (ref 94–109)
CHOLEST SERPL-MCNC: 157 MG/DL
CO2 SERPL-SCNC: 26 MMOL/L (ref 20–32)
CREAT SERPL-MCNC: 1.24 MG/DL (ref 0.52–1.04)
EOSINOPHIL # BLD AUTO: 0.1 10E3/UL (ref 0–0.7)
EOSINOPHIL NFR BLD AUTO: 4 %
ERYTHROCYTE [DISTWIDTH] IN BLOOD BY AUTOMATED COUNT: 13.4 % (ref 10–15)
FASTING STATUS PATIENT QL REPORTED: ABNORMAL
GFR SERPL CREATININE-BSD FRML MDRD: 45 ML/MIN/1.73M2
GLUCOSE BLD-MCNC: 98 MG/DL (ref 70–99)
HBA1C MFR BLD: 5.5 % (ref 0–5.6)
HCT VFR BLD AUTO: 40.3 % (ref 35–47)
HDLC SERPL-MCNC: 49 MG/DL
HGB BLD-MCNC: 12.9 G/DL (ref 11.7–15.7)
IMM GRANULOCYTES # BLD: 0 10E3/UL
IMM GRANULOCYTES NFR BLD: 0 %
LDLC SERPL CALC-MCNC: 80 MG/DL
LIPASE SERPL-CCNC: 1647 U/L (ref 13–60)
LYMPHOCYTES # BLD AUTO: 0.6 10E3/UL (ref 0.8–5.3)
LYMPHOCYTES NFR BLD AUTO: 28 %
MCH RBC QN AUTO: 28.2 PG (ref 26.5–33)
MCHC RBC AUTO-ENTMCNC: 32 G/DL (ref 31.5–36.5)
MCV RBC AUTO: 88 FL (ref 78–100)
MONOCYTES # BLD AUTO: 0.8 10E3/UL (ref 0–1.3)
MONOCYTES NFR BLD AUTO: 40 %
NEUTROPHILS # BLD AUTO: 0.5 10E3/UL (ref 1.6–8.3)
NEUTROPHILS NFR BLD AUTO: 27 %
NONHDLC SERPL-MCNC: 108 MG/DL
PLATELET # BLD AUTO: 186 10E3/UL (ref 150–450)
POTASSIUM BLD-SCNC: 4.9 MMOL/L (ref 3.4–5.3)
RBC # BLD AUTO: 4.58 10E6/UL (ref 3.8–5.2)
SODIUM SERPL-SCNC: 138 MMOL/L (ref 133–144)
T4 FREE SERPL-MCNC: 1.07 NG/DL (ref 0.76–1.46)
TRIGL SERPL-MCNC: 140 MG/DL
TSH SERPL DL<=0.005 MIU/L-ACNC: 5.02 MU/L (ref 0.4–4)
VIT B12 SERPL-MCNC: 350 PG/ML (ref 232–1245)
WBC # BLD AUTO: 2 10E3/UL (ref 4–11)

## 2022-09-23 PROCEDURE — 83036 HEMOGLOBIN GLYCOSYLATED A1C: CPT | Performed by: FAMILY MEDICINE

## 2022-09-23 PROCEDURE — 84439 ASSAY OF FREE THYROXINE: CPT | Performed by: FAMILY MEDICINE

## 2022-09-23 PROCEDURE — 36415 COLL VENOUS BLD VENIPUNCTURE: CPT | Performed by: FAMILY MEDICINE

## 2022-09-23 PROCEDURE — 84443 ASSAY THYROID STIM HORMONE: CPT | Performed by: FAMILY MEDICINE

## 2022-09-23 PROCEDURE — 82607 VITAMIN B-12: CPT | Performed by: FAMILY MEDICINE

## 2022-09-23 PROCEDURE — 99215 OFFICE O/P EST HI 40 MIN: CPT | Mod: 25 | Performed by: FAMILY MEDICINE

## 2022-09-23 PROCEDURE — 80061 LIPID PANEL: CPT | Performed by: FAMILY MEDICINE

## 2022-09-23 PROCEDURE — 90662 IIV NO PRSV INCREASED AG IM: CPT | Performed by: FAMILY MEDICINE

## 2022-09-23 PROCEDURE — 85025 COMPLETE CBC W/AUTO DIFF WBC: CPT | Performed by: FAMILY MEDICINE

## 2022-09-23 PROCEDURE — 83690 ASSAY OF LIPASE: CPT | Performed by: FAMILY MEDICINE

## 2022-09-23 PROCEDURE — 80048 BASIC METABOLIC PNL TOTAL CA: CPT | Performed by: FAMILY MEDICINE

## 2022-09-23 PROCEDURE — G0008 ADMIN INFLUENZA VIRUS VAC: HCPCS | Performed by: FAMILY MEDICINE

## 2022-09-23 RX ORDER — ATORVASTATIN CALCIUM 20 MG/1
20 TABLET, FILM COATED ORAL DAILY
Qty: 90 TABLET | Refills: 1 | Status: SHIPPED | OUTPATIENT
Start: 2022-09-23 | End: 2023-03-28

## 2022-09-23 RX ORDER — LEVOTHYROXINE SODIUM 150 UG/1
150 TABLET ORAL DAILY
Qty: 90 TABLET | Refills: 1 | Status: SHIPPED | OUTPATIENT
Start: 2022-09-23 | End: 2023-03-28

## 2022-09-23 RX ORDER — LISINOPRIL 20 MG/1
20 TABLET ORAL AT BEDTIME
Qty: 90 TABLET | Refills: 1 | Status: SHIPPED | OUTPATIENT
Start: 2022-09-23 | End: 2023-03-28

## 2022-09-23 NOTE — TELEPHONE ENCOUNTER
----- Message from Marry Rodriguez MD sent at 9/23/2022 10:21 AM CDT -----  Estevan Ms. Roth,  Some of your results came back show no anemia but white count is lower than usual around 2 similar to what it was 6 months ago and 1 year ago.  If pain in your abdomen gets worse please go to the emergency room to be further evaluated.  If white count continues to drop may need to see the hematologist again previously assessed to be of benign cause.  If you have any further concerns please do not hesitate to contact us by message, phone or making an appointment.  Have a good day   Dr Michael SCOTT

## 2022-09-23 NOTE — RESULT ENCOUNTER NOTE
Estevan SalehFrantz Satuziel,  Your results came back and thyroid is slightly out of range. Continue same dose & recheck at next ap . Refills sent in . If you have any further concerns please do not hesitate to contact us by message, phone or making an appointment.  Have a good day   Dr Michael SCOTT

## 2022-09-23 NOTE — RESULT ENCOUNTER NOTE
Estevan Ms. Roth,  Your results came back and vit b 12 is low normal. Take 500 mcg of vit b 12 daily as discussed  If you have any further concerns please do not hesitate to contact us by message, phone or making an appointment.  Have a good day   Dr Michael SCOTT

## 2022-09-23 NOTE — TELEPHONE ENCOUNTER
Nurse Triage SBAR    Situation: MD advises patient to go into the ED    Background: Dr Rodriguez is calling from [facility]. Epigastric pain. Labs done today.     Assessment:  Dr Michael attempted to call to tell her to go into the ED. The patient did not answer and MD is requesting FNA to contact the patient to tell her to go into the ED now.     Recommendation: Dr Rodriguez is calling for FNA to contact the patient to advise her to go into the ED.     Provider Recommendation Follow Up:   Reached patient/caregiver. Informed of provider's recommendations. Patient verbalized understanding and stated she will see what she can do as she is out of town.     Vi Smallwood, LENORA Nursing Advisor 9/23/2022 5:18 PM     Reason for Disposition    Nursing judgment    Protocols used: NO GUIDELINE OR REFERENCE [id number]

## 2022-09-23 NOTE — PROGRESS NOTES
Assessment & Plan     Hypertension goal BP (blood pressure) < 140/90  HTN stable on lisinopril increased dose 20 mg . No side effects. Avoiding salt. Not checking BP at home. Will do labs today.  Continue same dose of lisinopril 20 mg daily refill sent in.    CKD 3 stable resolved microalbuminuria, on an ACE, Avoiding  antiinflammatories due to chronic kidney disease    BMI up to 37, weight up , eating two meals a day, discussed smaller portions, avoiding alcohol, juices, declines weight evaluation, sleep doctor or mtm due to cost    HLD on atorvastatin 20 mg, will do fasting labs today.  Med refilled and will adjust once labs and ASCVD risk assessed.    Hypothyroid on levothyroxine 150 mcg daily. No hair loss, has had some weight gain, after colonoscopy bowels moving better.  We will check TSH today.    Prediabetes will check HBA1c today, encouraged to  increase exercise, eat less carb's, avoid alcohol and work on loosing 10 % of total body weight     Snoring declines sleep eval    Had a positive collogued earlier this year, finally got colonoscopy 3 weeks ago in sept . Showed 3 serrated adenoma fragmented and 1 tubular adenoma and advised recheck 3 years.     Memory deficits, currently tracking and able to drive locally . Here alone without family. Declines need to see neurology. Feels ok . Resolved anemia. stable low wbc, Recovered from Covid. Opting to defer seeing neuro for memory concern and snoring for now, will monitor. Reporting Fatigue resolved    Hx of snoring, suspected undiagnosed HUGO, opting to not see sleep for now due to cost.     Stable asymptomatic low white count.     Hx of breast cancer but Mammogram in April was normal. Mammogram due 2023.    Continue to use compression socks for dependant edema. Stable    Osteopenia on calcium and vit d, Dexa due in 2023 for hx of osteopenia/ osteoporosis, unclear if on calcium and vit d. Encouraged to take calcium 1200 mg a day &  vit d 2000 units a day      Was to start vit b12 500 mcg OTC daily for low normal B 12 noted in march and physical deconditioning ( forgot to start). Not sure if taking yet. Encouraged to take vitamin B12 500 mcg OTC daily for low normal B 12 noted in march, memory and physical deconditioning     For new epigastric upper abdominal pain intermittent 1 week worse with pushing on it , exam appears benign, no rebound no surgical abdomen noted.  Does not radiate into chest and no associated cardiac symptoms.  Suspect gastritis.  To avoid spicy, avoid greasy foods, and red sauces, coffee, alcohol till better. We will have her increase Pepcid to twice a day, avoid aspirin and see what labs show, warm pack to area. counseled if gets worse, has chest pain, trouble breathing for has blood in stool or black stool, to go to the ER, & if not better may need an EGD etc. Ultrasound to check for pain. Ct abdomen if no answer.     Due for high dose flu shot & given today   Wants to wait on new Covid vaccine or 3rd booster  Declines shingles vaccine, will get at pharmacy maybe  See you back in 6 months for a physical but sooner if symptoms brinda worse   - Basic metabolic panel  (Ca, Cl, CO2, Creat, Gluc, K, Na, BUN); Future  - PRIMARY CARE FOLLOW-UP SCHEDULING; Future  - lisinopril (ZESTRIL) 20 MG tablet; Take 1 tablet (20 mg) by mouth At Bedtime    Stage 3 chronic kidney disease, unspecified whether stage 3a or 3b CKD (H)  CKD 3 stable resolved microalbuminuria, on an ACE, Avoiding  antiinflammatories due to chronic kidney disease  - PRIMARY CARE FOLLOW-UP SCHEDULING; Future    Obesity (BMI 35.0-39.9) with comorbidity (H)  Weight gain  BMI up to 37, weight up , eating two meals a day, discussed smaller portions, avoiding alcohol, juices, declines weight evaluation, sleep doctor or mtm due to cost  - PRIMARY CARE FOLLOW-UP SCHEDULING; Future    Mixed hyperlipidemia  HLD on atorvastatin 20 mg, will do fasting labs today.  Med refilled and will adjust once labs  and ASCVD risk assessed.  - PRIMARY CARE FOLLOW-UP SCHEDULING; Future  - atorvastatin (LIPITOR) 20 MG tablet; Take 1 tablet (20 mg) by mouth daily  - Lipid Profile (Chol, Trig, HDL, LDL calc); Future    Acquired hypothyroidism  Hypothyroid on levothyroxine 150 mcg daily. No hair loss, has had some weight gain, after colonoscopy bowels moving better.  We will check TSH today.  - PRIMARY CARE FOLLOW-UP SCHEDULING; Future  - TSH with free T4 reflex; Future    Prediabetes  Prediabetes will check HBA1c today, encouraged to  increase exercise, eat less carb's, avoid alcohol and work on loosing 10 % of total body weight   - Hemoglobin A1c; Future  - PRIMARY CARE FOLLOW-UP SCHEDULING; Future    Snoring  Snoring declines sleep eval  - PRIMARY CARE FOLLOW-UP SCHEDULING; Future    Tubular adenoma of colon  Serrated adenoma of colon  Had a positive collogued earlier this year, finally got colonoscopy 3 weeks ago in sept . Showed 3 serrated adenoma fragmented and 1 tubular adenoma and advised recheck 3 years.      Memory deficit  Memory deficits, currently tracking and able to drive locally . Here alone without family. Declines need to see neurology. Feels ok . Resolved anemia. stable low wbc, Recovered from Covid. Opting to defer seeing neuro for memory concern and snoring for now, will monitor. Reporting Fatigue resolved  Hx of snoring, suspected undiagnosed HUGO, opting to not see sleep for now due to cost  - PRIMARY CARE FOLLOW-UP SCHEDULING; Future    Neutropenia, unspecified type (H)  Stable asymptomatic low white count.   - Vitamin B12; Future  - PRIMARY CARE FOLLOW-UP SCHEDULING; Future    History of breast cancer  Hx of breast cancer but Mammogram in April was normal. Mammogram due 2023.  - PRIMARY CARE FOLLOW-UP SCHEDULING; Future    Dependent edema  Continue to use compression socks for dependant edema. Stable.  Suspect due to BMI and uncorrected sleep apnea.  - PRIMARY CARE FOLLOW-UP SCHEDULING; Future    Osteopenia,  unspecified location  Osteopenia on calcium and vit d, Dexa due in 2023 for hx of osteopenia/ osteoporosis, unclear if on calcium and vit d. Encouraged to take calcium 1200 mg a day &  vit d 2000 units a day   - PRIMARY CARE FOLLOW-UP SCHEDULING; Future    Epigastric pain  For new epigastric upper abdominal pain intermittent 1 week worse with pushing on it , exam appears benign, no rebound no surgical abdomen noted.  Does not radiate into chest and no associated cardiac symptoms.  Suspect gastritis.  To avoid spicy, avoid greasy foods, and red sauces, coffee, alcohol till better. We will have her increase Pepcid to twice a day, avoid aspirin and see what labs show, warm pack to area. counseled if gets worse, has chest pain, trouble breathing for has blood in stool or black stool, to go to the ER, & if not better may need an EGD etc. Ultrasound to check for pain. Ct abdomen if no answer.   - CBC with platelets and differential; Future  - Lipase; Future  - Helicobacter pylori Antigen Stool; Future  - US Abdomen Limited; Future    Physical deconditioning  Was to start vit b12 500 mcg OTC daily for low normal B 12 noted in march and physical deconditioning ( forgot to start). Not sure if taking yet. Encouraged to take vitamin B12 500 mcg OTC daily for low normal B 12 noted in march, memory and physical deconditioning   - Vitamin B12; Future    Need for prophylactic vaccination and inoculation against influenza  Due for high dose flu shot & given today     Need for COVID-19 vaccine  Wants to wait on new Covid vaccine or 3rd booster    Need for shingles vaccine  Declines shingles vaccine, will get at pharmacy maybe  See you back in 6 months for a physical but sooner if symptoms brinda worse    Review of the result(s) of each unique test - labs since 6/2022  Independent interpretation of a test performed by another physician/other qualified health care professional (not separately reported) - colonoscopy results  Diagnosis or  "treatment significantly limited by social determinants of health - ? memory concerns, cost of care  Ordering of each unique test  Prescription drug management  53 minutes spent on the date of the encounter doing chart review, history and exam, documentation and further activities per the note     BMI:   Estimated body mass index is 37.25 kg/m  as calculated from the following:    Height as of this encounter: 1.76 m (5' 9.29\").    Weight as of this encounter: 115.4 kg (254 lb 6.4 oz).   Weight management plan: Discussed healthy diet and exercise guidelines    Work on weight loss  Regular exercise  See Patient Instructions    Return in about 6 months (around 3/23/2023) for with me, in person, Routine preventive.   Follow-up Visit   Expected date:  Mar 23, 2023 (Approximate)      Follow Up Appointment Details:     Follow-up with whom?: Me    Follow-Up for what?: Medicare Wellness    Any Additional Chronic Condition Management?:  Hyperlipidemia  Hypertension       Welcome or Annual?: Annual Wellness    How?: In Person                  Marry Rodriguez MD  [facility]      Subjective Romayne is a 77 year old, presenting for the following health issues:  Follow Up and Hypertension      History of Present Illness       Hypertension: She presents for follow up of hypertension.  She does not check blood pressure  regularly outside of the clinic. Outside blood pressures have been over 140/90. She does not follow a low salt diet.       BACKGROUND  76 yr lady with History of passive smoke exposure, obesity, hypertension on lisinopril, hyperlipidemia on atorvastatin, hypothyroidism on levothyroxine, CKD 3, history of memory deficit per epic, history of chronic leg edema, history of prior maxillary sinusitis, resolved cellulitis, history of idiopathic leukopenia S/p work-up that was negative by hematology in 2012, history of lipoma noted in the past, history of prior right breast cancer in 2008 S/p right " mastectomy and saline implants in remission no longer followed by oncology, history of resolved anemia, history of Covid infection in November 2020 with respiratory failure sepsis, hypoxia, Covid pneumonia, PE and left lower extremity DVT, elevated D-dimer, UTI, resolved HALI, resolved anemia, with complete resolution of Covid symptoms noted by February 2021.  No longer on oxygen.  History of snoring, chronic fatigue, physical deconditioning, GERD on Pepcid, and constipation, history of untreated osteoporosis DEXA scan in 2021 showed osteopenia, history of vitreal macular adhesion right eye and epiretinal membrane S/p cataract surgery and remote right eye injury, S/p vitrectomy being managed by eye & being monitored as a glaucoma suspect  on multivitamin recently under care of Dr. Sheridan then Sapphire Glass then Adela Avila,   Seen by PCP in April 2019 for dyspnea on exertion.  Examination was unremarkable, chest x-ray was negative.  EKG was unremarkable.  Echo showed a normal EF.  Stress test done was normal.  Her lisinopril was increased and suspected to be deconditioned and advised weight loss and increase activity and referred to sleep for her history of fatigue for possible obstructive sleep apnea but was never seen by sleep.  Fit test never done.  Not seen till called for medication refill on 4/30/2020 and appointment made with this writer, had an appointment by telephone with this writer for the first time on 5/19/2020 for blood pressure med refill.  Advise labs and follow-up in clinic.  Fit test done 6/8/2020 was negative.  TSH was elevated at 10 with free T4 normal and a normal hemoglobin A1c.   Visited with endocrine virtually on 7/8/2020 and noted had been treated for subclinical hypothyroidism with levothyroxine since 2004.  Medications adjusted and DEXA scan ordered.  Seen by eye 9/29/2020 for cataracts glaucoma check, prior right eye injury and which showed retinal membrane adhesion.  Seen by eye  "again 10/19/2020.  Seen in urgent care on 11/5/2020 for fever cough dizziness abdominal pain nausea was given Bactrim for cystitis tested for COVID discharged home and testing came back positive for Covid.  Seen in ER 11/16/2020 for shortness of breath sinus tachycardia and severe hypoxia.  Was admitted for worsening respiratory symptoms prompting her to seek further evaluation on 11/17/20 at [facility]. She was found to be in acute hypoxic respiratory failure requiring 30 LPM HFNC.  Chest x-ray at that time revealed \"diffuse patch airspace opacities consistent with COVID.\" She was admitted to the MICU for stabilization overnight and transferred to the general medical floor on 11/18/20. For her COVID infection, she received dexamethasone and remdesivir. She was initially treated with broad spectrum antibiotics for possible super-imposed pneumonia, but these were quickly discontinued. Her hospital course was complicated by a LLE DVT and PE's. She was started on Lovenox and transitioned to Apixaban. She was also found to have a UTI on day of discharge and was sent home on oral antibiotics.  Her severe Sepsis, resolved. For acute Hypoxic Respiratory Failure 2/2 COVID-19 PNA - COVID+ 11/5.  She was COVID recovered,  initially requiring 30 LMP HFNC, but was able to wean to 2Ls NC with exertion, 0 to 0.5L at rest. She completed her 5 day course of Remdesivir during her hospital stay. On discharge was continued on dexamethasone 6mg daily for 10 doses (ending 11/27) & continued on 2Ls NC O2 with ambulation, 0-0.5L at rest. For the Non-occlusive thrombus in left popliteal vein & Pulmonary Embolisms of segmental and subsegmental right upper and lower lobes with Elevated D-dimer - D-dimer >20 on admission. Started on high-intensity heparin gtt in ED with bolus- and transitioned to low intensity on 11/18/20. Ultrasound of B/l lower extremities obtained on 11/19 which were notable for non-occlusive thrombus in left popliteal vein. She " was then transitioned back to high intensity heparin gtt on 11/19. Had elevated hep 10A levels overnight 11/19. Transitioned to Lovenox 1mg/kg BID on 11/20/20. CT PE protocol with segmental and subsegmental PE in the right upper and lower lobes w/o evidence of RHS. Also w/ extensive reticular changes and GOO throughout lungs representing inflammation and/or fibrosis r/t recent COVID-19 infection. on Discharge continued on Apixiban 10mg BID x7 days, then 5mg BID thereafter for ~ 3 month duration of treatment as likely provoked DVT/PE due to COVID-19 infection. Was to follow up with PCP in one week to discuss anticoagulation. Noted Urinary Tract Infection & Urinary retention, resolved - Required straight cath x2 during her hospital admission. Developed LUTS last day of discharge with urinalysis revealing few bacteria, moderate leukocytes, and 12 Wbcs. However, there were 4 epis.  & discharged on Keflex 500mg BID for 5 days & to follow up with PCP in one week to review urine culture results. Her HALI, resolved. Noted CKD III - Baseline creatinine 1.1-1.2, elevated to 1.53 on presentation with BUN 47 then returned to baseline. Etiology likely prerenal 2/2 COVID. Lisinopril was held & resumed on discharge. For her HTN - Patient's lisinopril held on during hospital stay due to HALI. Started on amlodipine 11/20 for elevated BP but on discharge, amlodipine was discontinued and resumed home lisinopril 20 mg daily. She noted increased heartburn since starting Eliquis, while inpatient was on omeprazole this was switched to Pepcid once off the Eliquis and continued on Tums as needed. Had constipation on admission - improved with bowel regimen, however developed liquid stool which improved by discharge. Hypothyroidism: TSH on admission WNL at 0.42 & continued on  PTA Synthroid. Noted Normocytic anemia - Baseline Hgb 12-13. & was 12.2 at discharge. No S/sx of bleeding since initiation of anicoagulation. for HLD was continued on a  statin.  She was discharged home on 11/24/2020.  Seen 12/3/2020 by Dr. Lazo for hospital discharge noted was no longer on oxygen during the day and satting fine & remained on Eliquis.  Seen by Sapphire Glass her PCP on 2/5/2021 for routine physical and noted had completely recovered from Covid & no longer short of breath, had no cough & not on oxygen, no longer with leg pain & was wearing compression socks for chronic leg swelling. and Doppler done on 2//22 was negative for DVT on Eliquis. DEXA scan ordered for prior untreated osteoporosis and continued on Pepcid for heartburn.  Labs later showed she was overcorrected on thyroid and levothyroxine was changed from double dosing on the weekend to daily simple dosing of 150 mcg daily and recheck TSH 6 weeks later was within normal range and continued on same dose.  DEXA scan done 2/11/2021 showed osteopenia and continued on calcium and vitamin D.  Cataracts removed 2/22/2021.  Seen by 3/11/2021.  TSH normal 3/15/2021.  Noted blurry vision 4/13/2021.  Had stable CKD 5/18/2021 and advised to avoid NSAID's.  Seen 5/18/2021 by Dr. Avila for preop for vitrectomy.   On 6/7/2021 had vitrectomy and membrane stripping for history of vitreoretinal adhesion right eye.  Seen by the eye doctor postop day 1, 1 week postop and at 6-week postop guero and noted was doing well.  Last seen by eye on 10/20/2021 for hx of Vitreoretinal adhesion right eye, ERM right eye S/P PPV/MP right eye 6/2021. Foveal anatomy improved significantly; BCVA 20/50 limited likely due to subtle IS/OS disruption at fovea (+drusen) -3.0 refraction right eye and seeing well up close; instructed her to try to use right eye as monovision for near.  Was to observe pseudophakia each eye.  Noted to be a glaucoma suspect with large disc and large cupping child being followed by Dr. Shelby.  Seen 3/22/22 for medicare wellness/ preventive health and additional concerns. Discussed slef breast checks, mammogram ordered,  Pelvic / PAP no longer needed  Health care maintenance reviewed. To Consider working on health care directives & given honoring choices. Given Covid pfizer booster for prior received [place] vaccine in 2021.  Discussed Shingrex.   BMI 37, mostly sedentary.  Discussed lifestyle. Encouraged to work on diet, exercise and losing at least 5 to 10% of total body weight.  Hyperlipidemia on atorvastatin 10 mg bedtime had enough meds till could get lab work reviewed. LDL later came back normal but triglycerides were elevated and overall cardiovascular risk is 23.2 so atorvastatin increased to 20 mg bedtime and was to recheck cholesterol fasting in 3 months when I saw her back again.  Hypertension on lisinopril 10 mg daily previously was on 20, blood pressure was  136/79.  Asymptomatic no side effects & refilled at same dose  #90 with 3 refills.   CKD 3 asymptomatic reminded to avoid NSAID's Kidney function came back stable with normal micro albumin.  Hypothyroid on levothyroxine 150 mcg daily deviously on differing doses on the weekend and weekdays but had been on this dose since February 2021.    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  Had no symptoms. Count came back low at 2.2 similar with neutropenia.  Stable to prior. History of anemia noted when had COVID asymptomatic no GI bleeding not on anticoagulation. hemoglobin and hematocrit came back normal.  History of Covid in November 2020 was hospitalized requiring oxygen and received steroids and antivirals.  Had a DVT and PE requiring anticoagulation 3 months.  Resolved thromboembolism and sepsis and HALI and treated for UTI at that time was in the ICU and discharged after a week in hospital.  Reported made a full recovery & no longer on oxygen or anticoagulation.  Resolved acute respiratory failure on no oxygen since January 2021.  History of snoring had been referred to sleep in the past.  Did have obesity, hyperlipidemia,  hypertension and forgetfulness.  Did have chronic deconditioning. Referral placed to sleep again. Chronic fatigue reported improved.  Had bilateral lower leg swelling managed with compression socks daily felt related to BMI and untreated sleep apnea  Physical deconditioning.  Was very sedentary.  Prior echo and stress test before had COVID was normal.  CT during COVID in 2020 did not show right heart strain.  Reported no symptoms at rest or with mild exertion.  Encouraged to be more active.  Hx of breast cancer & had had right mastectomy and chemotherapy,  initially tried a saline implant but reported this had since been removed.  Reported no new lumps or ulcers on scar tissue.  Reported no left breast lumps, bumps, skin changes or nipple discharge.  Had been in remission long time and not seen oncology in a while.  Mammogram for the left placed  Heartburn controlled on Pepcid OTC daily. Constipation improved & was to continue with a high-fiber diet.  History of osteopenia.  Prior untreated osteoporosis but DEXA scan done 2021 showed osteopenia and was advised to continue calcium and vitamin D.  Unclear if taking calcium & recommended taking calcium citrate 1200 mg total a day and vitamin D at least 2000 units daily and rechecking DEXA scan in 2023.  Noted memory deficits, failed mini cog, seen alone, referred to neurology to further evaluate.   History of vitreal macular adhesion right eye and epiretinal membrane S/p surgery vitrectomy and stripping in June 2021. Was a bilateral glaucoma suspect and under care of eye. Was to see the dentist regularly. The 1 cm cyst on her right back was not a concern, & to monitor for worsening  Colon cancer screening due options discussed & Cologuard.ordered.   Labs showed normal Vit d, TSH was mildly out of range but ft 4 was normal & continued on same dose for 3 months before rechecking. micro albumin had improved. Wbc was 2.2, not anemia, platelets normal. B 12 was low normal  and advised 500 mcg of vit B12.CMP showed stable CKD. Lipids not goal and atorvastatin increased to 20 mg. HBA1c was 5.7 and advised on diet and lifestyle changes. cologuard result not received. No apt made with sleep or neuro. Seen by eye 4/20/22 for glaucoma monitoring.     Seen  6/23/22 Blood pressure was not goal increased lisinopril to 20 mg bedtime ( to  take two of the 10 mg left until can start the new script of 20 mg dose) . Encouraged to avoid Gatorade as high in salt, drink tea in moderation, avoid alcohol. Check BP at home, goal should be less than 130/85. Recheck BP in 2 to 3 weeks with medical assistant and lab at that time for med monitoring with a bmp. Was to work on diet , exercise, low carb , smaller portions, avoid alcohol to help Blood pressure, weight and cholesterol.  BMI > 37  Discussed diet, exercise and weight loss. Was to continue atorvastatin 20 mg increased at last visit and see if with better BP control and repeat lipids if needed to be adjusted further in the future.  CKD 3 stable resolved microalbuminuria, on an ACE, to avoid antiinflammatories due to chronic kidney disease. Was clinically euthyroid on levothyroxine 150 mcg daily. Later tsh was WNL and continued on same dose. Due to prediabetes HBA1c checked & encouraged to  increase exercise, eat less carb's, avoid alcohol and work on loosing 10 % of total body weight. For hx of memory deficits, was  tracking and able to drive locally, in alone without family. Declined need to see neurology.  Was to continue to monitor. For  hx of snoring, suspected undiagnosed HUGO, opting to not see sleep for now due to cost. had resolved anemia. Recovered from Covid in 2020. Opted to defer seeing neuro for memory concern and snoring reporting Fatigue resolved. Had stable asymptomatic low white count. Reviewed H x of breast cancer & Mammogram in April had been normal. Was to continue use of compression socks for dependant edema. Noted  Dexa due in  2023 for hx of osteopenia/ osteoporosis, encouraged to take calcium and vit d supp. Reminded to start vit b12 500 mcg OTC daily for low normal B 12 noted in march and physical deconditioning ( forgot to start). Reported was working on health care directives  Covid # 3 due mid July. Declined Shingrex. Was to continue care with dentist and eye. Reported then  Heart burn and constipation had resolved. Was to contact cologuard about missing test result. This was later reported as positive & triage advised to contact patient and diagnostic colonoscopy ordered.     B12 was normal.  Hemoglobin A1c in prediabetic range.  Normal CMP and TSH.  Chronically low white count no anemia.  ASCVD risk was 30% with normal LDL, low HDL and elevated triglycerides and continued on same dose statin.  On 7/7 blood pressure elevated recheck on 7/18 was normal and continued on same meds.  Finally got colonoscopy done 9/7/2022 fragmented superficial serrated adenoma removed x3 and tubular adenoma removed x1 and recommended recheck in 3 years    CURRENTLY  Here for follow up   HTN stable on lisinopril increased dose 20 mg . No side effects. Avoiding salt. Not chekcing BP at home. Will do labs today   CKD 3 stable resolved microalbuminuria, on an ACE, Avoiding  antiinflammatories due to chronic kidney disease  BMI up to 37, weight up , eating two meals a day, discussed smaller portions, avoiding alcohol, juices, declines weight evaluation, sleep doctor or mtm due to cost  HLD on atorvastatin 20 mg, will do fasting labs today   Hypothyroid on levothyroxine 150 mcg daily. No hair loss, has had some weight gain, after colonoscopy bowels moving better  Prediabetes will check HBA1c today, encouraged to  increase exercise, eat less carb's, avoid alcohol and work on loosing 10 % of total body weight   Snoring declines sleep eval  Had a positive collogued earlier this year, finally got colonoscopy sept . Showed 3 serrated adenoma fragmented and 1  tubular adenoma and advised recheck 3 years.   Memory deficits, currently tracking and able to drive locally . Here alone without family. Declines need to see neurology. Feels ok . Resolved anemia. stable low wbc, Recovered from Covid. Opting to defer seeing neuro for memory concern and snoring for now, will monitor. Reporting Fatigue resolved  Hx of snoring, suspected undiagnosed HUGO, opting to not see sleep for now due to cost.   Stable asymptomatic low white count.   Hx of breast cancer but Mammogram in April was normal  Uses compression socks for dependant edema. stable  Osteopenia on calcium and vit d, Dexa due in 2023 for hx of osteopenia/ osteoporosis, unclear if on calcium and vit d  Was to start vit b12 500 mcg OTC daily for low normal B 12 noted in march and physical deconditioning ( forgot to start). Not sure if taking yet     Notes Epigastric pain 1 week, comes and goes , started after eating shellfish , pushing epigastric area hurts.  No radiation into chest or back.  No pain and does not breast area.  No bruising redness warmth.  No trauma or falls.  Does have history of heartburn in the past.  Unclear if has heartburn right now.  Is belching.  Reports no nausea vomiting diarrhea constipation blood in stools or black stools.  Has had no weight loss or night sweats. Reports no  dysuria, hematuria, frequency, urgency, hesitancy, incontinence, No pelvic complaints. No leg swelling or joint pain. No rash.  Currently no fever or chills, no headache or dizziness, no double or blurry vision, no facial pain, earache, sore throat, runny nose, post nasal drip, no trouble hearing, smelling, tasting or swallowing, no cough , no chest pain, trouble breathing or palpitations,   Due for high dose flu shot   Wants to wait on new Covid vaccine  Declines shingles vaccine, will get at pharmacy maybe    Review of Systems   Constitutional, HEENT, cardiovascular, pulmonary, GI, , musculoskeletal, neuro, skin, endocrine  "and psych systems are negative, except as otherwise noted.      Objective    /82 (BP Location: Left arm, Patient Position: Sitting, Cuff Size: Adult Large)   Pulse 71   Temp 97.3  F (36.3  C) (Temporal)   Resp 18   Ht 1.76 m (5' 9.29\")   Wt 115.4 kg (254 lb 6.4 oz)   SpO2 98%   BMI 37.25 kg/m    Body mass index is 37.25 kg/m .  Physical Exam   GENERAL: healthy, alert, no distress and obese  EYES: Eyes grossly normal to inspection, PERRL and conjunctivae and sclerae normal  HENT: ear canals and TM's normal, nose and mouth without ulcers or lesions, poor dentitions  NECK: no adenopathy, no asymmetry, masses, or scars and thyroid normal to palpation  RESP: lungs clear to auscultation - no rales, rhonchi or wheezes  CV: regular rate and rhythm, normal S1 S2, no S3 or S4, no murmur, click or rub, no peripheral edema and peripheral pulses strong  ABDOMEN: Abdomen is soft, nondistended, good sounds present, mild discomfort epigastric area to palpation there are some tenderness, no rebound, no hepatosplenomegaly but difficult to assess due to body habitus.    MS: no gross musculoskeletal defects noted, no edema  SKIN: no suspicious lesions or rashes  NEURO: Normal strength and tone, mentation intact and speech normal  PSYCH: mentation appears normal, affect normal/bright, fatigued, judgement and insight intact and appearance well groomed    No results found for any visits on 09/23/22.  No results found for this or any previous visit (from the past 24 hour(s)).              "

## 2022-09-23 NOTE — RESULT ENCOUNTER NOTE
Estevan SalehFrantz Roth,  Your results came back with a normal HBA1c . If you have any further concerns please do not hesitate to contact us by message, phone or making an appointment.  Have a good day   Dr Michael SCOTT

## 2022-09-23 NOTE — RESULT ENCOUNTER NOTE
Estevan Ms. Roth,  Your results came back and it is about 4:47 PM on Friday evening I just got your pancreatic enzyme lipase back and it is quite elevated suggesting inflammation of your pancreas called pancreatitis likely reason for the upper abdominal pain you have been having for the past 1 week.  Please go to the emergency room where they can evaluate you further.  I will also try and have the nurse contact you if you have not seen this message.  If you have any further concerns please do not hesitate to contact us by message, phone or making an appointment.  Have a good day   Dr Michael SCOTT

## 2022-09-23 NOTE — RESULT ENCOUNTER NOTE
Estevan Ms. Roth,  Some of your results came back show no anemia but white count is lower than usual around 2 similar to what it was 6 months ago and 1 year ago.  If pain in your abdomen gets worse please go to the emergency room to be further evaluated.  If white count continues to drop may need to see the hematologist again previously assessed to be of benign cause.  If you have any further concerns please do not hesitate to contact us by message, phone or making an appointment.  Have a good day   Dr Michael SCOTT

## 2022-09-23 NOTE — RESULT ENCOUNTER NOTE
Estevan Roth,  Your results came back and lipids & kidney function  If you have any further concerns please do not hesitate to contact us by message, phone or making an appointment.  Have a good day   Dr Michael SCOTT

## 2022-09-23 NOTE — PATIENT INSTRUCTIONS
Continue lisinopril 20 mg . No side effects. Avoiding salt. Not checking BP at home. Will do labs today   Avoiding  antiinflammatories due to chronic kidney disease  BMI up to 37, weight up , eating two meals a day, discussed smaller portions, avoiding alcohol, juices, declines weight evaluation, sleep doctor or mtm due to cost  Continue atorvastatin 20 mg, will do fasting labs today & adjust meds as needed  Hypothyroid on levothyroxine 150 mcg daily. Will check labs and adjust med if needed  Check for diabetes encouraged to  increase exercise, eat less carb's, avoid alcohol and work on loosing 10 % of total body weight   Snoring opted no sleep camille  Had 4 precancerous polyps on recent colonoscopy  advised recheck 3 years.   Memory deficits, currently no acute concerns, opted no neurology evaluation, Resolved anemia. stable low wbc, Recovered from Covid. Fatigue resolved  Hx of snoring, suspected undiagnosed HUGO, opting to not see sleep for now due to cost.   Stable asymptomatic low white count.   Hx of breast cancer but Mammogram in April was normal, due in 2023  Uses compression socks for dependant edema. stable  Osteopenia, Dexa due in 2023 for hx of osteopenia/ osteoporosis, take calcium 1200 mg a day , vit d 2000 units a day   Take vitamin B12 500 mcg OTC daily for low normal B 12 noted in march and physical deconditioning   For upper abdominal pain increase pepcid to twice a day, avoid aspirin and see what labs show, warm pack there  If gets worse, has blood in stool or black stool go to the Er, if not better may need an EGD, avoid spicy, avodi greasy foods, and red sauces, coffee, alcohol   Ultrasound to check for pain  Hgh dose flu shot today  Wants to wait on new covid vaccine  Declines shingles vaccine, will get at pharmacy maybe  Continue care with dentist and eye   See you back in 6 months for a physical but sooner if symptoms brinda worse

## 2022-09-26 ENCOUNTER — NURSE TRIAGE (OUTPATIENT)
Dept: NURSING | Facility: CLINIC | Age: 77
End: 2022-09-26

## 2022-09-26 ENCOUNTER — LAB (OUTPATIENT)
Dept: LAB | Facility: CLINIC | Age: 77
End: 2022-09-26
Payer: COMMERCIAL

## 2022-09-26 ENCOUNTER — TELEPHONE (OUTPATIENT)
Dept: FAMILY MEDICINE | Facility: CLINIC | Age: 77
End: 2022-09-26

## 2022-09-26 DIAGNOSIS — R10.13 EPIGASTRIC PAIN: ICD-10-CM

## 2022-09-26 PROCEDURE — 87338 HPYLORI STOOL AG IA: CPT

## 2022-09-26 NOTE — TELEPHONE ENCOUNTER
Reason for Call:  Request for results:    Name of test or procedure: Labs    Date of test of procedure: 9/23/2022    Location of the test or procedure: [facility]    OK to leave the result message on voice mail or with a family member? YES    Phone number Patient can be reached at:  Home number on file [phone] (home)    Additional comments: just wants her lab results    Call taken on 9/26/2022 at 12:00 PM by Stefani Oconnor

## 2022-09-26 NOTE — TELEPHONE ENCOUNTER
Patient calling about message from Dr. Rodriguez;  She reports she did not go to a  ER, she went to [facility]    But they would not do a CT scan of her pancreas up [place].  Patient calling fnow, and was given the message from Dr. Rodriguez, and advised to go to the ER here in [place] now .  Patient agreed to POC.    Melanie Christensen RN   [facility] Nurse Advisor      Hello Ms. Roth,  Your results came back and it is about 4:47 PM on Friday evening I just got your pancreatic enzyme lipase back and it is quite elevated suggesting inflammation of your pancreas called pancreatitis likely reason for the upper abdominal pain you have been having for the past 1 week.  Please go to the emergency room where they can evaluate you further.  I will also try and have the nurse contact you if you have not seen this message.  If you have any further concerns please do not hesitate to contact us by message, phone or making an appointment.  Have a good day   Dr Michael SCOTT     Reason for Disposition    Information only question and nurse able to answer    Protocols used: INFORMATION ONLY CALL - NO TRIAGE-A-[place]

## 2022-09-26 NOTE — TELEPHONE ENCOUNTER
See 9/26/22 nurse triage encounter.  MARY PartidaN, RN  [facility]

## 2022-09-27 LAB — H PYLORI AG STL QL IA: NEGATIVE

## 2022-09-27 NOTE — TELEPHONE ENCOUNTER
Dr. Rodriguez -  Please advise.   1. LABS. Patient would like to come in for labs tomorrow. Is that necessary? If so, please order labs.   2. APPOINTMENT. Do you want an in person appointment with her?  3. CT SCAN. Patient no longer having abdominal pain -- do you still want the CT scan?    History:   1. Patient had appointment earlier in day on 9/23/2022 with Dr. Rodriguez.   2. Lab results (elevated lipase) came back and triage nurse contacted patient to go to the ER for her pancreas.  3. Patient went to the ER in [place], [place] on 9/23/2022 - [facility].  4. Patient states that she went to the ER but was not having pain and could not remember exactly why she was going.   Discharge Instructions: from ER:   -- Based on your exam and symptoms, I would recommend going home, resting and starting clear liquids for next 1-2 days  -- Go to local hospital if worsening  -- Follow up with PCP as planned in 3-4 days for additional testing and follow up blood tests.     Patient states that [facility] not go [facility] and was no longer having [facility] pain.       Antoinette Alba, MARYN RN  [facility]

## 2022-09-27 NOTE — RESULT ENCOUNTER NOTE
Estevan Ms. Roth,  Your results came back negative for h pylori infection  If you have any further concerns please do not hesitate to contact us by message, phone or making an appointment.  Have a good day   Dr Michael SCOTT

## 2022-09-27 NOTE — TELEPHONE ENCOUNTER
Thanks for the great summary  She needs her thyroid rechecked but its too soon for that should get that done in 3 monhts  Would recommend checking wbc an lipase again given recent abnormalities  Glad the pain is better but not sure why lab was up   Alcohol use, gallstones other cases can cause that so would recomend to go ahead and get the ct abdomen, take pepcid daily as advised and avoid all alcohol

## 2022-09-27 NOTE — TELEPHONE ENCOUNTER
Dr. Rodriguez -  Pended CT scan.   You may want more on the diagnosis for the CT scan.     Thanks so much,  MARY LastN LENORA  [facility]

## 2022-09-27 NOTE — TELEPHONE ENCOUNTER
Writer called and left message on patient's voicemail to call back and speak with a triage nurse regarding abdominal/pelvic CT.   Patient is to call and make appointment @ [phone].    MARY LastN RN  [facility]

## 2022-09-28 ENCOUNTER — TELEPHONE (OUTPATIENT)
Dept: FAMILY MEDICINE | Facility: CLINIC | Age: 77
End: 2022-09-28

## 2022-09-28 ENCOUNTER — LAB (OUTPATIENT)
Dept: LAB | Facility: CLINIC | Age: 77
End: 2022-09-28
Payer: COMMERCIAL

## 2022-09-28 DIAGNOSIS — R74.8 ELEVATED LIPASE: ICD-10-CM

## 2022-09-28 DIAGNOSIS — D70.9 NEUTROPENIA, UNSPECIFIED TYPE (H): Primary | ICD-10-CM

## 2022-09-28 LAB
AMYLASE SERPL-CCNC: 95 U/L (ref 30–110)
LIPASE SERPL-CCNC: 219 U/L (ref 13–60)
WBC # BLD AUTO: 1.9 10E3/UL (ref 4–11)

## 2022-09-28 PROCEDURE — 83690 ASSAY OF LIPASE: CPT

## 2022-09-28 PROCEDURE — 85048 AUTOMATED LEUKOCYTE COUNT: CPT

## 2022-09-28 PROCEDURE — 36415 COLL VENOUS BLD VENIPUNCTURE: CPT

## 2022-09-28 PROCEDURE — 82150 ASSAY OF AMYLASE: CPT

## 2022-09-28 RX ORDER — ATORVASTATIN CALCIUM 20 MG/1
TABLET, FILM COATED ORAL
Qty: 90 TABLET | Refills: 0 | OUTPATIENT
Start: 2022-09-28

## 2022-09-28 NOTE — RESULT ENCOUNTER NOTE
Estevan Ms. Roth,  Your results came back showing  Wbc lower than usual. See hematology as discussed with nurse on phone today   Lipase improved  Suspect episode of pancreatitis improved  Continue to avoid all alcohol to prevent recurrence of symptoms   If you have any further concerns please do not hesitate to contact us by message, phone or making an appointment.  Have a good day   Dr Michael SCOTT

## 2022-09-28 NOTE — TELEPHONE ENCOUNTER
Called and spoke with patient regarding going in for CT scan to abdomen.   Patient agreed and is going to have CT scan.     MARY LastN RN  [facility]

## 2022-09-28 NOTE — TELEPHONE ENCOUNTER
Situation: low WBC Of 1.9 on recheck labs today. persistent idopathic leukopenia worse recently with elevated lipase and resolved epigastric pain, awaiting ct  abd, prior extensive hematology work up unclear cause of low wbc, concerned as dropped low again if neuopogen or new work up needed? See below plan    Background: recently seen for routine HTN check when mentioned epigastric pain and labs showed elevated lipase consistent with pancreatitis.. was not in town when labs back and symptoms since resolved with no intervention other then maybe a liquid diet  Was advised to recheck lipase, wbc, amylase and get a ct abdomen. Diagnostics not back except for wbc which came back today at 1.9    Assessment: has been seen several times in late 2012 when diagnosed with idiopathic leukopenia  Work-up in past included:  Bone marrow biopsy in 2008 and 2012   Autoimmune tests negative: KAIT, RF  Viral serologies negative  Copper normal  Zinc normal  SPEP showed no monoclonal spike  History of breast cancer in 2008 s/p mastectomy followed by chemoradiation and hormonal therapy    Summary of prior workup  7/2012 Bone marrow biopsy pathology: FINAL DIAGNOSIS:  Bone marrow, right posterior iliac crest, decalcified trephine biopsy, touch imprint, particle crush, direct aspirate smear, concentrated aspirate smear, and peripheral blood smear: Variable marrow cellularity, overall 30% (normocellular for age), with trilineage hematopoietic maturation (see Comment) No morphologic evidence of myeloid dysplasia  Peripheral blood showing marked neutropenia   7/2012 Bone marrow biopsy chromosome:  No clonal chromosomal abnormality was detected among the 20 metaphase cells analyzed. Thus, no cytogenetic evidence of a malignant process was found.  7/2012 Bone marrow biopsy flow cytometry: CONCLUSION: Bone marrow:  Aberrant immunophenotype on rare CD8-positive T cells (0.2%)  Polytypic B lymphocytes  Polytypic plasma cells  No increase in  blasts  Rare CD8-positive T cells (0.2%) have an aberrant immunophenotype: dim CD3 and slightly dim CD5. A prior study of marrow by flow cytometry in 2008 mentioned a suggestion of decreased CD3 on rare CD8-positive T cells (0.7%). In the current study, these cells are analyzed for variable regions of the T-cell receptor beta chain, but this is not helpful to determine possible clonality because the cells lack the T-cell receptors, both alpha beta and gamma delta. These cells may be clonal, but an immune mechanism is part of the differential diagnosis.     8/2012 Blood smear: FINAL DIAGNOSIS: Peripheral blood smear: Normocytic anemia Neutropenia  8/2012 SPEP: normal  8/2012 HIV neg, hepatitis neg  8/2012 KAIT: <1.0, RF 7  8/2012 Copper: 80, zinc: 66  10/2012 Vitamin B12: 577  8/2012 Granulocyte agglutination assay: negative  8/2012 Granulocyte immunofluorescence assay: negative    In 2014 seen again by hematology when admitted for cellulitis being rx with IV vancomycin   Noted then had Idiopathic leukopenia: Previously had extensive work-up without clear cause found.  WBC count at the time was 1.4 with ANC of 200.  Was put on Neupogen 480mcg qPM & continued for  7-10 days based on infection at the time.  Since then wbc has been between 1.8 to 3.9 with rare numbers above 5, mots recently 2 .1 to 3.3 last 1 yr.  The low of 1.9 likely in background of recent pancreatitis    PLAN  Even if unclear cause with extensive work up in the past given recent symptoms, elevated lipase and low wbc would recommend seeing hematology again with regard to low wbc    Awaiting ct and rest of labs but if should have increased pain, fever or new symptoms to contact us/ go to the ER.     If agreeable may sign hematology referral and close encounter

## 2022-09-28 NOTE — RESULT ENCOUNTER NOTE
Please call patient on wbc   Rest of labs not back yet   See Te note started  May close that encounter when done

## 2022-09-28 NOTE — TELEPHONE ENCOUNTER
I talked to pt.  She has the CT on 9/29/22.  She was agreeable to the hematology referral.  Took the referral phone #.  She currently has NO pain or fever.    Reviewed the entire PLAN with pt.  LENORA Roque

## 2022-09-29 ENCOUNTER — ANCILLARY PROCEDURE (OUTPATIENT)
Dept: CT IMAGING | Facility: CLINIC | Age: 77
End: 2022-09-29
Attending: FAMILY MEDICINE
Payer: COMMERCIAL

## 2022-09-29 DIAGNOSIS — R74.8 ELEVATED LIPASE: ICD-10-CM

## 2022-09-29 DIAGNOSIS — R10.13 EPIGASTRIC PAIN: ICD-10-CM

## 2022-09-29 PROCEDURE — 74177 CT ABD & PELVIS W/CONTRAST: CPT | Performed by: RADIOLOGY

## 2022-09-29 RX ORDER — IOPAMIDOL 755 MG/ML
123 INJECTION, SOLUTION INTRAVASCULAR ONCE
Status: COMPLETED | OUTPATIENT
Start: 2022-09-29 | End: 2022-09-29

## 2022-09-29 RX ADMIN — IOPAMIDOL 123 ML: 755 INJECTION, SOLUTION INTRAVASCULAR at 08:15

## 2022-09-29 NOTE — RESULT ENCOUNTER NOTE
Estevan SalehFrantz Roth,  Your results came back and ct shows no pancreatitis currently. 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  Have a good day   Dr Michael SCOTT

## 2022-09-29 NOTE — RESULT ENCOUNTER NOTE
Estevan SalehFrantz Roth,  Your results came back with normal amylase , pancreatic enzyme   If you have any further concerns please do not hesitate to contact us by message, phone or making an appointment.  Have a good day   Dr Michael SCOTT

## 2022-09-30 ENCOUNTER — PATIENT OUTREACH (OUTPATIENT)
Dept: ONCOLOGY | Facility: CLINIC | Age: 77
End: 2022-09-30

## 2022-09-30 NOTE — PROGRESS NOTES
Referral received for benign heme services, see below.    Referral reason: leukopenia    Current abnormal labs:   Lab Results   Component Value Date    WBC 1.9 09/28/2022    WBC 5.4 11/24/2020     Lab Results   Component Value Date    RBC 4.58 09/23/2022    RBC 4.46 11/24/2020     Lab Results   Component Value Date    HGB 12.9 09/23/2022    HGB 12.2 11/24/2020     Lab Results   Component Value Date    HCT 40.3 09/23/2022    HCT 39.6 11/24/2020     Lab Results   Component Value Date    MCV 88 09/23/2022    MCV 89 11/24/2020     Lab Results   Component Value Date    MCH 28.2 09/23/2022    MCH 27.4 11/24/2020     Lab Results   Component Value Date    MCHC 32.0 09/23/2022    MCHC 30.8 11/24/2020     Lab Results   Component Value Date    RDW 13.4 09/23/2022    RDW 13.7 11/24/2020     Lab Results   Component Value Date     09/23/2022     11/24/2020         Outreach: Call not placed to patient regarding referral.    Plan: Internal Referral: No additional work-up needed, scheduling instructions updated, referral transferred to NPS for completion.

## 2022-10-05 ENCOUNTER — ANCILLARY PROCEDURE (OUTPATIENT)
Dept: ULTRASOUND IMAGING | Facility: CLINIC | Age: 77
End: 2022-10-05
Attending: FAMILY MEDICINE
Payer: COMMERCIAL

## 2022-10-05 DIAGNOSIS — R10.13 EPIGASTRIC PAIN: ICD-10-CM

## 2022-10-05 PROCEDURE — 76705 ECHO EXAM OF ABDOMEN: CPT | Performed by: RADIOLOGY

## 2022-10-05 NOTE — RESULT ENCOUNTER NOTE
Estevan Roth,  Your results came back with a normal ultrasound  If you have any further concerns please do not hesitate to contact us by message, phone or making an appointment.  Have a good day   Dr Michael SCOTT

## 2022-10-05 NOTE — PROGRESS NOTES
RECORDS STATUS - ALL OTHER DIAGNOSIS    Neutropenia, persistent idopathic leukopenia worse recently with elevated lipase and resolved epigastric pain, awaiting ct abd, prior extensive hematology work up unclear cause of low wbc, concerned as dropped low again if neuopogen or new work up needed?/   RECORDS RECEIVED FROM: [facility]   DATE RECEIVED: 10/20/2022   NOTES STATUS DETAILS   OFFICE NOTE from referring provider Complete [facility]   Marry Rodriguez MD   DISCHARGE SUMMARY from hospital     DISCHARGE REPORT from the ER     OPERATIVE REPORT Complete 9/7/2022 Colonoscopy    MEDICATION LIST Complete [facility]   CLINICAL TRIAL TREATMENTS TO DATE     LABS     PATHOLOGY REPORTS     ANYTHING RELATED TO DIAGNOSIS Complete Labs last updated on 9/28/2022   GENONOMIC TESTING     TYPE:     IMAGING (NEED IMAGES & REPORT)     CT SCANS Complete CT Abdomen Pelvis 9/29/2022   MRI     MAMMO     ULTRASOUND Scheduled  US Abdomen Limited (Scheduled) 10/5/2022   PET

## 2022-10-11 DIAGNOSIS — H35.371 EPIRETINAL MEMBRANE, RIGHT: Primary | ICD-10-CM

## 2022-10-18 DIAGNOSIS — R71.8 OTHER ABNORMALITY OF RED BLOOD CELLS: ICD-10-CM

## 2022-10-18 DIAGNOSIS — E83.09 OTHER DISORDERS OF COPPER METABOLISM (H): ICD-10-CM

## 2022-10-18 DIAGNOSIS — D70.9 NEUTROPENIA, UNSPECIFIED TYPE (H): Primary | ICD-10-CM

## 2022-10-19 NOTE — PROGRESS NOTES
[facility]  HEMATOLOGY CONSULTATION    Romayne L Sathre   : 1945   MRN: [id number]  Date of service: Oct 20, 2022     REASON FOR CONSULTATION:  We are asked by Dr. Rodriguez to evaluate Romayne L Sathre for concern for neutropenia.    HISTORY OF PRESENT ILLNESS:  Romayne L Sathre is a 77 year old woman with a history of obesity BMI 37, HTN, CKD3, HLD, breast cancer in  in remission s/p chemoradiation and hormonal therapy, hypothyroidism, prediabetes, snoring, adenomas on colonoscopy, dependent edema, osteopenia, low normal B12, who is referred for neutropenia.     Per chart review and discussion with the patient, in 2009, she was diagnosed with breast cancer evaluated by Dr. Garrido in 2009, and as part of her treatment had serial CBCs from 2008 to 2009. The first ANC in our system 08 was 0.3-0.5 prior to the start of any treatment, which resolved on its own prior to starting any chemotherapy. She does not recall being told that she had had neutropenia prior to that, but she is known to have some memory issues. A bone marrow biopsy was unrevealing. She had a mastectomy in 10/3/2009. Unclear what her counts were at the time of mastectomy or whether she was given GCSF. Afterwards, she actually had normal to elevated ANCs during the first few months of her chemotherapy course, some of it related to an infected breast expander/implant which was removed. Normal ANCs 09 to 3/9/09. Finished chemotherapy 2009, continued on trastuzumab and started on letrozole, and referred for radiation which was completed in 2009. ANC back down to 0.8 on 09, 0.3 on 8/3/09 off of therapy.    Next ANCs done in 2012. Admitted with ANC 0.1-0.3. Work up in  showed 30% cellularity, normal for age, with trilineage hematopoietic maturation, no dysplasia, normal cytogenetics, flow with rare CD8+CD3dim,CD5dim T cells (0.2%, down from 0.7% in ), negative for both  alpha-delta and gamma-delta T cell receptors. Granulocyte agglutination and granulocyte immunofluorescence assays were negative. KAIT and RF were negative. HIV and hepatitis viral serologies were negative. B12, Copper, and Zn were normal. SPEP did not show a monoclonal spike and sFLC were normal. ANC stayed low 7/16/12-8/9/12, improved to 3.3 8/13, then relatively stable until 10/3/12 when ANC down to 0.3.     She was admitted in 2014 for cellulitis refractory to oral antibiotics and was found to have a WBC of 1.4 with ANC of 0.2. She was started on a short course of filgrastim at that time. Folic acid was normal.     ANCs in 1/7/2016 (routine), 5/92018 (routine), 5/21/2020 were between 0.5-0.9. ANCs in 11/16-11/24/20 (admission for COVID needing ICU and HFNC) were normal, with a few days of mild neutropenia. She had a left popliteal vein thrombosis and a PE in right upper and lower lobes and was treated with a course of anticoagulation. ANCs in 3/2022 (routine), 6/2022 (follow up), 9/2022 (follow up) have ranged between 0.6-1.5. Platelets have all been normal throughout. Hgbs have been normal the last few years except for during some admissions.    She has been having issues with recurrent abdominal pain. It is not there all the time. It last happened last week. It wasn't helped with BM. Some fluids helped. It comes and goes. She was found to have some pancreatitis last month. No gallstones on RUQ US this month, and US was otherwise normal. She has been taking her calcium pills but her calcium levels didn't look high. Doesn't drink a lot of alcohol. Does have some diverticulosis no CT 9/29/22.    She denies fevers, chills, night sweats, weight loss, headache, chest pain, cough, does have shortness of breath climbing up stairs or walking for a block or so. No appetite issues, nausea, vomiting, constipation, diarrhea, focal weakness, numbness, peripheral neuropathy. Vision and hearing are fine. She is going to get a  retinal exam tomorrow. Uses readers. Has a history of cataract. No lumps or bumps anywhere. No mouth sores. No epistaxis, gum bleeding, hemoptysis, hematemesis, melena, hematochezia, vaginal bleeding.     She eats sweets, a lot of water, tries to keep pop to a minimum. She has some meat in her diet, avoids pork but eats beef, turkey, chicken. She is plus/minus with vegetables. Likes squash, zucchini, beans. She doesn't eat a lot of fruit except for an applesauce here and there. She has some alcohol here and there, about once or twice a month. She likes to go camping and visiting with friends.    REVIEW OF SYSTEMS  A 10 point review of systems was performed and was otherwise negative except as mentioned in the HPI.     PAST MEDICAL HISTORY  Past Medical History:   Diagnosis Date     Antiplatelet or antithrombotic long-term use      Breast cancer (H) 8/26/2008    Right Breast     Chronic fatigue 5/19/2020     CKD (chronic kidney disease) stage 3, GFR 30-59 ml/min (H) 9/17/2010     Combined form of age-related cataract, left eye 10/20/2020    Added automatically from request for surgery [id number]     [facility] 7/17/2012    [facility] for . Margarita Lawler RN-BSN, [facility] [phone]  DX V65.8 REPLACED WITH [facility] (04/08/2013)     Heart burn 5/19/2020     History of 2019 novel coronavirus disease (COVID-19) 11/16/2020    Seen in urgent care on 11/5/2020 for fever cough dizziness abdominal pain nausea was given Bactrim for cystitis tested for COVID discharged home and testing came back positive for Covid.  Seen in ER 11/16/2020 for shortness of breath sinus tachycardia and severe hypoxia.  Was admitted for worsening respiratory symptoms prompting her to seek further evaluation on 11/17/20 at [facility]. She was found to      History of acute respiratory failure 11/16/2020    With covid , complicated by PE and DVT, on oxygen and eliquis 3 months, completely resolved as of 2/2021      Hyperlipidemia LDL goal <100 2010     Hypertension goal BP (blood pressure) < 140/90 2010     Hypothyroid 2002     Lipoma of other skin and subcutaneous tissue 2004     Nonsenile cataract      Osteoporosis 2014     Other psoriasis      Physical deconditioning 2020     PAST SURGICAL HISTORY  Past Surgical History:   Procedure Laterality Date     COLONOSCOPY N/A 2022    Procedure: COLONOSCOPY, WITH POLYPECTOMY;  Surgeon: Leventhal, Thomas Michael, MD;  Location: [facility] [place] OR      REMV CATARACT EXTRACAP,INSERT LENS, W/O ECP Right 2002    Dr. Clinton Lopez ([id number], Power:  20.0D)     PHACOEMULSIFICATION CLEAR CORNEA WITH STANDARD INTRAOCULAR LENS IMPLANT Left 2021    Procedure: LEFT EYE PHACOEMULSIFICATION, CATARACT, WITH INTRAOCULAR LENS IMPLANT;  Surgeon: Ruben Shelby MD;  Location: [facility] [place] OR     SURGICAL HISTORY OF -   Oct 3, 2008    Rt saline implant     VITRECTOMY PARSPLANA WITH 25 GAUGE SYSTEM Right 2021    Procedure: 1) Pars plana vitrectomy (PPV) 25g, Right eye,  2) Membrane stripping and stripping of  internal limiting membrane,;  Surgeon: Fede Danielson MD;  Location:  OR     SOCIAL HISTORY  Reviewed, and any changes made accordingly  Social History     Socioeconomic History     Marital status:      Spouse name: robbie England     Number of children: 3     Years of education: Not on file     Highest education level: Not on file   Occupational History     Occupation: [employer]     Employer: [employer]   Tobacco Use     Smoking status: Never     Passive exposure: Yes     Smokeless tobacco: Never     Tobacco comments:     family members smoke; daughter   Vaping Use     Vaping Use: Never used   Substance and Sexual Activity     Alcohol use: Yes     Alcohol/week: 10.0 standard drinks     Comment: 0-1 drink per month     Drug use: No     Sexual activity: Yes     Partners: Male   Other Topics Concern      Service Not Asked     Blood Transfusions  Not Asked     Caffeine Concern Not Asked     Comment: Occasionally - coffee - 2 to 3 cups a day     Occupational Exposure Not Asked     Hobby Hazards Not Asked     Sleep Concern Not Asked     Stress Concern Not Asked     Weight Concern Not Asked     Special Diet Not Asked     Back Care Not Asked     Exercise Not Asked     Comment: No exercise program     Bike Helmet Not Asked     Seat Belt Not Asked     Self-Exams Not Asked     Parent/sibling w/ CABG, MI or angioplasty before 65F 55M? No   Social History Narrative    9/2022: lives with daughter and granddaughter . Has 3 cats.         3/2022: lives with daughter and her daughter ( granddaughter ) . Has 4 cats. Retired , was a fast food in past         Balanced Diet - YesOsteoporosis Prevention Measures - Dairy servings per day: 3 servings dailyRegular Exercise -  No Describe Dental Exam - YES - Date: over a year, going tomorrowEye Exam - YES - Date: 1 year agoSelf Breast Exam - YesAbuse: Current or Past (Physical, Sexual or Emotional)- NoDo you feel safe in your environment - YesGuns stored in the home - unknownSunscreen used - YesSeatbelts used - YesLipids -  YES - Date: 3-06Glucose -  YES - Date: 11-04Colon Cancer Screening - Colonoscopy 4  Or 5 years ago, not in chart  (date completed)Hemoccults - NOPap Test -  UNKNOWNDo you have any concerns about STD's -  NoMammography - YES - Date: 3-06, has appt later this weekDEXA - NOImmunizations reviewed and up to date - No8-04-08  ZAC Ortega CMA     Social Determinants of Health     Financial Resource Strain: Not on file   Food Insecurity: Not on file   Transportation Needs: Not on file   Physical Activity: Not on file   Stress: Not on file   Social Connections: Not on file   Intimate Partner Violence: Not At Risk     Fear of Current or Ex-Partner: No     Emotionally Abused: No     Physically Abused: No     Sexually Abused: No   Housing Stability: Not on file   Lives with daughter and 22 year old granddaughter.    FAMILY  "HISTORY  Reviewed, and any changes made accordingly  Family History   Problem Relation Age of Onset     Diabetes Mother         dec     Thyroid Disease Mother         unsure     Respiratory Father         emphysema     Emphysema Father      Suicide Brother      Diabetes Brother      Family History Negative Brother         x 2     Pancreatic Cancer Brother      Psychotic Disorder Brother         depression     Diabetes Sister      Family History Negative Sister         x 2     Kidney Disease Sister         ESRD on dialysis, due to DM     Thyroid Disease Daughter      Goiter No family hx of      Glaucoma No family hx of      Macular Degeneration No family hx of      Hypertension No family hx of    No family history of blood disorders, blood clots.     MEDICATIONS  Current Outpatient Medications   Medication     atorvastatin (LIPITOR) 20 MG tablet     calcium carbonate-vitamin D (OSCAL W/D) 500-200 MG-UNIT tablet     cetirizine (ZYRTEC) 10 MG tablet     famotidine (PEPCID) 20 MG tablet     levothyroxine (SYNTHROID/LEVOTHROID) 150 MCG tablet     lisinopril (ZESTRIL) 20 MG tablet     Multiple Vitamin (MULTI-VITAMIN) per tablet     TURMERIC PO     No current facility-administered medications for this visit.     ALLERGIES  Allergies   Allergen Reactions     No Known Drug Allergies      PHYSICAL EXAM  BP (!) 159/87   Pulse 65   Temp 98.2  F (36.8  C) (Oral)   Ht 1.776 m (5' 9.92\")   Wt 115.8 kg (255 lb 3.2 oz)   SpO2 97%   BMI 36.70 kg/m     Wt Readings from Last 10 Encounters:   10/20/22 115.8 kg (255 lb 3.2 oz)   09/23/22 115.4 kg (254 lb 6.4 oz)   09/07/22 106.6 kg (235 lb)   07/18/22 112.9 kg (249 lb)   06/23/22 113.9 kg (251 lb)   03/22/22 114.8 kg (253 lb)   06/07/21 111.1 kg (244 lb 14.9 oz)   05/18/21 111.6 kg (246 lb)   02/22/21 105.2 kg (232 lb)   02/05/21 105.2 kg (232 lb)     Constitutional: Awake, alert, cooperative, in NAD.  Eyes: PERRL, EOMI, sclera clear, conjunctiva normal.  ENT: Normocephalic, " without obvious abnormality, oral pharynx with moist mucus membranes  Lymph: No cervical, axillary LN or easily palpable inguinal LAD.  Respiratory: Non-labored breathing, good air exchange, clear to auscultation bilaterally, no crackles or wheezing.  Cardiovascular: RRR, no murmur noted.  GI: + bowel sounds, soft, obese, non-distended, non-tender, no masses palpated, no hepatosplenomegaly.  Skin: No concerning lesions or rash on exposed areas.  Musculoskeletal: 1+ edema fredis LEs, has compression stockings on  Neurologic: Awake, alert & oriented x3.   Psych: appropriate affect    LABS  Recent Labs   Lab Test 10/20/22  0929 06/23/22  1001 03/22/22  0921 11/24/20  1029 11/23/20  0549   0000   WBC 2.0*   < > 2.2* 5.4 3.7*  --    HGB 12.9   < > 13.1 12.2 11.4*  --       < > 201 285 265  --    MCV 91   < > 87 89 89  --    RDW 13.6   < > 13.8 13.7 13.6  --    ANEU 0.6*  --  0.6* 4.3 2.3   < >   ALYM 0.6*  --  0.9 0.5* 0.6*   < >   BIBIANA 0.6  --  0.7 0.5 0.6   < >   AEOS 0.2  --  0.1 0.0 0.0  --     < > = values in this interval not displayed.     Recent Labs   Lab Test 10/20/22  0929 02/05/21  1119 11/24/20  0454 11/23/20  0549 11/22/20  0556 11/21/20  0534 11/20/20  0540      < >  --   --  139   < > 139   POTASSIUM 5.0   < >  --   --  4.5   < > 3.8   CHLORIDE 103   < >  --   --  109   < > 110*   CO2 29   < >  --   --  25   < > 21   BUN 20.4   < >  --   --  22   < > 26   CR 1.19*   < >  --   --  0.95   < > 0.97   GLALO 9.5   < >  --   --  8.4*   < > 8.1*   MAG  --   --   --   --  1.8  --  1.8   PHOS  --   --   --   --   --   --  3.4   LDH  --   --  250*   < > 226   < > 235*    < > = values in this interval not displayed.     Recent Labs   Lab Test 10/20/22  0929 06/23/22  1001 03/22/22  0921 03/22/22  0921 11/21/20  0534   AST 22 26   < > 20 27   ALT 18 28   < > 28 31   ALKPHOS 50 49   < > 48 59   ALBUMIN 4.1 3.9   < > 3.4 1.6*   PROTTOTAL 7.5 8.0   < > 7.6 6.4*   BILITOTAL 0.5 0.5  --  0.4 0.7    < > = values in  this interval not displayed.      No results for input(s): IGA, IGM, IGE, ELPM, ELPINT, IEP, KAPPAFREELT, LAMBDAFREELT, KLR in the last 97522 hours.    Invalid input(s): LGG   Recent Labs   Lab Test 10/20/22  0929 09/23/22  1010 06/23/22  1001 03/22/22  0921 11/24/20  0454   0000   RETICABSCT 0.077  --   --   --   --   --    RETP 1.7  --   --   --   --   --    IRON 71  --   --  76  --    < >   IRONSAT 26  --   --  26  --   --    MERLINE  --   --   --  179  --   --      --   --  292  --   --    B12  --  350   < > 391  --   --    LDH  --   --   --   --  250*  --     < > = values in this interval not displayed.     No results for input(s): MORPH in the last 55351 hours.  No results for input(s): HCVAB, HCABC, HBCAB, AUSAB, HIV in the last 54632 hours.  Recent Labs   Lab Test 11/24/20  1029 11/23/20  0549 11/22/20  0556 11/21/20  0534 11/20/20  0540 11/19/20  2321   INR  --   --   --   --  1.32*  --    PTT  --   --   --   --   --  188*   FIBR 513* 477* 488*   < > 543*  --     < > = values in this interval not displayed.        IMAGING  As mentioned above in HPI.    IMPRESSION  Romayne L Sathre is a 77 year old woman with the following issues:  1. 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   On review of her course over the last 14 years, she has had a negative workup. She has been relatively asymptomatic from this. She actually ends up having normal ANC most times when she undergoes a stimulus (COVID infection, maybe also after her mastectomy). 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 I considered cyclic neutropenia but she is usually low rather than normal except during periods of stimulation. CBC today is stable.  2. COVID associated DVT and PE in 11/2020, no further anticoagulation needed for this    PLAN  - Check peripheral blood smear, and retic count  - Check Iron panel, ferritin, folic acid, copper levels for nutritional causes of  neutropenia. Supplement if needed.  - Will hold off on bone marrow biopsy at this time given stability and lack of symptoms.  - Continue vitamin B12 supplementation.  - Discussed that if she feels sick, has fevers, significant abdominal pains, she should be evaluated for infection and get a CBC. She will let new providers know about her history of neutropenia. If neutrophils are low or if infection is not improving with regular treatment, I would recommend a course of neupogen 480mcg subcutaneous for 3-5 days.  - Follow up only as needed. I am available for any questions.     We had a long discussion with the patient about the diagnostic possibilities and necessary workup. All questions were answered to their satisfaction.    I spent 60 minutes on the date of service reviewing medical records from the referring provider, reviewing previous lab and imaging results as summarized above, obtaining and reviewing records from [facility] as summarized above, obtaining a history from the patient, performing a physical exam, counseling and educating the patient on the diagnosis and treatment, entering orders for tests, communication with the referring provider, evaluating a problem with uncertain prognosis, managing prescription medication, evaluating a potentially life or organ threatening problem, coordinating care and documenting in the electronic medical record.    Thank you for allowing me to participate in the care of this patient. Please do not hesitate to contact me if there are any concerns or questions.     Gurwinder Whitlock MD   of Medicine  Classical Hematology and Blood and Marrow Transplantation  Division of Hematology, Oncology, and Transplantation  [facility] [phone]

## 2022-10-20 ENCOUNTER — PRE VISIT (OUTPATIENT)
Dept: TRANSPLANT | Facility: CLINIC | Age: 77
End: 2022-10-20

## 2022-10-20 ENCOUNTER — ONCOLOGY VISIT (OUTPATIENT)
Dept: TRANSPLANT | Facility: CLINIC | Age: 77
End: 2022-10-20
Attending: FAMILY MEDICINE
Payer: COMMERCIAL

## 2022-10-20 VITALS
SYSTOLIC BLOOD PRESSURE: 159 MMHG | DIASTOLIC BLOOD PRESSURE: 87 MMHG | WEIGHT: 255.2 LBS | OXYGEN SATURATION: 97 % | HEIGHT: 70 IN | HEART RATE: 65 BPM | BODY MASS INDEX: 36.54 KG/M2 | TEMPERATURE: 98.2 F

## 2022-10-20 DIAGNOSIS — D70.9 NEUTROPENIA, UNSPECIFIED TYPE (H): ICD-10-CM

## 2022-10-20 DIAGNOSIS — E83.09 OTHER DISORDERS OF COPPER METABOLISM (H): ICD-10-CM

## 2022-10-20 DIAGNOSIS — R71.8 OTHER ABNORMALITY OF RED BLOOD CELLS: ICD-10-CM

## 2022-10-20 LAB
ALBUMIN SERPL BCG-MCNC: 4.1 G/DL (ref 3.5–5.2)
ALP SERPL-CCNC: 50 U/L (ref 35–104)
ALT SERPL W P-5'-P-CCNC: 18 U/L (ref 10–35)
ANION GAP SERPL CALCULATED.3IONS-SCNC: 8 MMOL/L (ref 7–15)
AST SERPL W P-5'-P-CCNC: 22 U/L (ref 10–35)
BASOPHILS # BLD MANUAL: 0.1 10E3/UL (ref 0–0.2)
BASOPHILS NFR BLD MANUAL: 3 %
BILIRUB SERPL-MCNC: 0.5 MG/DL
BUN SERPL-MCNC: 20.4 MG/DL (ref 8–23)
CALCIUM SERPL-MCNC: 9.5 MG/DL (ref 8.8–10.2)
CHLORIDE SERPL-SCNC: 103 MMOL/L (ref 98–107)
CREAT SERPL-MCNC: 1.19 MG/DL (ref 0.51–0.95)
CRP SERPL-MCNC: <3 MG/L
DEPRECATED HCO3 PLAS-SCNC: 29 MMOL/L (ref 22–29)
EOSINOPHIL # BLD MANUAL: 0.2 10E3/UL (ref 0–0.7)
EOSINOPHIL NFR BLD MANUAL: 8 %
ERYTHROCYTE [DISTWIDTH] IN BLOOD BY AUTOMATED COUNT: 13.6 % (ref 10–15)
FERRITIN SERPL-MCNC: 265 NG/ML (ref 11–328)
GFR SERPL CREATININE-BSD FRML MDRD: 47 ML/MIN/1.73M2
GLUCOSE SERPL-MCNC: 95 MG/DL (ref 70–99)
HCT VFR BLD AUTO: 41.1 % (ref 35–47)
HGB BLD-MCNC: 12.9 G/DL (ref 11.7–15.7)
IRON BINDING CAPACITY (ROCHE): 274 UG/DL (ref 240–430)
IRON SATN MFR SERPL: 26 % (ref 15–46)
IRON SERPL-MCNC: 71 UG/DL (ref 37–145)
LYMPHOCYTES # BLD MANUAL: 0.6 10E3/UL (ref 0.8–5.3)
LYMPHOCYTES NFR BLD MANUAL: 32 %
MCH RBC QN AUTO: 28.5 PG (ref 26.5–33)
MCHC RBC AUTO-ENTMCNC: 31.4 G/DL (ref 31.5–36.5)
MCV RBC AUTO: 91 FL (ref 78–100)
MONOCYTES # BLD MANUAL: 0.6 10E3/UL (ref 0–1.3)
MONOCYTES NFR BLD MANUAL: 28 %
NEUTROPHILS # BLD MANUAL: 0.6 10E3/UL (ref 1.6–8.3)
NEUTROPHILS NFR BLD MANUAL: 29 %
PATH REPORT.COMMENTS IMP SPEC: NORMAL
PATH REPORT.COMMENTS IMP SPEC: NORMAL
PATH REPORT.FINAL DX SPEC: NORMAL
PATH REPORT.MICROSCOPIC SPEC OTHER STN: NORMAL
PATH REPORT.MICROSCOPIC SPEC OTHER STN: NORMAL
PATH REPORT.RELEVANT HX SPEC: NORMAL
PLAT MORPH BLD: ABNORMAL
PLATELET # BLD AUTO: 188 10E3/UL (ref 150–450)
POTASSIUM SERPL-SCNC: 5 MMOL/L (ref 3.4–5.3)
PROT SERPL-MCNC: 7.5 G/DL (ref 6.4–8.3)
RBC # BLD AUTO: 4.53 10E6/UL (ref 3.8–5.2)
RBC MORPH BLD: ABNORMAL
RETICS # AUTO: 0.08 10E6/UL (ref 0.03–0.1)
RETICS/RBC NFR AUTO: 1.7 % (ref 0.5–2)
SODIUM SERPL-SCNC: 140 MMOL/L (ref 136–145)
WBC # BLD AUTO: 2 10E3/UL (ref 4–11)

## 2022-10-20 PROCEDURE — 86140 C-REACTIVE PROTEIN: CPT | Performed by: INTERNAL MEDICINE

## 2022-10-20 PROCEDURE — 36415 COLL VENOUS BLD VENIPUNCTURE: CPT | Mod: TC | Performed by: INTERNAL MEDICINE

## 2022-10-20 PROCEDURE — 80053 COMPREHEN METABOLIC PANEL: CPT | Performed by: INTERNAL MEDICINE

## 2022-10-20 PROCEDURE — 82728 ASSAY OF FERRITIN: CPT | Performed by: INTERNAL MEDICINE

## 2022-10-20 PROCEDURE — 85027 COMPLETE CBC AUTOMATED: CPT | Performed by: INTERNAL MEDICINE

## 2022-10-20 PROCEDURE — 99215 OFFICE O/P EST HI 40 MIN: CPT | Performed by: INTERNAL MEDICINE

## 2022-10-20 PROCEDURE — 99207 PR NO BILLABLE SERVICE THIS VISIT: CPT | Performed by: STUDENT IN AN ORGANIZED HEALTH CARE EDUCATION/TRAINING PROGRAM

## 2022-10-20 PROCEDURE — 85045 AUTOMATED RETICULOCYTE COUNT: CPT | Performed by: INTERNAL MEDICINE

## 2022-10-20 PROCEDURE — 85007 BL SMEAR W/DIFF WBC COUNT: CPT | Performed by: INTERNAL MEDICINE

## 2022-10-20 PROCEDURE — 82525 ASSAY OF COPPER: CPT | Performed by: INTERNAL MEDICINE

## 2022-10-20 PROCEDURE — 82747 ASSAY OF FOLIC ACID RBC: CPT | Performed by: INTERNAL MEDICINE

## 2022-10-20 PROCEDURE — G0463 HOSPITAL OUTPT CLINIC VISIT: HCPCS

## 2022-10-20 PROCEDURE — 83550 IRON BINDING TEST: CPT | Performed by: INTERNAL MEDICINE

## 2022-10-20 ASSESSMENT — PAIN SCALES - GENERAL: PAINLEVEL: NO PAIN (0)

## 2022-10-20 NOTE — NURSING NOTE
"Oncology Rooming Note    October 20, 2022 8:29 AM   Romayne L Sathre is a 77 year old female who presents for:    Chief Complaint   Patient presents with     Oncology Clinic Visit     Neutropenia, unspecified type     Initial Vitals: BP (!) 159/87   Pulse 65   Temp 98.2  F (36.8  C) (Oral)   Ht 1.776 m (5' 9.92\")   Wt 115.8 kg (255 lb 3.2 oz)   SpO2 97%   BMI 36.70 kg/m   Estimated body mass index is 36.7 kg/m  as calculated from the following:    Height as of this encounter: 1.776 m (5' 9.92\").    Weight as of this encounter: 115.8 kg (255 lb 3.2 oz). Body surface area is 2.39 meters squared.  No Pain (0) Comment: Data Unavailable   No LMP recorded. Patient is postmenopausal.  Allergies reviewed: Yes  Medications reviewed: Yes    Medications: Medication refills not needed today.  [facility]arm[facility] into [id number] [address] AT [address]    Clinical concerns: none       Heather Oquendo"

## 2022-10-20 NOTE — PATIENT INSTRUCTIONS
- Continue vitamin B12 supplementation.  - Discussed that if you  feels sick, has fevers, significant abdominal pains, you should be evaluated for infection and get a CBC. If you are seen by a new provider, tell them that you have a history of low neutrophils. I would recommend a course of neupogen 480mcg subcutaneous for 3-5 days.  - I will call you with any concerning results from labs today.  - Follow up with hematology only as needed.

## 2022-10-20 NOTE — LETTER
10/20/2022         RE: Romayne L Sathre  [address]  [facility] [address]        Dear Colleague,    Thank you for referring your patient, Romayne L Sathre, to the [facility] BLOOD AND MARROW TRANSPLANT PRO[facility] [place]. Please see a copy of my visit note below.    [facility]  HEMATOLOGY CONSULTATION    Romayne L Sathre   : 1945   MRN: [id number]  Date of service: Oct 20, 2022     REASON FOR CONSULTATION:  We are asked by Dr. Rodriguez to evaluate Romayne L Sathre for concern for neutropenia.    HISTORY OF PRESENT ILLNESS:  Romayne L Sathre is a 77 year old woman with a history of obesity BMI 37, HTN, CKD3, HLD, breast cancer in  in remission s/p chemoradiation and hormonal therapy, hypothyroidism, prediabetes, snoring, adenomas on colonoscopy, dependent edema, osteopenia, low normal B12, who is referred for neutropenia.     Per chart review and discussion with the patient, in 2009, she was diagnosed with breast cancer evaluated by Dr. Garrido in 2009, and as part of her treatment had serial CBCs from 2008 to 2009. The first ANC in our system 08 was 0.3-0.5 prior to the start of any treatment, which resolved on its own prior to starting any chemotherapy. She does not recall being told that she had had neutropenia prior to that, but she is known to have some memory issues. A bone marrow biopsy was unrevealing. She had a mastectomy in 10/3/2009. Unclear what her counts were at the time of mastectomy or whether she was given GCSF. Afterwards, she actually had normal to elevated ANCs during the first few months of her chemotherapy course, some of it related to an infected breast expander/implant which was removed. Normal ANCs 09 to 3/9/09. Finished chemotherapy 2009, continued on trastuzumab and started on letrozole, and referred for radiation which was completed in 2009. ANC back down to 0.8 on 09, 0.3 on 8/3/09 off of  therapy.    Next ANCs done in 7/2012. Admitted with ANC 0.1-0.3. Work up in 2012 showed 30% cellularity, normal for age, with trilineage hematopoietic maturation, no dysplasia, normal cytogenetics, flow with rare CD8+CD3dim,CD5dim T cells (0.2%, down from 0.7% in 2008), negative for both alpha-delta and gamma-delta T cell receptors. Granulocyte agglutination and granulocyte immunofluorescence assays were negative. KAIT and RF were negative. HIV and hepatitis viral serologies were negative. B12, Copper, and Zn were normal. SPEP did not show a monoclonal spike and sFLC were normal. ANC stayed low 7/16/12-8/9/12, improved to 3.3 8/13, then relatively stable until 10/3/12 when ANC down to 0.3.     She was admitted in 2014 for cellulitis refractory to oral antibiotics and was found to have a WBC of 1.4 with ANC of 0.2. She was started on a short course of filgrastim at that time. Folic acid was normal.     ANCs in 1/7/2016 (routine), 5/92018 (routine), 5/21/2020 were between 0.5-0.9. ANCs in 11/16-11/24/20 (admission for COVID needing ICU and HFNC) were normal, with a few days of mild neutropenia. She had a left popliteal vein thrombosis and a PE in right upper and lower lobes and was treated with a course of anticoagulation. ANCs in 3/2022 (routine), 6/2022 (follow up), 9/2022 (follow up) have ranged between 0.6-1.5. Platelets have all been normal throughout. Hgbs have been normal the last few years except for during some admissions.    She has been having issues with recurrent abdominal pain. It is not there all the time. It last happened last week. It wasn't helped with BM. Some fluids helped. It comes and goes. She was found to have some pancreatitis last month. No gallstones on RUQ US this month, and US was otherwise normal. She has been taking her calcium pills but her calcium levels didn't look high. Doesn't drink a lot of alcohol. Does have some diverticulosis no CT 9/29/22.    She denies fevers, chills, night  sweats, weight loss, headache, chest pain, cough, does have shortness of breath climbing up stairs or walking for a block or so. No appetite issues, nausea, vomiting, constipation, diarrhea, focal weakness, numbness, peripheral neuropathy. Vision and hearing are fine. She is going to get a retinal exam tomorrow. Uses readers. Has a history of cataract. No lumps or bumps anywhere. No mouth sores. No epistaxis, gum bleeding, hemoptysis, hematemesis, melena, hematochezia, vaginal bleeding.     She eats sweets, a lot of water, tries to keep pop to a minimum. She has some meat in her diet, avoids pork but eats beef, turkey, chicken. She is plus/minus with vegetables. Likes squash, zucchini, beans. She doesn't eat a lot of fruit except for an applesauce here and there. She has some alcohol here and there, about once or twice a month. She likes to go camping and visiting with friends.    REVIEW OF SYSTEMS  A 10 point review of systems was performed and was otherwise negative except as mentioned in the HPI.     PAST MEDICAL HISTORY  Past Medical History:   Diagnosis Date     Antiplatelet or antithrombotic long-term use      Breast cancer (H) 8/26/2008    Right Breast     Chronic fatigue 5/19/2020     CKD (chronic kidney disease) stage 3, GFR 30-59 ml/min (H) 9/17/2010     Combined form of age-related cataract, left eye 10/20/2020    Added automatically from request for surgery [id number]     [facility] [place] 7/17/2012    [facility] for . Margarita Lawler RN-BSN, [facility] [phone]  DX V65.8 REPLACED WITH [id number] [facility] (04/08/2013)     Heart burn 5/19/2020     History of 2019 novel coronavirus disease (COVID-19) 11/16/2020    Seen in urgent care on 11/5/2020 for fever cough dizziness abdominal pain nausea was given Bactrim for cystitis tested for COVID discharged home and testing came back positive for Covid.  Seen in ER 11/16/2020 for shortness of breath sinus tachycardia and severe hypoxia.  Was  admitted for worsening respiratory symptoms prompting her to seek further evaluation on 20 at [facility]. She was found to      History of acute respiratory failure 2020    With covid , complicated by PE and DVT, on oxygen and eliquis 3 months, completely resolved as of 2021     Hyperlipidemia LDL goal <100 2010     Hypertension goal BP (blood pressure) < 140/90 2010     Hypothyroid 2002     Lipoma of other skin and subcutaneous tissue 2004     Nonsenile cataract      Osteoporosis 2014     Other psoriasis      Physical deconditioning 2020     PAST SURGICAL HISTORY  Past Surgical History:   Procedure Laterality Date     COLONOSCOPY N/A 2022    Procedure: COLONOSCOPY, WITH POLYPECTOMY;  Surgeon: Leventhal, Thomas Michael, MD;  Location: [facility] [place] OR      REMV CATARACT EXTRACAP,INSERT LENS, W/O ECP Right 2002    Dr. Clinton Lopez ([id number], Power:  20.0D)     PHACOEMULSIFICATION CLEAR CORNEA WITH STANDARD INTRAOCULAR LENS IMPLANT Left 2021    Procedure: LEFT EYE PHACOEMULSIFICATION, CATARACT, WITH INTRAOCULAR LENS IMPLANT;  Surgeon: Ruben Shelby MD;  Location: [facility] [place] OR     SURGICAL HISTORY OF -   Oct 3, 2008    Rt saline implant     VITRECTOMY PARSPLANA WITH 25 GAUGE SYSTEM Right 2021    Procedure: 1) Pars plana vitrectomy (PPV) 25g, Right eye,  2) Membrane stripping and stripping of  internal limiting membrane,;  Surgeon: Fede Danielson MD;  Location:  OR     SOCIAL HISTORY  Reviewed, and any changes made accordingly  Social History     Socioeconomic History     Marital status:      Spouse name: Tomás, robbie     Number of children: 3     Years of education: Not on file     Highest education level: Not on file   Occupational History     Occupation: [employer]     Employer: [employer]   Tobacco Use     Smoking status: Never     Passive exposure: Yes     Smokeless tobacco: Never     Tobacco comments:     family members smoke; daughter   Vaping Use      Vaping Use: Never used   Substance and Sexual Activity     Alcohol use: Yes     Alcohol/week: 10.0 standard drinks     Comment: 0-1 drink per month     Drug use: No     Sexual activity: Yes     Partners: Male   Other Topics Concern      Service Not Asked     Blood Transfusions Not Asked     Caffeine Concern Not Asked     Comment: Occasionally - coffee - 2 to 3 cups a day     Occupational Exposure Not Asked     Hobby Hazards Not Asked     Sleep Concern Not Asked     Stress Concern Not Asked     Weight Concern Not Asked     Special Diet Not Asked     Back Care Not Asked     Exercise Not Asked     Comment: No exercise program     Bike Helmet Not Asked     Seat Belt Not Asked     Self-Exams Not Asked     Parent/sibling w/ CABG, MI or angioplasty before 65F 55M? No   Social History Narrative    9/2022: lives with daughter and granddaughter . Has 3 cats.         3/2022: lives with daughter and her daughter ( granddaughter ) . Has 4 cats. Retired , was a fast food in past         Balanced Diet - YesOsteoporosis Prevention Measures - Dairy servings per day: 3 servings dailyRegular Exercise -  No Describe Dental Exam - YES - Date: over a year, going tomorrowEye Exam - YES - Date: 1 year agoSelf Breast Exam - YesAbuse: Current or Past (Physical, Sexual or Emotional)- NoDo you feel safe in your environment - YesGuns stored in the home - unknownSunscreen used - YesSeatbelts used - YesLipids -  YES - Date: 3-06Glucose -  YES - Date: 11-04Colon Cancer Screening - Colonoscopy 4  Or 5 years ago, not in chart  (date completed)Hemoccults - NOPap Test -  UNKNOWNDo you have any concerns about STD's -  NoMammography - YES - Date: 3-06, has appt later this weekDEXA - NOImmunizations reviewed and up to date - No8-04-08  ZAC Ortega CMA     Social Determinants of Health     Financial Resource Strain: Not on file   Food Insecurity: Not on file   Transportation Needs: Not on file   Physical Activity: Not on file   Stress: Not on file  "  Social Connections: Not on file   Intimate Partner Violence: Not At Risk     Fear of Current or Ex-Partner: No     Emotionally Abused: No     Physically Abused: No     Sexually Abused: No   Housing Stability: Not on file   Lives with daughter and 22 year old granddaughter.    FAMILY HISTORY  Reviewed, and any changes made accordingly  Family History   Problem Relation Age of Onset     Diabetes Mother         dec     Thyroid Disease Mother         unsure     Respiratory Father         emphysema     Emphysema Father      Suicide Brother      Diabetes Brother      Family History Negative Brother         x 2     Pancreatic Cancer Brother      Psychotic Disorder Brother         depression     Diabetes Sister      Family History Negative Sister         x 2     Kidney Disease Sister         ESRD on dialysis, due to DM     Thyroid Disease Daughter      Goiter No family hx of      Glaucoma No family hx of      Macular Degeneration No family hx of      Hypertension No family hx of    No family history of blood disorders, blood clots.     MEDICATIONS  Current Outpatient Medications   Medication     atorvastatin (LIPITOR) 20 MG tablet     calcium carbonate-vitamin D (OSCAL W/D) 500-200 MG-UNIT tablet     cetirizine (ZYRTEC) 10 MG tablet     famotidine (PEPCID) 20 MG tablet     levothyroxine (SYNTHROID/LEVOTHROID) 150 MCG tablet     lisinopril (ZESTRIL) 20 MG tablet     Multiple Vitamin (MULTI-VITAMIN) per tablet     TURMERIC PO     No current facility-administered medications for this visit.     ALLERGIES  Allergies   Allergen Reactions     No Known Drug Allergies      PHYSICAL EXAM  BP (!) 159/87   Pulse 65   Temp 98.2  F (36.8  C) (Oral)   Ht 1.776 m (5' 9.92\")   Wt 115.8 kg (255 lb 3.2 oz)   SpO2 97%   BMI 36.70 kg/m     Wt Readings from Last 10 Encounters:   10/20/22 115.8 kg (255 lb 3.2 oz)   09/23/22 115.4 kg (254 lb 6.4 oz)   09/07/22 106.6 kg (235 lb)   07/18/22 112.9 kg (249 lb)   06/23/22 113.9 kg (251 lb) "   03/22/22 114.8 kg (253 lb)   06/07/21 111.1 kg (244 lb 14.9 oz)   05/18/21 111.6 kg (246 lb)   02/22/21 105.2 kg (232 lb)   02/05/21 105.2 kg (232 lb)     Constitutional: Awake, alert, cooperative, in NAD.  Eyes: PERRL, EOMI, sclera clear, conjunctiva normal.  ENT: Normocephalic, without obvious abnormality, oral pharynx with moist mucus membranes  Lymph: No cervical, axillary LN or easily palpable inguinal LAD.  Respiratory: Non-labored breathing, good air exchange, clear to auscultation bilaterally, no crackles or wheezing.  Cardiovascular: RRR, no murmur noted.  GI: + bowel sounds, soft, obese, non-distended, non-tender, no masses palpated, no hepatosplenomegaly.  Skin: No concerning lesions or rash on exposed areas.  Musculoskeletal: 1+ edema fredis LEs, has compression stockings on  Neurologic: Awake, alert & oriented x3.   Psych: appropriate affect    LABS  Recent Labs   Lab Test 10/20/22  0929 06/23/22  1001 03/22/22  0921 11/24/20  1029 11/23/20  0549   0000   WBC 2.0*   < > 2.2* 5.4 3.7*  --    HGB 12.9   < > 13.1 12.2 11.4*  --       < > 201 285 265  --    MCV 91   < > 87 89 89  --    RDW 13.6   < > 13.8 13.7 13.6  --    ANEU 0.6*  --  0.6* 4.3 2.3   < >   ALYM 0.6*  --  0.9 0.5* 0.6*   < >   BIBIANA 0.6  --  0.7 0.5 0.6   < >   AEOS 0.2  --  0.1 0.0 0.0  --     < > = values in this interval not displayed.     Recent Labs   Lab Test 10/20/22  0929 02/05/21  1119 11/24/20  0454 11/23/20  0549 11/22/20  0556 11/21/20  0534 11/20/20  0540      < >  --   --  139   < > 139   POTASSIUM 5.0   < >  --   --  4.5   < > 3.8   CHLORIDE 103   < >  --   --  109   < > 110*   CO2 29   < >  --   --  25   < > 21   BUN 20.4   < >  --   --  22   < > 26   CR 1.19*   < >  --   --  0.95   < > 0.97   GALLO 9.5   < >  --   --  8.4*   < > 8.1*   MAG  --   --   --   --  1.8  --  1.8   PHOS  --   --   --   --   --   --  3.4   LDH  --   --  250*   < > 226   < > 235*    < > = values in this interval not displayed.     Recent  Labs   Lab Test 10/20/22  0929 06/23/22  1001 03/22/22  0921 03/22/22  0921 11/21/20  0534   AST 22 26   < > 20 27   ALT 18 28   < > 28 31   ALKPHOS 50 49   < > 48 59   ALBUMIN 4.1 3.9   < > 3.4 1.6*   PROTTOTAL 7.5 8.0   < > 7.6 6.4*   BILITOTAL 0.5 0.5  --  0.4 0.7    < > = values in this interval not displayed.      No results for input(s): IGA, IGM, IGE, ELPM, ELPINT, IEP, KAPPAFREELT, LAMBDAFREELT, KLR in the last 82212 hours.    Invalid input(s): LGG   Recent Labs   Lab Test 10/20/22  0929 09/23/22  1010 06/23/22  1001 03/22/22  0921 11/24/20  0454   0000   RETICABSCT 0.077  --   --   --   --   --    RETP 1.7  --   --   --   --   --    IRON 71  --   --  76  --    < >   IRONSAT 26  --   --  26  --   --    MERLINE  --   --   --  179  --   --      --   --  292  --   --    B12  --  350   < > 391  --   --    LDH  --   --   --   --  250*  --     < > = values in this interval not displayed.     No results for input(s): MORPH in the last 79259 hours.  No results for input(s): HCVAB, HCABC, HBCAB, AUSAB, HIV in the last 63211 hours.  Recent Labs   Lab Test 11/24/20  1029 11/23/20  0549 11/22/20  0556 11/21/20  0534 11/20/20  0540 11/19/20  2321   INR  --   --   --   --  1.32*  --    PTT  --   --   --   --   --  188*   FIBR 513* 477* 488*   < > 543*  --     < > = values in this interval not displayed.        IMAGING  As mentioned above in HPI.    IMPRESSION  Romayne L Sathre is a 77 year old woman with the following issues:  1. 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   On review of her course over the last 14 years, she has had a negative workup. She has been relatively asymptomatic from this. She actually ends up having normal ANC most times when she undergoes a stimulus (COVID infection, maybe also after her mastectomy). 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 I considered cyclic neutropenia but she is usually low rather than normal except  during periods of stimulation. CBC today is stable.  2. COVID associated DVT and PE in 11/2020, no further anticoagulation needed for this    PLAN  - Check peripheral blood smear, and retic count  - Check Iron panel, ferritin, folic acid, copper levels for nutritional causes of neutropenia. Supplement if needed.  - Will hold off on bone marrow biopsy at this time given stability and lack of symptoms.  - Continue vitamin B12 supplementation.  - Discussed that if she feels sick, has fevers, significant abdominal pains, she should be evaluated for infection and get a CBC. She will let new providers know about her history of neutropenia. If neutrophils are low or if infection is not improving with regular treatment, I would recommend a course of neupogen 480mcg subcutaneous for 3-5 days.  - Follow up only as needed. I am available for any questions.     We had a long discussion with the patient about the diagnostic possibilities and necessary workup. All questions were answered to their satisfaction.    I spent 60 minutes on the date of service reviewing medical records from the referring provider, reviewing previous lab and imaging results as summarized above, obtaining and reviewing records from [facility] as summarized above, obtaining a history from the patient, performing a physical exam, counseling and educating the patient on the diagnosis and treatment, entering orders for tests, communication with the referring provider, evaluating a problem with uncertain prognosis, managing prescription medication, evaluating a potentially life or organ threatening problem, coordinating care and documenting in the electronic medical record.    Thank you for allowing me to participate in the care of this patient. Please do not hesitate to contact me if there are any concerns or questions.     Gurwinder Whitlock MD   of Medicine  Classical Hematology and Blood and Marrow Transplantation  Division of Hematology,  Oncology, and Transplantation  [facility] [phone]

## 2022-10-22 LAB — FOLATE RBC-MCNC: NORMAL NG/ML

## 2022-10-23 LAB — COPPER SERPL-MCNC: 89.3 UG/DL

## 2022-10-25 ENCOUNTER — OFFICE VISIT (OUTPATIENT)
Dept: OPHTHALMOLOGY | Facility: CLINIC | Age: 77
End: 2022-10-25
Attending: OPHTHALMOLOGY
Payer: COMMERCIAL

## 2022-10-25 DIAGNOSIS — H35.371 EPIRETINAL MEMBRANE, RIGHT: ICD-10-CM

## 2022-10-25 DIAGNOSIS — Z96.1 PSEUDOPHAKIA: ICD-10-CM

## 2022-10-25 DIAGNOSIS — H40.003 GLAUCOMA SUSPECT OF BOTH EYES: Primary | ICD-10-CM

## 2022-10-25 DIAGNOSIS — Z96.1 PSEUDOPHAKIA OF BOTH EYES: ICD-10-CM

## 2022-10-25 DIAGNOSIS — H43.821 VITREOMACULAR ADHESION OF RIGHT EYE: ICD-10-CM

## 2022-10-25 PROCEDURE — 99214 OFFICE O/P EST MOD 30 MIN: CPT | Mod: GC | Performed by: OPHTHALMOLOGY

## 2022-10-25 PROCEDURE — G0463 HOSPITAL OUTPT CLINIC VISIT: HCPCS | Mod: 25

## 2022-10-25 PROCEDURE — 92134 CPTRZ OPH DX IMG PST SGM RTA: CPT | Performed by: OPHTHALMOLOGY

## 2022-10-25 ASSESSMENT — EXTERNAL EXAM - RIGHT EYE: OD_EXAM: NORMAL

## 2022-10-25 ASSESSMENT — CONF VISUAL FIELD
OD_SUPERIOR_NASAL_RESTRICTION: 0
OS_INFERIOR_TEMPORAL_RESTRICTION: 0
OD_INFERIOR_TEMPORAL_RESTRICTION: 0
OS_INFERIOR_NASAL_RESTRICTION: 0
METHOD: COUNTING FINGERS
OD_SUPERIOR_TEMPORAL_RESTRICTION: 0
OD_INFERIOR_NASAL_RESTRICTION: 0
OD_NORMAL: 1
OS_SUPERIOR_TEMPORAL_RESTRICTION: 0
OS_SUPERIOR_NASAL_RESTRICTION: 0
OS_NORMAL: 1

## 2022-10-25 ASSESSMENT — SLIT LAMP EXAM - LIDS
COMMENTS: MILD PTOSIS
COMMENTS: NORMAL

## 2022-10-25 ASSESSMENT — TONOMETRY
OD_IOP_MMHG: 13
OS_IOP_MMHG: 14
IOP_METHOD: TONOPEN

## 2022-10-25 ASSESSMENT — CUP TO DISC RATIO
OD_RATIO: 0.6
OS_RATIO: 0.6

## 2022-10-25 ASSESSMENT — VISUAL ACUITY
OD_CC: 20/50
OS_CC: 20/20
OS_CC+: -2
METHOD: SNELLEN - LINEAR
OD_CC+: -1+1

## 2022-10-25 ASSESSMENT — EXTERNAL EXAM - LEFT EYE: OS_EXAM: NORMAL

## 2022-10-25 NOTE — PROGRESS NOTES
CC: blurred vision     INTERVAL HISTORY -   No changes in vision. Doing well today. Saw Dr. Shelby in interim for glaucoma which looks stable.     HPI -   Romayne L Sathre is a  76 year old year-old patient initially presented for referral from Dr Shelby for VMT right eye. Problem with focusing at distance and near. Now s/p PPV/MP for ERM and VMT right eye 6/7/21. S/P CE IOL left eye 2/2021.       PAST OCULAR SURGERY  CE IOL OU  S/P PPV/MP right eye 6/2021    RETINAL IMAGING:  OCT 10/25/22   OD -no VMT, no traction on fovea, no SRF/IRF,  (traction last seen 4/2021)  OS - normal foveal contour ; small ERM predominantly nasally     ASSESSMENT & PLAN    1. Vitreoretinal adhesion right eye  2. ERM right eye   S/P PPV/MP right eye 6/2021  Foveal anatomy improved significantly; BCVA 20/50 limited likely due to subtle IS/OS disruption at fovea (+drusen)  -3.0 refraction right eye and sees well up close; instructed her to try to use right eye as monovision for near    2. Pseudophakia each eye   observe    3. Glaucoma suspect ou  Large disc with large cup each eye  Being followed by Dr. Shelby  Everything stable    Disposition:    Continue to follow with Dr. Shelby  RTC with me repeat OCT in 12 months VTD      Myriam Wolf MD  Resident Physician, PGY-3  Department of Ophthalmology  10/25/22 9:04 AM      Complete documentation of historical and exam elements from today's encounter can be found in the full encounter summary report (not reduplicated in this progress note). I personally obtained the chief complaint(s) and history of present illness.  I confirmed and edited as necessary the review of systems, past medical/surgical history, family history, social history, and examination findings as documented by others; and I examined the patient myself. I personally reviewed the relevant tests, images, and reports as documented above. I formulated and edited as necessary the assessment and plan and discussed the findings and  management plan with the patient and family.     Fede Donovan MD

## 2022-10-25 NOTE — NURSING NOTE
Chief Complaints and History of Present Illnesses   Patient presents with     Follow Up     Vitreomacular adhesion of right eye      Chief Complaint(s) and History of Present Illness(es)     Follow Up            Comments: Vitreomacular adhesion of right eye           Comments    Pt states no change in VA since last visit  No flashes, floaters, eye pain or redness    Leelee Shelby COT 8:29 AM October 25, 2022

## 2023-01-08 DIAGNOSIS — I10 HYPERTENSION GOAL BP (BLOOD PRESSURE) < 140/90: ICD-10-CM

## 2023-01-11 RX ORDER — LISINOPRIL 20 MG/1
TABLET ORAL
Qty: 90 TABLET | Refills: 1 | OUTPATIENT
Start: 2023-01-11

## 2023-01-11 NOTE — TELEPHONE ENCOUNTER
Request too early.     Disp Refills Start End CUONG   lisinopril (ZESTRIL) 20 MG tablet 90 tablet 1 9/23/2022  No   Sig - Route: Take 1 tablet (20 mg) by mouth At Bed

## 2023-03-24 DIAGNOSIS — E03.9 ACQUIRED HYPOTHYROIDISM: ICD-10-CM

## 2023-03-27 ASSESSMENT — ENCOUNTER SYMPTOMS
HEARTBURN: 0
ABDOMINAL PAIN: 0
WEAKNESS: 0
PARESTHESIAS: 0
ARTHRALGIAS: 0
CHILLS: 0
NAUSEA: 0
DIARRHEA: 0
MYALGIAS: 0
PALPITATIONS: 0
FEVER: 0
HEMATURIA: 0
DYSURIA: 0
SHORTNESS OF BREATH: 0
SORE THROAT: 0
HEADACHES: 0
BREAST MASS: 0
HEMATOCHEZIA: 0
JOINT SWELLING: 0
CONSTIPATION: 0
NERVOUS/ANXIOUS: 0
DIZZINESS: 0
COUGH: 0
EYE PAIN: 0
FREQUENCY: 0

## 2023-03-27 ASSESSMENT — ACTIVITIES OF DAILY LIVING (ADL): CURRENT_FUNCTION: NO ASSISTANCE NEEDED

## 2023-03-27 NOTE — TELEPHONE ENCOUNTER
Routing refill request to provider for review/approval because:  --Labs out of range: 9/23/22  TSH and plan - Your results came back and thyroid is slightly out of range. Continue same dose & recheck at next ap   --Patient has appointment scheduled for 3/28/23 with Michael.    --Last visit:  9/23/2022 Michael for HTN +19.            --Last Written Prescription:     Disp Refills Start End CUONG   levothyroxine (SYNTHROID/LEVOTHROID) 150 MCG tablet 90 tablet 1 9/23/2022  No   Sig - Route: Take 1 tablet (150 mcg) by mouth daily          TSH   Date Value Ref Range Status   09/23/2022 5.02 (H) 0.40 - 4.00 mU/L Final   06/23/2022 3.55 0.40 - 4.00 mU/L Final   03/22/2022 4.95 (H) 0.40 - 4.00 mU/L Final   03/15/2021 2.76 0.40 - 4.00 mU/L Final   02/05/2021 0.28 (L) 0.40 - 4.00 mU/L Final   11/18/2020 0.42 0.40 - 4.00 mU/L Final   06/08/2020 10.38 (H) 0.40 - 4.00 mU/L Final   05/21/2020 10.36 (H) 0.40 - 4.00 mU/L Final

## 2023-03-27 NOTE — TELEPHONE ENCOUNTER
--Left voice mail asking patient to call back and ask to speak with triage nurse OR check MyChart for clarification on a request we received.

## 2023-03-27 NOTE — TELEPHONE ENCOUNTER
Will address tomorrow at visit or can give few days until labs seen as dose might need adjustment

## 2023-03-28 ENCOUNTER — OFFICE VISIT (OUTPATIENT)
Dept: FAMILY MEDICINE | Facility: CLINIC | Age: 78
End: 2023-03-28
Attending: FAMILY MEDICINE
Payer: COMMERCIAL

## 2023-03-28 VITALS
TEMPERATURE: 97.2 F | RESPIRATION RATE: 22 BRPM | BODY MASS INDEX: 37.89 KG/M2 | OXYGEN SATURATION: 97 % | SYSTOLIC BLOOD PRESSURE: 119 MMHG | WEIGHT: 250 LBS | DIASTOLIC BLOOD PRESSURE: 77 MMHG | HEIGHT: 68 IN | HEART RATE: 70 BPM

## 2023-03-28 DIAGNOSIS — R60.9 DEPENDENT EDEMA: ICD-10-CM

## 2023-03-28 DIAGNOSIS — R06.83 SNORING: ICD-10-CM

## 2023-03-28 DIAGNOSIS — E66.01 MORBID OBESITY (H): ICD-10-CM

## 2023-03-28 DIAGNOSIS — Z00.00 HEALTH CARE MAINTENANCE: ICD-10-CM

## 2023-03-28 DIAGNOSIS — Z71.89 ADVANCED DIRECTIVES, COUNSELING/DISCUSSION: ICD-10-CM

## 2023-03-28 DIAGNOSIS — Z78.0 ASYMPTOMATIC MENOPAUSAL STATE: ICD-10-CM

## 2023-03-28 DIAGNOSIS — Z12.31 ENCOUNTER FOR SCREENING MAMMOGRAM FOR MALIGNANT NEOPLASM OF BREAST: ICD-10-CM

## 2023-03-28 DIAGNOSIS — E03.9 ACQUIRED HYPOTHYROIDISM: ICD-10-CM

## 2023-03-28 DIAGNOSIS — Z85.3 HISTORY OF BREAST CANCER: ICD-10-CM

## 2023-03-28 DIAGNOSIS — E78.2 MIXED HYPERLIPIDEMIA: ICD-10-CM

## 2023-03-28 DIAGNOSIS — H43.821 VITREOMACULAR ADHESION OF RIGHT EYE: ICD-10-CM

## 2023-03-28 DIAGNOSIS — Z23 NEED FOR SHINGLES VACCINE: ICD-10-CM

## 2023-03-28 DIAGNOSIS — R73.03 PREDIABETES: ICD-10-CM

## 2023-03-28 DIAGNOSIS — R41.3 MEMORY DEFICIT: ICD-10-CM

## 2023-03-28 DIAGNOSIS — R10.13 EPIGASTRIC PAIN: ICD-10-CM

## 2023-03-28 DIAGNOSIS — N18.30 STAGE 3 CHRONIC KIDNEY DISEASE, UNSPECIFIED WHETHER STAGE 3A OR 3B CKD (H): ICD-10-CM

## 2023-03-28 DIAGNOSIS — I10 HYPERTENSION GOAL BP (BLOOD PRESSURE) < 140/90: ICD-10-CM

## 2023-03-28 DIAGNOSIS — H40.003 GLAUCOMA SUSPECT, BILATERAL: ICD-10-CM

## 2023-03-28 DIAGNOSIS — H35.371 EPIRETINAL MEMBRANE, RIGHT: ICD-10-CM

## 2023-03-28 DIAGNOSIS — Z00.00 MEDICARE ANNUAL WELLNESS VISIT, SUBSEQUENT: Primary | ICD-10-CM

## 2023-03-28 DIAGNOSIS — D70.9 CHRONIC IDIOPATHIC NEUTROPENIA (H): ICD-10-CM

## 2023-03-28 DIAGNOSIS — D12.6 SERRATED ADENOMA OF COLON: ICD-10-CM

## 2023-03-28 DIAGNOSIS — M85.80 OSTEOPENIA, UNSPECIFIED LOCATION: ICD-10-CM

## 2023-03-28 PROBLEM — R19.5 POSITIVE COLORECTAL CANCER SCREENING USING COLOGUARD TEST: Status: RESOLVED | Noted: 2022-06-26 | Resolved: 2023-03-28

## 2023-03-28 LAB
ALBUMIN SERPL BCG-MCNC: 4.2 G/DL (ref 3.5–5.2)
ALP SERPL-CCNC: 48 U/L (ref 35–104)
ALT SERPL W P-5'-P-CCNC: 25 U/L (ref 10–35)
ANION GAP SERPL CALCULATED.3IONS-SCNC: 11 MMOL/L (ref 7–15)
AST SERPL W P-5'-P-CCNC: 31 U/L (ref 10–35)
BASOPHILS # BLD MANUAL: 0.1 10E3/UL (ref 0–0.2)
BASOPHILS NFR BLD MANUAL: 3 %
BILIRUB SERPL-MCNC: 0.4 MG/DL
BUN SERPL-MCNC: 20.6 MG/DL (ref 8–23)
CALCIUM SERPL-MCNC: 9.7 MG/DL (ref 8.8–10.2)
CHLORIDE SERPL-SCNC: 103 MMOL/L (ref 98–107)
CHOLEST SERPL-MCNC: 160 MG/DL
CREAT SERPL-MCNC: 1.31 MG/DL (ref 0.51–0.95)
CREAT UR-MCNC: 170 MG/DL
DEPRECATED HCO3 PLAS-SCNC: 26 MMOL/L (ref 22–29)
EOSINOPHIL # BLD MANUAL: 0.1 10E3/UL (ref 0–0.7)
EOSINOPHIL NFR BLD MANUAL: 3 %
ERYTHROCYTE [DISTWIDTH] IN BLOOD BY AUTOMATED COUNT: 14.2 % (ref 10–15)
GFR SERPL CREATININE-BSD FRML MDRD: 42 ML/MIN/1.73M2
GLUCOSE SERPL-MCNC: 97 MG/DL (ref 70–99)
HBA1C MFR BLD: 5.7 % (ref 0–5.6)
HCT VFR BLD AUTO: 40.7 % (ref 35–47)
HDLC SERPL-MCNC: 44 MG/DL
HGB BLD-MCNC: 12.8 G/DL (ref 11.7–15.7)
LDLC SERPL CALC-MCNC: 87 MG/DL
LYMPHOCYTES # BLD MANUAL: 0.7 10E3/UL (ref 0.8–5.3)
LYMPHOCYTES NFR BLD MANUAL: 34 %
MCH RBC QN AUTO: 27.7 PG (ref 26.5–33)
MCHC RBC AUTO-ENTMCNC: 31.4 G/DL (ref 31.5–36.5)
MCV RBC AUTO: 88 FL (ref 78–100)
MICROALBUMIN UR-MCNC: 16.9 MG/L
MICROALBUMIN/CREAT UR: 9.94 MG/G CR (ref 0–25)
MONOCYTES # BLD MANUAL: 0.7 10E3/UL (ref 0–1.3)
MONOCYTES NFR BLD MANUAL: 33 %
NEUTROPHILS # BLD MANUAL: 0.6 10E3/UL (ref 1.6–8.3)
NEUTROPHILS NFR BLD MANUAL: 27 %
NONHDLC SERPL-MCNC: 116 MG/DL
PLAT MORPH BLD: ABNORMAL
PLATELET # BLD AUTO: 206 10E3/UL (ref 150–450)
POTASSIUM SERPL-SCNC: 5 MMOL/L (ref 3.4–5.3)
PROT SERPL-MCNC: 7.7 G/DL (ref 6.4–8.3)
RBC # BLD AUTO: 4.62 10E6/UL (ref 3.8–5.2)
RBC MORPH BLD: ABNORMAL
SODIUM SERPL-SCNC: 140 MMOL/L (ref 136–145)
STOMATOCYTES BLD QL SMEAR: SLIGHT
T4 FREE SERPL-MCNC: 1.3 NG/DL (ref 0.9–1.7)
TRIGL SERPL-MCNC: 145 MG/DL
TSH SERPL DL<=0.005 MIU/L-ACNC: 7.85 UIU/ML (ref 0.3–4.2)
VIT B12 SERPL-MCNC: 863 PG/ML (ref 232–1245)
WBC # BLD AUTO: 2.2 10E3/UL (ref 4–11)

## 2023-03-28 PROCEDURE — 82607 VITAMIN B-12: CPT | Performed by: FAMILY MEDICINE

## 2023-03-28 PROCEDURE — 99214 OFFICE O/P EST MOD 30 MIN: CPT | Mod: 25 | Performed by: FAMILY MEDICINE

## 2023-03-28 PROCEDURE — 82570 ASSAY OF URINE CREATININE: CPT | Performed by: FAMILY MEDICINE

## 2023-03-28 PROCEDURE — 84439 ASSAY OF FREE THYROXINE: CPT | Performed by: FAMILY MEDICINE

## 2023-03-28 PROCEDURE — 85025 COMPLETE CBC W/AUTO DIFF WBC: CPT | Performed by: FAMILY MEDICINE

## 2023-03-28 PROCEDURE — 84443 ASSAY THYROID STIM HORMONE: CPT | Performed by: FAMILY MEDICINE

## 2023-03-28 PROCEDURE — G0439 PPPS, SUBSEQ VISIT: HCPCS | Performed by: FAMILY MEDICINE

## 2023-03-28 PROCEDURE — 36415 COLL VENOUS BLD VENIPUNCTURE: CPT | Performed by: FAMILY MEDICINE

## 2023-03-28 PROCEDURE — 80061 LIPID PANEL: CPT | Performed by: FAMILY MEDICINE

## 2023-03-28 PROCEDURE — 83036 HEMOGLOBIN GLYCOSYLATED A1C: CPT | Performed by: FAMILY MEDICINE

## 2023-03-28 PROCEDURE — 82043 UR ALBUMIN QUANTITATIVE: CPT | Performed by: FAMILY MEDICINE

## 2023-03-28 PROCEDURE — 80053 COMPREHEN METABOLIC PANEL: CPT | Performed by: FAMILY MEDICINE

## 2023-03-28 RX ORDER — LISINOPRIL 20 MG/1
20 TABLET ORAL AT BEDTIME
Qty: 90 TABLET | Refills: 1 | Status: SHIPPED | OUTPATIENT
Start: 2023-03-28 | End: 2023-09-06

## 2023-03-28 RX ORDER — ATORVASTATIN CALCIUM 20 MG/1
20 TABLET, FILM COATED ORAL DAILY
Qty: 90 TABLET | Refills: 1 | Status: SHIPPED | OUTPATIENT
Start: 2023-03-28 | End: 2023-09-27

## 2023-03-28 RX ORDER — LEVOTHYROXINE SODIUM 150 UG/1
TABLET ORAL
Qty: 45 TABLET | Refills: 0 | Status: SHIPPED | OUTPATIENT
Start: 2023-03-28 | End: 2023-05-09

## 2023-03-28 ASSESSMENT — ENCOUNTER SYMPTOMS
ARTHRALGIAS: 0
NERVOUS/ANXIOUS: 0
SORE THROAT: 0
CONSTIPATION: 0
JOINT SWELLING: 0
CHILLS: 0
FREQUENCY: 0
ABDOMINAL PAIN: 0
PALPITATIONS: 0
HEMATOCHEZIA: 0
MYALGIAS: 0
HEMATURIA: 0
DYSURIA: 0
WEAKNESS: 0
PARESTHESIAS: 0
FEVER: 0
COUGH: 0
HEARTBURN: 0
SHORTNESS OF BREATH: 0
DIARRHEA: 0
BREAST MASS: 0
HEADACHES: 0
EYE PAIN: 0
DIZZINESS: 0
NAUSEA: 0

## 2023-03-28 ASSESSMENT — PAIN SCALES - GENERAL: PAINLEVEL: NO PAIN (0)

## 2023-03-28 ASSESSMENT — ACTIVITIES OF DAILY LIVING (ADL): CURRENT_FUNCTION: NO ASSISTANCE NEEDED

## 2023-03-28 NOTE — PROGRESS NOTES
"SUBJECTIVE:   Romayne is a 77 year old who presents for Preventive Visit.  Additional Questions 3/28/2023   Roomed by jonathan her   Accompanied by self   Patient has been advised of split billing requirements and indicates understanding: Yes  Are you in the first 12 months of your Medicare coverage?  No  Healthy Habits:     In general, how would you rate your overall health?  Good    Frequency of exercise:  None    Do you usually eat at least 4 servings of fruit and vegetables a day, include whole grains    & fiber and avoid regularly eating high fat or \"junk\" foods?  Yes    Taking medications regularly:  Yes    Medication side effects:  None    Ability to successfully perform activities of daily living:  No assistance needed    Home Safety:  No safety concerns identified    Hearing Impairment:  No hearing concerns    In the past 6 months, have you been bothered by leaking of urine? Yes    In general, how would you rate your overall mental or emotional health?  Excellent      PHQ-2 Total Score: 0    Additional concerns today:  No  Have you ever done Advance Care Planning? (For example, a Health Directive, POLST, or a discussion with a medical provider or your loved ones about your wishes): Yes, advance care planning is on file.  Fall risk  Fallen 2 or more times in the past year?: No  Any fall with injury in the past year?: No  Cognitive Screening   1) Repeat 3 items (Leader, Season, Table)    2) Clock draw: ABNORMAL 11:50  3) 3 item recall: Recalls 1 object   Results: ABNORMAL clock, 1-2 items recalled: PROBABLE COGNITIVE IMPAIRMENT, **INFORM PROVIDER**  Mini-CogTM Copyright OLIVIA Lorenzo. Licensed by the author for use in [facility]; reprinted with permission (rober@.[email or web address]). All rights reserved.    Do you have sleep apnea, excessive snoring or daytime drowsiness?: yes    CURRENTLY  Here for medicare wellness, alone  Vitals stable  Vision under care of eye, wears reading glasses, stable  Feels hearing is " good  reports no falls  Cognition only recalled 1 word, got clock wrong. Daughter drove her here. Tracking . Feels same. Reports has no concerns. Lives with another daughter   HM reviewed  Has ACP on file  Declines COVID booster today  Declines Shingrex    HTN stable on lisinopril increased dose 20 mg taking at bedtime, No side effects. Avoiding salt. Not checking BP at home as feels healthy & theres no need. Will do labs today.       CKD 3 stable, resolved microalbuminuria, on an ACE, Avoiding antiinflammatories due to chronic kidney disease     Prediabetes will check HBA1c today, encouraged to  increase exercise, eat less carb's, avoid alcohol and work on loosing 10 % of total body weight      HLD on atorvastatin 20 mg, will do fasting labs today.  The 10-year ASCVD risk score (Joe BUTLER, et al., 2019) is: 20%    Values used to calculate the score:      Age: 77 years      Sex: Female      Is Non- : No      Diabetic: No      Tobacco smoker: No      Systolic Blood Pressure: 119 mmHg      Is BP treated: Yes      HDL Cholesterol: 44 mg/dL      Total Cholesterol: 160 mg/dL  Has enough meds till labs can be reviewed    BMI up to 38, weight down , ht down, eating two meals a day, discussed smaller portions, avoiding alcohol, juices, eating less carb's,  declines weight evaluation, sleep doctor or mtm due to cost. No bread,s lean meat,     Hx of Intermittent idiopathic neutropenia which predated her cancer diagnosis and treatment. On review of her course over the last 14 years, she has had a negative workup. She has been relatively asymptomatic from this. She actually ends up having normal ANC most times when she undergoes a stimulus (COVID infection, maybe also after her mastectomy). 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 Hematology considered cyclic neutropenia but she is usually low rather than normal except during periods of stimulation. CBC stable. Had a COVID  associated DVT and PE in 11/2020, no further anticoagulation needed for this. Peripheral blood smear, and retic count  Were unremarkable. Had normal Iron panel, ferritin, folic acid, copper levels, so no nutritional causes of neutropenia. They held off on bone marrow biopsy given stability and lack of symptoms. Advised to continue vitamin B12 supplementation. Thinks taking ? 1000 mcg. It was discussed that if she feels sick, has fevers, significant abdominal pains, she should be evaluated for infection and get a CBC. She will let new providers know about her history of neutropenia. If neutrophils are low or if infection is not improving with regular treatment, hematology in 2022  recommended a course of Neupogen 480 mcg subcutaneous for 3-5 days.  Currently feels well.     Resolved epigastric upper abdominal pain  Noted in 9/2022. Lipase had been elevated. Trended down on own. Symptoms resolved with no intervention & ct abdomen & u/s RUQ around that time though after pain had subsided did not explain symptoms. Was advised to avoid all alcohol to be safe     Hypothyroid on levothyroxine 150 mcg daily. No hair loss, has had some weight gain, after colonoscopy bowels moving better.  Has enough till labs can be reviewed. Last check TSH was mildly out of range & no changes made    Snoring declines sleep eval. suspected undiagnosed HUGO,     Hx of 3 serrated adenoma fragmented and 1 tubular adenoma removed on colonoscopy 9/2022 after a positive colo guard noted in early 2022. Advised recheck 3 years. 2025  No symptoms currently     Using compression socks daily for dependant edema & has no symptoms     Osteopenia on calcium and vit d, Dexa due in 2023     Memory deficits, currently tracking and able to drive locally . Here alone without family. Declines need to see neurology.     Hx of breast cancer  Left screening Mammogram due 2023.     Eyes stable    Allergies on zyrtec     BACKGROUND  History of passive smoke exposure,  obesity, hypertension on lisinopril, CKD 3, , prediabetes, hyperlipidemia on atorvastatin, hypothyroidism on levothyroxine, snoring, history of memory deficit per epic, history of chronic leg edema, history of prior maxillary sinusitis, resolved cellulitis, history of chronic idiopathic neutropenia/ leukopenia S/p work-up that was negative by hematology in 2012 & 2022,, history of lipoma noted in the past, history of prior right breast cancer in 2008 S/p right mastectomy and saline implants in remission no longer followed by oncology, history of resolved anemia, history of Covid infection in November 2020 with respiratory failure sepsis, hypoxia, Covid pneumonia, PE and left lower extremity DVT, elevated D-dimer, UTI, resolved HALI, resolved anemia, with complete resolution of Covid symptoms noted by February 2021.  No longer on oxygen.  History of snoring, chronic fatigue, physical deconditioning, GERD on Pepcid, resolved epigastric pain & pancreatitis noted in 2022, and constipation, history of untreated osteoporosis DEXA scan in 2021 showed osteopenia, history of vitreal macular adhesion right eye and epiretinal membrane S/p cataract surgery and remote right eye injury, S/p vitrectomy being managed by eye & being monitored as a glaucoma suspect  on multivitamin, serrated adenoma colon,   Under care of Dr. Sheridan then Sapphire Glass then Adela Avila,     Seen by PCP in April 2019 for dyspnea on exertion.  Examination was unremarkable, chest x-ray was negative.  EKG was unremarkable.  Echo showed a normal EF.  Stress test done was normal.  Her lisinopril was increased and suspected to be deconditioned and advised weight loss and increase activity and referred to sleep for her history of fatigue for possible obstructive sleep apnea but was never seen by sleep.  Fit test never done.  Not seen till called for medication refill on 4/30/2020 and appointment made with this writer, had an appointment by telephone with  "this writer for the first time on 5/19/2020 for blood pressure med refill.  Advise labs and follow-up in clinic.  Fit test done 6/8/2020 was negative.  TSH was elevated at 10 with free T4 normal and a normal hemoglobin A1c.   Visited with endocrine virtually on 7/8/2020 and noted had been treated for subclinical hypothyroidism with levothyroxine since 2004.  Medications adjusted and DEXA scan ordered.  Seen by eye 9/29/2020 for cataracts glaucoma check, prior right eye injury and which showed retinal membrane adhesion.  Seen by eye again 10/19/2020.  Seen in urgent care on 11/5/2020 for fever cough dizziness abdominal pain nausea was given Bactrim for cystitis tested for COVID discharged home and testing came back positive for Covid.  Seen in ER 11/16/2020 for shortness of breath sinus tachycardia and severe hypoxia.  Was admitted for worsening respiratory symptoms prompting her to seek further evaluation on 11/17/20 at [facility]. She was found to be in acute hypoxic respiratory failure requiring 30 LPM HFNC.  Chest x-ray at that time revealed \"diffuse patch airspace opacities consistent with COVID.\" She was admitted to the MICU for stabilization overnight and transferred to the general medical floor on 11/18/20. For her COVID infection, she received dexamethasone and remdesivir. She was initially treated with broad spectrum antibiotics for possible super-imposed pneumonia, but these were quickly discontinued. Her hospital course was complicated by a LLE DVT and PE's. She was started on Lovenox and transitioned to Apixaban. She was also found to have a UTI on day of discharge and was sent home on oral antibiotics.  Her severe Sepsis, resolved. For acute Hypoxic Respiratory Failure 2/2 COVID-19 PNA - COVID+ 11/5.  She was COVID recovered,  initially requiring 30 LMP HFNC, but was able to wean to 2Ls NC with exertion, 0 to 0.5L at rest. She completed her 5 day course of Remdesivir during her hospital stay. On discharge was " continued on dexamethasone 6mg daily for 10 doses (ending 11/27) & continued on 2Ls NC O2 with ambulation, 0-0.5L at rest. For the Non-occlusive thrombus in left popliteal vein & Pulmonary Embolisms of segmental and subsegmental right upper and lower lobes with Elevated D-dimer - D-dimer >20 on admission. Started on high-intensity heparin gtt in ED with bolus- and transitioned to low intensity on 11/18/20. Ultrasound of B/l lower extremities obtained on 11/19 which were notable for non-occlusive thrombus in left popliteal vein. She was then transitioned back to high intensity heparin gtt on 11/19. Had elevated hep 10A levels overnight 11/19. Transitioned to Lovenox 1mg/kg BID on 11/20/20. CT PE protocol with segmental and subsegmental PE in the right upper and lower lobes w/o evidence of RHS. Also w/ extensive reticular changes and GOO throughout lungs representing inflammation and/or fibrosis r/t recent COVID-19 infection. on Discharge continued on Apixiban 10mg BID x7 days, then 5mg BID thereafter for ~ 3 month duration of treatment as likely provoked DVT/PE due to COVID-19 infection. Was to follow up with PCP in one week to discuss anticoagulation. Noted Urinary Tract Infection & Urinary retention, resolved - Required straight cath x2 during her hospital admission. Developed LUTS last day of discharge with urinalysis revealing few bacteria, moderate leukocytes, and 12 Wbcs. However, there were 4 epis.  & discharged on Keflex 500 mg BID for 5 days & to follow up with PCP in one week to review urine culture results. Her HALI, resolved. Noted CKD III - Baseline creatinine 1.1-1.2, elevated to 1.53 on presentation with BUN 47 then returned to baseline. Etiology likely prerenal 2/2 COVID. Lisinopril was held & resumed on discharge. For her HTN - Patient's lisinopril held on during hospital stay due to HALI. Started on amlodipine 11/20 for elevated BP but on discharge, amlodipine was discontinued and resumed home  lisinopril 20 mg daily. She noted increased heartburn since starting Eliquis, while inpatient was on omeprazole this was switched to Pepcid once off the Eliquis and continued on Tums as needed. Had constipation on admission - improved with bowel regimen, however developed liquid stool which improved by discharge. Hypothyroidism: TSH on admission WNL at 0.42 & continued on  PTA Synthroid. Noted Normocytic anemia - Baseline Hgb 12-13. & was 12.2 at discharge. No S/sx of bleeding since initiation of anticoagulation. for HLD was continued on a statin.  She was discharged home on 11/24/2020.  Seen 12/3/2020 by Dr. Lazo for hospital discharge noted was no longer on oxygen during the day and satting fine & remained on Eliquis.  Seen by Sapphire Glass her PCP on 2/5/2021 for routine physical and noted had completely recovered from Covid & no longer short of breath, had no cough & not on oxygen, no longer with leg pain & was wearing compression socks for chronic leg swelling. and Doppler done on 2//22 was negative for DVT on Eliquis. DEXA scan ordered for prior untreated osteoporosis and continued on Pepcid for heartburn.  Labs later showed she was overcorrected on thyroid and levothyroxine was changed from double dosing on the weekend to daily simple dosing of 150 mcg daily and recheck TSH 6 weeks later was within normal range and continued on same dose.  DEXA scan done 2/11/2021 showed osteopenia and continued on calcium and vitamin D.  Cataracts removed 2/22/2021.  Seen by 3/11/2021.  TSH normal 3/15/2021.  Noted blurry vision 4/13/2021.  Had stable CKD 5/18/2021 and advised to avoid NSAID's.  Seen 5/18/2021 by Dr. Avila for preop for vitrectomy.   On 6/7/2021 had vitrectomy and membrane stripping for history of vitreoretinal adhesion right eye.  Seen by the eye doctor postop day 1, 1 week postop and at 6-week postop guero and noted was doing well.  Last seen by eye on 10/20/2021 for hx of Vitreoretinal adhesion right eye,  ERM right eye S/P PPV/MP right eye 6/2021. Foveal anatomy improved significantly; BCVA 20/50 limited likely due to subtle IS/OS disruption at fovea (+drusen) -3.0 refraction right eye and seeing well up close; instructed her to try to use right eye as monovision for near.  Was to observe pseudophakia each eye.  Noted to be a glaucoma suspect with large disc and large cupping child being followed by Dr. Shelby.  Seen 3/22/22 for medicare wellness/ preventive health and additional concerns. Discussed slef breast checks, mammogram ordered, Pelvic / PAP no longer needed  Health care maintenance reviewed. To Consider working on health care directives & given honoring choices. Given Covid pfizer booster for prior received [place] vaccine in 2021.  Discussed Shingrex.   BMI 37, mostly sedentary.  Discussed lifestyle. Encouraged to work on diet, exercise and losing at least 5 to 10% of total body weight.  Hyperlipidemia on atorvastatin 10 mg bedtime had enough meds till could get lab work reviewed. LDL later came back normal but triglycerides were elevated and overall cardiovascular risk is 23.2 so atorvastatin increased to 20 mg bedtime and was to recheck cholesterol fasting in 3 months when I saw her back again.  Hypertension on lisinopril 10 mg daily previously was on 20, blood pressure was  136/79.  Asymptomatic no side effects & refilled at same dose  #90 with 3 refills.   CKD 3 asymptomatic reminded to avoid NSAID's Kidney function came back stable with normal micro albumin.  Hypothyroid on levothyroxine 150 mcg daily deviously on differing doses on the weekend and weekdays but had been on this dose since February 2021.    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  Had no symptoms. Count came back low at 2.2 similar with neutropenia.  Stable to prior. History of anemia noted when had COVID asymptomatic no GI bleeding not on anticoagulation. hemoglobin and hematocrit came back  normal.  History of Covid in November 2020 was hospitalized requiring oxygen and received steroids and antivirals.  Had a DVT and PE requiring anticoagulation 3 months.  Resolved thromboembolism and sepsis and HALI and treated for UTI at that time was in the ICU and discharged after a week in hospital.  Reported made a full recovery & no longer on oxygen or anticoagulation.  Resolved acute respiratory failure on no oxygen since January 2021.  History of snoring had been referred to sleep in the past.  Did have obesity, hyperlipidemia, hypertension and forgetfulness.  Did have chronic deconditioning. Referral placed to sleep again. Chronic fatigue reported improved.  Had bilateral lower leg swelling managed with compression socks daily felt related to BMI and untreated sleep apnea  Physical deconditioning.  Was very sedentary.  Prior echo and stress test before had COVID was normal.  CT during COVID in 2020 did not show right heart strain.  Reported no symptoms at rest or with mild exertion.  Encouraged to be more active.  Hx of breast cancer & had had right mastectomy and chemotherapy,  initially tried a saline implant but reported this had since been removed.  Reported no new lumps or ulcers on scar tissue.  Reported no left breast lumps, bumps, skin changes or nipple discharge.  Had been in remission long time and not seen oncology in a while.  Mammogram for the left placed  Heartburn controlled on Pepcid OTC daily. Constipation improved & was to continue with a high-fiber diet.  History of osteopenia.  Prior untreated osteoporosis but DEXA scan done 2021 showed osteopenia and was advised to continue calcium and vitamin D.  Unclear if taking calcium & recommended taking calcium citrate 1200 mg total a day and vitamin D at least 2000 units daily and rechecking DEXA scan in 2023.  Noted memory deficits, failed mini cog, seen alone, referred to neurology to further evaluate.   History of vitreal macular adhesion right  eye and epiretinal membrane S/p surgery vitrectomy and stripping in June 2021. Was a bilateral glaucoma suspect and under care of eye. Was to see the dentist regularly. The 1 cm cyst on her right back was not a concern, & to monitor for worsening  Colon cancer screening due options discussed & [place] guard.ordered.   Labs showed normal Vit d, TSH was mildly out of range but ft 4 was normal & continued on same dose for 3 months before rechecking. micro albumin had improved. Wbc was 2.2, not anemia, platelets normal. B 12 was low normal and advised 500 mcg of vit B12.CMP showed stable CKD. Lipids not goal and atorvastatin increased to 20 mg. HBA1c was 5.7 and advised on diet and lifestyle changes. colo guard result not received. No apt made with sleep or neuro. Seen by eye 4/20/22 for glaucoma monitoring.      Seen  6/23/22 Blood pressure was not goal increased lisinopril to 20 mg bedtime ( to  take two of the 10 mg left until can start the new script of 20 mg dose) . Encouraged to avoid Gatorade as high in salt, drink tea in moderation, avoid alcohol. Check BP at home, goal should be less than 130/85. Recheck BP in 2 to 3 weeks with medical assistant and lab at that time for med monitoring with a bmp. Was to work on diet , exercise, low carb , smaller portions, avoid alcohol to help Blood pressure, weight and cholesterol.  BMI > 37  Discussed diet, exercise and weight loss. Was to continue atorvastatin 20 mg increased at last visit and see if with better BP control and repeat lipids if needed to be adjusted further in the future.  CKD 3 stable resolved microalbuminuria, on an ACE, to avoid antiinflammatories due to chronic kidney disease. Was clinically euthyroid on levothyroxine 150 mcg daily. Later TSH was WNL and continued on same dose. Due to prediabetes HBA1c checked & encouraged to  increase exercise, eat less carb's, avoid alcohol and work on loosing 10 % of total body weight. For hx of memory deficits, was   tracking and able to drive locally, in alone without family. Declined need to see neurology.  Was to continue to monitor. For  hx of snoring, suspected undiagnosed HUGO, opting to not see sleep for now due to cost. had resolved anemia. Recovered from Covid in 2020. Opted to defer seeing neuro for memory concern and snoring reporting Fatigue resolved. Had stable asymptomatic low white count. Reviewed H x of breast cancer & Mammogram in April had been normal. Was to continue use of compression socks for dependant edema. Noted  Dexa due in 2023 for hx of osteopenia/ osteoporosis, encouraged to take calcium and vit d supp. Reminded to start vit B12 500 mcg OTC daily for low normal B 12 noted in march and physical deconditioning ( forgot to start). Reported was working on health care directives  Covid # 3 due mid July. Declined Shingrex. Was to continue care with dentist and eye. Reported then  Heart burn and constipation had resolved. Was to contact colo guard about missing test result. This was later reported as positive & triage advised to contact patient and diagnostic colonoscopy ordered.      B12 was normal.  Hemoglobin A1c in prediabetic range.  Normal CMP and TSH.  Chronically low white count no anemia.  ASCVD risk was 30% with normal LDL, low HDL and elevated triglycerides and continued on same dose statin.  On 7/7 blood pressure elevated recheck on 7/18 was normal and continued on same meds.  Finally got colonoscopy done 9/7/2022 fragmented superficial serrated adenoma removed x3 and tubular adenoma removed x1 and recommended recheck in 3 years    Seen 9/23/22 HTN stable on lisinopril increased dose 20 mg . No side effects. Avoiding salt. Not checking BP at home. CKD 3 stable resolved microalbuminuria, on an ACE, Avoiding  antiinflammatories due to chronic kidney disease. BMI up to 37, weight up , eating two meals a day, discussed smaller portions, avoiding alcohol, juices, declined weight evaluation, sleep  doctor or mtm due to cost. HLD on atorvastatin 20 mg. Hypothyroid on levothyroxine 150 mcg daily. No hair loss, had had some weight gain, after colonoscopy bowels moving better.  Prediabetes & encouraged to  increase exercise, eat less carb's, avoid alcohol and work on loosing 10 % of total body weight. Hx of snoring & declined sleep eval.    Had a positive colo guard early 2022, got colonoscopy 3 weeks prior in sept & it showed 3 serrated adenoma fragmented and 1 tubular adenoma and advised recheck 3 years.   Noted memory deficits,  Was racking and able to drive locally. Seen alone without family. Felt ok . Resolved anemia. stable low wbc, Recovered from Covid. Opted to defer seeing neuro for memory concern or sleep for snoring & reported fatigue resolved  Hx of snoring, suspected undiagnosed HUGO, opting to not see sleep due to cost.   Had stable asymptomatic low white count.  Hx of breast cancer but Mammogram in April was normal. Mammogram due 2023. Was to continue to use compression socks for dependant edema.   Osteopenia, Dexa due in 2023 for hx of osteopenia/ osteoporosis, unclear if on calcium and vit d. Encouraged to take calcium 1200 mg a day &  vit d 2000 units a day   Was to start vit B12 500 mcg OTC daily for low normal B 12 noted in march and physical deconditioning ( forgot to start). Encouraged to take vitamin B12 500 mcg OTC daily for low normal B 12 noted in march, memory and physical deconditioning   For new epigastric upper abdominal pain intermittent 1 week worse with pushing on it exam was  benign, no rebound or surgical abdomen noted.  Did not radiate into chest & had  no associated cardiac symptoms.  Suspected gastritis.  To avoid spicy, avoid greasy foods, and red sauces, coffee, alcohol till better. Increase Pepcid to twice a day, avoid aspirin and see what labs show, warm pack to area. counseled if gets worse, has chest pain, trouble breathing or has blood in stool or black stool, to go to  the ER, & if not better may need an EGD etc. Ultrasound to check for pain. Ct abdomen if no answer.   Given flu shot. Declined Covid booster & Shingrex.   TSH was mildly out of range normal ft4 & continued on same dose. LDL was 84, TG elevated at 184, normal HBA1c, B 12 low normal, negative for H Pylori but lipase quite elevated. Advised to be seen in the er. [place] finally got hold of her & se opted to go to a local er there where noted symptoms had abated & chose not to do a ct scan in [place]. Repeat labs on  9/28 showed normal amylase & improved lipase& low wbc. Ct ordered & referred to hematology. Ct done 9/29 showed no pancreatitis,arthritis spine,atherosclerosis, diverticulosis & osteopenia & advised no alcohol. U/abdomen on 10/8/22 was normal. Seen by hematology 10/20/22 & suspected had chronic intermittent idiopathic neutropenia . Labs done showed normal copper, cr 1.9 stable, normal crp,ferritin folate, mild leukopenia, neutropenia & lymphopenia. BM Bx held off & advised to always get a cbc when sick& Neupogen shots if had absolute neutropenia at the time not responding to regular therapy.  Seen by eye 10/28/22 for routine check up of chronic concerns.   Reviewed and updated as needed this visit by clinical staff   Tobacco  Allergies  Meds   Med Hx  Surg Hx  Fam Hx          Reviewed and updated as needed this visit by Provider   Tobacco  Allergies  Meds   Med Hx  Surg Hx  Fam Hx         Social History     Tobacco Use     Smoking status: Never     Passive exposure: Yes     Smokeless tobacco: Never     Tobacco comments:     family members smoke; daughter   Vaping Use     Vaping status: Never Used   Substance Use Topics     Alcohol use: Yes     Alcohol/week: 10.0 standard drinks of alcohol     Comment: 0-1 drink per month         3/28/2023     1:47 PM   Alcohol Use   Prescreen: >3 drinks/day or >7 drinks/week? No         2/5/2021    10:24 AM   AUDIT - Alcohol Use Disorders Identification  Test - Reproduced from the World Health Organization Audit 2001 (Second Edition)   1.  How often do you have a drink containing alcohol? 2 to 4 times a month   2.  How many drinks containing alcohol do you have on a typical day when you are drinking? 1 or 2   3.  How often do you have five or more drinks on one occasion? Less than monthly   4.  How often during the last year have you found that you were not able to stop drinking once you had started? Less than monthly   5.  How often during the last year have you failed to do what was normally expected of you because of drinking? Less than monthly   6.  How often during the last year have you needed a first drink in the morning to get yourself going after a heavy drinking session? Never   7.  How often during the last year have you had a feeling of guilt or remorse after drinking? Less than monthly   8.  How often during the last year have you been unable to remember what happened the night before because of your drinking? Never   9.  Have you or someone else been injured because of your drinking? No   10. Has a relative, friend, doctor or other health care worker been concerned about your drinking or suggested you cut down? No   TOTAL SCORE 6     Do you have a current opioid prescription? No  Do you use any other controlled substances or medications that are not prescribed by a provider? None    Current providers sharing in care for this patient include:   Patient Care Team:  Marry Rodriguez MD as PCP - General (Family Medicine)  Marry Rodriguez MD as Referring Physician (Family Practice)  Kamila Jeffery MD as MD (INTERNAL MEDICINE - ENDOCRINOLOGY, DIABETES & METABOLISM)  Ruben Shelby MD as MD (Ophthalmology)  Fede Danielson MD as Assigned Surgical Provider  Marry Rodriguez MD as Assigned PCP  Doris Ritter RPH as Pharmacist (Pharmacist)  Doris Ritter RPH as Assigned MTM Pharmacist  Gurwinder Whitlock MD as Assigned Pediatric Specialist  Provider    The following health maintenance items are reviewed in Epic and correct as of today:  Health Maintenance   Topic Date Due     ZOSTER IMMUNIZATION (1 of 2) Never done     COVID-19 Vaccine (3 - Booster for Gilbert series) 05/17/2022     MEDICARE ANNUAL WELLNESS VISIT  03/22/2023     BMP  03/28/2024     LIPID  03/28/2024     MICROALBUMIN  03/28/2024     TSH W/FREE T4 REFLEX  03/28/2024     ANNUAL REVIEW OF HM ORDERS  03/28/2024     FALL RISK ASSESSMENT  03/28/2024     HEMOGLOBIN  03/28/2024     COLORECTAL CANCER SCREENING  09/07/2025     ADVANCE CARE PLANNING  04/12/2028     DTAP/TDAP/TD IMMUNIZATION (3 - Td or Tdap) 02/05/2031     DEXA  04/04/2038     HEPATITIS C SCREENING  Completed     PHQ-2 (once per calendar year)  Completed     INFLUENZA VACCINE  Completed     Pneumococcal Vaccine: 65+ Years  Completed     URINALYSIS  Completed     IPV IMMUNIZATION  Aged Out     MENINGITIS IMMUNIZATION  Aged Out     MAMMO SCREENING  Discontinued     Lab work is in process  Labs reviewed in EPIC  BP Readings from Last 3 Encounters:   03/28/23 119/77   10/20/22 (!) 159/87   09/23/22 131/82    Wt Readings from Last 3 Encounters:   03/28/23 113.4 kg (250 lb)   10/20/22 115.8 kg (255 lb 3.2 oz)   09/23/22 115.4 kg (254 lb 6.4 oz)                  Patient Active Problem List   Diagnosis     Hypothyroidism     Hypertension goal BP (blood pressure) < 140/90     CKD (chronic kidney disease) stage 3, GFR 30-59 ml/min (H)     Mixed hyperlipidemia     Advanced directives, counseling/discussion     History of breast cancer     Chronic idiopathic neutropenia (H)     Memory deficit     Obesity (BMI 35.0-39.9) with comorbidity (H)     Dependent edema     Epiretinal membrane, right     Vitreomacular adhesion of right eye     Glaucoma suspect, bilateral     Prediabetes     Snoring     Osteopenia, unspecified location     Serrated adenoma of colon     Past Surgical History:   Procedure Laterality Date     COLONOSCOPY N/A 9/7/2022     Procedure: COLONOSCOPY, WITH POLYPECTOMY;  Surgeon: Leventhal, Thomas Michael, MD;  [place] [facility] [place]      [place]MV CATARACT EXTRACAP,INSERT LENS, W/O ECP Right 05/29/2002    Dr. Clinton Lopez ([id number], Power:  20.0D)     PHACOEMULSIFICATION CLEAR CORNEA WITH STANDARD INTRAOCULAR LENS IMPLANT Left 2/22/2021    Procedure: LEFT EYE PHACOEMULSIFICATION, CATARACT, WITH INTRAOCULAR LENS IMPLANT;  Surgeon: Ruben Shelby MD;  [place] [facility] [place]     SURGICAL HISTORY OF -   Oct 3, 2008    Rt saline implant     VITRECTOMY PARSPLANA WITH 25 GAUGE SYSTEM Right 6/7/2021    Procedure: 1) Pars plana vitrectomy (PPV) 25g, Right eye,  2) Membrane stripping and stripping of  internal limiting membrane,;  Surgeon: Fede Danielson MD;  Location:  OR       Social History     Tobacco Use     Smoking status: Never     Passive exposure: Yes     Smokeless tobacco: Never     Tobacco comments:     family members smoke; daughter   Vaping Use     Vaping status: Never Used   Substance Use Topics     Alcohol use: Yes     Alcohol/week: 10.0 standard drinks of alcohol     Comment: 0-1 drink per month     Family History   Problem Relation Age of Onset     Diabetes Mother         dec     Thyroid Disease Mother         unsure     Respiratory Father         emphysema     Emphysema Father      Diabetes Sister      Family History Negative Sister         x 2     Suicide Brother      Diabetes Brother      Family History Negative Brother         x 2     Pancreatic Cancer Brother      Thyroid Disease Daughter      Diabetes Daughter      Goiter No family hx of      Glaucoma No family hx of      Macular Degeneration No family hx of      Hypertension No family hx of          Current Outpatient Medications   Medication Sig Dispense Refill     alendronate (FOSAMAX) 70 MG tablet Take 1 tablet (70 mg) by mouth every 7 days 12 tablet 3     atorvastatin (LIPITOR) 20 MG tablet Take 1 tablet (20 mg) by mouth daily 90 tablet 1     calcium  carbonate-vitamin D (OSCAL W/D) 500-200 MG-UNIT tablet Take 1 tablet by mouth daily Taking every 3 dasy or so       cetirizine (ZYRTEC) 10 MG tablet Take 10 mg by mouth daily       Cyanocobalamin (B-12 PO)        famotidine (PEPCID) 20 MG tablet Take 20 mg by mouth daily       levothyroxine (SYNTHROID/LEVOTHROID) 150 MCG tablet 1 tab 6 days a week & 1.5 tab one day a week & recheck tsh in 6 weeks for further adjustment 45 tablet 0     lisinopril (ZESTRIL) 20 MG tablet Take 1 tablet (20 mg) by mouth At Bedtime 90 tablet 1     Multiple Vitamin (MULTI-VITAMIN) per tablet Take 1 tablet by mouth daily       TURMERIC PO Take 1 tablet by mouth daily       Allergies   Allergen Reactions     No Known Drug Allergies      Recent Labs   Lab Test 03/28/23  1449 10/20/22  0929 09/23/22  1010 07/07/22  0955 06/23/22  1001 03/22/22  0921 03/22/22  0921 05/18/21  0942 03/15/21  0940 02/05/21  1119   A1C 5.7*  --  5.5  --   --   --  5.7*  --   --   --    LDL 87  --  80  --  84   < > 100  --   --  90   HDL 44*  --  49*  --  43*   < > 48*  --   --  45*   TRIG 145  --  140  --  184*   < > 150*  --   --  176*   ALT 25 18  --   --  28   < > 28  --   --   --    CR 1.31* 1.19* 1.24*   < > 1.29*   < > 1.22* 1.30*  --  1.26*   GFRESTIMATED 42* 47* 45*   < > 43*   < > 46* 40*  --  42*   GFRESTBLACK  --   --   --   --   --   --   --  47*  --  48*   POTASSIUM 5.0 5.0 4.9   < > 5.2   < > 4.8 5.1  --  4.9   TSH 7.85*  --  5.02*  --  3.55   < > 4.95*  --    < > 0.28*    < > = values in this interval not displayed.      Mammogram Screening - Patient over age 75, has elected to continue with screening.  Pertinent mammograms are reviewed under the imaging tab.    Review of Systems   Constitutional: Negative for chills and fever.   HENT: Negative for congestion, ear pain, hearing loss and sore throat.    Eyes: Negative for pain and visual disturbance.   Respiratory: Negative for cough and shortness of breath.    Cardiovascular: Positive for peripheral  "edema. Negative for chest pain and palpitations.   Gastrointestinal: Negative for abdominal pain, constipation, diarrhea, heartburn, hematochezia and nausea.   Breasts:  Negative for tenderness, breast mass and discharge.   Genitourinary: Negative for dysuria, frequency, genital sores, hematuria, pelvic pain, urgency, vaginal bleeding and vaginal discharge.   Musculoskeletal: Negative for arthralgias, joint swelling and myalgias.   Skin: Negative for rash.   Neurological: Negative for dizziness, weakness, headaches and paresthesias.   Psychiatric/Behavioral: Negative for mood changes. The patient is not nervous/anxious.      Constitutional, HEENT, cardiovascular, pulmonary, GI, , musculoskeletal, neuro, skin, endocrine and psych systems are negative, except as otherwise noted.    OBJECTIVE:   /77 (BP Location: Right arm, Patient Position: Sitting, Cuff Size: Adult Large)   Pulse 70   Temp 97.2  F (36.2  C) (Temporal)   Resp 22   Ht 1.725 m (5' 7.9\")   Wt 113.4 kg (250 lb)   SpO2 97%   BMI 38.12 kg/m   Estimated body mass index is 38.12 kg/m  as calculated from the following:    Height as of this encounter: 1.725 m (5' 7.9\").    Weight as of this encounter: 113.4 kg (250 lb).  Physical Exam  GENERAL: healthy, alert, no distress and obese  EYES: Eyes grossly normal to inspection, PERRL and conjunctivae and sclerae normal  HENT: ear canals and TM's normal, nose and mouth without ulcers or lesions  NECK: no adenopathy, no asymmetry, masses, or scars and thyroid normal to palpation  RESP: lungs clear to auscultation - no rales, rhonchi or wheezes  BREAST: right mastectomy scar, left normal without masses, tenderness or nipple discharge and no palpable axillary masses or adenopathy  CV: regular rate and rhythm, normal S1 S2, no S3 or S4, no murmur, click or rub, no peripheral edema and peripheral pulses strong  ABDOMEN: soft, nontender, no hepatosplenomegaly, no masses and bowel sounds normal  MS: no gross " musculoskeletal defects noted, no edema, wearing compression socks  SKIN: no suspicious lesions or rashes  NEURO: Normal strength and tone, mentation intact and speech normal  PSYCH: mentation appears normal, affect normal/bright    Diagnostic Test Results:  Labs reviewed in Epic  No results found for this or any previous visit (from the past 24 hour(s)).  Results for orders placed or performed in visit on 03/28/23   Lipid Profile (Chol, Trig, HDL, LDL calc)     Status: Abnormal   Result Value Ref Range    Cholesterol 160 <200 mg/dL    Triglycerides 145 <150 mg/dL    Direct Measure HDL 44 (L) >=50 mg/dL    LDL Cholesterol Calculated 87 <=100 mg/dL    Non HDL Cholesterol 116 <130 mg/dL    Narrative    Cholesterol  Desirable:  <200 mg/dL    Triglycerides  Normal:  Less than 150 mg/dL  Borderline High:  150-199 mg/dL  High:  200-499 mg/dL  Very High:  Greater than or equal to 500 mg/dL    Direct Measure HDL  Female:  Greater than or equal to 50 mg/dL   Male:  Greater than or equal to 40 mg/dL    LDL Cholesterol  Desirable:  <100mg/dL  Above Desirable:  100-129 mg/dL   Borderline High:  130-159 mg/dL   High:  160-189 mg/dL   Very High:  >= 190 mg/dL    Non HDL Cholesterol  Desirable:  130 mg/dL  Above Desirable:  130-159 mg/dL  Borderline High:  160-189 mg/dL  High:  190-219 mg/dL  Very High:  Greater than or equal to 220 mg/dL   Hemoglobin A1c     Status: Abnormal   Result Value Ref Range    Hemoglobin A1C 5.7 (H) 0.0 - 5.6 %   Albumin Random Urine Quantitative with Creat Ratio     Status: None   Result Value Ref Range    Creatinine Urine mg/dL 170.0 mg/dL    Albumin Urine mg/L 16.9 mg/L    Albumin Urine mg/g Cr 9.94 0.00 - 25.00 mg/g Cr   Vitamin B12     Status: Normal   Result Value Ref Range    Vitamin B12 863 232 - 1,245 pg/mL   Comprehensive metabolic panel (BMP + Alb, Alk Phos, ALT, AST, Total. Bili, TP)     Status: Abnormal   Result Value Ref Range    Sodium 140 136 - 145 mmol/L    Potassium 5.0 3.4 - 5.3 mmol/L     Chloride 103 98 - 107 mmol/L    Carbon Dioxide (CO2) 26 22 - 29 mmol/L    Anion Gap 11 7 - 15 mmol/L    Urea Nitrogen 20.6 8.0 - 23.0 mg/dL    Creatinine 1.31 (H) 0.51 - 0.95 mg/dL    Calcium 9.7 8.8 - 10.2 mg/dL    Glucose 97 70 - 99 mg/dL    Alkaline Phosphatase 48 35 - 104 U/L    AST 31 10 - 35 U/L    ALT 25 10 - 35 U/L    Protein Total 7.7 6.4 - 8.3 g/dL    Albumin 4.2 3.5 - 5.2 g/dL    Bilirubin Total 0.4 <=1.2 mg/dL    GFR Estimate 42 (L) >60 mL/min/1.73m2   TSH with free T4 reflex     Status: Abnormal   Result Value Ref Range    TSH 7.85 (H) 0.30 - 4.20 uIU/mL   CBC with platelets and differential     Status: Abnormal   Result Value Ref Range    WBC Count 2.2 (L) 4.0 - 11.0 10e3/uL    RBC Count 4.62 3.80 - 5.20 10e6/uL    Hemoglobin 12.8 11.7 - 15.7 g/dL    Hematocrit 40.7 35.0 - 47.0 %    MCV 88 78 - 100 fL    MCH 27.7 26.5 - 33.0 pg    MCHC 31.4 (L) 31.5 - 36.5 g/dL    RDW 14.2 10.0 - 15.0 %    Platelet Count 206 150 - 450 10e3/uL   T4 free     Status: Normal   Result Value Ref Range    Free T4 1.30 0.90 - 1.70 ng/dL   Manual Differential     Status: Abnormal   Result Value Ref Range    % Neutrophils 27 %    % Lymphocytes 34 %    % Monocytes 33 %    % Eosinophils 3 %    % Basophils 3 %    Absolute Neutrophils 0.6 (L) 1.6 - 8.3 10e3/uL    Absolute Lymphocytes 0.7 (L) 0.8 - 5.3 10e3/uL    Absolute Monocytes 0.7 0.0 - 1.3 10e3/uL    Absolute Eosinophils 0.1 0.0 - 0.7 10e3/uL    Absolute Basophils 0.1 0.0 - 0.2 10e3/uL    RBC Morphology Confirmed RBC Indices     Platelet Assessment  Automated Count Confirmed. Platelet morphology is normal.     Automated Count Confirmed. Platelet morphology is normal.    Stomatocytes Slight (A) None Seen   CBC with platelets and differential     Status: Abnormal    Narrative    The following orders were created for panel order CBC with platelets and differential.  Procedure                               Abnormality         Status                     ---------                                -----------         ------                     CBC with platelets and d...[[id number]]  Abnormal            Final result               Manual Differential[[id number]]          Abnormal            Final result                 Please view results for these tests on the individual orders.       ASSESSMENT / PLAN:       ICD-10-CM    1. Medicare annual wellness visit, subsequent  Z00.00 MA Screen Left w/Bryson     CANCELED: *MA Screening Digital Bilateral      2. Hypertension goal BP (blood pressure) < 140/90  I10 PRIMARY CARE FOLLOW-UP SCHEDULING     Lipid Profile (Chol, Trig, HDL, LDL calc)     Albumin Random Urine Quantitative with Creat Ratio     PRIMARY CARE FOLLOW-UP SCHEDULING     lisinopril (ZESTRIL) 20 MG tablet     Comprehensive metabolic panel (BMP + Alb, Alk Phos, ALT, AST, Total. Bili, TP)     TSH with free T4 reflex     Lipid Profile (Chol, Trig, HDL, LDL calc)     Albumin Random Urine Quantitative with Creat Ratio     Comprehensive metabolic panel (BMP + Alb, Alk Phos, ALT, AST, Total. Bili, TP)     TSH with free T4 reflex     T4 free      3. Stage 3 chronic kidney disease, unspecified whether stage 3a or 3b CKD (H)  N18.30 PRIMARY CARE FOLLOW-UP SCHEDULING     Lipid Profile (Chol, Trig, HDL, LDL calc)     Albumin Random Urine Quantitative with Creat Ratio     Comprehensive metabolic panel (BMP + Alb, Alk Phos, ALT, AST, Total. Bili, TP)     TSH with free T4 reflex     Lipid Profile (Chol, Trig, HDL, LDL calc)     Albumin Random Urine Quantitative with Creat Ratio     Comprehensive metabolic panel (BMP + Alb, Alk Phos, ALT, AST, Total. Bili, TP)     TSH with free T4 reflex     T4 free     Basic metabolic panel  (Ca, Cl, CO2, Creat, Gluc, K, Na, BUN)      4. Prediabetes  R73.03 PRIMARY CARE FOLLOW-UP SCHEDULING     Lipid Profile (Chol, Trig, HDL, LDL calc)     Hemoglobin A1c     Comprehensive metabolic panel (BMP + Alb, Alk Phos, ALT, AST, Total. Bili, TP)     TSH with free T4 reflex     Lipid  Profile (Chol, Trig, HDL, LDL calc)     Hemoglobin A1c     Comprehensive metabolic panel (BMP + Alb, Alk Phos, ALT, AST, Total. Bili, TP)     TSH with free T4 reflex     T4 free      5. Mixed hyperlipidemia  E78.2 PRIMARY CARE FOLLOW-UP SCHEDULING     Lipid Profile (Chol, Trig, HDL, LDL calc)     Comprehensive metabolic panel (BMP + Alb, Alk Phos, ALT, AST, Total. Bili, TP)     Lipid Profile (Chol, Trig, HDL, LDL calc)     Comprehensive metabolic panel (BMP + Alb, Alk Phos, ALT, AST, Total. Bili, TP)     atorvastatin (LIPITOR) 20 MG tablet      6. Obesity (BMI 35.0-39.9) with comorbidity (H)  E66.01 PRIMARY CARE FOLLOW-UP SCHEDULING     Lipid Profile (Chol, Trig, HDL, LDL calc)     Comprehensive metabolic panel (BMP + Alb, Alk Phos, ALT, AST, Total. Bili, TP)     Lipid Profile (Chol, Trig, HDL, LDL calc)     Comprehensive metabolic panel (BMP + Alb, Alk Phos, ALT, AST, Total. Bili, TP)      7. Chronic idiopathic neutropenia (H)  D70.9 PRIMARY CARE FOLLOW-UP SCHEDULING     CBC with platelets and differential     CBC with platelets and differential      8. Epigastric pain  R10.13       9. Acquired hypothyroidism  E03.9 PRIMARY CARE FOLLOW-UP SCHEDULING     TSH with free T4 reflex     TSH with free T4 reflex     T4 free     levothyroxine (SYNTHROID/LEVOTHROID) 150 MCG tablet     TSH with free T4 reflex      10. Snoring  R06.83 PRIMARY CARE FOLLOW-UP SCHEDULING     CBC with platelets and differential     CBC with platelets and differential      11. Dependent edema  R60.9 PRIMARY CARE FOLLOW-UP SCHEDULING     CBC with platelets and differential     Lipid Profile (Chol, Trig, HDL, LDL calc)     Comprehensive metabolic panel (BMP + Alb, Alk Phos, ALT, AST, Total. Bili, TP)     TSH with free T4 reflex     CBC with platelets and differential     Lipid Profile (Chol, Trig, HDL, LDL calc)     Comprehensive metabolic panel (BMP + Alb, Alk Phos, ALT, AST, Total. Bili, TP)     TSH with free T4 reflex     T4 free      12.  Osteopenia, unspecified location  M85.80 PRIMARY CARE FOLLOW-UP SCHEDULING     DX Hip/Pelvis/Spine     alendronate (FOSAMAX) 70 MG tablet      13. Serrated adenoma of colon  D12.6       14. Memory deficit  R41.3 PRIMARY CARE FOLLOW-UP SCHEDULING     Vitamin B12     MR Brain w/o & w Contrast     Vitamin B12      15. History of breast cancer  Z85.3 PRIMARY CARE FOLLOW-UP SCHEDULING     MA Screen Left w/Brsyon     CANCELED: *MA Screening Digital Bilateral      16. Glaucoma suspect, bilateral  H40.003       17. Vitreomacular adhesion of right eye  H43.821       18. Epiretinal membrane, right  H35.371       19. Health care maintenance  Z00.00 REVIEW OF HEALTH MAINTENANCE PROTOCOL ORDERS      20. Advanced directives, counseling/discussion  Z71.89       21. Need for shingles vaccine  Z23       22. Encounter for screening mammogram for malignant neoplasm of breast  Z12.31 MA Screen Left w/Bryson     CANCELED: *MA Screening Digital Bilateral      23. Asymptomatic menopausal state  Z78.0 DX Hip/Pelvis/Spine        Seen today for Medicare wellness as well as for hypertension hypothyroidism chronic issues and medications.  Vital stable.  Vision under care of eye.  Hearing is okay.  Mammogram due  Has had no falls.  DEXA scan due to screen for osteoporosis and order in place.  Failed cognition screen, tracking okay reports no concerns.  Lives with daughter does not drive.  MRI ordered.  Opted out of neurology referral for now we will continue monitoring and recheck in 6 months with daughter at visit.  Healthcare maintenance reviewed.  Has healthcare directives on file.  COVID booster due opted to defer today.  Discussed Shingrex will think about it.  Rest of vaccines up-to-date.  Lab work today and will make further recommendations once those are reviewed.    Hypertension currently stable on lisinopril 20 mg bedtime with no side effects.  Continue to avoid salt, limit alcohol intake, regular exercise and weight loss.    Chronic  kidney disease stage III with resolved microalbuminuria on ACE inhibitor.  Avoid anti-inflammatories as much as possible.later labs showed Electrolytes & liver tests wnl, kidney function decreased, to increase water intake & recheck non fasting in a lab only apt in 6 weeks when recommend rechecking TSH    History of prediabetes we will recheck hemoglobin A1c today encouraged to increase physical exercise continue to eat less carb's avoid alcohol and work on losing 10% of total body weight to prevent developing diabetes. HBA1c came back 5.7 &encouraged to continue with a low carb diet, increase physical activity & some weight loss will help    Hyperlipidemia on atorvastatin 20 mg.  We will see what lab work shows and adjust medications if needed is currently estimated cardiovascular risk is moderate at 20% of having a heart attack or stroke in the next 10 years. Later labs showed lipids goal and continued on same dose of statin.    BMI has gone up a bit likely due to shrinking in height though weight has improved.  Continue to eat small portions, less carb's, less juices, and increase physical activity.  Feels well and opted due to cost hold off on seeing a sleep specialist or a  weight doctor.    History of chronic intermittent idiopathic neutropenia that predated cancer diagnosis and treatment.  Negative work-up and asymptomatic from it.  Seen by hematology most recently in the fall 2022.  Peripheral smear and additional lab work for iron copper folic acid were normal.  No nutritional causes seen.  Held off on bone marrow biopsy given stability and lack of symptoms.  Advised to continue B12 supplementation.  Please clarify if taking 500 or 1000 mcg daily.  If he should feel sick have fevers, significant abdominal pains then you should always come in to be evaluated for infection and get a CBC.  Please let your providers know you have a history of neutropenia and if neutrophils are low or if infection is not  improving with regular treatment then hematology recommended a course of Neupogen 480 mcg subcu for 3 to 5 days at the time. Currently wbc stable. Normal B 12.     No longer with upper abdominal pain, resolved a week after seen for it in September 2022.  At that time lipase was elevated though CT scan later did not show pancreatitis & Ultrasound was unremarkable.but imaging done after symptoms had subsided. Remains on Pepcid 20 mg twice a day.  Monitor for any new symptoms.     Hypothyroidism on levothyroxine 150 mcg daily.  In the past her TSH has been mildly out of range.  We will recheck today and make adjustments in medication after that as needed.After the visit noted Thyroid testing shows TSH indicates under correction. Recommended changing thyroid to 150 mcg 1 tab 6 days a week & 1.5 tab ( 150+75) mcg one day a week & recheck TSH in 6 weeks. At visit noted just got a refill so new script not sent in and will wait to see what TSH looks like in 6 weeks of change to adjust meds and refill.    History of snoring, BMI more than 38, there is possibility of undiagnosed sleep apnea.  Currently feels well and opted not to see the sleep specialist.    Dependent edema likely due to weight and possibly varicose veins.  Undiagnosed sleep apnea could also contribute to this.  Currently no sign of heart failure.  Continue to use compression socks.    History of osteopenia continue calcium 1200 a day vitamin D at least 2000 units daily and recheck DEXA scan this year.  Later dexa showed osteopenia but with high fracture risk of hip & so sent in fosamax 70 mg 1/wk, to monitor for GERD & to connect with her dentist prior to starting. Recheck dexa in 1 yr.    History of breast cancer, prior right mastectomy scar looks good.  Left breast exam was normal.  Left screening Mammogram due and order in place to schedule..    Memory deficits noted on screening test.  Here alone without family.  Currently responding appropriately.   "Opted not to see neurology.  MRI ordered.  Return in 6 months for recheck with family.    Continue care with the eye doctor for history of vitreal macular adhesion right eye epiretinal membrane on the right and bilateral glaucoma suspect monitoring.    Follow-up in an office visit in 6 months or earlier as needed.      Patient has been advised of split billing requirements and indicates understanding: Yes      COUNSELING:  Reviewed preventive health counseling, as reflected in patient instructions       Regular exercise       Healthy diet/nutrition       Vision screening       Hearing screening       Dental care       Bladder control       Fall risk prevention       Immunizations    Declined: Covid-19 and Zoster due to Other felt there was no need.            Aspirin prophylaxis        Alcohol Use        Osteoporosis prevention/bone health       Colon cancer screening       (Mohini)menopause management       The 10-year ASCVD risk score (Joe BUTLER, et al., 2019) is: 20%    Values used to calculate the score:      Age: 77 years      Sex: Female      Is Non- : No      Diabetic: No      Tobacco smoker: No      Systolic Blood Pressure: 119 mmHg      Is BP treated: Yes      HDL Cholesterol: 44 mg/dL      Total Cholesterol: 160 mg/dL       Advanced Planning       BMI:   Estimated body mass index is 38.12 kg/m  as calculated from the following:    Height as of this encounter: 1.725 m (5' 7.9\").    Weight as of this encounter: 113.4 kg (250 lb).   Weight management plan: Discussed healthy diet and exercise guidelines      She reports that she has never smoked. She has been exposed to tobacco smoke. She has never used smokeless tobacco.      Appropriate preventive services were discussed with this patient, including applicable screening as appropriate for cardiovascular disease, diabetes, osteopenia/osteoporosis, and glaucoma.  As appropriate for age/gender, discussed screening for colorectal cancer, " prostate cancer, breast cancer, and cervical cancer. Checklist reviewing preventive services available has been given to the patient.    Reviewed patients plan of care and provided an AVS. The Complex Care Plan (for patients with higher acuity and needing more deliberate coordination of services) for Romayne meets the Care Plan requirement. This Care Plan has been established and reviewed with the Patient.    Marry Rodriguez MD  [facility]    Identified Health Risks:

## 2023-03-28 NOTE — PATIENT INSTRUCTIONS
Seen today for Medicare wellness as well as for hypertension hypothyroidism chronic issues and medications.  Vital stable.  Vision under care of eye.  Hearing is okay.  Mammogram due  Has had no falls.  DEXA scan due to screen for osteoporosis and order in place.  Failed cognition screen, tracking okay reports no concerns.  Lives with daughter does not drive.  MRI ordered.  Opted out of neurology referral for now we will continue monitoring and recheck in 6 months with daughter at visit.  Healthcare maintenance reviewed.  Has healthcare directives on file.  COVID booster due opted to defer today.  Discussed Shingrix will think about it.  Rest of vaccines up-to-date.  Lab work today and will make further recommendations once those are reviewed.    Hypertension currently stable on lisinopril 20 mg bedtime with no side effects.  Continue to avoid salt, limit alcohol intake, regular exercise and weight loss.    Chronic kidney disease stage III with resolved microalbuminuria on ACE inhibitor.  Avoid anti-inflammatories as much as possible.    History of prediabetes we will recheck hemoglobin A1c today encouraged to increase physical exercise continue to eat less carbs avoid alcohol and work on losing 10% of total body weight to prevent developing diabetes.    Hyperlipidemia on atorvastatin 20 mg.  We will see what lab work shows and adjust medications if needed is currently estimated cardiovascular risk is moderate at 20% of having a heart attack or stroke in the next 10 years.    BMI has gone up a bit likely due to shrinking in height though weight has improved.  Continue to eat small portions, less carbs, less juices, and increase physical activity.  Feels well and opted due to cost hold off on seeing a sleep specialist weight doctor.    History of chronic intermittent idiopathic neutropenia that predated cancer diagnosis and treatment.  Negative work-up and asymptomatic from it.  Seen by hematology most recently in  the fall 2022.  Peripheral smear and additional lab work for iron copper folic acid were normal.  No nutritional causes seen.  Held off on bone marrow biopsy given stability and lack of symptoms.  Advised to continue B12 supplementation.  Please clarify if taking 500 or 1000 mcg daily.  If he should feel sick have fevers, significant abdominal pains then you should always come in to be evaluated for infection and get a CBC.  Please let your providers know you have a history of neutropenia and if neutrophils are low or if infection is not improving with regular treatment then hematology recommended a course of Neupogen 480 mcg subcu for 3 to 5 days at the time.    No longer with upper abdominal pain and resolved a week after seen for it in September 2022.  At that time lipase was elevated though CT scan later did not show pancreatitis.  Remains on Pepcid 20 mg twice a day.  Monitor for any new symptoms.  Ultrasound was unremarkable.    Hypothyroidism on levothyroxine 150 mcg daily.  In the past her TSH has been mildly out of range.  We will recheck today and make adjustments in medication after that as needed.    History of snoring, BMI more than 38, there is possibility of undiagnosed sleep apnea.  Currently feels well and opted not to see the sleep specialist.    Dependent edema likely due to weight and possibly varicose veins.  Undiagnosed sleep apnea could also contribute to this.  Currently no sign of heart failure.  Continue to use compression socks.    History of osteopenia continue calcium 1200 a day vitamin D at least 2000 units daily and recheck DEXA scan this year.    History of breast cancer, prior right mastectomy, scar looks good.  Left breast exam was normal.  Mammogram due and order in place.    Memory deficits noted on screening test.  Here alone without family.  Currently responding appropriately.  Opted not to see neurology.  MRI ordered.  Return in 6 months for recheck with family.    Continue  care with the eye doctor for history of vitreal macular adhesion right eye epiretinal membrane on the right and bilateral glaucoma suspect monitoring.    Follow-up in an office visit in 6 months or earlier as needed.    Patient Education   Personalized Prevention Plan  You are due for the preventive services outlined below.  Your care team is available to assist you in scheduling these services.  If you have already completed any of these items, please share that information with your care team to update in your medical record.  Health Maintenance Due   Topic Date Due    Zoster (Shingles) Vaccine (1 of 2) Never done    COVID-19 Vaccine (3 - Booster for Gilbert series) 05/17/2022    Kidney Microalbumin Urine Test  03/22/2023    ANNUAL REVIEW OF HM ORDERS  03/22/2023    Annual Wellness Visit  03/22/2023

## 2023-03-28 NOTE — RESULT ENCOUNTER NOTE
Estevan Roth,  Some of your results came back and HBA1c is elevated at 5.7 in prediabetic range. Continue with a low carb diet, increase physical activity & some weight loss will help. If you have any further concerns please do not hesitate to contact us by message, phone or making an appointment.  Have a good day   Dr Michael SCOTT

## 2023-03-29 ENCOUNTER — TELEPHONE (OUTPATIENT)
Dept: NURSING | Facility: CLINIC | Age: 78
End: 2023-03-29
Payer: COMMERCIAL

## 2023-03-29 ENCOUNTER — TELEPHONE (OUTPATIENT)
Dept: FAMILY MEDICINE | Facility: CLINIC | Age: 78
End: 2023-03-29
Payer: COMMERCIAL

## 2023-03-29 RX ORDER — LEVOTHYROXINE SODIUM 150 UG/1
TABLET ORAL
Qty: 90 TABLET | Refills: 0 | OUTPATIENT
Start: 2023-03-29

## 2023-03-29 NOTE — TELEPHONE ENCOUNTER
Patient returning call from clinic. Please see original telephone encounter from earlier today.    Noris Mcdaniels RN  03/29/23 9:01 AM  [facility] Nurse Advisor

## 2023-03-29 NOTE — RESULT ENCOUNTER NOTE
Results within acceptable limits.     Thyroid testing shows tsh indicates under correction  Recommend changing thyroid to 150 mcg 1 tab 6 days a week & 1.5 tab ( 150+75) mcg one day a week & recheck tsh in 6 weeks. At visit noted just got a refill . Do you have enough med for 6 weeks with this dose change   Normal vitamin B 12  Normal microalbumin  White count low stable to prior  Lipids goal, continue current dose of statin, sent in.  Electrolytes & liver tests wnl, kidney function decreased, increase water intake & recheck non fasting in a lab only apt in 6 weeks when recommend rechecking tsh  Microalbumin & vitamin B 12 normal

## 2023-03-29 NOTE — TELEPHONE ENCOUNTER
Patient returning missed call from clinic. Relayed message from provider regarding lab results and medication changes. Patient verbalized understanding and had no further questions. Transferred to scheduling to schedule lab visit on 6 weeks.    Noris Mcdaniels RN  03/29/23 8:59 AM  [facility] Nurse Advisor

## 2023-03-29 NOTE — TELEPHONE ENCOUNTER
----- Message from Marry Rodriguez MD sent at 3/28/2023 11:38 PM CDT -----  Results within acceptable limits.     Thyroid testing shows tsh indicates under correction  Recommend changing thyroid to 150 mcg 1 tab 6 days a week & 1.5 tab ( 150+75) mcg one day a week & recheck tsh in 6 weeks. At visit noted just got a refill . Do you have enough med for 6 weeks with this dose change   Normal vitamin B 12  Normal microalbumin  White count low stable to prior  Lipids goal, continue current dose of statin, sent in.  Electrolytes & liver tests wnl, kidney function decreased, increase water intake & recheck non fasting in a lab only apt in 6 weeks when recommend rechecking tsh  Microalbumin & vitamin B 12 normal

## 2023-03-29 NOTE — TELEPHONE ENCOUNTER
Left message to call back and ask to speak with an available triage nurse.  MARY PartidaN, RN-BC  [facility]

## 2023-03-30 DIAGNOSIS — E78.2 MIXED HYPERLIPIDEMIA: ICD-10-CM

## 2023-03-31 RX ORDER — ATORVASTATIN CALCIUM 20 MG/1
TABLET, FILM COATED ORAL
Qty: 90 TABLET | Refills: 1 | OUTPATIENT
Start: 2023-03-31

## 2023-04-04 ENCOUNTER — HOSPITAL ENCOUNTER (OUTPATIENT)
Dept: BONE DENSITY | Facility: CLINIC | Age: 78
Discharge: HOME | End: 2023-04-04
Attending: FAMILY MEDICINE | Admitting: FAMILY MEDICINE
Payer: COMMERCIAL

## 2023-04-04 DIAGNOSIS — M85.80 OSTEOPENIA, UNSPECIFIED LOCATION: ICD-10-CM

## 2023-04-04 DIAGNOSIS — Z78.0 ASYMPTOMATIC MENOPAUSAL STATE: ICD-10-CM

## 2023-04-04 PROCEDURE — 77080 DXA BONE DENSITY AXIAL: CPT

## 2023-04-04 NOTE — RESULT ENCOUNTER NOTE
Estevan Ms. Roth,  Your results came back and dexa scan shows osteopenia low bone mineral density, the 10-year probability of a major osteoporotic fracture is 12% and that of the hip is 3 percent based on your left hip bone density.  Consider treatment with medication for osteoporosis given the hip fracture scores greater than or equal to 3%.  First-line meds are bisphosphonates like Fosamax or Actonel.  Please check with your dentist if okay to start this medication.  Rare risk of jaw necrosis.  Most well-tolerated can cause acid reflux recommend taking it on an empty stomach and stay upright for 1 hour after taking.  Is taking weekly and then would recommend a recheck DEXA scan in 1 year.  If hesitant to take this and wanting to know more information can refer you to endocrine to get all med options and then you can make a more better informed decision if you would like.  Or you can follow-up in clinic here and we can discuss more   Let me know what you decide  If you have any further concerns please do not hesitate to contact us by message, phone or making an appointment.  Have a good day   Dr Michael SCOTT

## 2023-04-10 RX ORDER — ALENDRONATE SODIUM 70 MG/1
70 TABLET ORAL
Qty: 12 TABLET | Refills: 3 | Status: SHIPPED | OUTPATIENT
Start: 2023-04-10 | End: 2024-03-18

## 2023-04-14 ENCOUNTER — ANCILLARY PROCEDURE (OUTPATIENT)
Dept: MAMMOGRAPHY | Facility: CLINIC | Age: 78
End: 2023-04-14
Attending: FAMILY MEDICINE
Payer: COMMERCIAL

## 2023-04-14 ENCOUNTER — ANCILLARY PROCEDURE (OUTPATIENT)
Dept: MRI IMAGING | Facility: CLINIC | Age: 78
End: 2023-04-14
Attending: FAMILY MEDICINE
Payer: COMMERCIAL

## 2023-04-14 DIAGNOSIS — R41.3 MEMORY DEFICIT: ICD-10-CM

## 2023-04-14 DIAGNOSIS — Z12.31 ENCOUNTER FOR SCREENING MAMMOGRAM FOR MALIGNANT NEOPLASM OF BREAST: ICD-10-CM

## 2023-04-14 DIAGNOSIS — Z85.3 HISTORY OF BREAST CANCER: ICD-10-CM

## 2023-04-14 DIAGNOSIS — Z00.00 MEDICARE ANNUAL WELLNESS VISIT, SUBSEQUENT: ICD-10-CM

## 2023-04-14 PROCEDURE — 77063 BREAST TOMOSYNTHESIS BI: CPT | Mod: 52 | Performed by: RADIOLOGY

## 2023-04-14 PROCEDURE — 77067 SCR MAMMO BI INCL CAD: CPT | Mod: 52 | Performed by: RADIOLOGY

## 2023-04-14 PROCEDURE — A9585 GADOBUTROL INJECTION: HCPCS | Performed by: RADIOLOGY

## 2023-04-14 PROCEDURE — 70553 MRI BRAIN STEM W/O & W/DYE: CPT | Performed by: RADIOLOGY

## 2023-04-14 RX ORDER — GADOBUTROL 604.72 MG/ML
15 INJECTION INTRAVENOUS ONCE
Status: COMPLETED | OUTPATIENT
Start: 2023-04-14 | End: 2023-04-14

## 2023-04-14 RX ADMIN — GADOBUTROL 10 ML: 604.72 INJECTION INTRAVENOUS at 16:22

## 2023-04-16 NOTE — RESULT ENCOUNTER NOTE
Estevan SalehFrantz Roth,  Your results came back and MRI shows brain shrinkage likely related to age and htn and cholesterol and contributing to memory concerns. No concerning findings found.  If you have any further concerns please do not hesitate to contact us by message, phone or making an appointment.  Have a good day   Dr Michael SCOTT

## 2023-04-17 NOTE — RESULT ENCOUNTER NOTE
Estevan Priceigor,  Your results came back and mammogram showed there is a possible asymmetry in the left breast the breast clinic will be contacting you to set up additional imaging to evaluate this more. If you have any further concerns please do not hesitate to contact us by message, phone or making an appointment.  Have a good day   Dr Michael SCOTT

## 2023-04-25 DIAGNOSIS — H40.003 GLAUCOMA SUSPECT OF BOTH EYES: Primary | ICD-10-CM

## 2023-04-26 ENCOUNTER — ANCILLARY PROCEDURE (OUTPATIENT)
Dept: MAMMOGRAPHY | Facility: CLINIC | Age: 78
End: 2023-04-26
Attending: FAMILY MEDICINE
Payer: COMMERCIAL

## 2023-04-26 ENCOUNTER — TELEPHONE (OUTPATIENT)
Dept: FAMILY MEDICINE | Facility: CLINIC | Age: 78
End: 2023-04-26

## 2023-04-26 ENCOUNTER — OFFICE VISIT (OUTPATIENT)
Dept: OPHTHALMOLOGY | Facility: CLINIC | Age: 78
End: 2023-04-26
Attending: OPHTHALMOLOGY
Payer: COMMERCIAL

## 2023-04-26 ENCOUNTER — PATIENT OUTREACH (OUTPATIENT)
Dept: ONCOLOGY | Facility: CLINIC | Age: 78
End: 2023-04-26

## 2023-04-26 DIAGNOSIS — T85.22XS DISLOCATED INTRAOCULAR LENS, SEQUELA: ICD-10-CM

## 2023-04-26 DIAGNOSIS — H43.821 VITREOMACULAR ADHESION OF RIGHT EYE: ICD-10-CM

## 2023-04-26 DIAGNOSIS — R92.8 ABNORMAL MAMMOGRAM OF LEFT BREAST: ICD-10-CM

## 2023-04-26 DIAGNOSIS — H35.371 EPIRETINAL MEMBRANE, RIGHT: ICD-10-CM

## 2023-04-26 DIAGNOSIS — H40.003 GLAUCOMA SUSPECT OF BOTH EYES: ICD-10-CM

## 2023-04-26 DIAGNOSIS — Z85.3 HISTORY OF BREAST CANCER: ICD-10-CM

## 2023-04-26 DIAGNOSIS — H40.003 GLAUCOMA SUSPECT OF BOTH EYES: Primary | ICD-10-CM

## 2023-04-26 DIAGNOSIS — Z90.11 S/P MASTECTOMY, RIGHT: ICD-10-CM

## 2023-04-26 DIAGNOSIS — Z86.69 HISTORY OF RETINAL DETACHMENT: ICD-10-CM

## 2023-04-26 DIAGNOSIS — T85.22XA DISPLACEMENT OF INTRAOCULAR LENS, INITIAL ENCOUNTER: Primary | ICD-10-CM

## 2023-04-26 DIAGNOSIS — T81.30XA WOUND DEHISCENCE: Primary | ICD-10-CM

## 2023-04-26 DIAGNOSIS — Z96.1 PSEUDOPHAKIA OF BOTH EYES: ICD-10-CM

## 2023-04-26 PROCEDURE — 99214 OFFICE O/P EST MOD 30 MIN: CPT | Performed by: OPHTHALMOLOGY

## 2023-04-26 PROCEDURE — 76642 ULTRASOUND BREAST LIMITED: CPT | Mod: 50 | Performed by: RADIOLOGY

## 2023-04-26 PROCEDURE — 92133 CPTRZD OPH DX IMG PST SGM ON: CPT | Performed by: OPHTHALMOLOGY

## 2023-04-26 PROCEDURE — 76512 OPH US DX B-SCAN: CPT | Mod: 26 | Performed by: OPHTHALMOLOGY

## 2023-04-26 PROCEDURE — 92015 DETERMINE REFRACTIVE STATE: CPT

## 2023-04-26 PROCEDURE — G0463 HOSPITAL OUTPT CLINIC VISIT: HCPCS | Mod: 25 | Performed by: OPHTHALMOLOGY

## 2023-04-26 PROCEDURE — 77065 DX MAMMO INCL CAD UNI: CPT | Mod: LT | Performed by: RADIOLOGY

## 2023-04-26 PROCEDURE — 99214 OFFICE O/P EST MOD 30 MIN: CPT | Mod: 25 | Performed by: OPHTHALMOLOGY

## 2023-04-26 PROCEDURE — 76512 OPH US DX B-SCAN: CPT | Performed by: OPHTHALMOLOGY

## 2023-04-26 PROCEDURE — G0279 TOMOSYNTHESIS, MAMMO: HCPCS | Performed by: RADIOLOGY

## 2023-04-26 ASSESSMENT — REFRACTION_MANIFEST
OD_CYLINDER: SPHERE
OS_ADD: +2.75
OS_CYLINDER: +0.75
OD_ADD: +2.75
OD_AXIS: 090
OD_SPHERE: -1.75
OS_SPHERE: -0.25
OS_AXIS: 157

## 2023-04-26 ASSESSMENT — TONOMETRY
OS_IOP_MMHG: 17
OD_IOP_MMHG: 15
IOP_METHOD: TONOPEN

## 2023-04-26 ASSESSMENT — REFRACTION_WEARINGRX
OD_SPHERE: -3.00
OS_ADD: +2.75
OS_CYLINDER: SPHERE
OS_SPHERE: +0.50
OD_ADD: +2.75
OD_CYLINDER: SPHERE

## 2023-04-26 ASSESSMENT — CONF VISUAL FIELD
OD_INFERIOR_NASAL_RESTRICTION: 3
OS_INFERIOR_TEMPORAL_RESTRICTION: 0
OS_SUPERIOR_TEMPORAL_RESTRICTION: 0
OD_INFERIOR_TEMPORAL_RESTRICTION: 3
OD_SUPERIOR_TEMPORAL_RESTRICTION: 3
OS_NORMAL: 1
METHOD: COUNTING FINGERS
OS_SUPERIOR_NASAL_RESTRICTION: 0
OS_INFERIOR_NASAL_RESTRICTION: 0

## 2023-04-26 ASSESSMENT — EXTERNAL EXAM - LEFT EYE
OS_EXAM: NORMAL
OS_EXAM: NORMAL

## 2023-04-26 ASSESSMENT — VISUAL ACUITY
OS_SC: 20/20
METHOD: SNELLEN - LINEAR
OD_SC: 20/250
OD_PH_SC: 20/200

## 2023-04-26 ASSESSMENT — EXTERNAL EXAM - RIGHT EYE
OD_EXAM: NORMAL
OD_EXAM: NORMAL

## 2023-04-26 ASSESSMENT — CUP TO DISC RATIO
OS_RATIO: 0.6
OS_RATIO: 0.6

## 2023-04-26 ASSESSMENT — SLIT LAMP EXAM - LIDS
COMMENTS: MILD PTOSIS
COMMENTS: NORMAL
COMMENTS: NORMAL
COMMENTS: MILD PTOSIS

## 2023-04-26 NOTE — PROGRESS NOTES
CC: subluxated lens right eye     INTERVAL HISTORY - Saw Dr Shelby today who said the IOL is more dislocated today. He recommended consideration of surgery for repositioning.   .   HPI -   Brycebraden Roth is a 77 year old patient initially presented for referral from Dr Shelby for VMT right eye. Problem with focusing at distance and near. Now s/p PPV/MP for ERM and VMT right eye 6/7/21. S/P CE IOL left eye 2/2021.       PAST OCULAR SURGERY  CE IOL OU  S/P PPV/MP right eye 6/2021    RETINAL IMAGING:  OCT 10/25/22   OD -no VMT, no traction on fovea, no SRF/IRF,  (traction last seen 4/2021)  OS - normal foveal contour ; small ERM predominantly nasally     B scan: 4/26/23: right eye retina attached with buckle     ASSESSMENT & PLAN    0. Subluxated IOL, right eye   - s/p trauma ~10 years ago   - decreased vision x 1 months; noted in exam w/ Afsaneh today   - Vision potential appears to be at least 20/50, VA today 20/250   - Endopigment and guttae on exam bilaterally are concerning for fuchs   - Scleral fixated IOL may be preferred over ACIOL given endothelial health   - there is peritomy conj scarring and encircling band in the eye; possible RD after ocular trauma ~10 yrs ago; possibly performed at [facility]   - notes from [facility] will be reviewed     - IOL calcs done 2020   - recommend surgery with IOL explantation and placement of a scleral-fixated IOL     - After explaining all risks, benefits and alternatives of the surgery including but not limited to endophthalmitis (rare but a devastating complication that will need antibiotics injection and more surgeries), retinal detachment (will need additional surgeries), dislocation of the lens in future, high or low intraocular pressure that may need medications surgeries, need for face down positioning if gas or oil bubble placed in the eye were discussed with the patient. We discussed about the benefits of PPV surgery and its alternatives. All the questions answered. Romayne agreed  and wanted to proceed.      - case request placed; 120 minutes; resident/fellow participation   - POD1 and POW1 and POM1      1. Vitreoretinal adhesion right eye  2. H.o ERM right eye   S/P PPV/MP right eye 6/2021  Foveal anatomy improved significantly; BCVA 20/50 limited likely due to subtle IS/OS disruption at fovea (+drusen)    2. Pseudophakia each eye   observe    3. Glaucoma suspect ou  Large disc with large cup each eye  Being followed by Dr. Shelby  Everything stable    Disposition:    RTC for POD1 and POW1     Nazario Velazquez MD  Vitreoretinal Surgical Fellow, PGY6  [facility]        Complete documentation of historical and exam elements from today's encounter can be found in the full encounter summary report (not reduplicated in this progress note). I personally obtained the chief complaint(s) and history of present illness.  I confirmed and edited as necessary the review of systems, past medical/surgical history, family history, social history, and examination findings as documented by others; and I examined the patient myself. I personally reviewed the relevant tests, images, and reports as documented above. I formulated and edited as necessary the assessment and plan and discussed the findings and management plan with the patient and family.     Fede Donovan MD

## 2023-04-26 NOTE — NURSING NOTE
Chief Complaints and History of Present Illnesses   Patient presents with     Glaucoma Suspect Follow Up     1 year follow up both eyes.     Chief Complaint(s) and History of Present Illness(es)     Glaucoma Suspect Follow Up            Comments: 1 year follow up both eyes.          Comments    Pt states vision is about the same as last visit. No eye pain today.  No new flashes or floaters. No redness or dryness.    MARK Petersen April 26, 2023 7:38 AM

## 2023-04-26 NOTE — TELEPHONE ENCOUNTER
----- Message from Marry Rodriguez MD sent at 4/26/2023 11:15 AM CDT -----  Please call patient. See below.  Id like to put in a breast surgeon consult       Estevan Ms. Roth,  Your results came back and diagnostic left breast imaging was fine but noted during u/s that her right-sided mastectomy scar demonstrated partial dehiscence and scabbing & associated erythema in the region. That she then reported had been present for 1 week.  Targeted, real-time ultrasound evaluation was performed by the technologist and radiologist.In the right breast in the region of patient's scar there were findings related to scarring and postoperative change without demonstrated mass or collection.  I n the left breast, medially there were no suspicious mammographic findings.  Recommend a surgical consultation.for the partially dehisced right mastectomy scar from surgery performed 14-15 years ago. Consider either skin biopsy or MRI for further evaluation if there is clinical concern.   If you have any further concerns please do not hesitate to contact us by message, phone or making an appointment.  Have a good day   Dr Michael SCOTT

## 2023-04-26 NOTE — LETTER
Romayne L Sathre  [address]  [place] [address]        April 26, 2023    Date of Exam:     Dear Romayne:    Thank you for your recent visit.     Breast Imaging Result: We are pleased to inform you that the results of your recent breast imaging show no evidence of malignancy (cancer).    Breast Density: Your breast imaging shows that you have dense breast tissue. This means you have slightly more risk of getting breast cancer. It also means your breast imaging will be harder to read. While it's harder to read, it's still important to do breast imaging. In fact, yearly breast imaging is even more important for anyone at higher risk. You may need to do more testing if you have enough risk.    If you are having any problems such as a lump or pain in your breast, please discuss this with your health care team if you haven't already. You may need more testing.    As you know, it's important to find cancer early. Not all cancers are found through breast imaging, but it's the most accurate method for finding cancer early. A complete check should include breast imaging, physical exams, and breast self-exams. The American College of Radiology and the Society of Breast Imaging advises that yearly breast imaging should start at age 40.    A report of your breast imaging results was sent to: Marry Rodriguez    Your breast imaging will become part of your medical file here at [facility] for at least 10 years. You are responsible for telling any new health care team or breast imaging site of the date and place of this exam.     We appreciate the opportunity to participate in your health care.    Sincerely,  DO Kiley Coello MD  [facility] [place]

## 2023-04-26 NOTE — PROGRESS NOTES
New Patient Oncology Nurse Navigator Note     Referring provider: Marry Rodriguez MD     Referring Clinic/Organization: [facility]     Referred to (specialty:) Breast Provider Referral     Date Referral Received: April 26, 2023     Evaluation for:    Z85.3 (ICD-10-CM) - History of breast cancer   Z90.11 (ICD-10-CM) - S/P mastectomy, right   T81.30XA (ICD-10-CM) - Wound dehiscence      Clinical History (per Nurse review of records provided):      Patient presented for left screening mammogram on 4/14/23 and a possible asymmetry in the left breast on the MLO view in the retroareolar plane position, middle depth.    2008  Right breast abnormality was identified through a routine physical examination.    8/28/08 -   A: FNA, right axilla   B: Right breast core needle biopsy, 9:00   C: Right breast core needle biopsy, 10:00     FINAL DIAGNOSIS:   A.  Lymph node, right axilla, fine-needle aspirate - Metastatic carcinoma consistent with breast primary.   B.  Breast, right, ultrasound-guided core biopsy at 9:00, 3 cm from nipple - Infiltrating ductal carcinoma, [place] grade 3 Part B, tumor at 9:00. ESTROGEN RECEPTORS: Negative (0% of tumor cells guero). PROGESTERONE RECEPTORS: Negative (0% of tumor cells guero).   C.  Breast, right, ultrasound-guided core biopsy at 10:00, 9 cm from nipple - Infiltrating ductal carcinoma, Luz grade 3 Part C, tumor at 10:00. ESTROGEN RECEPTORS: Positive (greater than 90% of tumor cells guero). PROGESTERONE RECEPTORS: Positive (greater than 90% of tumor cells guero).    MASTECTOMY DATE/PATHOLOGY REPORT NEEDED  Subsequently had right modified radical mastectomy for biopsy proven 2 foci breast cancer on the right side.  Final pathology indicating 9 o'clock, 3 cm from the nipple, 2.3-cm infiltrating ductal cancer, grade 3, ER/MD negative, HER-2 positive, FISH ratio 5.4 and the 10 o'clock, 9 cm from the nipple, infiltrating ductal cancer, 2.4-cm, grade 3, ER/MD  greater than 90% positive, HER-2/jraen negative.  There were 2/7 positive lymph nodes.      She saw Dr. Yoselin Garrido and chemotherapy was planned with [facility]    Radiation - finished post-mastectomy radiation initiaed on 4/14/2009 and completed 5/29/2009. ([facility]) Adeel Glez MD    Plastics - She has expander infection problems and it eventually was removed.     Most recent note available with Dr. Garrido was on 8/6/2009.    Records Location: Care Everywhere, Media and See Bookmarked material    Records Needed:    Path reports-biopsy and surgery (as applicable)    Pathology reviews (as applicable)    Med onc notes, rad notes, OP note, surgeons note (as applicable)    Genetic results (as applicable)    Echo or MUGA results (as applicable)    Radiation summary (as applicable)    Chemotherapy summary(as applicable)

## 2023-04-26 NOTE — RESULT ENCOUNTER NOTE
Estevan Ms. Roth,  Your results came back and left breast diagnostic imaging mammo and u/s was normal but Recommend surgical consult for the partially dehisced right mastectomy scar from surgery performed 14-15 years ago. Consider either skin biopsy or MRI for further evaluation if there is clinical concern.   If you have any further concerns please do not hesitate to contact us by message, phone or making an appointment.  Have a good day   Dr Michael SCOTT

## 2023-04-26 NOTE — RESULT ENCOUNTER NOTE
Please call patient. See below.  Id like to put in a breast surgeon consult       Estevan Ms. Roth,  Your results came back and diagnostic left breast imaging was fine but noted during u/s that her right-sided mastectomy scar demonstrated partial dehiscence and scabbing & associated erythema in the region. That she then reported had been present for 1 week.  Targeted, real-time ultrasound evaluation was performed by the technologist and radiologist.In the right breast in the region of patient's scar there were findings related to scarring and postoperative change without demonstrated mass or collection.  I n the left breast, medially there were no suspicious mammographic findings.  Recommend a surgical consultation.for the partially dehisced right mastectomy scar from surgery performed 14-15 years ago. Consider either skin biopsy or MRI for further evaluation if there is clinical concern.   If you have any further concerns please do not hesitate to contact us by message, phone or making an appointment.  Have a good day   Dr Michael SCOTT

## 2023-04-26 NOTE — TELEPHONE ENCOUNTER
PT called back.  Reviewed entire message from Dr. Rodriguez.  She will take the breast surgeon referral.  Did I pend the right order?  Pt is fine seeing a different surgeon from her last surgeon 15 years ago.    OK to call pt on cell or home #,  Cell phone 1st.  LENORA Roque

## 2023-04-26 NOTE — PROGRESS NOTES
Chief Complaint(s) and History of Present Illness(es)     Glaucoma Suspect Follow Up            Comments: 1 year follow up both eyes.          Comments    Pt states vision is about the same as last visit. No eye pain today.  No new flashes or floaters. No redness or dryness.    MARK Petersen April 26, 2023 7:38 AM                   Review of systems for the eyes was negative other than the pertinent positives/negatives listed in the HPI.      Assessment & Plan      Romayne L Sathre is a 77 year old female with the following diagnoses:   1. Glaucoma suspect of both eyes    2. Pseudophakia of both eyes    3. Epiretinal membrane, right    4. Vitreomacular adhesion of right eye         Here for annual dilated fundus exam   Denies vision changes or concerns  s/p pars plana vitrectomy (PPV) right eye    Intraocular lens-bag complex more dislocated now right eye  No posterior view.  Unable to get OCT today  Sent for Bscan, will try to see Venus today to discuss surgery for repositioning v exchange    Left eye looks good  Intraocular pressure acceptable  OCT Nerve fiber layer left eye stable and remains within normal limits    TMax unknown  Family history: negative  Trauma history: positive - right eye with remote hx of trauma.   Pachmetry: 571/568  Monocular precautions      Patient disposition:   Venus today if possible  Return in about 6 months (around 10/26/2023) for VT only, OCT NFL.           Attending Physician Attestation:  Complete documentation of historical and exam elements from today's encounter can be found in the full encounter summary report (not reduplicated in this progress note).  I personally obtained the chief complaint(s) and history of present illness.  I confirmed and edited as necessary the review of systems, past medical/surgical history, family history, social history, and examination findings as documented by others; and I examined the patient myself.  I personally reviewed the  relevant tests, images, and reports as documented above.  I formulated and edited as necessary the assessment and plan and discussed the findings and management plan with the patient and family. . - Ruben Shelby MD

## 2023-04-26 NOTE — TELEPHONE ENCOUNTER
Called patient's home number: Left message to call back and ask to speak with an available triage nurse.    Called patient's mobile number: phone rang without being answered and no option to leave voicemail.    MARY PartidaN, RN-BC  [facility]

## 2023-04-26 NOTE — TELEPHONE ENCOUNTER
Writer called patient, informed her Dr. Rodriguez ordered breast surgeon referral and reviewed scheduling number.    Patient verbalized understanding and in agreement with plan.    MARY PartidaN, RN-[facility]

## 2023-04-27 ENCOUNTER — PATIENT OUTREACH (OUTPATIENT)
Dept: ONCOLOGY | Facility: CLINIC | Age: 78
End: 2023-04-27

## 2023-04-27 ENCOUNTER — ONCOLOGY VISIT (OUTPATIENT)
Dept: SURGERY | Facility: CLINIC | Age: 78
End: 2023-04-27
Attending: PHYSICIAN ASSISTANT
Payer: COMMERCIAL

## 2023-04-27 VITALS
BODY MASS INDEX: 37.99 KG/M2 | SYSTOLIC BLOOD PRESSURE: 149 MMHG | WEIGHT: 249.1 LBS | DIASTOLIC BLOOD PRESSURE: 83 MMHG | RESPIRATION RATE: 16 BRPM | OXYGEN SATURATION: 96 % | HEART RATE: 70 BPM | TEMPERATURE: 98 F

## 2023-04-27 DIAGNOSIS — Z80.0 FAMILY HISTORY OF PANCREATIC CANCER: ICD-10-CM

## 2023-04-27 DIAGNOSIS — Z90.11 S/P MASTECTOMY, RIGHT: ICD-10-CM

## 2023-04-27 DIAGNOSIS — Z85.3 HISTORY OF BREAST CANCER: ICD-10-CM

## 2023-04-27 DIAGNOSIS — Z80.3 FAMILY HISTORY OF BREAST CANCER: Primary | ICD-10-CM

## 2023-04-27 DIAGNOSIS — T81.30XA WOUND DEHISCENCE: ICD-10-CM

## 2023-04-27 PROCEDURE — G0463 HOSPITAL OUTPT CLINIC VISIT: HCPCS | Performed by: PHYSICIAN ASSISTANT

## 2023-04-27 PROCEDURE — 88341 IMHCHEM/IMCYTCHM EA ADD ANTB: CPT | Mod: 26 | Performed by: PATHOLOGY

## 2023-04-27 PROCEDURE — 11104 PUNCH BX SKIN SINGLE LESION: CPT | Performed by: PHYSICIAN ASSISTANT

## 2023-04-27 PROCEDURE — 88305 TISSUE EXAM BY PATHOLOGIST: CPT | Mod: TC | Performed by: PHYSICIAN ASSISTANT

## 2023-04-27 PROCEDURE — 88305 TISSUE EXAM BY PATHOLOGIST: CPT | Mod: 26 | Performed by: PATHOLOGY

## 2023-04-27 PROCEDURE — 99203 OFFICE O/P NEW LOW 30 MIN: CPT | Mod: 25 | Performed by: PHYSICIAN ASSISTANT

## 2023-04-27 PROCEDURE — 88342 IMHCHEM/IMCYTCHM 1ST ANTB: CPT | Mod: 26 | Performed by: PATHOLOGY

## 2023-04-27 RX ORDER — CEPHALEXIN 500 MG/1
500 CAPSULE ORAL 3 TIMES DAILY
Qty: 15 CAPSULE | Refills: 0 | Status: SHIPPED | OUTPATIENT
Start: 2023-04-27 | End: 2023-05-02

## 2023-04-27 ASSESSMENT — PAIN SCALES - GENERAL: PAINLEVEL: NO PAIN (0)

## 2023-04-27 NOTE — PROGRESS NOTES
NEW CONSULTATION   Apr 27, 2023     Romayne L Sathre is a 77 year old woman with history of right breast cancer, now with a mastectomy site wound.     HPI:    Medical history of right breast cancer, chronic kidney disease, pulmonary embolism/DVT, thyroid disease.     Family history of brother with pancreatic cancer.     She was found on screening mammogram in 2008 to have 2 right breast mass. Biopsy demonstrated infiltrating ductal carcinoma and lymph node metastasis. She underwent a right mastectomy and lymph node dissection. She followed with medical oncologist Dr. Yoselin Garrido for chemotherapy. She had radiation at [facility]. She had an expander implant infection and the expander was removed. Her most recent visit with Dr. Garrido was in 2009.     She had a left screening mammogram 4/14/23 that demonstrated an asymmetry. Follow up diagnostic mammogram and ultrasound were negative. She was noted to have a right mastectomy site wound. Ultrasound of this area did not show any mass.     She reports today she has had a wound at her right mastectomy site for a month. She is putting neosporin on it. It is not getting better. She denies any mass in the area. She denies any axillary mass, breast mass, chest wall mass, left breast skin change, left breast nipple inversion, left breast nipple discharge, headaches, change in vision, GI/abdominal symptoms, change in chronic back pain. Her and her daughter report they are eating well and eat a high protein low sugar diet. She does not smoke and does not have diabetes.     BREAST-SPECIFIC HISTORY:    Previous breast imaging: Yes   - 12/30/04 Smammo BI-RADS 1  - 3/14/06 Smammo BI-RADS 1  - 8/7/08 Smammo: right 1 cm nodular density very deep in upper outer quadrant BI-RADS 0  - 8/28/08 right ultrasound guilded breast core needle biopsy   9:00 3 cm FN: infiltrating ductal carcinoma, grade 3  10:00 9 cm FN infiltrating ductal carcinoma, grade 3  ER/[place] negative  Right  axillary lymph node biopsy: metastatic carcinoma   - 9/9/08 breast MRI: right breast 9:00 3 cm FN 2.4 cm mass, 10:00 9 cm FN 2.4 cm mass, enlarged right axillary lymph nodes.  MRI brain no mets  - 7/31/12 left Smammo BI-RADS 2  - 1/13/16 left Smammo BI-RADS 2  - 5/1/18 left Smammo BI-RADS 1  - 4/12/22 left Smammo BI-RADS 1  - 4/14/23 left Smammo: left breast asymmetry BI-RADS 0  - 4/26/23 left Dmammo and bilateral breast ultrasound: BI-RADS 2    Prior breast biopsies/surgeries: Yes   - 8/28/08 right ultrasound guilded breast core needle biopsy   9:00 3 cm FN: infiltrating ductal carcinoma, grade 3  10:00 9 cm FN infiltrating ductal carcinoma, grade 3  ER/NH negative  Right axillary lymph node biopsy: metastatic carcinoma   - 2008 right mastectomy and lymph node dissection:   9:00 3 cm FN infiltrating ductal carcinoma, grade 3, ER/NH negative, HER2 postive  10:00 9 cm FN infiltrating ductal carcinoma, grade 3 ER/NH positive, HER2 negative.   - 2008 right expander implant removed.     Prior history of breast cancer or DCIS: Yes   - 2008 right breast cancer s/p right mastectomy, right axillary sentinel lymph node biopsy, chemotherapy, and radiation.   9:00 3 cm FN infiltrating ductal carcinoma, grade 3, ER/NH negative, HER2 postive  10:00 9 cm FN infiltrating ductal carcinoma, grade 3 ER/NH positive, HER2 negative.     Prior radiation history: Yes   - 2008 for right breast cancer with lymph node metastasis    Self breast exams: Yes  Breast density: heterogeneously dense    FAMILY HISTORY:  Breast ca: Yes   - sister, 40's/50's  Ovarian ca: No  Pancreatic ca: Yes   - brother, 50's  Prostate: No  Gastric ca: No  Melanoma: No  Colon ca: No  Other cancer: No  Other genetic, testing, syndromes, or clotting disorders: no     PAST MEDICAL HISTORY  Past Medical History:   Diagnosis Date     Antiplatelet or antithrombotic long-term use      Breast cancer (H) 08/26/2008    Right Breast     Chronic fatigue 05/19/2020     CKD  (chronic kidney disease) stage 3, GFR 30-59 ml/min (H) 09/17/2010     Combined form of age-related cataract, left eye 10/20/2020    Added automatically from request for surgery [id number]     Health Care Home 07/17/2012    formerly [facility] for . Margarita Lawler RN-BSN, [facility] [phone]  DX V65.8 REPLACED WITH [id number] HEALTH CARE HOME (04/08/2013)     Heart burn 05/19/2020     History of 2019 novel coronavirus disease (COVID-19) 11/16/2020    Seen in urgent care on 11/5/2020 for fever cough dizziness abdominal pain nausea was given Bactrim for cystitis tested for COVID discharged home and testing came back positive for Covid.  Seen in ER 11/16/2020 for shortness of breath sinus tachycardia and severe hypoxia.  Was admitted for worsening respiratory symptoms prompting her to seek further evaluation on 11/17/20 at [facility]. She was found to      History of acute respiratory failure 11/16/2020    With covid , complicated by PE and DVT, on oxygen and eliquis 3 months, completely resolved as of 2/2021     Hyperlipidemia LDL goal <100 11/29/2010     Hypertension goal BP (blood pressure) < 140/90 09/17/2010     Hypothyroid 2002     Lipoma of other skin and subcutaneous tissue 11/19/2004     Nonsenile cataract      Osteoporosis 2014     Other psoriasis      Physical deconditioning 05/19/2020     Positive colorectal cancer screening using Cologuard test 06/26/2022     Thyroid disease      PAST SURGICAL HISTORY   Past Surgical History:   Procedure Laterality Date     COLONOSCOPY N/A 9/7/2022    Procedure: COLONOSCOPY, WITH POLYPECTOMY;  Surgeon: Leventhal, Thomas Michael, MD;  Location: [facility] CATARACT EXTRACAP,INSERT LENS, W/O ECP Right 05/29/2002    Dr. Clinton Lopez ([id number], Power:  20.0D)     PHACOEMULSIFICATION CLEAR CORNEA WITH STANDARD INTRAOCULAR LENS IMPLANT Left 2/22/2021    Procedure: LEFT EYE PHACOEMULSIFICATION, CATARACT, WITH INTRAOCULAR LENS IMPLANT;  Surgeon: Ruben Shelby MD;   Location: [facility] OR     SURGICAL HISTORY OF -   Oct 3, 2008    Rt saline implant     VITRECTOMY PARSPLANA WITH 25 GAUGE SYSTEM Right 6/7/2021    Procedure: 1) Pars plana vitrectomy (PPV) 25g, Right eye,  2) Membrane stripping and stripping of  internal limiting membrane,;  Surgeon: Fede Danielson MD;  Location:  OR     MEDICATIONS  Current Outpatient Medications   Medication Sig Dispense Refill     alendronate (FOSAMAX) 70 MG tablet Take 1 tablet (70 mg) by mouth every 7 days 12 tablet 3     atorvastatin (LIPITOR) 20 MG tablet Take 1 tablet (20 mg) by mouth daily 90 tablet 1     calcium carbonate-vitamin D (OSCAL W/D) 500-200 MG-UNIT tablet Take 1 tablet by mouth daily Taking every 3 dasy or so       cetirizine (ZYRTEC) 10 MG tablet Take 10 mg by mouth daily       Cyanocobalamin (B-12 PO)        famotidine (PEPCID) 20 MG tablet Take 20 mg by mouth daily       levothyroxine (SYNTHROID/LEVOTHROID) 150 MCG tablet 1 tab 6 days a week & 1.5 tab one day a week & recheck tsh in 6 weeks for further adjustment 45 tablet 0     lisinopril (ZESTRIL) 20 MG tablet Take 1 tablet (20 mg) by mouth At Bedtime 90 tablet 1     Multiple Vitamin (MULTI-VITAMIN) per tablet Take 1 tablet by mouth daily       TURMERIC PO Take 1 tablet by mouth daily        ALLERGIES  Allergies   Allergen Reactions     No Known Drug Allergy         SOCIAL HISTORY:  Smokes: No  EtOH: Yes  Lives with her daughter, grand daughter, and cats.     ROS:   Change in vision No  Headaches: no  Respiratory: No shortness of breath, dyspnea on exertion, cough, or hemoptysis   Cardiovascular: negative   Gastrointestinal: negative Abdominal pain: no  Breast: right breast mass.  Musculoskeletal: chronic back pain  Psychiatric: negative  Hematologic/Lymphatic/Immunologic: negative  Endocrine: negative    EXAM  BP (!) 149/83   Pulse 70   Temp 98  F (36.7  C) (Oral)   Resp 16   Wt 113 kg (249 lb 1.6 oz)   SpO2 96%   BMI 37.99 kg/m     PHYSICAL  EXAM  Respiratory: breathing non labored.   Breasts: Examination was done in both the upright and supine positions. Left breast without mass, skin change, nipple inversion or nipple discharge.   Right mastectomy scar with 3x0.5 cm superficial wound with fibrinous tissue at the base. There is erythema surrounding the wound. There is firmness along the scar but no mass.   No clavicular, cervical, or axillary lymphadenopathy.     INVESTIGATIONS:    Verbal consent was obtained for punch biopsy of the skin and wound.   The skin was cleaned with a chlorhexidine swab. 1 ml of 1% lidocaine was used for local anesthetic. 5 mm punch biopsy was obtained from the superior lateral portion of the wound edge. 2 4-0 Monocryl sutures were placed. Primapore bandage was placed over the top.     ASSESSMENT/PLAN:    Romayne L Sathre is a 77 year old woman with history of right breast cancer in 2008 now with a wound at her mastectomy site. Punch biopsy was obtained today. She was given a prescription for keflex. Referral placed for genetic counseling. Follow up in 1 week.     Evelina Phillips PA-C    30 minutes spent on the date of the encounter doing chart review, review of test results, interpretation of tests, patient visit and documentation.

## 2023-04-27 NOTE — LETTER
4/27/2023         RE: Romayne L Sathre  [address]  [place] [address]        Dear Colleague,    Thank you for referring your patient, Romayne L Sathre, to the [facility]. Please see a copy of my visit note below.    NEW CONSULTATION   Apr 27, 2023     Romayne L Sathre is a 77 year old woman with history of right breast cancer, now with a mastectomy site wound.     HPI:    Medical history of right breast cancer, chronic kidney disease, pulmonary embolism/DVT, thyroid disease.     Family history of brother with pancreatic cancer.     She was found on screening mammogram in 2008 to have 2 right breast mass. Biopsy demonstrated infiltrating ductal carcinoma and lymph node metastasis. She underwent a right mastectomy and lymph node dissection. She followed with medical oncologist Dr. Yoselin Garrido for chemotherapy. She had radiation at [facility]. She had an expander implant infection and the expander was removed. Her most recent visit with Dr. Garrido was in 2009.     She had a left screening mammogram 4/14/23 that demonstrated an asymmetry. Follow up diagnostic mammogram and ultrasound were negative. She was noted to have a right mastectomy site wound. Ultrasound of this area did not show any mass.     She reports today she has had a wound at her right mastectomy site for a month. She is putting neosporin on it. It is not getting better. She denies any mass in the area. She denies any axillary mass, breast mass, chest wall mass, left breast skin change, left breast nipple inversion, left breast nipple discharge, headaches, change in vision, GI/abdominal symptoms, change in chronic back pain. Her and her daughter report they are eating well and eat a high protein low sugar diet. She does not smoke and does not have diabetes.     BREAST-SPECIFIC HISTORY:    Previous breast imaging: Yes   - 12/30/04 Smammo BI-RADS 1  - 3/14/06 Smammo BI-RADS 1  - 8/7/08 Smammo:  right 1 cm nodular density very deep in upper outer quadrant BI-RADS 0  - 8/28/08 right ultrasound guilded breast core needle biopsy   9:00 3 cm FN: infiltrating ductal carcinoma, grade 3  10:00 9 cm FN infiltrating ductal carcinoma, grade 3  ER/IN negative  Right axillary lymph node biopsy: metastatic carcinoma   - 9/9/08 breast MRI: right breast 9:00 3 cm FN 2.4 cm mass, 10:00 9 cm FN 2.4 cm mass, enlarged right axillary lymph nodes.  MRI brain no mets  - 7/31/12 left Smammo BI-RADS 2  - 1/13/16 left Smammo BI-RADS 2  - 5/1/18 left Smammo BI-RADS 1  - 4/12/22 left Smammo BI-RADS 1  - 4/14/23 left Smammo: left breast asymmetry BI-RADS 0  - 4/26/23 left Dmammo and bilateral breast ultrasound: BI-RADS 2    Prior breast biopsies/surgeries: Yes   - 8/28/08 right ultrasound guilded breast core needle biopsy   9:00 3 cm FN: infiltrating ductal carcinoma, grade 3  10:00 9 cm FN infiltrating ductal carcinoma, grade 3  ER/IN negative  Right axillary lymph node biopsy: metastatic carcinoma   - 2008 right mastectomy and lymph node dissection:   9:00 3 cm FN infiltrating ductal carcinoma, grade 3, ER/IN negative, HER2 postive  10:00 9 cm FN infiltrating ductal carcinoma, grade 3 ER/IN positive, HER2 negative.   - 2008 right expander implant removed.     Prior history of breast cancer or DCIS: Yes   - 2008 right breast cancer s/p right mastectomy, right axillary sentinel lymph node biopsy, chemotherapy, and radiation.   9:00 3 cm FN infiltrating ductal carcinoma, grade 3, ER/IN negative, HER2 postive  10:00 9 cm FN infiltrating ductal carcinoma, grade 3 ER/IN positive, HER2 negative.     Prior radiation history: Yes   - 2008 for right breast cancer with lymph node metastasis    Self breast exams: Yes  Breast density: heterogeneously dense    FAMILY HISTORY:  Breast ca: Yes   - sister, 40's/50's  Ovarian ca: No  Pancreatic ca: Yes   - brother, 50's  Prostate: No  Gastric ca: No  Melanoma: No  Colon ca: No  Other cancer:  No  Other genetic, testing, syndromes, or clotting disorders: no     PAST MEDICAL HISTORY  Past Medical History:   Diagnosis Date    Antiplatelet or antithrombotic long-term use     Breast cancer (H) 08/26/2008    Right Breast    Chronic fatigue 05/19/2020    CKD (chronic kidney disease) stage 3, GFR 30-59 ml/min (H) 09/17/2010    Combined form of age-related cataract, left eye 10/20/2020    Added automatically from request for surgery [id number]    Health Care Home 07/17/2012    [facility] for . Margarita Lawler RN-BSN, [facility] [phone]  DX V65.8 REPLACED WITH [id number] [facility]ALTH CARE HOME (04/08/2013)    Heart burn 05/19/2020    History of 2019 novel coronavirus disease (COVID-19) 11/16/2020    Seen in urgent care on 11/5/2020 for fever cough dizziness abdominal pain nausea was given Bactrim for cystitis tested for COVID discharged home and testing came back positive for Covid.  Seen in ER 11/16/2020 for shortness of breath sinus tachycardia and severe hypoxia.  Was admitted for worsening respiratory symptoms prompting her to seek further evaluation on 11/17/20 at [facility]. She was found to     History of acute respiratory failure 11/16/2020    With covid , complicated by PE and DVT, on oxygen and eliquis 3 months, completely resolved as of 2/2021    Hyperlipidemia LDL goal <100 11/29/2010    Hypertension goal BP (blood pressure) < 140/90 09/17/2010    Hypothyroid 2002    Lipoma of other skin and subcutaneous tissue 11/19/2004    Nonsenile cataract     Osteoporosis 2014    Other psoriasis     Physical deconditioning 05/19/2020    Positive colorectal cancer screening using Cologuard test 06/26/2022    Thyroid disease      PAST SURGICAL HISTORY   Past Surgical History:   Procedure Laterality Date    COLONOSCOPY N/A 9/7/2022    Procedure: COLONOSCOPY, WITH POLYPECTOMY;  Surgeon: Leventhal, Thomas Michael, MD;  Location: [facility] OR     REMV CATARACT EXTRACAP,INSERT LENS, W/O ECP Right 05/29/2002    Dr. Alonzo  Lopez ([id number], Power:  20.0D)    PHACOEMULSIFICATION CLEAR CORNEA WITH STANDARD INTRAOCULAR LENS IMPLANT Left 2/22/2021    Procedure: LEFT EYE PHACOEMULSIFICATION, CATARACT, WITH INTRAOCULAR LENS IMPLANT;  Surgeon: Ruben Shelby MD;  Location: [facility] OR    SURGICAL HISTORY OF -   Oct 3, 2008    Rt saline implant    VITRECTOMY PARSPLANA WITH 25 GAUGE SYSTEM Right 6/7/2021    Procedure: 1) Pars plana vitrectomy (PPV) 25g, Right eye,  2) Membrane stripping and stripping of  internal limiting membrane,;  Surgeon: Fede Danielson MD;  Location: [facility] OR     MEDICATIONS  Current Outpatient Medications   Medication Sig Dispense Refill    alendronate (FOSAMAX) 70 MG tablet Take 1 tablet (70 mg) by mouth every 7 days 12 tablet 3    atorvastatin (LIPITOR) 20 MG tablet Take 1 tablet (20 mg) by mouth daily 90 tablet 1    calcium carbonate-vitamin D (OSCAL W/D) 500-200 MG-UNIT tablet Take 1 tablet by mouth daily Taking every 3 dasy or so      cetirizine (ZYRTEC) 10 MG tablet Take 10 mg by mouth daily      Cyanocobalamin (B-12 PO)       famotidine (PEPCID) 20 MG tablet Take 20 mg by mouth daily      levothyroxine (SYNTHROID/LEVOTHROID) 150 MCG tablet 1 tab 6 days a week & 1.5 tab one day a week & recheck tsh in 6 weeks for further adjustment 45 tablet 0    lisinopril (ZESTRIL) 20 MG tablet Take 1 tablet (20 mg) by mouth At Bedtime 90 tablet 1    Multiple Vitamin (MULTI-VITAMIN) per tablet Take 1 tablet by mouth daily      TURMERIC PO Take 1 tablet by mouth daily        ALLERGIES  Allergies   Allergen Reactions    No Known Drug Allergy         SOCIAL HISTORY:  Smokes: No  EtOH: Yes  Lives with her daughter, grand daughter, and cats.     ROS:   Change in vision No  Headaches: no  Respiratory: No shortness of breath, dyspnea on exertion, cough, or hemoptysis   Cardiovascular: negative   Gastrointestinal: negative Abdominal pain: no  Breast: right breast mass.  Musculoskeletal: chronic back pain  Psychiatric:  negative  Hematologic/Lymphatic/Immunologic: negative  Endocrine: negative    EXAM  BP (!) 149/83   Pulse 70   Temp 98  F (36.7  C) (Oral)   Resp 16   Wt 113 kg (249 lb 1.6 oz)   SpO2 96%   BMI 37.99 kg/m     PHYSICAL EXAM  Respiratory: breathing non labored.   Breasts: Examination was done in both the upright and supine positions. Left breast without mass, skin change, nipple inversion or nipple discharge.   Right mastectomy scar with 3x0.5 cm superficial wound with fibrinous tissue at the base. There is erythema surrounding the wound. There is firmness along the scar but no mass.   No clavicular, cervical, or axillary lymphadenopathy.     INVESTIGATIONS:    Verbal consent was obtained for punch biopsy of the skin and wound.   The skin was cleaned with a chlorhexidine swab. 1 ml of 1% lidocaine was used for local anesthetic. 5 mm punch biopsy was obtained from the superior lateral portion of the wound edge. 2 4-0 Monocryl sutures were placed. Primapore bandage was placed over the top.     ASSESSMENT/PLAN:    Romayne L Sathre is a 77 year old woman with history of right breast cancer in 2008 now with a wound at her mastectomy site. Punch biopsy was obtained today. She was given a prescription for keflex. Referral placed for genetic counseling. Follow up in 1 week.     Evelina Phillips PA-C    30 minutes spent on the date of the encounter doing chart review, review of test results, interpretation of tests, patient visit and documentation.

## 2023-04-27 NOTE — NURSING NOTE
"Oncology Rooming Note    April 27, 2023 8:12 AM   Romayne L Sathre is a 77 year old female who presents for:    Chief Complaint   Patient presents with     Oncology Clinic Visit     [facility] for Assessment of Mastectomy Site     Initial Vitals: Blood Pressure (Abnormal) 149/83   Pulse 70   Temperature 98  F (36.7  C) (Oral)   Respiration 16   Weight 113 kg (249 lb 1.6 oz)   Oxygen Saturation 96%   Body Mass Index 37.99 kg/m   Estimated body mass index is 37.99 kg/m  as calculated from the following:    Height as of 3/28/23: 1.725 m (5' 7.9\").    Weight as of this encounter: 113 kg (249 lb 1.6 oz). Body surface area is 2.33 meters squared.  No Pain (0) Comment: Data Unavailable   No LMP recorded. Patient is postmenopausal.  Allergies reviewed: Yes  Medications reviewed: Yes    Medications: Medication refills not needed today.  Pharmacy name entered into [employer]: [id number] - [place], [place] - [address] AT [address]    Clinical concerns: none       Ita Esquivel MA            "

## 2023-04-28 ENCOUNTER — TELEPHONE (OUTPATIENT)
Dept: OPHTHALMOLOGY | Facility: CLINIC | Age: 78
End: 2023-04-28
Payer: COMMERCIAL

## 2023-04-28 PROBLEM — T85.22XA DISPLACEMENT OF INTRAOCULAR LENS, INITIAL ENCOUNTER: Status: ACTIVE | Noted: 2023-04-28

## 2023-04-28 NOTE — TELEPHONE ENCOUNTER
I called Romayne to schedule surgery with Dr. Fede Howard, I left a voicemail with callback # [phone].

## 2023-04-28 NOTE — PROGRESS NOTES
New Patient Oncology Nurse Navigator Note     Referring provider: Evelina Phillips PA-C     Referring Clinic/Organization: [facility]     Referred to (specialty:) Genetic Counseling      Date Referral Received: April 27, 2023     Evaluation for:    Z85.3 (ICD-10-CM) - History of breast cancer   Z80.0 (ICD-10-CM) - Family history of pancreatic cancer     Writer received referral, reviewed for appropriate plan, and transferred to New Patient Scheduling for completion.

## 2023-05-01 LAB
PATH REPORT.COMMENTS IMP SPEC: NORMAL
PATH REPORT.FINAL DX SPEC: NORMAL
PATH REPORT.GROSS SPEC: NORMAL
PATH REPORT.MICROSCOPIC SPEC OTHER STN: NORMAL
PATH REPORT.RELEVANT HX SPEC: NORMAL
PHOTO IMAGE: NORMAL

## 2023-05-01 NOTE — PROGRESS NOTES
FOLLOW UP  May 3, 2023     Romayne L Sathre is a 77 year old woman with history of right breast cancer, now with a mastectomy site wound.     HPI:    Medical history of right breast cancer, chronic kidney disease, pulmonary embolism/DVT, thyroid disease.     Family history of sister with breast cancer and brother with pancreatic cancer.     She was found on screening mammogram in 2008 to have 2 right breast masses. Biopsy demonstrated infiltrating ductal carcinoma and lymph node metastasis. She underwent a right mastectomy and lymph node dissection. She followed with medical oncologist Dr. Yoselin Garrido for chemotherapy. She had radiation at [facility]. She had an expander implant infection and the expander was removed. Her most recent visit with Dr. Garrido was in 2009.     She had a left screening mammogram 4/14/23 that demonstrated an asymmetry. Follow up diagnostic mammogram and ultrasound were negative. She was noted to have a right mastectomy site wound. Ultrasound of the wound did not show any mass.     She was seen last week with a wound at her right mastectomy site x1 month. Punch biopsy was performed. Biopsy demonstrated radiation related changes, no malignancy.     Today she reports the wound is healing and the erythema is removed. She denies fever, swelling, mass. She did not  the prescribed keflex.     BREAST-SPECIFIC HISTORY:    Previous breast imaging: Yes   - 12/30/04 Smammo BI-RADS 1  - 3/14/06 Smammo BI-RADS 1  - 8/7/08 Smammo: right 1 cm nodular density very deep in upper outer quadrant BI-RADS 0  - 8/28/08 right ultrasound guilded breast core needle biopsy   9:00 3 cm FN: infiltrating ductal carcinoma, grade 3  10:00 9 cm FN infiltrating ductal carcinoma, grade 3  ER/GA negative  Right axillary lymph node biopsy: metastatic carcinoma   - 9/9/08 breast MRI: right breast 9:00 3 cm FN 2.4 cm mass, 10:00 9 cm FN 2.4 cm mass, enlarged right axillary lymph nodes.  MRI brain no  mets  - 7/31/12 left Smammo BI-RADS 2  - 1/13/16 left Smammo BI-RADS 2  - 5/1/18 left Smammo BI-RADS 1  - 4/12/22 left Smammo BI-RADS 1  - 4/14/23 left Smammo: left breast asymmetry BI-RADS 0  - 4/26/23 left Dmammo and bilateral breast ultrasound: BI-RADS 2    Prior breast biopsies/surgeries: Yes   - 8/28/08 right ultrasound guilded breast core needle biopsy   9:00 3 cm FN: infiltrating ductal carcinoma, grade 3  10:00 9 cm FN infiltrating ductal carcinoma, grade 3  ER/NV negative  Right axillary lymph node biopsy: metastatic carcinoma   - 2008 right mastectomy and lymph node dissection:   9:00 3 cm FN infiltrating ductal carcinoma, grade 3, ER/NV negative, HER2 postive  10:00 9 cm FN infiltrating ductal carcinoma, grade 3 ER/NV positive, HER2 negative.   - 2008 right expander implant removed.     Prior history of breast cancer or DCIS: Yes   - 2008 right breast cancer s/p right mastectomy, right axillary sentinel lymph node biopsy, chemotherapy, and radiation.   9:00 3 cm FN infiltrating ductal carcinoma, grade 3, ER/NV negative, HER2 postive  10:00 9 cm FN infiltrating ductal carcinoma, grade 3 ER/NV positive, HER2 negative.     Prior radiation history: Yes   - 2008 for right breast cancer with lymph node metastasis    Self breast exams: Yes  Breast density: heterogeneously dense    FAMILY HISTORY:  Breast ca: Yes   - sister, 40's/50's  Ovarian ca: No  Pancreatic ca: Yes   - brother, 50's  Prostate: No  Gastric ca: No  Melanoma: No  Colon ca: No  Other cancer: No  Other genetic, testing, syndromes, or clotting disorders: no     PAST MEDICAL HISTORY  Past Medical History:   Diagnosis Date     Antiplatelet or antithrombotic long-term use      Breast cancer (H) 08/26/2008    Right Breast     Chronic fatigue 05/19/2020     CKD (chronic kidney disease) stage 3, GFR 30-59 ml/min (H) 09/17/2010     Combined form of age-related cataract, left eye 10/20/2020    Added automatically from request for surgery [id number]      Health Care Home 07/17/2012    [facility] for . Margarita Lawler RN-BSN, [facility] [phone]  DX V65.8 REPLACED WITH 23088 [facility]ALTH CARE HOME (04/08/2013)     Heart burn 05/19/2020     History of 2019 novel coronavirus disease (COVID-19) 11/16/2020    Seen in urgent care on 11/5/2020 for fever cough dizziness abdominal pain nausea was given Bactrim for cystitis tested for COVID discharged home and testing came back positive for Covid.  Seen in ER 11/16/2020 for shortness of breath sinus tachycardia and severe hypoxia.  Was admitted for worsening respiratory symptoms prompting her to seek further evaluation on 11/17/20 at [facility]. She was found to      History of acute respiratory failure 11/16/2020    With covid , complicated by PE and DVT, on oxygen and eliquis 3 months, completely resolved as of 2/2021     Hyperlipidemia LDL goal <100 11/29/2010     Hypertension goal BP (blood pressure) < 140/90 09/17/2010     Hypothyroid 2002     Lipoma of other skin and subcutaneous tissue 11/19/2004     Nonsenile cataract      Osteoporosis 2014     Other psoriasis      Physical deconditioning 05/19/2020     Positive colorectal cancer screening using Cologuard test 06/26/2022     Thyroid disease      PAST SURGICAL HISTORY   Past Surgical History:   Procedure Laterality Date     COLONOSCOPY N/A 9/7/2022    Procedure: COLONOSCOPY, WITH POLYPECTOMY;  Surgeon: Leventhal, Thomas Michael, MD;  Location: [facility] OR      REMV CATARACT EXTRACAP,INSERT LENS, W/O ECP Right 05/29/2002    Dr. Clinton Lopez ([id number], Power:  20.0D)     PHACOEMULSIFICATION CLEAR CORNEA WITH STANDARD INTRAOCULAR LENS IMPLANT Left 2/22/2021    Procedure: LEFT EYE PHACOEMULSIFICATION, CATARACT, WITH INTRAOCULAR LENS IMPLANT;  Surgeon: Ruben Shelby MD;  Location: [facility] OR     SURGICAL HISTORY OF -   Oct 3, 2008    Rt saline implant     VITRECTOMY PARSPLANA WITH 25 GAUGE SYSTEM Right 6/7/2021    Procedure: 1) Pars plana vitrectomy (PPV) 25g,  Right eye,  2) Membrane stripping and stripping of  internal limiting membrane,;  Surgeon: Fede Danielson MD;  Location: [facility] OR     MEDICATIONS  Current Outpatient Medications   Medication Sig Dispense Refill     alendronate (FOSAMAX) 70 MG tablet Take 1 tablet (70 mg) by mouth every 7 days 12 tablet 3     atorvastatin (LIPITOR) 20 MG tablet Take 1 tablet (20 mg) by mouth daily 90 tablet 1     calcium carbonate-vitamin D (OSCAL W/D) 500-200 MG-UNIT tablet Take 1 tablet by mouth daily Taking every 3 dasy or so       cephALEXin (KEFLEX) 500 MG capsule Take 1 capsule (500 mg) by mouth 3 times daily for 5 days 15 capsule 0     cetirizine (ZYRTEC) 10 MG tablet Take 10 mg by mouth daily       Cyanocobalamin (B-12 PO)        famotidine (PEPCID) 20 MG tablet Take 20 mg by mouth daily       levothyroxine (SYNTHROID/LEVOTHROID) 150 MCG tablet 1 tab 6 days a week & 1.5 tab one day a week & recheck tsh in 6 weeks for further adjustment 45 tablet 0     lisinopril (ZESTRIL) 20 MG tablet Take 1 tablet (20 mg) by mouth At Bedtime 90 tablet 1     Multiple Vitamin (MULTI-VITAMIN) per tablet Take 1 tablet by mouth daily       TURMERIC PO Take 1 tablet by mouth daily        ALLERGIES  Allergies   Allergen Reactions     No Known Drug Allergy         SOCIAL HISTORY:  Smokes: No her daughter who she lives with does smoke.   EtOH: Yes  Lives with her daughter, grand daughter, and cats.     ROS:   Change in vision No  Headaches: no  Respiratory: No shortness of breath, dyspnea on exertion, cough, or hemoptysis   Cardiovascular: negative   Gastrointestinal: negative Abdominal pain: no  Breast: right breast mass.  Musculoskeletal: chronic back pain  Psychiatric: negative  Hematologic/Lymphatic/Immunologic: negative  Endocrine: negative    EXAM  BP (!) 152/74   Pulse 69   Temp 98.6  F (37  C) (Oral)   Wt 113.1 kg (249 lb 4.8 oz)   SpO2 99%   BMI 38.02 kg/m     PHYSICAL EXAM  Respiratory: breathing non labored.   Right  mastectomy scar with 2.5x0.4 cm superficial wound with fibrinous tissue at the base. Minimal surrounding erythema. There is firmness along the scar but no mass. Unable to upload an image to the chart.   No clavicular, cervical, or axillary lymphadenopathy.     ASSESSMENT/PLAN:    Romayne L Sathre is a 77 year old woman with history of right breast cancer in 2008 now with a wound at her mastectomy site. Punch biopsy demonstrated radiation changes, no malignancy. Recommend she complete the prescribed keflex x5 days. Follow up in 1-2 weeks.     Evelina Phillips PA-C    10 minutes spent on the date of the encounter doing chart review, review of test results, interpretation of tests, patient visit and documentation.

## 2023-05-02 ENCOUNTER — TELEPHONE (OUTPATIENT)
Dept: OPHTHALMOLOGY | Facility: CLINIC | Age: 78
End: 2023-05-02
Payer: COMMERCIAL

## 2023-05-02 NOTE — TELEPHONE ENCOUNTER
I called Romayne to schedule surgery with Dr. Fede Howard, I left a voicemail with callback # [phone].

## 2023-05-03 ENCOUNTER — ONCOLOGY VISIT (OUTPATIENT)
Dept: SURGERY | Facility: CLINIC | Age: 78
End: 2023-05-03
Attending: PHYSICIAN ASSISTANT
Payer: COMMERCIAL

## 2023-05-03 VITALS
OXYGEN SATURATION: 99 % | BODY MASS INDEX: 38.02 KG/M2 | SYSTOLIC BLOOD PRESSURE: 152 MMHG | TEMPERATURE: 98.6 F | HEART RATE: 69 BPM | WEIGHT: 249.3 LBS | DIASTOLIC BLOOD PRESSURE: 74 MMHG

## 2023-05-03 DIAGNOSIS — Z90.11 S/P MASTECTOMY, RIGHT: Primary | ICD-10-CM

## 2023-05-03 DIAGNOSIS — T81.30XA WOUND DEHISCENCE: ICD-10-CM

## 2023-05-03 DIAGNOSIS — Z80.0 FAMILY HISTORY OF PANCREATIC CANCER: ICD-10-CM

## 2023-05-03 DIAGNOSIS — Z80.3 FAMILY HISTORY OF BREAST CANCER: ICD-10-CM

## 2023-05-03 PROCEDURE — 99213 OFFICE O/P EST LOW 20 MIN: CPT | Performed by: PHYSICIAN ASSISTANT

## 2023-05-03 PROCEDURE — G0463 HOSPITAL OUTPT CLINIC VISIT: HCPCS | Performed by: PHYSICIAN ASSISTANT

## 2023-05-03 ASSESSMENT — PAIN SCALES - GENERAL: PAINLEVEL: NO PAIN (0)

## 2023-05-03 NOTE — LETTER
5/3/2023         RE: Romayne L Sathre  [address]  [facility] [address]        Dear Colleague,    Thank you for referring your patient, Romayne L Sathre, to the [facility]. Please see a copy of my visit note below.    FOLLOW UP  May 3, 2023     Romayne L Sathre is a 77 year old woman with history of right breast cancer, now with a mastectomy site wound.     HPI:    Medical history of right breast cancer, chronic kidney disease, pulmonary embolism/DVT, thyroid disease.     Family history of sister with breast cancer and brother with pancreatic cancer.     She was found on screening mammogram in 2008 to have 2 right breast masses. Biopsy demonstrated infiltrating ductal carcinoma and lymph node metastasis. She underwent a right mastectomy and lymph node dissection. She followed with medical oncologist Dr. Yoselin Garrido for chemotherapy. She had radiation at [facility]. She had an expander implant infection and the expander was removed. Her most recent visit with Dr. Garrido was in 2009.     She had a left screening mammogram 4/14/23 that demonstrated an asymmetry. Follow up diagnostic mammogram and ultrasound were negative. She was noted to have a right mastectomy site wound. Ultrasound of the wound did not show any mass.     She was seen last week with a wound at her right mastectomy site x1 month. Punch biopsy was performed. Biopsy demonstrated radiation related changes, no malignancy.     Today she reports the wound is healing and the erythema is removed. She denies fever, swelling, mass. She did not  the prescribed keflex.     BREAST-SPECIFIC HISTORY:    Previous breast imaging: Yes   - 12/30/04 Smammo BI-RADS 1  - 3/14/06 Smammo BI-RADS 1  - 8/7/08 Smammo: right 1 cm nodular density very deep in upper outer quadrant BI-RADS 0  - 8/28/08 right ultrasound guilded breast core needle biopsy   9:00 3 cm FN: infiltrating ductal carcinoma, grade 3  10:00 9 cm  FN infiltrating ductal carcinoma, grade 3  ER/DC negative  Right axillary lymph node biopsy: metastatic carcinoma   - 9/9/08 breast MRI: right breast 9:00 3 cm FN 2.4 cm mass, 10:00 9 cm FN 2.4 cm mass, enlarged right axillary lymph nodes.  MRI brain no mets  - 7/31/12 left Smammo BI-RADS 2  - 1/13/16 left Smammo BI-RADS 2  - 5/1/18 left Smammo BI-RADS 1  - 4/12/22 left Smammo BI-RADS 1  - 4/14/23 left Smammo: left breast asymmetry BI-RADS 0  - 4/26/23 left Dmammo and bilateral breast ultrasound: BI-RADS 2    Prior breast biopsies/surgeries: Yes   - 8/28/08 right ultrasound guilded breast core needle biopsy   9:00 3 cm FN: infiltrating ductal carcinoma, grade 3  10:00 9 cm FN infiltrating ductal carcinoma, grade 3  ER/DC negative  Right axillary lymph node biopsy: metastatic carcinoma   - 2008 right mastectomy and lymph node dissection:   9:00 3 cm FN infiltrating ductal carcinoma, grade 3, ER/DC negative, HER2 postive  10:00 9 cm FN infiltrating ductal carcinoma, grade 3 ER/DC positive, HER2 negative.   - 2008 right expander implant removed.     Prior history of breast cancer or DCIS: Yes   - 2008 right breast cancer s/p right mastectomy, right axillary sentinel lymph node biopsy, chemotherapy, and radiation.   9:00 3 cm FN infiltrating ductal carcinoma, grade 3, ER/DC negative, HER2 postive  10:00 9 cm FN infiltrating ductal carcinoma, grade 3 ER/DC positive, HER2 negative.     Prior radiation history: Yes   - 2008 for right breast cancer with lymph node metastasis    Self breast exams: Yes  Breast density: heterogeneously dense    FAMILY HISTORY:  Breast ca: Yes   - sister, 40's/50's  Ovarian ca: No  Pancreatic ca: Yes   - brother, 50's  Prostate: No  Gastric ca: No  Melanoma: No  Colon ca: No  Other cancer: No  Other genetic, testing, syndromes, or clotting disorders: no     PAST MEDICAL HISTORY  Past Medical History:   Diagnosis Date    Antiplatelet or antithrombotic long-term use     Breast cancer (H)  08/26/2008    Right Breast    Chronic fatigue 05/19/2020    CKD (chronic kidney disease) stage 3, GFR 30-59 ml/min (H) 09/17/2010    Combined form of age-related cataract, left eye 10/20/2020    Added automatically from request for surgery [id number]    Health Care Home 07/17/2012    [facility] . Margarita Lawler RN-BSN, [facility] [phone]  DX V65.8 REPLACED WITH [id number] [facility]ALTH CARE HOME (04/08/2013)    Heart burn 05/19/2020    History of 2019 novel coronavirus disease (COVID-19) 11/16/2020    Seen in urgent care on 11/5/2020 for fever cough dizziness abdominal pain nausea was given Bactrim for cystitis tested for COVID discharged home and testing came back positive for Covid.  Seen in ER 11/16/2020 for shortness of breath sinus tachycardia and severe hypoxia.  Was admitted for worsening respiratory symptoms prompting her to seek further evaluation on 11/17/20 at [facility]. She was found to     History of acute respiratory failure 11/16/2020    With covid , complicated by PE and DVT, on oxygen and eliquis 3 months, completely resolved as of 2/2021    Hyperlipidemia LDL goal <100 11/29/2010    Hypertension goal BP (blood pressure) < 140/90 09/17/2010    Hypothyroid 2002    Lipoma of other skin and subcutaneous tissue 11/19/2004    Nonsenile cataract     Osteoporosis 2014    Other psoriasis     Physical deconditioning 05/19/2020    Positive colorectal cancer screening using Cologuard test 06/26/2022    Thyroid disease      PAST SURGICAL HISTORY   Past Surgical History:   Procedure Laterality Date    COLONOSCOPY N/A 9/7/2022    Procedure: COLONOSCOPY, WITH POLYPECTOMY;  Surgeon: Leventhal, Thomas Michael, MD;  Location: [facility] CATARACT EXTRACAP,INSERT LENS, W/O ECP Right 05/29/2002    Dr. Clinton Lopez (MA60AC, Power:  20.0D)    PHACOEMULSIFICATION CLEAR CORNEA WITH STANDARD INTRAOCULAR LENS IMPLANT Left 2/22/2021    Procedure: LEFT EYE PHACOEMULSIFICATION, CATARACT, WITH INTRAOCULAR LENS  IMPLANT;  Surgeon: Ruben Shelby MD;  Location: [facility] OR    SURGICAL HISTORY OF -   Oct 3, 2008    Rt saline implant    VITRECTOMY PARSPLANA WITH 25 GAUGE SYSTEM Right 6/7/2021    Procedure: 1) Pars plana vitrectomy (PPV) 25g, Right eye,  2) Membrane stripping and stripping of  internal limiting membrane,;  Surgeon: Fede Danielson MD;  Location: [facility] OR     MEDICATIONS  Current Outpatient Medications   Medication Sig Dispense Refill    alendronate (FOSAMAX) 70 MG tablet Take 1 tablet (70 mg) by mouth every 7 days 12 tablet 3    atorvastatin (LIPITOR) 20 MG tablet Take 1 tablet (20 mg) by mouth daily 90 tablet 1    calcium carbonate-vitamin D (OSCAL W/D) 500-200 MG-UNIT tablet Take 1 tablet by mouth daily Taking every 3 dasy or so      cephALEXin (KEFLEX) 500 MG capsule Take 1 capsule (500 mg) by mouth 3 times daily for 5 days 15 capsule 0    cetirizine (ZYRTEC) 10 MG tablet Take 10 mg by mouth daily      Cyanocobalamin (B-12 PO)       famotidine (PEPCID) 20 MG tablet Take 20 mg by mouth daily      levothyroxine (SYNTHROID/LEVOTHROID) 150 MCG tablet 1 tab 6 days a week & 1.5 tab one day a week & recheck tsh in 6 weeks for further adjustment 45 tablet 0    lisinopril (ZESTRIL) 20 MG tablet Take 1 tablet (20 mg) by mouth At Bedtime 90 tablet 1    Multiple Vitamin (MULTI-VITAMIN) per tablet Take 1 tablet by mouth daily      TURMERIC PO Take 1 tablet by mouth daily        ALLERGIES  Allergies   Allergen Reactions    No Known Drug Allergy         SOCIAL HISTORY:  Smokes: No her daughter who she lives with does smoke.   EtOH: Yes  Lives with her daughter, grand daughter, and cats.     ROS:   Change in vision No  Headaches: no  Respiratory: No shortness of breath, dyspnea on exertion, cough, or hemoptysis   Cardiovascular: negative   Gastrointestinal: negative Abdominal pain: no  Breast: right breast mass.  Musculoskeletal: chronic back pain  Psychiatric: negative  Hematologic/Lymphatic/Immunologic:  negative  Endocrine: negative    EXAM  BP (!) 152/74   Pulse 69   Temp 98.6  F (37  C) (Oral)   Wt 113.1 kg (249 lb 4.8 oz)   SpO2 99%   BMI 38.02 kg/m     PHYSICAL EXAM  Respiratory: breathing non labored.   Right mastectomy scar with 2.5x0.4 cm superficial wound with fibrinous tissue at the base. Minimal surrounding erythema. There is firmness along the scar but no mass. Unable to upload an image to the chart.   No clavicular, cervical, or axillary lymphadenopathy.     ASSESSMENT/PLAN:    Romayne L Sathre is a 77 year old woman with history of right breast cancer in 2008 now with a wound at her mastectomy site. Punch biopsy demonstrated radiation changes, no malignancy. Recommend she complete the prescribed keflex x5 days. Follow up in 1-2 weeks.     Evelina Phillips PA-C    10 minutes spent on the date of the encounter doing chart review, review of test results, interpretation of tests, patient visit and documentation.

## 2023-05-03 NOTE — NURSING NOTE
"Oncology Rooming Note    May 3, 2023 8:00 AM   Romayne L Sathre is a 77 year old female who presents for:    Chief Complaint   Patient presents with     Oncology Clinic Visit     RTN: Breast cancer     Initial Vitals: BP (!) 152/74   Pulse 69   Temp 98.6  F (37  C) (Oral)   Wt 113.1 kg (249 lb 4.8 oz)   SpO2 99%   BMI 38.02 kg/m   Estimated body mass index is 38.02 kg/m  as calculated from the following:    Height as of 3/28/23: 1.725 m (5' 7.9\").    Weight as of this encounter: 113.1 kg (249 lb 4.8 oz). Body surface area is 2.33 meters squared.  No Pain (0) Comment: Data Unavailable   No LMP recorded. Patient is postmenopausal.  Allergies reviewed: Yes  Medications reviewed: Yes    Medications: Medication refills not needed today.  Pharmacy name entered into [employer]: [facility] #[id number] - [place], [place] - [address] AT [address]    Clinical concerns: none    Lisa Singh            "

## 2023-05-04 ENCOUNTER — TELEPHONE (OUTPATIENT)
Dept: OPHTHALMOLOGY | Facility: CLINIC | Age: 78
End: 2023-05-04
Payer: COMMERCIAL

## 2023-05-04 NOTE — TELEPHONE ENCOUNTER
I called Romayne to schedule surgery with Dr. Fede Howard, I left a voicemail with callback # [phone].

## 2023-05-08 ENCOUNTER — LAB (OUTPATIENT)
Dept: LAB | Facility: CLINIC | Age: 78
End: 2023-05-08
Payer: COMMERCIAL

## 2023-05-08 DIAGNOSIS — E03.9 ACQUIRED HYPOTHYROIDISM: ICD-10-CM

## 2023-05-08 DIAGNOSIS — N18.30 STAGE 3 CHRONIC KIDNEY DISEASE, UNSPECIFIED WHETHER STAGE 3A OR 3B CKD (H): ICD-10-CM

## 2023-05-08 LAB
ANION GAP SERPL CALCULATED.3IONS-SCNC: 9 MMOL/L (ref 7–15)
BUN SERPL-MCNC: 24.4 MG/DL (ref 8–23)
CALCIUM SERPL-MCNC: 9.5 MG/DL (ref 8.8–10.2)
CHLORIDE SERPL-SCNC: 104 MMOL/L (ref 98–107)
CREAT SERPL-MCNC: 1.35 MG/DL (ref 0.51–0.95)
DEPRECATED HCO3 PLAS-SCNC: 27 MMOL/L (ref 22–29)
GFR SERPL CREATININE-BSD FRML MDRD: 41 ML/MIN/1.73M2
GLUCOSE SERPL-MCNC: 102 MG/DL (ref 70–99)
POTASSIUM SERPL-SCNC: 4.8 MMOL/L (ref 3.4–5.3)
SODIUM SERPL-SCNC: 140 MMOL/L (ref 136–145)
T4 FREE SERPL-MCNC: 1.59 NG/DL (ref 0.9–1.7)
TSH SERPL DL<=0.005 MIU/L-ACNC: 5 UIU/ML (ref 0.3–4.2)

## 2023-05-08 PROCEDURE — 84439 ASSAY OF FREE THYROXINE: CPT

## 2023-05-08 PROCEDURE — 84443 ASSAY THYROID STIM HORMONE: CPT

## 2023-05-08 PROCEDURE — 36415 COLL VENOUS BLD VENIPUNCTURE: CPT

## 2023-05-08 PROCEDURE — 80048 BASIC METABOLIC PNL TOTAL CA: CPT

## 2023-05-08 NOTE — RESULT ENCOUNTER NOTE
Estevan Ms. Roth,  Some of your results came back and kidney function remains decreased similar to 1 month ago.  GFR which is kidney function is estimated at 41%.  Progressive decline over the past year.  This might be the new baseline.  Could be related to age blood pressure cholesterol over time.  Can refer to nephrology or kidney doctor if agreeable.  Let me know.  Awaiting thyroid test which is not back yet   if you have any further concerns please do not hesitate to contact us by message, phone or making an appointment.  Have a good day   Dr Michael SCOTT

## 2023-05-09 ENCOUNTER — TELEPHONE (OUTPATIENT)
Dept: FAMILY MEDICINE | Facility: CLINIC | Age: 78
End: 2023-05-09
Payer: COMMERCIAL

## 2023-05-09 DIAGNOSIS — E03.9 ACQUIRED HYPOTHYROIDISM: ICD-10-CM

## 2023-05-09 RX ORDER — LEVOTHYROXINE SODIUM 150 UG/1
TABLET ORAL
Qty: 45 TABLET | Refills: 0 | Status: SHIPPED | OUTPATIENT
Start: 2023-05-09 | End: 2023-06-20

## 2023-05-09 RX ORDER — LEVOTHYROXINE SODIUM 150 UG/1
TABLET ORAL
Qty: 45 TABLET | Refills: 0 | Status: SHIPPED | OUTPATIENT
Start: 2023-05-09 | End: 2023-05-09

## 2023-05-09 NOTE — TELEPHONE ENCOUNTER
Writer called patient and reviewed message per Dr. Rodriguez.    Patient verbalized understanding and in agreement with plan.    Lab appt scheduled on 6/19/23.  Visit date, time and location confirmed with patient.    Informed patient fasting is not required for this lab appt.    MARY PartidaN, RN-[facility]

## 2023-05-09 NOTE — TELEPHONE ENCOUNTER
TSH is close to within range , continue levothyroxine 150 mcg 1 tablet 6 days a week and 1-1/2 tablet 1 day of the week and recheck TSH in 6 weeks in a lab only appointment

## 2023-05-09 NOTE — TELEPHONE ENCOUNTER
----- Message from Marry Rodriguez MD sent at 5/9/2023  7:27 AM CDT -----  TSH is close to within range , continue levothyroxine 150 mcg 1 tablet 6 days a week and 1-1/2 tablet 1 day of the week and recheck TSH in 6 weeks in a lab only appointment

## 2023-05-16 NOTE — PROGRESS NOTES
FOLLOW UP  May 18, 2023     Romayne L Sathre is a 77 year old woman with history of right breast cancer, now with a mastectomy site wound.     HPI:    History of right breast cancer, chronic kidney disease, pulmonary embolism/DVT, thyroid disease.     Family history of sister with breast cancer and brother with pancreatic cancer. She was referred to genetic counseling.     She was found on screening mammogram in 2008 to have 2 right breast masses. Biopsy demonstrated infiltrating ductal carcinoma and lymph node metastasis. She underwent a right mastectomy and lymph node dissection. She followed with medical oncologist Dr. Yoselin Garrido for chemotherapy. She had radiation at [facility]. She had an expander implant infection and the expander was removed. Her most recent visit with Dr. Garrido was in 2009.     She had a left screening mammogram 4/14/23 that demonstrated an asymmetry. Follow up diagnostic mammogram and ultrasound were negative. She was noted to have a right mastectomy site wound. Ultrasound of the wound did not show any mass.     She was seen 4/27/23 with a wound at her right mastectomy site x1 month. Punch biopsy was performed. Biopsy demonstrated radiation related changes, no malignancy.     Today she reports the wound is healing but she continues to have some drainage from the wound.  She denies fever, swelling, mass, erythema. She did complete the course of keflex.     BREAST-SPECIFIC HISTORY:    Previous breast imaging: Yes   - 12/30/04 Smammo BI-RADS 1  - 3/14/06 Smammo BI-RADS 1  - 8/7/08 Smammo: right 1 cm nodular density very deep in upper outer quadrant BI-RADS 0  - 8/28/08 right ultrasound guilded breast core needle biopsy   9:00 3 cm FN: infiltrating ductal carcinoma, grade 3  10:00 9 cm FN infiltrating ductal carcinoma, grade 3  ER/ME negative  Right axillary lymph node biopsy: metastatic carcinoma   - 9/9/08 breast MRI: right breast 9:00 3 cm FN 2.4 cm mass, 10:00 9 cm FN 2.4  cm mass, enlarged right axillary lymph nodes.  MRI brain no mets  - 7/31/12 left Smammo BI-RADS 2  - 1/13/16 left Smammo BI-RADS 2  - 5/1/18 left Smammo BI-RADS 1  - 4/12/22 left Smammo BI-RADS 1  - 4/14/23 left Smammo: left breast asymmetry BI-RADS 0  - 4/26/23 left Dmammo and bilateral breast ultrasound: BI-RADS 2    Prior breast biopsies/surgeries: Yes   - 8/28/08 right ultrasound guilded breast core needle biopsy   9:00 3 cm FN: infiltrating ductal carcinoma, grade 3  10:00 9 cm FN infiltrating ductal carcinoma, grade 3  ER/MN negative  Right axillary lymph node biopsy: metastatic carcinoma   - 2008 right mastectomy and lymph node dissection:   9:00 3 cm FN infiltrating ductal carcinoma, grade 3, ER/MN negative, HER2 postive  10:00 9 cm FN infiltrating ductal carcinoma, grade 3 ER/MN positive, HER2 negative.   - 2008 right expander implant removed.     Prior history of breast cancer or DCIS: Yes   - 2008 right breast cancer s/p right mastectomy, right axillary sentinel lymph node biopsy, chemotherapy, and radiation.   9:00 3 cm FN infiltrating ductal carcinoma, grade 3, ER/MN negative, HER2 postive  10:00 9 cm FN infiltrating ductal carcinoma, grade 3 ER/MN positive, HER2 negative.     Prior radiation history: Yes   - 2008 for right breast cancer with lymph node metastasis    Self breast exams: Yes  Breast density: heterogeneously dense    FAMILY HISTORY:  Breast ca: Yes   - sister, 40's/50's  Ovarian ca: No  Pancreatic ca: Yes   - brother, 50's  Prostate: No  Gastric ca: No  Melanoma: No  Colon ca: No  Other cancer: No  Other genetic, testing, syndromes, or clotting disorders: no     PAST MEDICAL HISTORY  Past Medical History:   Diagnosis Date     Antiplatelet or antithrombotic long-term use      Breast cancer (H) 08/26/2008    Right Breast     Chronic fatigue 05/19/2020     CKD (chronic kidney disease) stage 3, GFR 30-59 ml/min (H) 09/17/2010     Combined form of age-related cataract, left eye 10/20/2020     Added automatically from request for surgery [id number]     Health Care Home 07/17/2012    [facility] for . Margarita Lawler RN-BSN, [facility] [phone]  DX V65.8 REPLACED WITH [id number] HEALTH CARE HOME (04/08/2013)     Heart burn 05/19/2020     History of 2019 novel coronavirus disease (COVID-19) 11/16/2020    Seen in urgent care on 11/5/2020 for fever cough dizziness abdominal pain nausea was given Bactrim for cystitis tested for COVID discharged home and testing came back positive for Covid.  Seen in ER 11/16/2020 for shortness of breath sinus tachycardia and severe hypoxia.  Was admitted for worsening respiratory symptoms prompting her to seek further evaluation on 11/17/20 at [facility]. She was found to      History of acute respiratory failure 11/16/2020    With covid , complicated by PE and DVT, on oxygen and eliquis 3 months, completely resolved as of 2/2021     Hyperlipidemia LDL goal <100 11/29/2010     Hypertension goal BP (blood pressure) < 140/90 09/17/2010     Hypothyroid 2002     Lipoma of other skin and subcutaneous tissue 11/19/2004     Nonsenile cataract      Osteoporosis 2014     Other psoriasis      Physical deconditioning 05/19/2020     Positive colorectal cancer screening using Cologuard test 06/26/2022     Thyroid disease      PAST SURGICAL HISTORY   Past Surgical History:   Procedure Laterality Date     COLONOSCOPY N/A 9/7/2022    Procedure: COLONOSCOPY, WITH POLYPECTOMY;  Surgeon: Leventhal, Thomas Michael, MD;  Location: [facility] OR      [facility] CATARACT EXTRACAP,INSERT LENS, W/O ECP Right 05/29/2002    Dr. Clinton Lopez ([id number], Power:  20.0D)     PHACOEMULSIFICATION CLEAR CORNEA WITH STANDARD INTRAOCULAR LENS IMPLANT Left 2/22/2021    Procedure: LEFT EYE PHACOEMULSIFICATION, CATARACT, WITH INTRAOCULAR LENS IMPLANT;  Surgeon: Ruben Shelby MD;  Location: [facility] OR     SURGICAL HISTORY OF -   Oct 3, 2008    Rt saline implant     VITRECTOMY PARSPLANA WITH 25 GAUGE SYSTEM Right  6/7/2021    Procedure: 1) Pars plana vitrectomy (PPV) 25g, Right eye,  2) Membrane stripping and stripping of  internal limiting membrane,;  Surgeon: Fede Danielson MD;  Location: [facility]     MEDICATIONS  Current Outpatient Medications   Medication Sig Dispense Refill     alendronate (FOSAMAX) 70 MG tablet Take 1 tablet (70 mg) by mouth every 7 days 12 tablet 3     atorvastatin (LIPITOR) 20 MG tablet Take 1 tablet (20 mg) by mouth daily 90 tablet 1     calcium carbonate-vitamin D (OSCAL W/D) 500-200 MG-UNIT tablet Take 1 tablet by mouth daily Taking every 3 dasy or so       cetirizine (ZYRTEC) 10 MG tablet Take 10 mg by mouth daily       Cyanocobalamin (B-12 PO)        famotidine (PEPCID) 20 MG tablet Take 20 mg by mouth daily       levothyroxine (SYNTHROID/LEVOTHROID) 150 MCG tablet 1 tab 6 days a week & 1.5 tab one day a week & recheck tsh in 6 weeks for further adjustment 45 tablet 0     lisinopril (ZESTRIL) 20 MG tablet Take 1 tablet (20 mg) by mouth At Bedtime 90 tablet 1     Multiple Vitamin (MULTI-VITAMIN) per tablet Take 1 tablet by mouth daily       TURMERIC PO Take 1 tablet by mouth daily        ALLERGIES  Allergies   Allergen Reactions     No Known Drug Allergy         SOCIAL HISTORY:  Smokes: No her daughter who she lives with does smoke.   EtOH: Yes  Lives with her daughter, grand daughter, and cats.     ROS:   Change in vision No  Headaches: no  Respiratory: No shortness of breath, dyspnea on exertion, cough, or hemoptysis   Cardiovascular: negative   Gastrointestinal: negative Abdominal pain: no  Breast: right breast mass.  Musculoskeletal: chronic back pain  Psychiatric: negative  Hematologic/Lymphatic/Immunologic: negative  Endocrine: negative    EXAM  BP (!) 175/78 (BP Location: Left arm, Patient Position: Sitting, Cuff Size: Adult Regular)   Pulse 65   Temp 98.6  F (37  C) (Oral)   Resp 16   Wt 112 kg (247 lb)   SpO2 98%   BMI 37.67 kg/m     PHYSICAL EXAM  Respiratory: breathing  non labored.   Right mastectomy scar with 1.5x0.5 cm (previoulsy 2.5x0.4 cm) superficial wound with fibrinous tissue at the base. The fibrinous material was debrided away with a cotton swab. Granulation tissue at the base of the wound. Minimal surrounding erythema. There is firmness along the scar but no mass.      Media Information      Document Information    Other:  Photograph   Right mastectomy site wound. 1.5 x 0.5 cm (previously 2.5 x 0.4 cm)   05/18/2023 7:35 AM   Attached To:   Oncology Visit on 5/18/23 with Evelina Phillips PA-C     Source Information    Evelina Phillips PA-C   Onc Surgery       ASSESSMENT/PLAN:    Romayne L Sathre is a 77 year old woman with history of right breast cancer in 2008 now with a wound at her mastectomy site. Punch biopsy demonstrated radiation changes, no malignancy. The wound is healing but will need to follow to ensure it completely heals. If it does not completely heal or worsens would recommend repeat punch biopsy. Recommended to apply moisturizer daily such as Vaseline or Aquaphor. She will follow up in 2 weeks.     Evelina Phillips PA-C    10 minutes spent on the date of the encounter doing chart review, review of test results, interpretation of tests, patient visit and documentation.

## 2023-05-18 ENCOUNTER — ONCOLOGY VISIT (OUTPATIENT)
Dept: SURGERY | Facility: CLINIC | Age: 78
End: 2023-05-18
Attending: PHYSICIAN ASSISTANT
Payer: COMMERCIAL

## 2023-05-18 VITALS
WEIGHT: 247 LBS | BODY MASS INDEX: 37.67 KG/M2 | TEMPERATURE: 98.6 F | DIASTOLIC BLOOD PRESSURE: 78 MMHG | OXYGEN SATURATION: 98 % | HEART RATE: 65 BPM | RESPIRATION RATE: 16 BRPM | SYSTOLIC BLOOD PRESSURE: 175 MMHG

## 2023-05-18 DIAGNOSIS — T81.30XA WOUND DEHISCENCE: ICD-10-CM

## 2023-05-18 DIAGNOSIS — Z90.11 S/P MASTECTOMY, RIGHT: Primary | ICD-10-CM

## 2023-05-18 PROCEDURE — 99213 OFFICE O/P EST LOW 20 MIN: CPT | Performed by: PHYSICIAN ASSISTANT

## 2023-05-18 PROCEDURE — G0463 HOSPITAL OUTPT CLINIC VISIT: HCPCS | Performed by: PHYSICIAN ASSISTANT

## 2023-05-18 ASSESSMENT — PAIN SCALES - GENERAL: PAINLEVEL: NO PAIN (0)

## 2023-05-18 NOTE — NURSING NOTE
"Oncology Rooming Note    May 18, 2023 7:10 AM   Romayne L Sathre is a 77 year old female who presents for:    Chief Complaint   Patient presents with     Oncology Clinic Visit     History of breast cancer     Initial Vitals: BP (!) 175/78 (BP Location: Left arm, Patient Position: Sitting, Cuff Size: Adult Regular)   Pulse 65   Temp 98.6  F (37  C) (Oral)   Resp 16   Wt 112 kg (247 lb)   SpO2 98%   BMI 37.67 kg/m   Estimated body mass index is 37.67 kg/m  as calculated from the following:    Height as of 3/28/23: 1.725 m (5' 7.9\").    Weight as of this encounter: 112 kg (247 lb). Body surface area is 2.32 meters squared.  No Pain (0) Comment: Data Unavailable   No LMP recorded. Patient is postmenopausal.  Allergies reviewed: Yes  Medications reviewed: Yes    Medications: Medication refills not needed today.  Pharm[facility] entered into [id number] [address] AT [address]    Clinical concerns: Patient reports having new drainage from her incision site, that has a strong odor, it started this past Monday.  Please address.       Adela Garrido), LPN May 18, 2023 7:11 AM              "

## 2023-05-18 NOTE — LETTER
5/18/2023         RE: Romayne L Sathre  [address]  [place] [id number]        Dear Colleague,    Thank you for referring your patient, Romayne L Sathre, to the [facility]. Please see a copy of my visit note below.    FOLLOW UP  May 18, 2023     Romayne L Sathre is a 77 year old woman with history of right breast cancer, now with a mastectomy site wound.     HPI:    History of right breast cancer, chronic kidney disease, pulmonary embolism/DVT, thyroid disease.     Family history of sister with breast cancer and brother with pancreatic cancer. She was referred to genetic counseling.     She was found on screening mammogram in 2008 to have 2 right breast masses. Biopsy demonstrated infiltrating ductal carcinoma and lymph node metastasis. She underwent a right mastectomy and lymph node dissection. She followed with medical oncologist Dr. Yoselin Garrido for chemotherapy. She had radiation at [facility]. She had an expander implant infection and the expander was removed. Her most recent visit with Dr. Garrido was in 2009.     She had a left screening mammogram 4/14/23 that demonstrated an asymmetry. Follow up diagnostic mammogram and ultrasound were negative. She was noted to have a right mastectomy site wound. Ultrasound of the wound did not show any mass.     She was seen 4/27/23 with a wound at her right mastectomy site x1 month. Punch biopsy was performed. Biopsy demonstrated radiation related changes, no malignancy.     Today she reports the wound is healing but she continues to have some drainage from the wound.  She denies fever, swelling, mass, erythema. She did complete the course of keflex.     BREAST-SPECIFIC HISTORY:    Previous breast imaging: Yes   - 12/30/04 Smammo BI-RADS 1  - 3/14/06 Smammo BI-RADS 1  - 8/7/08 Smammo: right 1 cm nodular density very deep in upper outer quadrant BI-RADS 0  - 8/28/08 right ultrasound guilded breast core needle biopsy    9:00 3 cm FN: infiltrating ductal carcinoma, grade 3  10:00 9 cm FN infiltrating ductal carcinoma, grade 3  ER/[place] negative  Right axillary lymph node biopsy: metastatic carcinoma   - 9/9/08 breast MRI: right breast 9:00 3 cm FN 2.4 cm mass, 10:00 9 cm FN 2.4 cm mass, enlarged right axillary lymph nodes.  MRI brain no mets  - 7/31/12 left Smammo BI-RADS 2  - 1/13/16 left Smammo BI-RADS 2  - 5/1/18 left Smammo BI-RADS 1  - 4/12/22 left Smammo BI-RADS 1  - 4/14/23 left Smammo: left breast asymmetry BI-RADS 0  - 4/26/23 left Dmammo and bilateral breast ultrasound: BI-RADS 2    Prior breast biopsies/surgeries: Yes   - 8/28/08 right ultrasound guilded breast core needle biopsy   9:00 3 cm FN: infiltrating ductal carcinoma, grade 3  10:00 9 cm FN infiltrating ductal carcinoma, grade 3  ER/WV negative  Right axillary lymph node biopsy: metastatic carcinoma   - 2008 right mastectomy and lymph node dissection:   9:00 3 cm FN infiltrating ductal carcinoma, grade 3, ER/WV negative, HER2 postive  10:00 9 cm FN infiltrating ductal carcinoma, grade 3 ER/WV positive, HER2 negative.   - 2008 right expander implant removed.     Prior history of breast cancer or DCIS: Yes   - 2008 right breast cancer s/p right mastectomy, right axillary sentinel lymph node biopsy, chemotherapy, and radiation.   9:00 3 cm FN infiltrating ductal carcinoma, grade 3, ER/WV negative, HER2 postive  10:00 9 cm FN infiltrating ductal carcinoma, grade 3 ER/WV positive, HER2 negative.     Prior radiation history: Yes   - 2008 for right breast cancer with lymph node metastasis    Self breast exams: Yes  Breast density: heterogeneously dense    FAMILY HISTORY:  Breast ca: Yes   - sister, 40's/50's  Ovarian ca: No  Pancreatic ca: Yes   - brother, 50's  Prostate: No  Gastric ca: No  Melanoma: No  Colon ca: No  Other cancer: No  Other genetic, testing, syndromes, or clotting disorders: no     PAST MEDICAL HISTORY  Past Medical History:   Diagnosis Date     Antiplatelet or antithrombotic long-term use     Breast cancer (H) 08/26/2008    Right Breast    Chronic fatigue 05/19/2020    CKD (chronic kidney disease) stage 3, GFR 30-59 ml/min (H) 09/17/2010    Combined form of age-related cataract, left eye 10/20/2020    Added automatically from request for surgery [id number]    Health Care Home 07/17/2012    [facility] for . Margarita Lawler RN-BSN, [facility] [phone]  DX V65.8 REPLACED WITH [id number]ALTH CARE HOME (04/08/2013)    Heart burn 05/19/2020    History of 2019 novel coronavirus disease (COVID-19) 11/16/2020    Seen in urgent care on 11/5/2020 for fever cough dizziness abdominal pain nausea was given Bactrim for cystitis tested for COVID discharged home and testing came back positive for Covid.  Seen in ER 11/16/2020 for shortness of breath sinus tachycardia and severe hypoxia.  Was admitted for worsening respiratory symptoms prompting her to seek further evaluation on 11/17/20 at [facility]. She was found to     History of acute respiratory failure 11/16/2020    With covid , complicated by PE and DVT, on oxygen and eliquis 3 months, completely resolved as of 2/2021    Hyperlipidemia LDL goal <100 11/29/2010    Hypertension goal BP (blood pressure) < 140/90 09/17/2010    Hypothyroid 2002    Lipoma of other skin and subcutaneous tissue 11/19/2004    Nonsenile cataract     Osteoporosis 2014    Other psoriasis     Physical deconditioning 05/19/2020    Positive colorectal cancer screening using Cologuard test 06/26/2022    Thyroid disease      PAST SURGICAL HISTORY   Past Surgical History:   Procedure Laterality Date    COLONOSCOPY N/A 9/7/2022    Procedure: COLONOSCOPY, WITH POLYPECTOMY;  Surgeon: Leventhal, Thomas Michael, MD;  [place] [facility] [place]    [facility] CATARACT EXTRACAP,INSERT LENS, W/O ECP Right 05/29/2002    Dr. Clinton Lopez (MA60AC, Power:  20.0D)    PHACOEMULSIFICATION CLEAR CORNEA WITH STANDARD INTRAOCULAR LENS IMPLANT Left 2/22/2021    Procedure:  LEFT EYE PHACOEMULSIFICATION, CATARACT, WITH INTRAOCULAR LENS IMPLANT;  Surgeon: Ruben Shelby MD;  Location: [facility] OR    SURGICAL HISTORY OF -   Oct 3, 2008    Rt saline implant    VITRECTOMY PARSPLANA WITH 25 GAUGE SYSTEM Right 6/7/2021    Procedure: 1) Pars plana vitrectomy (PPV) 25g, Right eye,  2) Membrane stripping and stripping of  internal limiting membrane,;  Surgeon: Fede Danielson MD;  Location: [facility] OR     MEDICATIONS  Current Outpatient Medications   Medication Sig Dispense Refill    alendronate (FOSAMAX) 70 MG tablet Take 1 tablet (70 mg) by mouth every 7 days 12 tablet 3    atorvastatin (LIPITOR) 20 MG tablet Take 1 tablet (20 mg) by mouth daily 90 tablet 1    calcium carbonate-vitamin D (OSCAL W/D) 500-200 MG-UNIT tablet Take 1 tablet by mouth daily Taking every 3 dasy or so      cetirizine (ZYRTEC) 10 MG tablet Take 10 mg by mouth daily      Cyanocobalamin (B-12 PO)       famotidine (PEPCID) 20 MG tablet Take 20 mg by mouth daily      levothyroxine (SYNTHROID/LEVOTHROID) 150 MCG tablet 1 tab 6 days a week & 1.5 tab one day a week & recheck tsh in 6 weeks for further adjustment 45 tablet 0    lisinopril (ZESTRIL) 20 MG tablet Take 1 tablet (20 mg) by mouth At Bedtime 90 tablet 1    Multiple Vitamin (MULTI-VITAMIN) per tablet Take 1 tablet by mouth daily      TURMERIC PO Take 1 tablet by mouth daily        ALLERGIES  Allergies   Allergen Reactions    No Known Drug Allergy         SOCIAL HISTORY:  Smokes: No her daughter who she lives with does smoke.   EtOH: Yes  Lives with her daughter, grand daughter, and cats.     ROS:   Change in vision No  Headaches: no  Respiratory: No shortness of breath, dyspnea on exertion, cough, or hemoptysis   Cardiovascular: negative   Gastrointestinal: negative Abdominal pain: no  Breast: right breast mass.  Musculoskeletal: chronic back pain  Psychiatric: negative  Hematologic/Lymphatic/Immunologic: negative  Endocrine: negative    EXAM  BP (!) 175/78  (BP Location: Left arm, Patient Position: Sitting, Cuff Size: Adult Regular)   Pulse 65   Temp 98.6  F (37  C) (Oral)   Resp 16   Wt 112 kg (247 lb)   SpO2 98%   BMI 37.67 kg/m     PHYSICAL EXAM  Respiratory: breathing non labored.   Right mastectomy scar with 1.5x0.5 cm (previoulsy 2.5x0.4 cm) superficial wound with fibrinous tissue at the base. The fibrinous material was debrided away with a cotton swab. Granulation tissue at the base of the wound. Minimal surrounding erythema. There is firmness along the scar but no mass.      Media Information      Document Information    Other:  Photograph   Right mastectomy site wound. 1.5 x 0.5 cm (previously 2.5 x 0.4 cm)   05/18/2023 7:35 AM   Attached To:   Oncology Visit on 5/18/23 with Evelina Phillips PA-C     Source Information    Evelina Phillips PA-C   Onc Surgery       ASSESSMENT/PLAN:    Romayne L Sathre is a 77 year old woman with history of right breast cancer in 2008 now with a wound at her mastectomy site. Punch biopsy demonstrated radiation changes, no malignancy. The wound is healing but will need to follow to ensure it completely heals. If it does not completely heal or worsens would recommend repeat punch biopsy. Recommended to apply moisturizer daily such as Vaseline or Aquaphor. She will follow up in 2 weeks.     Evelina Phillips PA-C    10 minutes spent on the date of the encounter doing chart review, review of test results, interpretation of tests, patient visit and documentation.

## 2023-05-31 NOTE — PROGRESS NOTES
FOLLOW UP  Jun 1, 2023     Romayne L Sathre is a 77 year old woman with history of right breast cancer, now with a mastectomy site wound.     HPI:    History of right breast cancer, chronic kidney disease, pulmonary embolism/DVT, thyroid disease.     Family history of sister with breast cancer and brother with pancreatic cancer. She was referred to genetic counseling.     She was found on screening mammogram in 2008 to have 2 right breast masses. Biopsy demonstrated infiltrating ductal carcinoma and lymph node metastasis. She underwent a right mastectomy and lymph node dissection. She followed with medical oncologist Dr. Yoselin Garrido for chemotherapy. She had radiation at [facility]. She had an expander implant infection and the expander was removed. Her most recent visit with Dr. Garrido was in 2009.     She had a left screening mammogram 4/14/23 that demonstrated an asymmetry. Follow up diagnostic mammogram and ultrasound were negative. She was noted to have a right mastectomy site wound. Ultrasound of the wound did not show any mass.     She was seen 4/27/23 with a wound at her right mastectomy site x1 month. Punch biopsy was performed. Biopsy demonstrated radiation related changes, no malignancy. She completed a 5 day course of keflex.     Today she reports the wound is slowly healing. She still has some drainage. No fever, pain or erythema. She is applying Vaseline daily.     BREAST-SPECIFIC HISTORY:    Previous breast imaging: Yes   - 12/30/04 Smammo BI-RADS 1  - 3/14/06 Smammo BI-RADS 1  - 8/7/08 Smammo: right 1 cm nodular density very deep in upper outer quadrant BI-RADS 0  - 8/28/08 right ultrasound guilded breast core needle biopsy   9:00 3 cm FN: infiltrating ductal carcinoma, grade 3  10:00 9 cm FN infiltrating ductal carcinoma, grade 3  ER/UT negative  Right axillary lymph node biopsy: metastatic carcinoma   - 9/9/08 breast MRI: right breast 9:00 3 cm FN 2.4 cm mass, 10:00 9 cm FN 2.4 cm  mass, enlarged right axillary lymph nodes.  MRI brain no mets  - 7/31/12 left Smammo BI-RADS 2  - 1/13/16 left Smammo BI-RADS 2  - 5/1/18 left Smammo BI-RADS 1  - 4/12/22 left Smammo BI-RADS 1  - 4/14/23 left Smammo: left breast asymmetry BI-RADS 0  - 4/26/23 left Dmammo and bilateral breast ultrasound: BI-RADS 2    Prior breast biopsies/surgeries: Yes   - 8/28/08 right ultrasound guilded breast core needle biopsy   9:00 3 cm FN: infiltrating ductal carcinoma, grade 3  10:00 9 cm FN infiltrating ductal carcinoma, grade 3  ER/ID negative  Right axillary lymph node biopsy: metastatic carcinoma   - 2008 right mastectomy and lymph node dissection:   9:00 3 cm FN infiltrating ductal carcinoma, grade 3, ER/ID negative, HER2 postive  10:00 9 cm FN infiltrating ductal carcinoma, grade 3 ER/ID positive, HER2 negative.   - 2008 right expander implant removed.     Prior history of breast cancer or DCIS: Yes   - 2008 right breast cancer s/p right mastectomy, right axillary sentinel lymph node biopsy, chemotherapy, and radiation.   9:00 3 cm FN infiltrating ductal carcinoma, grade 3, ER/ID negative, HER2 postive  10:00 9 cm FN infiltrating ductal carcinoma, grade 3 ER/ID positive, HER2 negative.     Prior radiation history: Yes   - 2008 for right breast cancer with lymph node metastasis    Self breast exams: Yes  Breast density: heterogeneously dense    FAMILY HISTORY:  Breast ca: Yes   - sister, 40's/50's  Ovarian ca: No  Pancreatic ca: Yes   - brother, 50's  Prostate: No  Gastric ca: No  Melanoma: No  Colon ca: No  Other cancer: No  Other genetic, testing, syndromes, or clotting disorders: no     PAST MEDICAL HISTORY  Past Medical History:   Diagnosis Date     Antiplatelet or antithrombotic long-term use      Breast cancer (H) 08/26/2008    Right Breast     Chronic fatigue 05/19/2020     CKD (chronic kidney disease) stage 3, GFR 30-59 ml/min (H) 09/17/2010     Combined form of age-related cataract, left eye 10/20/2020    Added  automatically from request for surgery [id number]     Health Care Home 07/17/2012    [facility] for . Margarita Lawler RN-BSN, [facility] [phone]  DX V65.8 REPLACED WITH [id number] [facility] CARE HOME (04/08/2013)     Heart burn 05/19/2020     History of 2019 novel coronavirus disease (COVID-19) 11/16/2020    Seen in urgent care on 11/5/2020 for fever cough dizziness abdominal pain nausea was given Bactrim for cystitis tested for COVID discharged home and testing came back positive for Covid.  Seen in ER 11/16/2020 for shortness of breath sinus tachycardia and severe hypoxia.  Was admitted for worsening respiratory symptoms prompting her to seek further evaluation on 11/17/20 at [facility]. She was found to      History of acute respiratory failure 11/16/2020    With covid , complicated by PE and DVT, on oxygen and eliquis 3 months, completely resolved as of 2/2021     Hyperlipidemia LDL goal <100 11/29/2010     Hypertension goal BP (blood pressure) < 140/90 09/17/2010     Hypothyroid 2002     Lipoma of other skin and subcutaneous tissue 11/19/2004     Nonsenile cataract      Osteoporosis 2014     Other psoriasis      Physical deconditioning 05/19/2020     Positive colorectal cancer screening using Cologuard test 06/26/2022     Thyroid disease      PAST SURGICAL HISTORY   Past Surgical History:   Procedure Laterality Date     COLONOSCOPY N/A 9/7/2022    Procedure: COLONOSCOPY, WITH POLYPECTOMY;  Surgeon: Leventhal, Thomas Michael, MD;  Location: [facility] OR      REMV CATARACT EXTRACAP,INSERT LENS, W/O ECP Right 05/29/2002    Dr. Clinton Lopez ([id number], Power:  20.0D)     PHACOEMULSIFICATION CLEAR CORNEA WITH STANDARD INTRAOCULAR LENS IMPLANT Left 2/22/2021    Procedure: LEFT EYE PHACOEMULSIFICATION, CATARACT, WITH INTRAOCULAR LENS IMPLANT;  Surgeon: Ruben Shelby MD;  Location: [facility] OR     SURGICAL HISTORY OF -   Oct 3, 2008    Rt saline implant     VITRECTOMY PARSPLANA WITH 25 GAUGE SYSTEM Right 6/7/2021     Procedure: 1) Pars plana vitrectomy (PPV) 25g, Right eye,  2) Membrane stripping and stripping of  internal limiting membrane,;  Surgeon: Fede Danielson MD;  Location: [facility] OR     MEDICATIONS  Current Outpatient Medications   Medication Sig Dispense Refill     alendronate (FOSAMAX) 70 MG tablet Take 1 tablet (70 mg) by mouth every 7 days 12 tablet 3     atorvastatin (LIPITOR) 20 MG tablet Take 1 tablet (20 mg) by mouth daily 90 tablet 1     calcium carbonate-vitamin D (OSCAL W/D) 500-200 MG-UNIT tablet Take 1 tablet by mouth daily Taking every 3 dasy or so       cetirizine (ZYRTEC) 10 MG tablet Take 10 mg by mouth daily       Cyanocobalamin (B-12 PO)        famotidine (PEPCID) 20 MG tablet Take 20 mg by mouth daily       levothyroxine (SYNTHROID/LEVOTHROID) 150 MCG tablet 1 tab 6 days a week & 1.5 tab one day a week & recheck tsh in 6 weeks for further adjustment 45 tablet 0     lisinopril (ZESTRIL) 20 MG tablet Take 1 tablet (20 mg) by mouth At Bedtime 90 tablet 1     Multiple Vitamin (MULTI-VITAMIN) per tablet Take 1 tablet by mouth daily       TURMERIC PO Take 1 tablet by mouth daily        ALLERGIES  Allergies   Allergen Reactions     No Known Drug Allergy       SOCIAL HISTORY:  Smokes: No her daughter who she lives with does smoke.   EtOH: Yes  Lives with her daughter, grand daughter, and cats.     ROS:   Change in vision No  Headaches: no  Respiratory: No shortness of breath, dyspnea on exertion, cough, or hemoptysis   Cardiovascular: negative   Gastrointestinal: negative Abdominal pain: no  Breast: right breast mass.  Musculoskeletal: chronic back pain  Psychiatric: negative  Hematologic/Lymphatic/Immunologic: negative  Endocrine: negative    EXAM  There were no vitals taken for this visit.   PHYSICAL EXAM  Respiratory: breathing non labored.   Right mastectomy scar with 1.5x04. cm superficial wound. No surrounding erythema. There is firmness along the scar but no mass.        Media  Information      Document Information    Other:  Photograph   Right mastectomy site   06/01/2023 10:50 AM   Attached To:   Oncology Visit on 6/1/23 with Evelina Phillips PA-C     Source Information    Evelina Phillips PA-C   Onc Surgery          ASSESSMENT/PLAN:    Romayne L Sathre is a 77 year old woman with history of right breast cancer in 2008 now with a wound at her mastectomy site. Punch biopsy demonstrated radiation changes, no malignancy. The wound is healing but will need to follow to ensure it completely heals. If it does not completely heal or worsens would recommend repeat punch biopsy. Recommended to apply moisturizer daily such as Vaseline or Aquaphor. She will follow up in 2 weeks.     Evelina Phillips PA-C    10 minutes spent on the date of the encounter doing chart review, review of test results, interpretation of tests, patient visit and documentation.

## 2023-06-01 ENCOUNTER — ONCOLOGY VISIT (OUTPATIENT)
Dept: SURGERY | Facility: CLINIC | Age: 78
End: 2023-06-01
Attending: PHYSICIAN ASSISTANT
Payer: COMMERCIAL

## 2023-06-01 VITALS
TEMPERATURE: 98.6 F | SYSTOLIC BLOOD PRESSURE: 113 MMHG | OXYGEN SATURATION: 96 % | DIASTOLIC BLOOD PRESSURE: 69 MMHG | WEIGHT: 241.1 LBS | BODY MASS INDEX: 36.77 KG/M2 | HEART RATE: 71 BPM

## 2023-06-01 DIAGNOSIS — Z90.11 S/P MASTECTOMY, RIGHT: Primary | ICD-10-CM

## 2023-06-01 PROCEDURE — 99213 OFFICE O/P EST LOW 20 MIN: CPT | Performed by: PHYSICIAN ASSISTANT

## 2023-06-01 PROCEDURE — G0463 HOSPITAL OUTPT CLINIC VISIT: HCPCS | Performed by: PHYSICIAN ASSISTANT

## 2023-06-01 ASSESSMENT — PAIN SCALES - GENERAL: PAINLEVEL: NO PAIN (0)

## 2023-06-01 NOTE — LETTER
6/1/2023         RE: Romayne L Sathre  [address]  [facility] [address]        Dear Colleague,    Thank you for referring your patient, Romayne L Sathre, to the [facility]. Please see a copy of my visit note below.    FOLLOW UP  Jun 1, 2023     Romayne L Sathre is a 77 year old woman with history of right breast cancer, now with a mastectomy site wound.     HPI:    History of right breast cancer, chronic kidney disease, pulmonary embolism/DVT, thyroid disease.     Family history of sister with breast cancer and brother with pancreatic cancer. She was referred to genetic counseling.     She was found on screening mammogram in 2008 to have 2 right breast masses. Biopsy demonstrated infiltrating ductal carcinoma and lymph node metastasis. She underwent a right mastectomy and lymph node dissection. She followed with medical oncologist Dr. Yoselin Garrido for chemotherapy. She had radiation at [facility]. She had an expander implant infection and the expander was removed. Her most recent visit with Dr. Garrido was in 2009.     She had a left screening mammogram 4/14/23 that demonstrated an asymmetry. Follow up diagnostic mammogram and ultrasound were negative. She was noted to have a right mastectomy site wound. Ultrasound of the wound did not show any mass.     She was seen 4/27/23 with a wound at her right mastectomy site x1 month. Punch biopsy was performed. Biopsy demonstrated radiation related changes, no malignancy. She completed a 5 day course of keflex.     Today she reports the wound is slowly healing. She still has some drainage. No fever, pain or erythema. She is applying Vaseline daily.     BREAST-SPECIFIC HISTORY:    Previous breast imaging: Yes   - 12/30/04 Smammo BI-RADS 1  - 3/14/06 Smammo BI-RADS 1  - 8/7/08 Smammo: right 1 cm nodular density very deep in upper outer quadrant BI-RADS 0  - 8/28/08 right ultrasound guilded breast core needle biopsy    9:00 3 cm FN: infiltrating ductal carcinoma, grade 3  10:00 9 cm FN infiltrating ductal carcinoma, grade 3  ER/UT negative  Right axillary lymph node biopsy: metastatic carcinoma   - 9/9/08 breast MRI: right breast 9:00 3 cm FN 2.4 cm mass, 10:00 9 cm FN 2.4 cm mass, enlarged right axillary lymph nodes.  MRI brain no mets  - 7/31/12 left Smammo BI-RADS 2  - 1/13/16 left Smammo BI-RADS 2  - 5/1/18 left Smammo BI-RADS 1  - 4/12/22 left Smammo BI-RADS 1  - 4/14/23 left Smammo: left breast asymmetry BI-RADS 0  - 4/26/23 left Dmammo and bilateral breast ultrasound: BI-RADS 2    Prior breast biopsies/surgeries: Yes   - 8/28/08 right ultrasound guilded breast core needle biopsy   9:00 3 cm FN: infiltrating ductal carcinoma, grade 3  10:00 9 cm FN infiltrating ductal carcinoma, grade 3  ER/UT negative  Right axillary lymph node biopsy: metastatic carcinoma   - 2008 right mastectomy and lymph node dissection:   9:00 3 cm FN infiltrating ductal carcinoma, grade 3, ER/UT negative, HER2 postive  10:00 9 cm FN infiltrating ductal carcinoma, grade 3 ER/UT positive, HER2 negative.   - 2008 right expander implant removed.     Prior history of breast cancer or DCIS: Yes   - 2008 right breast cancer s/p right mastectomy, right axillary sentinel lymph node biopsy, chemotherapy, and radiation.   9:00 3 cm FN infiltrating ductal carcinoma, grade 3, ER/UT negative, HER2 postive  10:00 9 cm FN infiltrating ductal carcinoma, grade 3 ER/UT positive, HER2 negative.     Prior radiation history: Yes   - 2008 for right breast cancer with lymph node metastasis    Self breast exams: Yes  Breast density: heterogeneously dense    FAMILY HISTORY:  Breast ca: Yes   - sister, 40's/50's  Ovarian ca: No  Pancreatic ca: Yes   - brother, 50's  Prostate: No  Gastric ca: No  Melanoma: No  Colon ca: No  Other cancer: No  Other genetic, testing, syndromes, or clotting disorders: no     PAST MEDICAL HISTORY  Past Medical History:   Diagnosis Date     Antiplatelet or antithrombotic long-term use     Breast cancer (H) 08/26/2008    Right Breast    Chronic fatigue 05/19/2020    CKD (chronic kidney disease) stage 3, GFR 30-59 ml/min (H) 09/17/2010    Combined form of age-related cataract, left eye 10/20/2020    Added automatically from request for surgery [id number]    Health Care Home 07/17/2012    [facility] for . Margarita Lawler RN-BSN, [facility] [phone]  DX V65.8 REPLACED WITH [id number] [facility] CARE [facility] (04/08/2013)    Heart burn 05/19/2020    History of 2019 novel coronavirus disease (COVID-19) 11/16/2020    Seen in urgent care on 11/5/2020 for fever cough dizziness abdominal pain nausea was given Bactrim for cystitis tested for COVID discharged home and testing came back positive for Covid.  Seen in ER 11/16/2020 for shortness of breath sinus tachycardia and severe hypoxia.  Was admitted for worsening respiratory symptoms prompting her to seek further evaluation on 11/17/20 at [facility]. She was found to     History of acute respiratory failure 11/16/2020    With covid , complicated by PE and DVT, on oxygen and eliquis 3 months, completely resolved as of 2/2021    Hyperlipidemia LDL goal <100 11/29/2010    Hypertension goal BP (blood pressure) < 140/90 09/17/2010    Hypothyroid 2002    Lipoma of other skin and subcutaneous tissue 11/19/2004    Nonsenile cataract     Osteoporosis 2014    Other psoriasis     Physical deconditioning 05/19/2020    Positive colorectal cancer screening using Cologuard test 06/26/2022    Thyroid disease      PAST SURGICAL HISTORY   Past Surgical History:   Procedure Laterality Date    COLONOSCOPY N/A 9/7/2022    Procedure: COLONOSCOPY, WITH POLYPECTOMY;  Surgeon: Leventhal, Thomas Michael, MD;  Location: [facility] OR    [facility] CATARACT EXTRACAP,INSERT LENS, W/O ECP Right 05/29/2002    Dr. Clinton Lopez ([id number], Power:  20.0D)    PHACOEMULSIFICATION CLEAR CORNEA WITH STANDARD INTRAOCULAR LENS IMPLANT Left 2/22/2021    Procedure:  LEFT EYE PHACOEMULSIFICATION, CATARACT, WITH INTRAOCULAR LENS IMPLANT;  Surgeon: Ruben Shelby MD;  Location: [facility] OR    SURGICAL HISTORY OF -   Oct 3, 2008    Rt saline implant    VITRECTOMY PARSPLANA WITH 25 GAUGE SYSTEM Right 6/7/2021    Procedure: 1) Pars plana vitrectomy (PPV) 25g, Right eye,  2) Membrane stripping and stripping of  internal limiting membrane,;  Surgeon: Fede Danielson MD;  Location: [facility] OR     MEDICATIONS  Current Outpatient Medications   Medication Sig Dispense Refill    alendronate (FOSAMAX) 70 MG tablet Take 1 tablet (70 mg) by mouth every 7 days 12 tablet 3    atorvastatin (LIPITOR) 20 MG tablet Take 1 tablet (20 mg) by mouth daily 90 tablet 1    calcium carbonate-vitamin D (OSCAL W/D) 500-200 MG-UNIT tablet Take 1 tablet by mouth daily Taking every 3 dasy or so      cetirizine (ZYRTEC) 10 MG tablet Take 10 mg by mouth daily      Cyanocobalamin (B-12 PO)       famotidine (PEPCID) 20 MG tablet Take 20 mg by mouth daily      levothyroxine (SYNTHROID/LEVOTHROID) 150 MCG tablet 1 tab 6 days a week & 1.5 tab one day a week & recheck tsh in 6 weeks for further adjustment 45 tablet 0    lisinopril (ZESTRIL) 20 MG tablet Take 1 tablet (20 mg) by mouth At Bedtime 90 tablet 1    Multiple Vitamin (MULTI-VITAMIN) per tablet Take 1 tablet by mouth daily      TURMERIC PO Take 1 tablet by mouth daily        ALLERGIES  Allergies   Allergen Reactions    No Known Drug Allergy       SOCIAL HISTORY:  Smokes: No her daughter who she lives with does smoke.   EtOH: Yes  Lives with her daughter, grand daughter, and cats.     ROS:   Change in vision No  Headaches: no  Respiratory: No shortness of breath, dyspnea on exertion, cough, or hemoptysis   Cardiovascular: negative   Gastrointestinal: negative Abdominal pain: no  Breast: right breast mass.  Musculoskeletal: chronic back pain  Psychiatric: negative  Hematologic/Lymphatic/Immunologic: negative  Endocrine: negative    EXAM  There were no  vitals taken for this visit.   PHYSICAL EXAM  Respiratory: breathing non labored.   Right mastectomy scar with 1.5x04. cm superficial wound. No surrounding erythema. There is firmness along the scar but no mass.        Media Information      Document Information    Other:  Photograph   Right mastectomy site   06/01/2023 10:50 AM   Attached To:   Oncology Visit on 6/1/23 with Evelina Phillips PA-C     Source Information    Evelina Phillips PA-C   Onc Surgery          ASSESSMENT/PLAN:    Romayne L Sathre is a 77 year old woman with history of right breast cancer in 2008 now with a wound at her mastectomy site. Punch biopsy demonstrated radiation changes, no malignancy. The wound is healing but will need to follow to ensure it completely heals. If it does not completely heal or worsens would recommend repeat punch biopsy. Recommended to apply moisturizer daily such as Vaseline or Aquaphor. She will follow up in 2 weeks.     Evelina Phillips PA-C    10 minutes spent on the date of the encounter doing chart review, review of test results, interpretation of tests, patient visit and documentation.

## 2023-06-01 NOTE — NURSING NOTE
"Oncology Rooming Note    June 1, 2023 10:27 AM   Romayne L Sathre is a 77 year old female who presents for:    Chief Complaint   Patient presents with     Oncology Clinic Visit     RTN: Breast cancer     Initial Vitals: /69   Pulse 71   Temp 98.6  F (37  C) (Oral)   Wt 109.4 kg (241 lb 1.6 oz)   SpO2 96%   BMI 36.77 kg/m   Estimated body mass index is 36.77 kg/m  as calculated from the following:    Height as of 3/28/23: 1.725 m (5' 7.9\").    Weight as of this encounter: 109.4 kg (241 lb 1.6 oz). Body surface area is 2.29 meters squared.  No Pain (0) Comment: Data Unavailable   No LMP recorded. Patient is postmenopausal.  Allergies reviewed: Yes  Medications reviewed: Yes    Medications: Medication refills not needed today.  Pharmacy name entered into [employer]: [id number] - [place], [place] - [address] AT [address]    Clinical concerns: Patient reports that wearing a bra opens up the scab of her scar and creates a slight drainage      Lisa Singh              "

## 2023-06-13 NOTE — PROGRESS NOTES
FOLLOW UP  Gilmar 15, 2023     Romayne L Sathre is a 77 year old woman with history of right breast cancer, now with a mastectomy site wound.     HPI:    History of right breast cancer, chronic kidney disease, pulmonary embolism/DVT, thyroid disease.     Family history of sister with breast cancer and brother with pancreatic cancer. She was referred to genetic counseling.     She was found on screening mammogram in 2008 to have 2 right breast masses. Biopsy demonstrated infiltrating ductal carcinoma and lymph node metastasis. She underwent a right mastectomy and lymph node dissection. She followed with medical oncologist Dr. Yoselin Garrido for chemotherapy. She had radiation at [facility]. She had an expander implant infection and the expander was removed. Her most recent visit with Dr. Garrido was in 2009.     She had a left screening mammogram 4/14/23 that demonstrated an asymmetry. Follow up diagnostic mammogram and ultrasound were negative. She was noted to have a right mastectomy site wound. Ultrasound of the wound did not show any mass.     She was seen 4/27/23 with a wound at her right mastectomy site x1 month. Punch biopsy was performed. Biopsy demonstrated radiation related changes, no malignancy. She completed a 5 day course of keflex.     Today she reports the wound is healing. No fever, pain or erythema. She is applying Vaseline occasionally.     BREAST-SPECIFIC HISTORY:    Previous breast imaging: Yes   - 12/30/04 Smammo BI-RADS 1  - 3/14/06 Smammo BI-RADS 1  - 8/7/08 Smammo: right 1 cm nodular density very deep in upper outer quadrant BI-RADS 0  - 8/28/08 right ultrasound guilded breast core needle biopsy   9:00 3 cm FN: infiltrating ductal carcinoma, grade 3  10:00 9 cm FN infiltrating ductal carcinoma, grade 3  ER/WI negative  Right axillary lymph node biopsy: metastatic carcinoma   - 9/9/08 breast MRI: right breast 9:00 3 cm FN 2.4 cm mass, 10:00 9 cm FN 2.4 cm mass, enlarged right axillary  lymph nodes.  MRI brain no mets  - 7/31/12 left Smammo BI-RADS 2  - 1/13/16 left Smammo BI-RADS 2  - 5/1/18 left Smammo BI-RADS 1  - 4/12/22 left Smammo BI-RADS 1  - 4/14/23 left Smammo: left breast asymmetry BI-RADS 0  - 4/26/23 left Dmammo and bilateral breast ultrasound: BI-RADS 2    Prior breast biopsies/surgeries: Yes   - 8/28/08 right ultrasound guilded breast core needle biopsy   9:00 3 cm FN: infiltrating ductal carcinoma, grade 3  10:00 9 cm FN infiltrating ductal carcinoma, grade 3  ER/AR negative  Right axillary lymph node biopsy: metastatic carcinoma   - 2008 right mastectomy and lymph node dissection:   9:00 3 cm FN infiltrating ductal carcinoma, grade 3, ER/AR negative, HER2 postive  10:00 9 cm FN infiltrating ductal carcinoma, grade 3 ER/AR positive, HER2 negative.   - 2008 right expander implant removed.     Prior history of breast cancer or DCIS: Yes   - 2008 right breast cancer s/p right mastectomy, right axillary sentinel lymph node biopsy, chemotherapy, and radiation.   9:00 3 cm FN infiltrating ductal carcinoma, grade 3, ER/AR negative, HER2 postive  10:00 9 cm FN infiltrating ductal carcinoma, grade 3 ER/AR positive, HER2 negative.     Prior radiation history: Yes   - 2008 for right breast cancer with lymph node metastasis    Self breast exams: Yes  Breast density: heterogeneously dense    FAMILY HISTORY:  Breast ca: Yes   - sister, 40's/50's  Ovarian ca: No  Pancreatic ca: Yes   - brother, 50's  Prostate: No  Gastric ca: No  Melanoma: No  Colon ca: No  Other cancer: No  Other genetic, testing, syndromes, or clotting disorders: no     PAST MEDICAL HISTORY  Past Medical History:   Diagnosis Date     Antiplatelet or antithrombotic long-term use      Breast cancer (H) 08/26/2008    Right Breast     Chronic fatigue 05/19/2020     CKD (chronic kidney disease) stage 3, GFR 30-59 ml/min (H) 09/17/2010     Combined form of age-related cataract, left eye 10/20/2020    Added automatically from request  for surgery [id number]     Health Care Home 07/17/2012    [facility] for . Margarita Lawler RN-BSN, [facility] [phone]  DX V65.8 REPLACED WITH [id number] [facility]ALTH CARE HOME (04/08/2013)     Heart burn 05/19/2020     History of 2019 novel coronavirus disease (COVID-19) 11/16/2020    Seen in urgent care on 11/5/2020 for fever cough dizziness abdominal pain nausea was given Bactrim for cystitis tested for COVID discharged home and testing came back positive for Covid.  Seen in ER 11/16/2020 for shortness of breath sinus tachycardia and severe hypoxia.  Was admitted for worsening respiratory symptoms prompting her to seek further evaluation on 11/17/20 at [facility]. She was found to      History of acute respiratory failure 11/16/2020    With covid , complicated by PE and DVT, on oxygen and eliquis 3 months, completely resolved as of 2/2021     Hyperlipidemia LDL goal <100 11/29/2010     Hypertension goal BP (blood pressure) < 140/90 09/17/2010     Hypothyroid 2002     Lipoma of other skin and subcutaneous tissue 11/19/2004     Nonsenile cataract      Osteoporosis 2014     Other psoriasis      Physical deconditioning 05/19/2020     Positive colorectal cancer screening using Cologuard test 06/26/2022     Thyroid disease      PAST SURGICAL HISTORY   Past Surgical History:   Procedure Laterality Date     COLONOSCOPY N/A 9/7/2022    Procedure: COLONOSCOPY, WITH POLYPECTOMY;  Surgeon: Leventhal, Thomas Michael, MD;  Location: [facility] OR      [facility]MV CATARACT EXTRACAP,INSERT LENS, W/O ECP Right 05/29/2002    Dr. Cilnton Lopez ([id number], Power:  20.0D)     PHACOEMULSIFICATION CLEAR CORNEA WITH STANDARD INTRAOCULAR LENS IMPLANT Left 2/22/2021    Procedure: LEFT EYE PHACOEMULSIFICATION, CATARACT, WITH INTRAOCULAR LENS IMPLANT;  Surgeon: Ruben Shelby MD;  Location: [facility] OR     SURGICAL HISTORY OF -   Oct 3, 2008    Rt saline implant     VITRECTOMY PARSPLANA WITH 25 GAUGE SYSTEM Right 6/7/2021    Procedure: 1) Pars plana  vitrectomy (PPV) 25g, Right eye,  2) Membrane stripping and stripping of  internal limiting membrane,;  Surgeon: Fede Danielson MD;  [facility]     MEDICATIONS  Current Outpatient Medications   Medication Sig Dispense Refill     alendronate (FOSAMAX) 70 MG tablet Take 1 tablet (70 mg) by mouth every 7 days 12 tablet 3     atorvastatin (LIPITOR) 20 MG tablet Take 1 tablet (20 mg) by mouth daily 90 tablet 1     calcium carbonate-vitamin D (OSCAL W/D) 500-200 MG-UNIT tablet Take 1 tablet by mouth daily Taking every 3 dasy or so       cetirizine (ZYRTEC) 10 MG tablet Take 10 mg by mouth daily       Cyanocobalamin (B-12 PO)        famotidine (PEPCID) 20 MG tablet Take 20 mg by mouth daily       levothyroxine (SYNTHROID/LEVOTHROID) 150 MCG tablet 1 tab 6 days a week & 1.5 tab one day a week & recheck tsh in 6 weeks for further adjustment 45 tablet 0     lisinopril (ZESTRIL) 20 MG tablet Take 1 tablet (20 mg) by mouth At Bedtime 90 tablet 1     Multiple Vitamin (MULTI-VITAMIN) per tablet Take 1 tablet by mouth daily       TURMERIC PO Take 1 tablet by mouth daily        ALLERGIES  Allergies   Allergen Reactions     No Known Drug Allergy       SOCIAL HISTORY:  Smokes: No her daughter who she lives with does smoke.   EtOH: Yes  Lives with her daughter, grand daughter, and cats.     ROS:   Change in vision No  Headaches: no  Respiratory: No shortness of breath, dyspnea on exertion, cough, or hemoptysis   Cardiovascular: negative   Gastrointestinal: negative Abdominal pain: no  Breast: right breast mass.  Musculoskeletal: chronic back pain  Psychiatric: negative  Hematologic/Lymphatic/Immunologic: negative  Endocrine: negative    EXAM  There were no vitals taken for this visit.   PHYSICAL EXAM  Respiratory: breathing non labored.   Inferior to right mastectomy scar with 1 cm superficial healing wound. No surrounding erythema. There is firmness along the scar but no mass.          Media Information      Document  Information    Other:  Photograph   Right mastectomy site   06/01/2023 10:50 AM   Attached To:   Oncology Visit on 6/1/23 with Evelina Phillips PA-C     Source Information    Evelina Phillips PA-C   Onc Surgery          ASSESSMENT/PLAN:    Romayne L Sathre is a 77 year old woman with history of right breast cancer in 2008 now with a wound at her mastectomy site. Punch biopsy demonstrated radiation changes, no malignancy. The wound is healing but will need to follow to ensure it completely heals. If it does not completely heal or worsens would recommend repeat punch biopsy. Recommended to apply moisturizer daily such as Vaseline or Aquaphor. She will follow up in 1 month.    Evelina Phillips PA-C    10 minutes spent on the date of the encounter doing chart review, review of test results, interpretation of tests, patient visit and documentation.

## 2023-06-15 ENCOUNTER — ONCOLOGY VISIT (OUTPATIENT)
Dept: SURGERY | Facility: CLINIC | Age: 78
End: 2023-06-15
Attending: PHYSICIAN ASSISTANT
Payer: COMMERCIAL

## 2023-06-15 VITALS
SYSTOLIC BLOOD PRESSURE: 113 MMHG | HEART RATE: 72 BPM | DIASTOLIC BLOOD PRESSURE: 63 MMHG | WEIGHT: 237.4 LBS | BODY MASS INDEX: 36.2 KG/M2 | TEMPERATURE: 98.9 F | OXYGEN SATURATION: 100 % | RESPIRATION RATE: 16 BRPM

## 2023-06-15 DIAGNOSIS — Z90.11 S/P MASTECTOMY, RIGHT: Primary | ICD-10-CM

## 2023-06-15 PROCEDURE — G0463 HOSPITAL OUTPT CLINIC VISIT: HCPCS | Performed by: PHYSICIAN ASSISTANT

## 2023-06-15 PROCEDURE — 99212 OFFICE O/P EST SF 10 MIN: CPT | Performed by: PHYSICIAN ASSISTANT

## 2023-06-15 ASSESSMENT — PAIN SCALES - GENERAL: PAINLEVEL: NO PAIN (0)

## 2023-06-15 NOTE — NURSING NOTE
"Oncology Rooming Note    Anuja 15, 2023 8:18 AM   Romayne L Sathre is a 77 year old female who presents for:    Chief Complaint   Patient presents with     Oncology Clinic Visit     Breast ca     Initial Vitals: /63   Pulse 72   Temp 98.9  F (37.2  C) (Oral)   Resp 16   Wt 107.7 kg (237 lb 6.4 oz)   SpO2 100%   BMI 36.20 kg/m   Estimated body mass index is 36.2 kg/m  as calculated from the following:    Height as of 3/28/23: 1.725 m (5' 7.9\").    Weight as of this encounter: 107.7 kg (237 lb 6.4 oz). Body surface area is 2.27 meters squared.  No Pain (0) Comment: Data Unavailable   No LMP recorded. Patient is postmenopausal.  Allergies reviewed: Yes  Medications reviewed: Yes    Medications: Medication refills not needed today.  Pharmacy name entered into [employer]: [id number] - [place], [place] - [address] AT [address]    Clinical concerns:  none      Hailey Morris            "

## 2023-06-15 NOTE — LETTER
6/15/2023         RE: Romayne L Sathre  [address]  [facility] [address]        Dear Colleague,    Thank you for referring your patient, Romayne L Sathre, to the [facility]. Please see a copy of my visit note below.    FOLLOW UP  Gilmar 15, 2023     Romayne L Sathre is a 77 year old woman with history of right breast cancer, now with a mastectomy site wound.     HPI:    History of right breast cancer, chronic kidney disease, pulmonary embolism/DVT, thyroid disease.     Family history of sister with breast cancer and brother with pancreatic cancer. She was referred to genetic counseling.     She was found on screening mammogram in 2008 to have 2 right breast masses. Biopsy demonstrated infiltrating ductal carcinoma and lymph node metastasis. She underwent a right mastectomy and lymph node dissection. She followed with medical oncologist Dr. Yoselin Garrido for chemotherapy. She had radiation at [facility]. She had an expander implant infection and the expander was removed. Her most recent visit with Dr. Garrido was in 2009.     She had a left screening mammogram 4/14/23 that demonstrated an asymmetry. Follow up diagnostic mammogram and ultrasound were negative. She was noted to have a right mastectomy site wound. Ultrasound of the wound did not show any mass.     She was seen 4/27/23 with a wound at her right mastectomy site x1 month. Punch biopsy was performed. Biopsy demonstrated radiation related changes, no malignancy. She completed a 5 day course of keflex.     Today she reports the wound is healing. No fever, pain or erythema. She is applying Vaseline occasionally.     BREAST-SPECIFIC HISTORY:    Previous breast imaging: Yes   - 12/30/04 Smammo BI-RADS 1  - 3/14/06 Smammo BI-RADS 1  - 8/7/08 Smammo: right 1 cm nodular density very deep in upper outer quadrant BI-RADS 0  - 8/28/08 right ultrasound guilded breast core needle biopsy   9:00 3 cm FN: infiltrating  ductal carcinoma, grade 3  10:00 9 cm FN infiltrating ductal carcinoma, grade 3  ER/NC negative  Right axillary lymph node biopsy: metastatic carcinoma   - 9/9/08 breast MRI: right breast 9:00 3 cm FN 2.4 cm mass, 10:00 9 cm FN 2.4 cm mass, enlarged right axillary lymph nodes.  MRI brain no mets  - 7/31/12 left Smammo BI-RADS 2  - 1/13/16 left Smammo BI-RADS 2  - 5/1/18 left Smammo BI-RADS 1  - 4/12/22 left Smammo BI-RADS 1  - 4/14/23 left Smammo: left breast asymmetry BI-RADS 0  - 4/26/23 left Dmammo and bilateral breast ultrasound: BI-RADS 2    Prior breast biopsies/surgeries: Yes   - 8/28/08 right ultrasound guilded breast core needle biopsy   9:00 3 cm FN: infiltrating ductal carcinoma, grade 3  10:00 9 cm FN infiltrating ductal carcinoma, grade 3  ER/NC negative  Right axillary lymph node biopsy: metastatic carcinoma   - 2008 right mastectomy and lymph node dissection:   9:00 3 cm FN infiltrating ductal carcinoma, grade 3, ER/NC negative, HER2 postive  10:00 9 cm FN infiltrating ductal carcinoma, grade 3 ER/NC positive, HER2 negative.   - 2008 right expander implant removed.     Prior history of breast cancer or DCIS: Yes   - 2008 right breast cancer s/p right mastectomy, right axillary sentinel lymph node biopsy, chemotherapy, and radiation.   9:00 3 cm FN infiltrating ductal carcinoma, grade 3, ER/NC negative, HER2 postive  10:00 9 cm FN infiltrating ductal carcinoma, grade 3 ER/NC positive, HER2 negative.     Prior radiation history: Yes   - 2008 for right breast cancer with lymph node metastasis    Self breast exams: Yes  Breast density: heterogeneously dense    FAMILY HISTORY:  Breast ca: Yes   - sister, 40's/50's  Ovarian ca: No  Pancreatic ca: Yes   - brother, 50's  Prostate: No  Gastric ca: No  Melanoma: No  Colon ca: No  Other cancer: No  Other genetic, testing, syndromes, or clotting disorders: no     PAST MEDICAL HISTORY  Past Medical History:   Diagnosis Date    Antiplatelet or antithrombotic  long-term use     Breast cancer (H) 08/26/2008    Right Breast    Chronic fatigue 05/19/2020    CKD (chronic kidney disease) stage 3, GFR 30-59 ml/min (H) 09/17/2010    Combined form of age-related cataract, left eye 10/20/2020    Added automatically from request for surgery [id number]    Health Care Home 07/17/2012    [facility] for . Margarita Lawler RN-BSN, [facility] [phone]  DX V65.8 REPLACED WITH [id number] [facility]ALTH CARE HOME (04/08/2013)    Heart burn 05/19/2020    History of 2019 novel coronavirus disease (COVID-19) 11/16/2020    Seen in urgent care on 11/5/2020 for fever cough dizziness abdominal pain nausea was given Bactrim for cystitis tested for COVID discharged home and testing came back positive for Covid.  Seen in ER 11/16/2020 for shortness of breath sinus tachycardia and severe hypoxia.  Was admitted for worsening respiratory symptoms prompting her to seek further evaluation on 11/17/20 at [facility]. She was found to     History of acute respiratory failure 11/16/2020    With covid , complicated by PE and DVT, on oxygen and eliquis 3 months, completely resolved as of 2/2021    Hyperlipidemia LDL goal <100 11/29/2010    Hypertension goal BP (blood pressure) < 140/90 09/17/2010    Hypothyroid 2002    Lipoma of other skin and subcutaneous tissue 11/19/2004    Nonsenile cataract     Osteoporosis 2014    Other psoriasis     Physical deconditioning 05/19/2020    Positive colorectal cancer screening using Cologuard test 06/26/2022    Thyroid disease      PAST SURGICAL HISTORY   Past Surgical History:   Procedure Laterality Date    COLONOSCOPY N/A 9/7/2022    Procedure: COLONOSCOPY, WITH POLYPECTOMY;  Surgeon: Leventhal, Thomas Michael, MD;  Location: [facility] OR    [facility] CATARACT EXTRACAP,INSERT LENS, W/O ECP Right 05/29/2002    Dr. Clinton Lopez ([id number], Power:  20.0D)    PHACOEMULSIFICATION CLEAR CORNEA WITH STANDARD INTRAOCULAR LENS IMPLANT Left 2/22/2021    Procedure: LEFT EYE PHACOEMULSIFICATION,  CATARACT, WITH INTRAOCULAR LENS IMPLANT;  Surgeon: Ruben Shelby MD;  Location: [facility] OR    SURGICAL HISTORY OF -   Oct 3, 2008    Rt saline implant    VITRECTOMY PARSPLANA WITH 25 GAUGE SYSTEM Right 6/7/2021    Procedure: 1) Pars plana vitrectomy (PPV) 25g, Right eye,  2) Membrane stripping and stripping of  internal limiting membrane,;  Surgeon: Fede Danielson MD;  [facility]: [facility]     MEDICATIONS  Current Outpatient Medications   Medication Sig Dispense Refill    alendronate (FOSAMAX) 70 MG tablet Take 1 tablet (70 mg) by mouth every 7 days 12 tablet 3    atorvastatin (LIPITOR) 20 MG tablet Take 1 tablet (20 mg) by mouth daily 90 tablet 1    calcium carbonate-vitamin D (OSCAL W/D) 500-200 MG-UNIT tablet Take 1 tablet by mouth daily Taking every 3 dasy or so      cetirizine (ZYRTEC) 10 MG tablet Take 10 mg by mouth daily      Cyanocobalamin (B-12 PO)       famotidine (PEPCID) 20 MG tablet Take 20 mg by mouth daily      levothyroxine (SYNTHROID/LEVOTHROID) 150 MCG tablet 1 tab 6 days a week & 1.5 tab one day a week & recheck tsh in 6 weeks for further adjustment 45 tablet 0    lisinopril (ZESTRIL) 20 MG tablet Take 1 tablet (20 mg) by mouth At Bedtime 90 tablet 1    Multiple Vitamin (MULTI-VITAMIN) per tablet Take 1 tablet by mouth daily      TURMERIC PO Take 1 tablet by mouth daily        ALLERGIES  Allergies   Allergen Reactions    No Known Drug Allergy       SOCIAL HISTORY:  Smokes: No her daughter who she lives with does smoke.   EtOH: Yes  Lives with her daughter, grand daughter, and cats.     ROS:   Change in vision No  Headaches: no  Respiratory: No shortness of breath, dyspnea on exertion, cough, or hemoptysis   Cardiovascular: negative   Gastrointestinal: negative Abdominal pain: no  Breast: right breast mass.  Musculoskeletal: chronic back pain  Psychiatric: negative  Hematologic/Lymphatic/Immunologic: negative  Endocrine: negative    EXAM  There were no vitals taken for this visit.    PHYSICAL EXAM  Respiratory: breathing non labored.   Inferior to right mastectomy scar with 1 cm superficial healing wound. No surrounding erythema. There is firmness along the scar but no mass.          Media Information      Document Information    Other:  Photograph   Right mastectomy site   06/01/2023 10:50 AM   Attached To:   Oncology Visit on 6/1/23 with Evelina Phillips PA-C     Source Information    Evelina Phillips PA-C   Onc Surgery          ASSESSMENT/PLAN:    Romayne L Sathre is a 77 year old woman with history of right breast cancer in 2008 now with a wound at her mastectomy site. Punch biopsy demonstrated radiation changes, no malignancy. The wound is healing but will need to follow to ensure it completely heals. If it does not completely heal or worsens would recommend repeat punch biopsy. Recommended to apply moisturizer daily such as Vaseline or Aquaphor. She will follow up in 1 month.    Evelina Phillips PA-C    10 minutes spent on the date of the encounter doing chart review, review of test results, interpretation of tests, patient visit and documentation.

## 2023-06-19 ENCOUNTER — LAB (OUTPATIENT)
Dept: LAB | Facility: CLINIC | Age: 78
End: 2023-06-19
Payer: COMMERCIAL

## 2023-06-19 DIAGNOSIS — E03.9 ACQUIRED HYPOTHYROIDISM: ICD-10-CM

## 2023-06-19 LAB — TSH SERPL DL<=0.005 MIU/L-ACNC: 3.45 UIU/ML (ref 0.3–4.2)

## 2023-06-19 PROCEDURE — 84443 ASSAY THYROID STIM HORMONE: CPT

## 2023-06-19 PROCEDURE — 36415 COLL VENOUS BLD VENIPUNCTURE: CPT

## 2023-06-20 ENCOUNTER — TELEPHONE (OUTPATIENT)
Dept: FAMILY MEDICINE | Facility: CLINIC | Age: 78
End: 2023-06-20
Payer: COMMERCIAL

## 2023-06-20 DIAGNOSIS — E03.9 ACQUIRED HYPOTHYROIDISM: ICD-10-CM

## 2023-06-20 RX ORDER — LEVOTHYROXINE SODIUM 150 UG/1
TABLET ORAL
Qty: 100 TABLET | Refills: 1 | Status: SHIPPED | OUTPATIENT
Start: 2023-06-20 | End: 2024-03-18

## 2023-06-20 NOTE — TELEPHONE ENCOUNTER
----- Message from Marry Rodriguez MD sent at 6/20/2023  7:09 AM CDT -----  Hello -    Here are my comments about your recent results:  -TSH (thyroid stimulating hormone) level is normal which indicates appropriate thyroid replacement dosing.  ADVISE: continuing same replacement dose 1 tab of 150 mcg 6 days a week & 1.5 tab one day a week and rechecking this in 3 months.  For additional lab test information, labtestsonline.org is an excellent reference..    Please let us know if you have any questions or concerns.      Regards,  Marry Rodriguez MD

## 2023-06-20 NOTE — TELEPHONE ENCOUNTER
Left message to call back and ask to speak with an available triage nurse.    MARY PartidaN, RN-BC  [facility]

## 2023-06-29 NOTE — TELEPHONE ENCOUNTER
Writer called and left message on patient's voicemail to call back and speak with a triage nurse.    MARY LastN RN  [facility]

## 2023-06-29 NOTE — TELEPHONE ENCOUNTER
Patient given comments from Dr Rodriguez and informed that a 3 month refill on her Levothyroxine was sent to her pharmacy on 6/20/23.  Camille Alvarez RN  [facility]

## 2023-08-10 ENCOUNTER — VIRTUAL VISIT (OUTPATIENT)
Dept: ONCOLOGY | Facility: CLINIC | Age: 78
End: 2023-08-10
Attending: PHYSICIAN ASSISTANT
Payer: COMMERCIAL

## 2023-08-10 DIAGNOSIS — Z80.3 FAMILY HISTORY OF MALIGNANT NEOPLASM OF BREAST: ICD-10-CM

## 2023-08-10 DIAGNOSIS — Z80.0 FAMILY HISTORY OF MALIGNANT NEOPLASM OF PANCREAS: ICD-10-CM

## 2023-08-10 DIAGNOSIS — Z85.3 HISTORY OF BREAST CANCER: Primary | ICD-10-CM

## 2023-08-10 PROCEDURE — 96040 HC GENETIC COUNSELING, EACH 30 MINUTES: CPT | Mod: TEL | Performed by: GENETIC COUNSELOR, MS

## 2023-08-10 NOTE — LETTER
August 10, 2023    Romayne L Sathre  [address]  [facility] [address]      Dear Romayne,    It was a pleasure speaking with you on the phone on 8/10/2023. Here is a copy of the progress note from our discussion. If you have any additional questions, please feel free to call.    Referring Provider: Evelina Phillips PA-C    Presenting Information:   I met with Romayne for her telephone genetic counseling visit, through the Cancer Risk Management Program, to discuss her personal history of breast cancer and family history of breast and pancreatic cancer. Today we reviewed this history, cancer screening recommendations, and available genetic testing options.    Personal History:  Romayne is a 77 year old year old female. 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     She had her first menstrual period at age 14, her first child at age 23, and completed menopause at age 46. Romayne has her ovaries, fallopian tubes and uterus in place, and she has had no ovarian cancer screening to date. She reports that she used hormone replacement therapy for approximately 5 years.      Her most recent mammogram in 2023 was normal. Romayne began having colonoscopies at the age of 76. Her most recent colonoscopy in 2022 found six 2-9mm polyps. Three were colonic mucosa, 2 were sessile serrated adenomas, and one tubular adenoma. Follow-up was recommended in 3 years. She does not regularly do any other cancer screening at this time. Romayne reported previous tobacco use for 15 years.    Family History: (Please see scanned pedigree for detailed family history information)  Romayne's brother was diagnosed with pancreatic cancer at age 68 and  at age 71.  Romayne's sister was diagnosed with breast cancer at age 50.  There is no other reported family history of cancer, however,  Romayne reported having limited information about extended family members.  Her maternal and paternal ethnicity is Frisian. There is no known Ashkenazi Tenriism ancestry on either side of her family. There is no reported consanguinity.    Discussion:  Romayne's personal and family history of breast and pancreatic cancer is suggestive of a hereditary cancer syndrome.  We reviewed the features of sporadic, familial, and hereditary cancers. We discussed that mutations in either BRCA1 or BRCA2 could be possible hereditary explanations for her family history of cancer. Mutations in the BRCA1 or BRCA2 gene are known to cause Hereditary Breast and Ovarian Cancer Syndrome (HBOC). HBOC typically presents with multiple family members diagnosed with breast cancer before age 50 and/or ovarian cancer. Other cancer risks associated with HBOC include male breast cancer, prostate cancer, pancreatic cancer, and melanoma.   We discussed the natural history and genetics of hereditary cancer. A detailed handout regarding hereditary cancer, along with the other information we discussed, will be mailed to Romayne at the end of our appointment today and can be found in the after visit summary. Topics included: inheritance pattern, cancer risks, cancer screening recommendations, and also risks, benefits and limitations of testing.  Based on her personal and family history, Romayne meets current National Comprehensive Cancer Network (NCCN) criteria for genetic testing of BRCA1/2 along with other high-penetrance pancreatic cancer susceptibility genes (I.e. GEORGIA, CDKN2A, EPCAM, MLH1, MSH2, MSH6, PALB2, STK11, and TP53).  Based on her personal and family history, Romayne meets current National Comprehensive Cancer Network (NCCN) criteria for genetic testing of BRCA1/2 along with other high-penetrance breast cancer susceptibility genes (I.e. CDH1, PALB2, PTEN, and TP53).  We discussed that there are additional genes that could cause increased risk  for breast and/or pancreatic cancer. As many of these genes present with overlapping features in a family and accurate cancer risk cannot always be established based upon the pedigree analysis alone, it would be reasonable for Romayne to consider panel genetic testing to analyze multiple genes at once.  We reviewed genetic testing options for hereditary cancers: hereditary breast and pancreatic cancer panel (Custom: Breast and Gyn + Pancreatic Cancers panel, 25 genes) and expanded high and moderate risk panel (Common Hereditary Cancers, 47 genes). Romayne wanted to think about her genetic testing options and she will contact me should she decide to pursue genetic testing in the future.  Romayne wanted to think about her genetic testing options and she will contact me should she be interested in genetic testing in the future.  Medical Management: For Romayne, we reviewed that the information from genetic testing may determine:  additional cancer screening for which Romayne may qualify (i.e. mammogram and breast MRI, more frequent colonoscopies, more frequent dermatologic exams, etc.),  options for risk reducing surgeries Romayne could consider (i.e. bilateral mastectomy, surgery to remove her ovaries and/or uterus, etc.),    and targeted chemotherapies if she were to develop certain cancers in the future (i.e. immunotherapy for individuals with Jacome syndrome, PARP inhibitors, etc.).   These recommendations and possible targeted chemotherapies will be discussed in detail once genetic testing is completed.     Plan:  1) Today Romayne elected to think about her genetic testing options.  2) A copy of the after visit summary will be mailed to Romayne.  3) Romayne will contact me should she elected to pursue genetic testing in the future.    Time spent on the phone: 24 minutes    Sabino Sanchez MS, [facility]  Licensed, Certified Genetic Counselor

## 2023-08-10 NOTE — Clinical Note
8/10/2023         RE: Romayne L Sathre  [address]  [facility] [address]        Dear Colleague,    Thank you for referring your patient, Romayne L Sathre, to the [facility]. Please see a copy of my visit note below.    Virtual Visit Details    Type of service:  Telephone Visit   Phone call duration: *** minutes     8/10/2023    Referring Provider: Evelina Phillips PA-C    Presenting Information:   I met with Romayne for her telephone genetic counseling visit, through the Cancer Risk Management Program, to discuss her personal history of breast cancer and family history of *** cancer. Today we reviewed this history, cancer screening recommendations, and available genetic testing options.    Personal History:  Romayne is a 77 year old year old female. She was diagnosed with ***; treatment included ***  / does not have any personal history of cancer.    ***She had her first menstrual period at age ***, her first child at age ***, and is ***.  ***Romayne has her ovaries, fallopian tubes and uterus in place, and she has had *** ovarian cancer screening to date. She reports that she *** hormone replacement therapy.      She has *** clinical breast exams and mammograms; her most recent mammogram in *** was ***. ***Romayne began having colonoscopies at the age of ***/Romayne has not had a colonoscopy. Her most recent colonoscopy in *** was *** and follow-up was recommended in ***. ***She does not regularly do any other cancer screening at this time. Romayne reported *** tobacco use and *** alcohol use.    Family History: (Please see scanned pedigree for detailed family history information)  ***  ***  Her maternal ethnicity is ***. Her paternal ethnicity is ***. There is no known Ashkenazi Adventist ancestry on either side of her family. There is no reported consanguinity.    Discussion:  Romayne's personal and family history of *** is suggestive of a hereditary cancer syndrome.  We  reviewed the features of sporadic, familial, and hereditary cancers. ***  We discussed the natural history and genetics of hereditary cancer. A detailed handout regarding hereditary cancer, along with the other information we discussed, will be mailed to Romayne at the end of our appointment today and can be found in the after visit summary. Topics included: inheritance pattern, cancer risks, cancer screening recommendations, and also risks, benefits and limitations of testing.  Based on her personal and family history, Romayne meets current National Comprehensive Cancer Network (NCCN) criteria for genetic testing of ***.  We discussed that there are additional genes that could cause increased risk for *** cancer. As many of these genes present with overlapping features in a family and accurate cancer risk cannot always be established based upon the pedigree analysis alone, it would be reasonable for Romayne to consider panel genetic testing to analyze multiple genes at once.  ***  Romayne stated that she would prefer to submit a saliva kit for her genetic testing. ***[facility] will send a kit directly to her home with directions on how to collect a saliva sample. We discussed that there is a small chance for sample failure due to contamination of the sample. To help minimize this, she should follow the directions that are sent with the kit. Romayne verbalized understanding of this. Once the sample is collected, she will send it to ***[facility] using the return envelope and prepaid shipping label.   Verbal consent was given over the phone and written on the consent form. Turnaround time is approximately 4 weeks once the lab receives the sample.  Medical Management: For Romayne, we reviewed that the information from genetic testing may determine:  ***surgery to treat Romayne's active cancer diagnosis (i.e. lumpectomy versus bilateral mastectomy, partial versus total colectomy, etc.***),  additional cancer screening for  which Romayne may qualify (i.e. mammogram and breast MRI, more frequent colonoscopies, more frequent dermatologic exams, etc.***),  options for risk reducing surgeries Romayne could consider (i.e. bilateral mastectomy, surgery to remove her ovaries and/or uterus, etc.***),    and targeted chemotherapies for Romayne's *** active cancer, or ***if she were to develop certain cancers in the future (i.e. immunotherapy for individuals with Jacome syndrome, PARP inhibitors, etc.***).   These recommendations and possible targeted chemotherapies will be discussed in detail once genetic testing is completed.     Plan:  1) Today Romayne elected to proceed with ***.  2) A copy of the consent form and the after visit summary will be mailed to Romayne.  3) This information should be available in approximately 4 weeks, once the lab receives the sample.  4) I will call Brycene with the results once they become available.    Time spent on the phone: *** minutes    Sabino Sanchez MS, [facility]  Licensed, Certified Genetic Counselor    ***    Virtual Visit Details    Type of service:  Telephone Visit        Again, thank you for allowing me to participate in the care of your patient.        Sincerely,        Sabino Sanchez GC

## 2023-08-10 NOTE — PROGRESS NOTES
8/10/2023    Referring Provider: Evelina Phillips PA-C    Presenting Information:   I met with Romayne for her telephone genetic counseling visit, through the Cancer Risk Management Program, to discuss her personal history of breast cancer and family history of breast and pancreatic cancer. Today we reviewed this history, cancer screening recommendations, and available genetic testing options.    Personal History:  Romayne is a 77 year old year old female. 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     She had her first menstrual period at age 14, her first child at age 23, and completed menopause at age 46. Romayne has her ovaries, fallopian tubes and uterus in place, and she has had no ovarian cancer screening to date. She reports that she used hormone replacement therapy for approximately 5 years.      Her most recent mammogram in 2023 was normal. Romayne began having colonoscopies at the age of 76. Her most recent colonoscopy in 2022 found six 2-9mm polyps. Three were colonic mucosa, 2 were sessile serrated adenomas, and one tubular adenoma. Follow-up was recommended in 3 years. She does not regularly do any other cancer screening at this time. Romayne reported previous tobacco use for 15 years.    Family History: (Please see scanned pedigree for detailed family history information)  Romayne's brother was diagnosed with pancreatic cancer at age 68 and  at age 71.  Romayne's sister was diagnosed with breast cancer at age 50.  There is no other reported family history of cancer, however, Romayne reported having limited information about extended family members.  Her maternal and paternal ethnicity is Greenlandic. There is no known Ashkenazi Anabaptist ancestry on either side of her family. There is no reported consanguinity.    Discussion:  Romayne's personal and  family history of breast and pancreatic cancer is suggestive of a hereditary cancer syndrome.  We reviewed the features of sporadic, familial, and hereditary cancers. We discussed that mutations in either BRCA1 or BRCA2 could be possible hereditary explanations for her family history of cancer. Mutations in the BRCA1 or BRCA2 gene are known to cause Hereditary Breast and Ovarian Cancer Syndrome (HBOC). HBOC typically presents with multiple family members diagnosed with breast cancer before age 50 and/or ovarian cancer. Other cancer risks associated with HBOC include male breast cancer, prostate cancer, pancreatic cancer, and melanoma.   We discussed the natural history and genetics of hereditary cancer. A detailed handout regarding hereditary cancer, along with the other information we discussed, will be mailed to Romayne at the end of our appointment today and can be found in the after visit summary. Topics included: inheritance pattern, cancer risks, cancer screening recommendations, and also risks, benefits and limitations of testing.  Based on her personal and family history, Romayne meets current National Comprehensive Cancer Network (NCCN) criteria for genetic testing of BRCA1/2 along with other high-penetrance pancreatic cancer susceptibility genes (I.e. GEORGIA, CDKN2A, EPCAM, MLH1, MSH2, MSH6, PALB2, STK11, and TP53).  Based on her personal and family history, Romayne meets current National Comprehensive Cancer Network (NCCN) criteria for genetic testing of BRCA1/2 along with other high-penetrance breast cancer susceptibility genes (I.e. CDH1, PALB2, PTEN, and TP53).  We discussed that there are additional genes that could cause increased risk for breast and/or pancreatic cancer. As many of these genes present with overlapping features in a family and accurate cancer risk cannot always be established based upon the pedigree analysis alone, it would be reasonable for Romayne to consider panel genetic testing to  analyze multiple genes at once.  We reviewed genetic testing options for hereditary cancers: hereditary breast and pancreatic cancer panel (Custom: Breast and Gyn + Pancreatic Cancers panel, 25 genes) and expanded high and moderate risk panel (Common Hereditary Cancers, 47 genes). Romayne wanted to think about her genetic testing options and she will contact me should she decide to pursue genetic testing in the future.  Romayne wanted to think about her genetic testing options and she will contact me should she be interested in genetic testing in the future.  Medical Management: For Romayne, we reviewed that the information from genetic testing may determine:  additional cancer screening for which Romayne may qualify (i.e. mammogram and breast MRI, more frequent colonoscopies, more frequent dermatologic exams, etc.),  options for risk reducing surgeries Romayne could consider (i.e. bilateral mastectomy, surgery to remove her ovaries and/or uterus, etc.),    and targeted chemotherapies if she were to develop certain cancers in the future (i.e. immunotherapy for individuals with Jacome syndrome, PARP inhibitors, etc.).   These recommendations and possible targeted chemotherapies will be discussed in detail once genetic testing is completed.     Plan:  1) Today Romayne elected to think about her genetic testing options.  2) A copy of the after visit summary will be mailed to Romayne.  3) Romayne will contact me should she elected to pursue genetic testing in the future.    Time spent on the phone: 24 minutes    Sabino Sanchez MS, [facility]  Licensed, Certified Genetic Counselor      Virtual Visit Details    Type of service:  Telephone Visit

## 2023-08-10 NOTE — NURSING NOTE
Is the patient currently in the state of [place]? YES    Visit mode:TELEPHONE    If the visit is dropped, the patient can be reconnected by: TELEPHONE VISIT: Phone number: [phone]    Will anyone else be joining the visit? YES: How would they like to receive their invitation? Text to cell phone: NA      How would you like to obtain your AVS? MyChart    Are changes needed to the allergy or medication list? NO    Reason for visit: Consult    Daniella Concepcion VF

## 2023-08-14 NOTE — PATIENT INSTRUCTIONS
Assessing Cancer Risk  Cancer is a common diagnosis which impacts many families.  Individuals may develop cancer due to environmental factors (such as exposures and lifestyle), aging, genetic predisposition, or a combination of these factors.      Only about 5-10% of cancers are thought to be due to an inherited cancer susceptibility gene.    These families often have:  Several people with the same or related types of cancer  Cancers diagnosed at a young age (before age 50)  Individuals with more than one primary cancer  Multiple generations of the family affected with cancer    Comprehensive Breast and Gynecologic Cancer Panel  We each inherit two copies of every gene in our bodies: one from our mother, and one from our father. Each gene has a specific job to do.  When a gene has a mistake or  mutation  in it, it does not work like it should.     Some people may be candidates for genetic testing of more than one gene.  For these families, genetic testing using a cancer panel may be offered. These panels will test different genes at once known to increase the risk for breast, ovarian, uterine, and/or other cancers.    This handout will review common hereditary breast and gynecologic cancer syndromes. The genes that will be discussed in this handout are: GE[place]GIA, BRCA1, BRCA2, BRIP1, CDH1, CHEK2, MLH1, MSH2, MSH6, PMS2, EPCAM, PTEN, PALB2, RAD51C, RAD51D, and TP53.    The purpose of this handout is to serve as a brief summary of the breast and gynecologic cancer risk genes that have published clinical management guidelines for individuals who are found to carry a mutation. Inheriting a mutation does not mean a person will develop cancer, but it does significantly increase their risk above the general population risk.     ______________________________________________________________________________    Hereditary Breast and Ovarian Cancer Syndrome (BRCA1 and BRCA2)  A single mutation in one of the copies of BRCA1 or  BRCA2 increases the risk for breast and ovarian cancer, among others.  The risk for pancreatic cancer and melanoma may also be slightly increased in some families.  The chart below shows the chance that someone with a BRCA mutation would develop cancer in his or her lifetime1,2,3,4.       Lifetime Cancer Risks    General Population BRCA1  BRCA2   Breast  12% >60% >60%   Ovarian  1-2% 39-58% 13-29%   Prostate 12% 7-26% 19-61%   Male Breast 0.1% 0.2-1.2% 1.8-7.1%   Pancreas 1-2% Up to 5% 5-10%     A person s ethnic background is also important to consider, as individuals of Ashkenazi Rastafarian ancestry have a higher chance of having a BRCA gene mutation.  There are three BRCA mutations that occur more frequently in this population.      Jacome Syndrome (MLH1, MSH2, MSH6, PMS2, and EPCAM)  Currently five genes are known to cause Jacome Syndrome: MLH1, MSH2, MSH6, PMS2, and EPCAM.  A single mutation in one of the Jacome Syndrome genes increases the risk for colon, endometrial, ovarian, and stomach cancers.  Other cancers that occur less commonly in Jacome Syndrome include urinary tract, skin, and brain cancers.  The chart below shows the chance that a person with Jacome syndrome would develop cancer in his or her lifetime5.      Lifetime Cancer Risks    General Population Jacome Syndrome   Colon 5% 10-61%   Endometrial 3% 13-57%   Ovarian 1-2% 1-38%   Stomach <1% 1-9%   *Cancer risk varies depending on Jacome syndrome gene found      Cowden Syndrome (PTEN)  Cowden syndrome is a hereditary condition that increases the risk for breast, thyroid, endometrial, colon, and kidney cancer.  Cowden syndrome is caused by a mutation in the PTEN gene.  A single mutation in one of the copies of PTEN causes Cowden syndrome and increases cancer risk.  The chart below shows the chance that someone with a PTEN mutation would develop cancer in their lifetime6,7.  Other benign features seen in some individuals with Cowden syndrome include benign  skin lesions (facial papules, keratoses, lipomas), learning disability, autism, thyroid nodules, colon polyps, and larger head size.     Lifetime Cancer Risks    General Population Cowden   Breast 12% 40-60%*   Thyroid 1% Up to 38%   Renal 1-2% Up to 35%   Endometrial 3% Up to 28%   Colon 5% Up to 9%   Melanoma 2-3% Up to 6%   *Emerging data suggests the risk for breast cancer could be greater than 60%               Li-Fraumeni Syndrome (TP53)  Li-Fraumeni Syndrome (LFS) is a cancer predisposition syndrome caused by a mutation in the TP53 gene. A single mutation in one of the copies of TP53 increases the risk for multiple cancers. Individuals with LFS are at an increased risk for developing cancer at a young age. The lifetime risk for development of a LFS-associated cancer is 50% by age 30 and 90% by age 60.   Core Cancers: Sarcomas, Breast, Brain, Lung, Leukemias/Lymphomas, Adrenocortical carcinomas  Other Cancers: Gastrointestinal, Thyroid, Skin, Genitourinary       Hereditary Diffuse Gastric Cancer (CDH1)  Currently, one gene is known to cause hereditary diffuse gastric cancer (HDGC): CDH1.  Individuals with HDGC are at increased risk for diffuse gastric cancer and lobular breast cancer. Of people diagnosed with HDGC, 30-50% have a mutation in the CDH1 gene.  This suggests there are likely other genes that may cause HDGC that have not been identified yet.      Lifetime Cancer Risks    General Population HDGC   Diffuse Gastric  <1% ~80%   Breast 12% 41-60%       Additional Genes    GEORGIA  GEORGIA is a moderate-risk breast cancer gene. Women who have a mutation in GEORGIA can have between a 2-4 fold increased risk for breast cancer compared to the general population8. GEORGIA mutations have also been associated with increased risk for pancreatic cancer between 5-10%9. Individuals who inherit two GEORGIA mutations have a condition called ataxia-telangiectasia (AT).  This rare autosomal recessive condition affects the nervous system  and immune system, and is associated with progressive cerebellar ataxia beginning in childhood. Individuals with ataxia-telangiectasia often have a weakened immune system and have an increased risk for childhood cancers.    PALB2  Mutations in PALB2 have been shown to increase the risk of breast cancer up to 41-60% in some families; where individuals fall within this risk range is dependent upon family [id number]. PALB2 mutations have also been associated with increased risk for pancreatic cancer between 5-10%.  Individuals who inherit two PALB2 mutations--one from their mother and one from their father--have a condition called Fanconi Anemia.  This rare autosomal recessive condition is associated with short stature, developmental delay, bone marrow failure, and increased risk for childhood cancers.    CHEK2   CHEK2 is a moderate-risk breast cancer gene.  Women who have a mutation in CHEK2 have around a 2-4 fold increased risk for breast cancer compared to the general population, and this risk may be higher depending upon family history.11,12,13 The risk of colon cancer may be twice as high as the general population risk of colon cancer of 5%. Mutations in CHEK2 have also been shown to increase the risk of other cancers, including prostate, however these cancer risks are currently not well understood.    BRIP1, RAD51C and RAD51D  Mutations in RAD51C and RAD51D have been shown to increase the risk of ovarian cancer and breast cancer 14,. Mutations in BRIP1 have been shown to increase the risk of ovarian cancer and possibly female breast cancer 15 .       Lifetime Cancer Risk    General Population        BRIP1   RAD51C  RAD51D   Breast 12% Not well defined 20-40% 20-40%   Ovarian 1-2% 5-15% 10-15% 10-20%     ______________________________________________________________  Inheritance  All of the cancer syndromes reviewed above are inherited in an autosomal dominant pattern.  This means that if a parent has a mutation,  each of their children will have a 50% chance of inheriting that same mutation. Therefore, each child --male or female-- would have a 50% chance of being at increased risk for developing cancer.    Image obtained from Genetics Home Reference, 2013     Mutations in some genes can occur de mario, which means that a person s mutation occurred for the first time in them and was not inherited from a parent.  Now that they have the mutation, however, it can be passed on to future generations.    Genetic Testing  Genetic testing involves a blood test and will look for any harmful mutations that are associated with increased cancer risk.  If possible, it is recommended that the person(s) who has had cancer be tested before other family members.  That person will give us the most useful information about whether or not a specific gene is associated with the cancer in the family.    Results  There are three possible results of genetic testing:  Positive--a harmful mutation was identified in one or more of the genes  Negative--no mutations were identified in any of the genes tested  Variant of unknown significance--a variation in one of the genes was identified, but it is unclear how this impacts cancer risk in the family    Advantages and Disadvantages   There are advantages and disadvantages to genetic testing.    Advantages  May clarify your cancer risk  Can help you make medical decisions  May explain the cancers in your family  May give useful information to your family members (if you share your results)    Disadvantages  Possible negative emotional impact of learning about inherited cancer risk  Uncertainty in interpreting a negative test result in some situations  Possible genetic discrimination concerns (see below)    Genetic Information Nondiscrimination Act (SEAN)  The Genetic Information Nondiscrimination Act of 2008 (SEAN) is a federal law that protects individuals from health insurance or employment discrimination  based on a genetic test result alone (with some exceptions, including employers with fewer than 15 employees, and ).  Although rare, SEAN  does not cover discrimination protections in terms of life insurance, long term care, or disability insurances.  Visit the National Human [employer] website to learn more.    Reducing Cancer Risk  All of the genes described in this handout have nationally recognized cancer screening guidelines that would be recommended for individuals who test positive.  In addition to increased cancer screening, surgeries may be offered or recommended to reduce cancer risk.  Recommendations are based upon an individual s genetic test result as well as their personal and family history of cancer.    Questions to Think About Regarding Genetic Testing:  What effect will the test result have on me and my relationship with my family members if I have an inherited gene mutation?  If I don t have a gene mutation?  Should I share my test results, and how will my family react to this news, which may also affect them?  Are my children ready to learn new information that may one day affect their own health?    Hereditary Cancer Resources    FORCE: Facing Our Risk of Cancer Empowered facingourrisk.org   Bright Pink bebrightpink.org   Li-Fraumeni Syndrome Association lfsassociation.org   PTEN World PTENworld.com   No stomach for cancer, Inc. nostomachforcancer.org   Stomach cancer relief network Scrnet.org   Collaborative Group of the Americas on Inherited Colorectal Cancer (CGA) cgaicc.com    Cancer Care cancercare.org   American Cancer Society (ACS) cancer.org   National Cancer Parks (NCI) cancer.gov     Please call us if you have any questions or concerns.   Cancer Risk Management Program [phone] ([phone])  Sabino Sanchez, MS [facility]  [phone]  Polina Ramsey, MS, [facility] [phone]  Orquidea Vera, MS, [facility]  [phone]  Germaine Ngo, MS, [facility]  [phone]  Amalia Kumari,  MS, [facility]  [phone]  Viridiana Monzon, MS, [facility] [phone]  Laly Arriaza, MS, [facility] [phone]    References  Nae Card PDP, Margarita S, Kizzy LINDSAY, Russell JE, Huey JL, Hayden N, Miriam H, Sweetie O, Paula A, Pasini B, Radilinda P, Manandrew S, Brooks DM, Emanuel N, Lissa E, Ekaterina H, Ybarra E, Marcelo J, Gronprieto J, Jorge B, Tulinius H, Thorlacius S, Eerola H, Nevanlinna H, Jean-Paul K, Jacey OP. Average risks of breast and ovarian cancer associated with BRCA1 or BRCA2 mutations detected in case series unselected for family history: a combined analysis of 222 studies. Am J Hum Destini. 2003;72:1117-30.  Jonn N, Denisse M, Diego G.  BRCA1 and BRCA2 Hereditary Breast and Ovarian Cancer. Gene Reviews online. 2013.  Get YC, Ayala S, Bravo G, Arias S. Breast cancer risk among male BRCA1 and BRCA2 mutation carriers. J Natl Cancer Inst. 2007;99:1811-4.  Darren MEJIA, Stu I, Munir J, Boogie E, Shailesh ER, Andrade F. Risk of breast cancer in male BRCA2 carriers. J Med Destini. 2010;47:710-1.  National Comprehensive Cancer Network. Clinical practice guidelines in oncology, colorectal cancer screening. Available online (registration required). 2015.  Florencio MH, Vince J, Simin J, Felix WEAVER, Nixon MS, Eng C. Lifetime cancer risks in individuals with germline PTEN mutations. Clin Cancer Res. 2012;18:400-7.  Trini R. Cowden Syndrome: A Critical Review of the Clinical Literature. J Destini . 2009:18:13-27.  Rosalind ELLIS, Elpidio MCLAUGHLIN, Alva S, Natalie P, Moiz T, Jayy M, Lei B, German H, Mirna R, Farideh K, Brad L, Darren MEJIA, Brooks MCLAUGHLIN, Anil DF, Veronika MR, The Breast Cancer Susceptibility Collaboration ([place]) & Thomas MEYER. GEORGIA mutations that cause ataxia-telangiectasia are breast cancer susceptibility alleles. Nature Genetics. 2006;38:873-875  Delta N , Yevgeniy Y, Eugenia J, Clayton L, Edith DELGADO , Donal ML, Daisy S, Sera AG, Khai S, Abhay ML, Frances J , Cat R, Lorna MARTELL, Abe  JR, Oscar VE, Catherine M, Vojeannettestein B, Althea N, Lala RH, Elbert KW, and Pauline AP. GEORGIA mutations in patients with hereditary pancreatic cancer. Cancer Discover. 2012;2:41-46  Summer CASTLE., et al. Breast-Cancer Risk in Families with Mutations in PALB2. NEJM. 2014; 371(6):497-506.  CHEK2 Breast Cancer Case-Control Consortium. CHEK2*1100delC and susceptibility to breast cancer: A collaborative analysis involving 10,860 breast cancer cases and 9,065 controls from 10 studies. Am J Hum Destini, 74 (2004), pp. 1925-1498  Charles T, Linda S, Holly K, et al. Spectrum of Mutations in BRCA1, BRCA2, CHEK2, and TP53 in Families at High Risk of Breast Cancer. NANI. 2006;295(12):8665-0812.   Ej C, Javed D, Norberto ELLIS, et al. Risk of breast cancer in women with a CHEK2 mutation with and without a family history of breast cancer. J Clin Oncol. 2011;29:7408-6977.  Song H, Barons E, Ramus SJ, et al. Contribution of germline mutations in the RAD51B, RAD51C, and RAD51D genes to ovarian cancer in the population. J Clin Oncol. 2015;33(26):4673-7598. Doi:10.1200/JCO.2015.61.2408.  Ava T, Sujatha DF, Rolando P, et al. Mutations in BRIP1 confer high risk of ovarian cancer. Leila Destini. 2011;43(11):9066-2344. doi:10.1038/ng.955.

## 2023-09-06 ENCOUNTER — TELEPHONE (OUTPATIENT)
Dept: FAMILY MEDICINE | Facility: CLINIC | Age: 78
End: 2023-09-06
Payer: COMMERCIAL

## 2023-09-06 NOTE — TELEPHONE ENCOUNTER
Hydrochlorothiazide 25 mg tablets      Last Written Prescription Date:  Not on current med list  Last Fill Quantity: 0,   # refills: 0  Last Office Visit: 3-28-23  Future Office visit:       Routing refill request to provider for review/approval because:  Drug not active on patient's medication list

## 2023-09-06 NOTE — TELEPHONE ENCOUNTER
Refusing. Patient not taking this medication.     Writer called patient to verify if patient taking this medication due to not on medication list. Patient denies taking this medication.     MARY LastN RN  [facility]

## 2023-09-08 ENCOUNTER — DOCUMENTATION ONLY (OUTPATIENT)
Dept: OPHTHALMOLOGY | Facility: CLINIC | Age: 78
End: 2023-09-08
Payer: COMMERCIAL

## 2023-09-08 NOTE — PROGRESS NOTES
Sent letter to patient with new surgical schedulers information.   Rachelle Peraza on 9/8/2023 at 12:04 PM

## 2023-09-26 ENCOUNTER — OFFICE VISIT (OUTPATIENT)
Dept: FAMILY MEDICINE | Facility: CLINIC | Age: 78
End: 2023-09-26
Payer: COMMERCIAL

## 2023-09-26 VITALS
BODY MASS INDEX: 35.4 KG/M2 | RESPIRATION RATE: 15 BRPM | DIASTOLIC BLOOD PRESSURE: 80 MMHG | HEART RATE: 60 BPM | TEMPERATURE: 97.6 F | WEIGHT: 239 LBS | OXYGEN SATURATION: 98 % | HEIGHT: 69 IN | SYSTOLIC BLOOD PRESSURE: 160 MMHG

## 2023-09-26 DIAGNOSIS — Z85.3 HISTORY OF BREAST CANCER: ICD-10-CM

## 2023-09-26 DIAGNOSIS — E78.2 MIXED HYPERLIPIDEMIA: ICD-10-CM

## 2023-09-26 DIAGNOSIS — R73.03 PREDIABETES: ICD-10-CM

## 2023-09-26 DIAGNOSIS — E66.01 MORBID OBESITY (H): ICD-10-CM

## 2023-09-26 DIAGNOSIS — Z23 NEED FOR SHINGLES VACCINE: ICD-10-CM

## 2023-09-26 DIAGNOSIS — T81.30XA WOUND DEHISCENCE: ICD-10-CM

## 2023-09-26 DIAGNOSIS — I10 HYPERTENSION GOAL BP (BLOOD PRESSURE) < 140/90: Primary | ICD-10-CM

## 2023-09-26 DIAGNOSIS — Z23 NEED FOR PROPHYLACTIC VACCINATION AND INOCULATION AGAINST INFLUENZA: ICD-10-CM

## 2023-09-26 DIAGNOSIS — R41.3 MEMORY DEFICIT: ICD-10-CM

## 2023-09-26 DIAGNOSIS — R06.83 SNORING: ICD-10-CM

## 2023-09-26 DIAGNOSIS — R60.9 DEPENDENT EDEMA: ICD-10-CM

## 2023-09-26 DIAGNOSIS — R92.8 ABNORMAL MAMMOGRAM OF LEFT BREAST: ICD-10-CM

## 2023-09-26 DIAGNOSIS — H40.003 GLAUCOMA SUSPECT, BILATERAL: ICD-10-CM

## 2023-09-26 DIAGNOSIS — Z90.11 S/P MASTECTOMY, RIGHT: ICD-10-CM

## 2023-09-26 DIAGNOSIS — N18.30 STAGE 3 CHRONIC KIDNEY DISEASE, UNSPECIFIED WHETHER STAGE 3A OR 3B CKD (H): ICD-10-CM

## 2023-09-26 DIAGNOSIS — E03.9 ACQUIRED HYPOTHYROIDISM: ICD-10-CM

## 2023-09-26 DIAGNOSIS — D70.9 CHRONIC IDIOPATHIC NEUTROPENIA (H): ICD-10-CM

## 2023-09-26 DIAGNOSIS — Z86.0101 HISTORY OF ADENOMATOUS POLYP OF COLON: ICD-10-CM

## 2023-09-26 DIAGNOSIS — M85.80 OSTEOPENIA, UNSPECIFIED LOCATION: ICD-10-CM

## 2023-09-26 LAB
ALBUMIN SERPL BCG-MCNC: 4 G/DL (ref 3.5–5.2)
ALP SERPL-CCNC: 43 U/L (ref 35–104)
ALT SERPL W P-5'-P-CCNC: 21 U/L (ref 0–50)
ANION GAP SERPL CALCULATED.3IONS-SCNC: 10 MMOL/L (ref 7–15)
AST SERPL W P-5'-P-CCNC: 28 U/L (ref 0–45)
BASOPHILS # BLD MANUAL: 0.1 10E3/UL (ref 0–0.2)
BASOPHILS NFR BLD MANUAL: 2 %
BILIRUB SERPL-MCNC: 0.3 MG/DL
BUN SERPL-MCNC: 18.9 MG/DL (ref 8–23)
CALCIUM SERPL-MCNC: 9.3 MG/DL (ref 8.8–10.2)
CHLORIDE SERPL-SCNC: 103 MMOL/L (ref 98–107)
CREAT SERPL-MCNC: 1.17 MG/DL (ref 0.51–0.95)
DEPRECATED HCO3 PLAS-SCNC: 27 MMOL/L (ref 22–29)
EGFRCR SERPLBLD CKD-EPI 2021: 48 ML/MIN/1.73M2
EOSINOPHIL # BLD MANUAL: 0.1 10E3/UL (ref 0–0.7)
EOSINOPHIL NFR BLD MANUAL: 5 %
ERYTHROCYTE [DISTWIDTH] IN BLOOD BY AUTOMATED COUNT: 14.3 % (ref 10–15)
GLUCOSE SERPL-MCNC: 87 MG/DL (ref 70–99)
HBA1C MFR BLD: 5.7 % (ref 0–5.6)
HCT VFR BLD AUTO: 43 % (ref 35–47)
HGB BLD-MCNC: 13.4 G/DL (ref 11.7–15.7)
LYMPHOCYTES # BLD MANUAL: 1 10E3/UL (ref 0.8–5.3)
LYMPHOCYTES NFR BLD MANUAL: 39 %
MCH RBC QN AUTO: 27.8 PG (ref 26.5–33)
MCHC RBC AUTO-ENTMCNC: 31.2 G/DL (ref 31.5–36.5)
MCV RBC AUTO: 89 FL (ref 78–100)
MONOCYTES # BLD MANUAL: 0.6 10E3/UL (ref 0–1.3)
MONOCYTES NFR BLD MANUAL: 23 %
NEUTROPHILS # BLD MANUAL: 0.8 10E3/UL (ref 1.6–8.3)
NEUTROPHILS NFR BLD MANUAL: 31 %
PLAT MORPH BLD: ABNORMAL
PLATELET # BLD AUTO: 204 10E3/UL (ref 150–450)
POTASSIUM SERPL-SCNC: 5.2 MMOL/L (ref 3.4–5.3)
PROT SERPL-MCNC: 7.4 G/DL (ref 6.4–8.3)
RBC # BLD AUTO: 4.82 10E6/UL (ref 3.8–5.2)
RBC MORPH BLD: ABNORMAL
SODIUM SERPL-SCNC: 140 MMOL/L (ref 135–145)
T4 FREE SERPL-MCNC: 1.53 NG/DL (ref 0.9–1.7)
TSH SERPL DL<=0.005 MIU/L-ACNC: 4.91 UIU/ML (ref 0.3–4.2)
WBC # BLD AUTO: 2.6 10E3/UL (ref 4–11)

## 2023-09-26 PROCEDURE — 83036 HEMOGLOBIN GLYCOSYLATED A1C: CPT | Performed by: FAMILY MEDICINE

## 2023-09-26 PROCEDURE — 84443 ASSAY THYROID STIM HORMONE: CPT | Performed by: FAMILY MEDICINE

## 2023-09-26 PROCEDURE — 85027 COMPLETE CBC AUTOMATED: CPT | Performed by: FAMILY MEDICINE

## 2023-09-26 PROCEDURE — G0008 ADMIN INFLUENZA VIRUS VAC: HCPCS | Performed by: FAMILY MEDICINE

## 2023-09-26 PROCEDURE — 36415 COLL VENOUS BLD VENIPUNCTURE: CPT | Performed by: FAMILY MEDICINE

## 2023-09-26 PROCEDURE — 84439 ASSAY OF FREE THYROXINE: CPT | Performed by: FAMILY MEDICINE

## 2023-09-26 PROCEDURE — 85007 BL SMEAR W/DIFF WBC COUNT: CPT | Performed by: FAMILY MEDICINE

## 2023-09-26 PROCEDURE — 80053 COMPREHEN METABOLIC PANEL: CPT | Performed by: FAMILY MEDICINE

## 2023-09-26 PROCEDURE — 90662 IIV NO PRSV INCREASED AG IM: CPT | Performed by: FAMILY MEDICINE

## 2023-09-26 PROCEDURE — 99215 OFFICE O/P EST HI 40 MIN: CPT | Mod: 25 | Performed by: FAMILY MEDICINE

## 2023-09-26 RX ORDER — AMLODIPINE BESYLATE 2.5 MG/1
2.5 TABLET ORAL DAILY
Qty: 90 TABLET | Refills: 0 | Status: SHIPPED | OUTPATIENT
Start: 2023-09-26 | End: 2023-12-26

## 2023-09-26 ASSESSMENT — PAIN SCALES - GENERAL: PAINLEVEL: NO PAIN (0)

## 2023-09-26 NOTE — PATIENT INSTRUCTIONS
Flu shot today   Labs today   Continue all meds   Add amlodipine 2.5 mg daily to help BP   Goal BP should be 130/80 santos given decreasing kidney function  Has enough lisinopril filled recently   Rest of meds will wait to fill till labs from today are back in case dose needs changing  See you back in 6 weeks for BP

## 2023-09-26 NOTE — PROGRESS NOTES
Assessment & Plan     Hypertension goal BP (blood pressure) < 140/90  Here alone. Late for apt  Hypertension currently on lisinopril 20 mg bedtime with no side effects. BP today elevated 167/75. Encouraged eating a low salt, caffeine diet and low alcohol intake. Add amlodipine 2.5 mg daily to help BP   Goal BP should be 130/80 santos given decreasing kidney function. Has enough lisinopril as filled recently. Rest of meds will wait to fill till labs from today are back in case dose needs changing. See back in 6 weeks for BP     Chronic kidney disease stage III with hx of microalbuminuria in the past on ACE inhibitor.  Avoid anti-inflammatories as much as possible. Later labs showed Kidney function (GFR) is decreased.  But similar to prior, continue to avoid NSAID's, stay hydrated and take the lisinopril to protect kidney function and start the amlodipine to improve BP as uncontrolled BP can worsen kidney function. Rest of electrolytes were normal.    BMI 35 & weight has gone up. Had tried a Keto diet, declined referral to weight specialist or sleep, or mtm , or taking meds. Has been avoiding pasta and bread. Liver and gallbladder tests (ALT,AST, Alk phos,bilirubin) are normal. Encouraged smaller portions and increasing exercise.    Prediabetes HB A1c 5.7. later labs showed Hemoglobin A1c remains in prediabetic range at 5.7.  Continue with dietary and lifestyle changes discussed and recheck again in 3 to 6 months time.    Hyperlipidemia on atorvastatin 20 mg. ASCVD risk 20% . Lipids in march wnl. Continue same dose of statin for now.     Hypothyroidism on levothyroxine 150 mcg 6 days of week and 1.5 tab of 150 1 day of week. Since 4/2023 TSH in June was normal. Later labs showed TSH (thyroid stimulating hormone) is slightly above range but Ft4 is normal. ADVISE: Continuing levothyroxine 1 tab 6 days a week & 1.5 tab one day a week & recheck TSH when I see her back on 11/9 for her BP. I gave her 100 with 1 refill end of  June so she should have enough of this med till labs rechecked in nov apt.    History of snoring, BMI more than 35, there was possibility of undiagnosed sleep apnea.  Opted not to see the sleep specialist. Avoids driving if drowsy. Daughter drove her in    History of chronic intermittent idiopathic neutropenia that predated cancer diagnosis and treatment.  Negative work-up and asymptomatic in past Seen by hematology most recently in the fall 2022.  Peripheral smear and additional lab work for iron copper folic acid had been normal.  No nutritional causes seen.  Held off on bone marrow biopsy given stability and lack of symptoms. On B 12 supp unknown dose.  B 12 wnl in past. Was advised if should feel sick, have fevers, significant abdominal pains then advised she should always come in to be evaluated for infection and get a CBC. & let her providers know the history of neutropenia and if neutrophils were low or if infection was not improving with regular treatment then hematology recommended a course of Neupogen 480 mcg subcu for 3 to 5 days at the time. No symptoms currently.Wbc is low but unchanged from before and not currently ill so no medication needed    Dependent edema likely due to weight  possibly varicose veins & undiagnosed sleep apnea. Continue using compression socks and encouraged a low salt diet and elevation  of legs 1 hr above heart level each day    Dexa  in 4/2023 showed osteopenia but with high fracture risk of hip & so started on fosamax 70 mg 1/wk, & remains on calcium 1200 a day & vitamin D at least 2000 units daily . Has had no side effects, no blood in stools or black stools & no dental issues. Dexa due again 2024. On Pepcid prn heart burn related to foods not any worse since on fosamax.    Hx of C sope done for positive cologuard which showed 3 serrated adenoma and one tubular adenoma removed 9/2022 and advised recheck in 3 yrs     Memory deficits noted on screening test at last visit.  Minicog today recalled 2 of 3 words. Here again alone without family. Though daughter dropped her off. MRI brain in April showed no mass effect, midline shift, or evidence of intracranial hemorrhage. The ventricles were proportionate to the cerebral sulci. Normal major vascular intracranial flow-voids. Advanced leukoaraiosis. Mild diffuse cerebral volume loss. Post contrast images demonstrate no abnormal intracranial enhancement. No abnormality of the skull marrow signal. The visualized portions of paranasal sinuses, and mastoid air cells were relatively clear. The orbits were grossly unremarkable. Discussed white matter changes and atrophy. Keep BP under control will be helpful. Currently responding appropriately.  & opted not to see neurology.      History of right breast cancer, prior right mastectomy scar breakdown and infection post punch biopsy resolved. Currently skin in tact. Seen by breast center. Left breast screening mammogram showed an asymmetry in April s/p dx imaging and u/s was unremarkable. To continue yearly screening. Did meet with a  given fh and personal hx of cancer and opted not to do any testing.     Continue to see the eye doctor for history of vitreal macular adhesion, right eye epiretinal membrane on the right and bilateral glaucoma suspect     High dose flu shot given today  Discussed considering the new COVID vaccine and rsv vaccine when available.   Reminded to think of getting Shingrex   Return in 6 weeks for a BP & TSH check  - TSH with free T4 reflex; Future  - Comprehensive metabolic panel (BMP + Alb, Alk Phos, ALT, AST, Total. Bili, TP); Future  - amlodipine (NORVASC) 2.5 MG tablet; Take 1 tablet (2.5 mg) by mouth daily  - TSH with free T4 reflex  - Comprehensive metabolic panel (BMP + Alb, Alk Phos, ALT, AST, Total. Bili, TP)  - T4 free    Stage 3 chronic kidney disease, unspecified whether stage 3a or 3b CKD (H)  Chronic kidney disease stage III with hx of  microalbuminuria in the past on ACE inhibitor.  Avoid anti-inflammatories as much as possible. Later labs showed Kidney function (GFR) is decreased.  But similar to prior, continue to avoid NSAID's, stay hydrated and take the lisinopril to protect kidney function and start the amlodipine to improve BP as uncontrolled BP can worsen kidney function. Rest of electrolytes were normal.  - TSH with free T4 reflex; Future  - Comprehensive metabolic panel (BMP + Alb, Alk Phos, ALT, AST, Total. Bili, TP); Future  - CBC with platelets and differential; Future  - TSH with free T4 reflex  - Comprehensive metabolic panel (BMP + Alb, Alk Phos, ALT, AST, Total. Bili, TP)  - CBC with platelets and differential  - T4 free    Obesity (BMI 35.0-39.9) with comorbidity (H)  BMI 35 & weight has gone up. Had tried a Keto diet, declined referral to weight specialist or sleep, or mtm , or taking meds. Has been avoiding pasta and bread. Liver and gallbladder tests (ALT,AST, Alk phos,bilirubin) are normal. Encouraged smaller portions and increasing exercise.  - TSH with free T4 reflex; Future  - TSH with free T4 reflex  - T4 free    Prediabetes  Prediabetes HB A1c 5.7. later labs showed Hemoglobin A1c remains in prediabetic range at 5.7.  Continue with dietary and lifestyle changes discussed and recheck again in 3 to 6 months time.  - TSH with free T4 reflex; Future  - Hemoglobin A1c; Future  - Comprehensive metabolic panel (BMP + Alb, Alk Phos, ALT, AST, Total. Bili, TP); Future  - TSH with free T4 reflex  - Hemoglobin A1c  - Comprehensive metabolic panel (BMP + Alb, Alk Phos, ALT, AST, Total. Bili, TP)  - T4 free    Mixed hyperlipidemia  Hyperlipidemia on atorvastatin 20 mg. ASCVD risk 20% . Lipids in march wnl. Continue same dose of statin for now.   - TSH with free T4 reflex; Future  - TSH with free T4 reflex  - T4 free  - atorvastatin (LIPITOR) 20 MG tablet; Take 1 tablet (20 mg) by mouth daily    Acquired hypothyroidism  Hypothyroidism  on levothyroxine 150 mcg 6 days of week and 1.5 tab of 150 1 day of week. Since 4/2023 TSH in June was normal. Later labs showed TSH (thyroid stimulating hormone) is slightly above range but Ft4 is normal. ADVISE: Continuing levothyroxine 1 tab 6 days a week & 1.5 tab one day a week & recheck TSH when I see her back on 11/9 for her BP. I gave her 100 with 1 refill end of June so she should have enough of this med till labs rechecked in nov apt.  - TSH with free T4 reflex; Future  - TSH with free T4 reflex  - T4 free    Snoring  History of snoring, BMI more than 35, there was possibility of undiagnosed sleep apnea.  Opted not to see the sleep specialist. Avoids driving if drowsy. Daughter drove her in  - TSH with free T4 reflex; Future  - CBC with platelets and differential; Future  - TSH with free T4 reflex  - CBC with platelets and differential  - T4 free    Chronic idiopathic neutropenia (H)  History of chronic intermittent idiopathic neutropenia that predated cancer diagnosis and treatment.  Negative work-up and asymptomatic in past Seen by hematology most recently in the fall 2022.  Peripheral smear and additional lab work for iron copper folic acid had been normal.  No nutritional causes seen.  Held off on bone marrow biopsy given stability and lack of symptoms. On B 12 supp unknown dose.  B 12 wnl in past. Was advised if should feel sick, have fevers, significant abdominal pains then advised she should always come in to be evaluated for infection and get a CBC. & let her providers know the history of neutropenia and if neutrophils were low or if infection was not improving with regular treatment then hematology recommended a course of Neupogen 480 mcg subcu for 3 to 5 days at the time. No symptoms currently.Wbc is low but unchanged from before and not currently ill so no medication needed    Dependent edema  Dependent edema likely due to weight  possibly varicose veins & undiagnosed sleep apnea. Continue using  compression socks and encouraged a low salt diet and elevation  of legs 1 hr above heart level each day    Osteopenia, unspecified location  Dexa  in 4/2023 showed osteopenia but with high fracture risk of hip & so started on fosamax 70 mg 1/wk, & remains on calcium 1200 a day & vitamin D at least 2000 units daily . Has had no side effects, no blood in stools or black stools & no dental issues. Dexa due again 2024. On Pepcid prn heart burn related to foods not any worse since on fosamax.    Serrated adenoma of colon  Hx of TEE [place] done for positive cologuard which showed 3 serrated adenoma and one tubular adenoma removed 9/2022 and advised recheck in 3 yrs     Memory deficit  Memory deficits noted on screening test at last visit. Minicog today recalled 2 of 3 words. Here again alone without family. Though daughter dropped her off. MRI brain in April showed no mass effect, midline shift, or evidence of intracranial hemorrhage. The ventricles were proportionate to the cerebral sulci. Normal major vascular intracranial flow-voids. Advanced leukoaraiosis. Mild diffuse cerebral volume loss. Post contrast images demonstrate no abnormal intracranial enhancement. No abnormality of the skull marrow signal. The visualized portions of paranasal sinuses, and mastoid air cells were relatively clear. The orbits were grossly unremarkable. Discussed white matter changes and atrophy. Keep BP under control will be helpful. Currently responding appropriately.  & opted not to see neurology.      History of breast cancer  S/P mastectomy, right  Wound dehiscence resolved  Abnormal mammogram of left breast, DX imaging normal  History of right breast cancer, prior right mastectomy scar breakdown and infection post punch biopsy resolved. Currently skin in tact. Seen by breast center. Left breast screening mammogram showed an asymmetry in April s/p dx imaging and u/s was unremarkable. To continue yearly screening. Did meet with a   "given fh and personal hx of cancer and opted not to do any testing.     Glaucoma suspect, bilateral  Continue to see the eye doctor for history of vitreal macular adhesion, right eye epiretinal membrane on the right and bilateral glaucoma suspect     Need for prophylactic vaccination and inoculation against influenza  High dose flu shot given today  Discussed considering the new COVID vaccine and rsv vaccine when available.   - INFLUENZA VACCINE 65+ (FLUZONE HD)    Need for shingles vaccine  Reminded to think of getting Shingrex   Return in 6 weeks for a BP & TSH check    Review of the result(s) of each unique test - diagnostics in epic  Independent interpretation of a test performed by another physician/other qualified health care professional (not separately reported) - breast,  Diagnosis or treatment significantly limited by social determinants of health - cognitive health  Ordering of each unique test  Prescription drug management  44 minutes spent by me on the date of the encounter doing chart review, history and exam, documentation and further activities per the note       BMI:   Estimated body mass index is 35 kg/m  as calculated from the following:    Height as of this encounter: 1.76 m (5' 9.29\").    Weight as of this encounter: 108.4 kg (239 lb).   Weight management plan: Discussed healthy diet and exercise guidelines    Work on weight loss  Regular exercise  See Patient Instructions    Marry Rodriguez MD  [facility]      Subjective Romayne is a 78 year old, presenting for the following health issues:  RECHECK (Follow Up )        9/26/2023     1:59 PM   Additional Questions   Roomed by Sharlene Juarez           History of Present Illness       Reason for visit:  Follow up on medication    She eats 2-3 servings of fruits and vegetables daily.She consumes 0 sweetened beverage(s) daily.She exercises with enough effort to increase her heart rate 10 to 19 minutes per day.  She exercises " with enough effort to increase her heart rate 3 or less days per week.   She is taking medications regularly.   Minicog  -On Clock got stuck on minutes hand and trouble with the hands   - 2/3 of the three words recalled    CURRENTLY  Here alone  Late for apt  Hypertension currently on lisinopril 20 mg bedtime with no side effects. BP today elevated 167/75. Took meds at night. Not checking at home. Reports eating a low salt, caffeine diet and low alcohol intake    Chronic kidney disease stage III with rhx of microalbuminuria in the past on ACE inhibitor.  Avoiding anti-inflammatories as much as possible.    BMI 35 & weight has gone up. Had tried a Keto diet, declined referral to weight specialist or sleep, or mtm , or taking meds. Has been avoiding pasta and bread    Prediabetes HB A1c 5.7     Hyperlipidemia on atorvastatin 20 mg. ASCVD risk 20% . Lipids in march wnl.    Hypothyroidism on levothyroxine 150 mcg 6 days of week and 1.5 tab of 150 1 day of week. TSH in June was normal.    History of snoring, BMI more than 35, there was possibility of undiagnosed sleep apnea.  Opted not to see the sleep specialist. Avoids driving if drowsy. Daughter drove her in    History of chronic intermittent idiopathic neutropenia that predated cancer diagnosis and treatment.  Negative work-up and asymptomatic in past Seen by hematology most recently in the fall 2022.  Peripheral smear and additional lab work for iron copper folic acid had been normal.  No nutritional causes seen.  Held off on bone marrow biopsy given stability and lack of symptoms. On B 12 supp unknown dose. Was advised if should feel sick, have fevers, significant abdominal pains then advised she should always come in to be evaluated for infection and get a CBC. & let her providers know the history of neutropenia and if neutrophils were low or if infection was not improving with regular treatment then hematology recommended a course of Neupogen 480 mcg subcu for 3  to 5 days at the time. No symptoms currently.    Dependent edema likely due to weight , possibly varicose veins & undiagnosed sleep apnea. Using compression socks with good results.    Dexa  in 4/2023 showed osteopenia but with high fracture risk of hip & so started on fosamax 70 mg 1/wk, & remains on calcium 1200 a day vitamin D at least 2000 units daily . Has had no side effects, no blood in stools or black stools & no dental issues. Dexa due again 2024. On Pepcid prn heart burn related to foods.    Serrated adenoma polyp removed from colon     Memory deficits noted on screening test at last visit.  Here alone without family.  MRI brain in April showed white matter changes and atrophy. Currently responding appropriately.  Opted not to see neurology.      History of right breast cancer, prior right mastectomy scar breakdown and infection post punch biopsy resolved. Currently skin in tact. Seen by breast center. Left breast screening mammogram showed an asymmetry in April s/p dx imaging an du/s was unremarkable. To continue yearly screening.   Left breast exam was normal.  Left screening Mammogram due and order in place to schedule..avoid a bra helps skin on right. Did meet with a  given fh and personal hx of cancer and opted not to do any testing.     Sees the eye doctor for history of vitreal macular adhesion, right eye epiretinal membrane on the right and bilateral glaucoma suspect     Will do Flu shot today  Discussed considering the new COVID vaccine and rsv vaccine when available.   Reminded to think of getting Shingrex     BACKGROUND  History of passive smoke exposure, obesity, hypertension on lisinopril, CKD 3, , prediabetes, hyperlipidemia on atorvastatin, hypothyroidism on levothyroxine, snoring, history of memory deficit per epic, history of chronic leg edema, history of prior maxillary sinusitis, resolved cellulitis, history of chronic idiopathic neutropenia/ leukopenia S/p work-up that was  negative by hematology in 2012 & 2022,, history of lipoma noted in the past, history of prior right breast cancer in 2008 S/p right mastectomy and saline implants in remission no longer followed by oncology, history of resolved anemia, history of Covid infection in November 2020 with respiratory failure sepsis, hypoxia, Covid pneumonia, PE and left lower extremity DVT, elevated D-dimer, UTI, resolved HALI, resolved anemia, with complete resolution of Covid symptoms noted by February 2021.  No longer on oxygen.  History of snoring, chronic fatigue, physical deconditioning, GERD on Pepcid, resolved epigastric pain & pancreatitis noted in 2022, and constipation, history of untreated osteoporosis DEXA scan in 2021 showed osteopenia, history of vitreal macular adhesion right eye and epiretinal membrane S/p cataract surgery and remote right eye injury, S/p vitrectomy being managed by eye & being monitored as a glaucoma suspect  on multivitamin, serrated adenoma colon,   Under care of Dr. Sheridan then Sapphire Glass then Adela Avila,      Seen by PCP in April 2019 for dyspnea on exertion.  Examination was unremarkable, chest x-ray was negative.  EKG was unremarkable.  Echo showed a normal EF.  Stress test done was normal.  Her lisinopril was increased and suspected to be deconditioned and advised weight loss and increase activity and referred to sleep for her history of fatigue for possible obstructive sleep apnea but was never seen by sleep.  Fit test never done.  Not seen till called for medication refill on 4/30/2020 and appointment made with this writer, had an appointment by telephone with this writer for the first time on 5/19/2020 for blood pressure med refill.  Advise labs and follow-up in clinic.  Fit test done 6/8/2020 was negative.  TSH was elevated at 10 with free T4 normal and a normal hemoglobin A1c.   Visited with endocrine virtually on 7/8/2020 and noted had been treated for subclinical hypothyroidism with  "levothyroxine since 2004.  Medications adjusted and DEXA scan ordered.  Seen by eye 9/29/2020 for cataracts glaucoma check, prior right eye injury and which showed retinal membrane adhesion.  Seen by eye again 10/19/2020.  Seen in urgent care on 11/5/2020 for fever cough dizziness abdominal pain nausea was given Bactrim for cystitis tested for COVID discharged home and testing came back positive for Covid.  Seen in ER 11/16/2020 for shortness of breath sinus tachycardia and severe hypoxia.  Was admitted for worsening respiratory symptoms prompting her to seek further evaluation on 11/17/20 at [facility]. She was found to be in acute hypoxic respiratory failure requiring 30 LPM HFNC.  Chest x-ray at that time revealed \"diffuse patch airspace opacities consistent with COVID.\" She was admitted to the MICU for stabilization overnight and transferred to the general medical floor on 11/18/20. For her COVID infection, she received dexamethasone and remdesivir. She was initially treated with broad spectrum antibiotics for possible super-imposed pneumonia, but these were quickly discontinued. Her hospital course was complicated by a LLE DVT and PE's. She was started on Lovenox and transitioned to Apixaban. She was also found to have a UTI on day of discharge and was sent home on oral antibiotics.  Her severe Sepsis, resolved. For acute Hypoxic Respiratory Failure 2/2 COVID-19 PNA - COVID+ 11/5.  She was COVID recovered,  initially requiring 30 LMP HFNC, but was able to wean to 2Ls NC with exertion, 0 to 0.5L at rest. She completed her 5 day course of Remdesivir during her hospital stay. On discharge was continued on dexamethasone 6mg daily for 10 doses (ending 11/27) & continued on 2Ls NC O2 with ambulation, 0-0.5L at rest. For the Non-occlusive thrombus in left popliteal vein & Pulmonary Embolisms of segmental and subsegmental right upper and lower lobes with Elevated D-dimer - D-dimer >20 on admission. Started on high-intensity " heparin gtt in ED with bolus- and transitioned to low intensity on 11/18/20. Ultrasound of B/l lower extremities obtained on 11/19 which were notable for non-occlusive thrombus in left popliteal vein. She was then transitioned back to high intensity heparin gtt on 11/19. Had elevated hep 10A levels overnight 11/19. Transitioned to Lovenox 1mg/kg BID on 11/20/20. CT PE protocol with segmental and subsegmental PE in the right upper and lower lobes w/o evidence of RHS. Also w/ extensive reticular changes and GOO throughout lungs representing inflammation and/or fibrosis r/t recent COVID-19 infection. on Discharge continued on Apixiban 10mg BID x7 days, then 5mg BID thereafter for ~ 3 month duration of treatment as likely provoked DVT/PE due to COVID-19 infection. Was to follow up with PCP in one week to discuss anticoagulation. Noted Urinary Tract Infection & Urinary retention, resolved - Required straight cath x2 during her hospital admission. Developed LUTS last day of discharge with urinalysis revealing few bacteria, moderate leukocytes, and 12 Wbcs. However, there were 4 epis.  & discharged on Keflex 500 mg BID for 5 days & to follow up with PCP in one week to review urine culture results. Her HALI, resolved. Noted CKD III - Baseline creatinine 1.1-1.2, elevated to 1.53 on presentation with BUN 47 then returned to baseline. Etiology likely prerenal 2/2 COVID. Lisinopril was held & resumed on discharge. For her HTN - Patient's lisinopril held on during hospital stay due to HALI. Started on amlodipine 11/20 for elevated BP but on discharge, amlodipine was discontinued and resumed home lisinopril 20 mg daily. She noted increased heartburn since starting Eliquis, while inpatient was on omeprazole this was switched to Pepcid once off the Eliquis and continued on Tums as needed. Had constipation on admission - improved with bowel regimen, however developed liquid stool which improved by discharge. Hypothyroidism: TSH on  admission WNL at 0.42 & continued on  PTA Synthroid. Noted Normocytic anemia - Baseline Hgb 12-13. & was 12.2 at discharge. No S/sx of bleeding since initiation of anticoagulation. for HLD was continued on a statin.  She was discharged home on 11/24/2020.  Seen 12/3/2020 by Dr. Lazo for hospital discharge noted was no longer on oxygen during the day and satting fine & remained on Eliquis.  Seen by Sapphire Glass her PCP on 2/5/2021 for routine physical and noted had completely recovered from Covid & no longer short of breath, had no cough & not on oxygen, no longer with leg pain & was wearing compression socks for chronic leg swelling. and Doppler done on 2//22 was negative for DVT on Eliquis. DEXA scan ordered for prior untreated osteoporosis and continued on Pepcid for heartburn.  Labs later showed she was overcorrected on thyroid and levothyroxine was changed from double dosing on the weekend to daily simple dosing of 150 mcg daily and recheck TSH 6 weeks later was within normal range and continued on same dose.  DEXA scan done 2/11/2021 showed osteopenia and continued on calcium and vitamin D.  Cataracts removed 2/22/2021.  Seen by 3/11/2021.  TSH normal 3/15/2021.  Noted blurry vision 4/13/2021.  Had stable CKD 5/18/2021 and advised to avoid NSAID's.  Seen 5/18/2021 by Dr. Avila for preop for vitrectomy.   On 6/7/2021 had vitrectomy and membrane stripping for history of vitreoretinal adhesion right eye.  Seen by the eye doctor postop day 1, 1 week postop and at 6-week postop guero and noted was doing well.  Last seen by eye on 10/20/2021 for hx of Vitreoretinal adhesion right eye, ERM right eye S/P PPV/MP right eye 6/2021. Foveal anatomy improved significantly; BCVA 20/50 limited likely due to subtle IS/OS disruption at fovea (+drusen) -3.0 refraction right eye and seeing well up close; instructed her to try to use right eye as monovision for near.  Was to observe pseudophakia each eye.  Noted to be a glaucoma  suspect with large disc and large cupping child being followed by Dr. Shelby.  Seen 3/22/22 for medicare wellness/ preventive health and additional concerns. Discussed slef breast checks, mammogram ordered, Pelvic / PAP no longer needed  Health care maintenance reviewed. To Consider working on health care directives & given honoring choices. Given Covid pfizer booster for prior received Aron vaccine in 2021.  Discussed Shingrex.   BMI 37, mostly sedentary.  Discussed lifestyle. Encouraged to work on diet, exercise and losing at least 5 to 10% of total body weight.  Hyperlipidemia on atorvastatin 10 mg bedtime had enough meds till could get lab work reviewed. LDL later came back normal but triglycerides were elevated and overall cardiovascular risk is 23.2 so atorvastatin increased to 20 mg bedtime and was to recheck cholesterol fasting in 3 months when I saw her back again.  Hypertension on lisinopril 10 mg daily previously was on 20, blood pressure was  136/79.  Asymptomatic no side effects & refilled at same dose  #90 with 3 refills.   CKD 3 asymptomatic reminded to avoid NSAID's Kidney function came back stable with normal micro albumin.  Hypothyroid on levothyroxine 150 mcg daily deviously on differing doses on the weekend and weekdays but had been on this dose since February 2021.    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  Had no symptoms. Count came back low at 2.2 similar with neutropenia.  Stable to prior. History of anemia noted when had COVID asymptomatic no GI bleeding not on anticoagulation. hemoglobin and hematocrit came back normal.  History of Covid in November 2020 was hospitalized requiring oxygen and received steroids and antivirals.  Had a DVT and PE requiring anticoagulation 3 months.  Resolved thromboembolism and sepsis and HALI and treated for UTI at that time was in the ICU and discharged after a week in hospital.  Reported made a full recovery &  no longer on oxygen or anticoagulation.  Resolved acute respiratory failure on no oxygen since January 2021.  History of snoring had been referred to sleep in the past.  Did have obesity, hyperlipidemia, hypertension and forgetfulness.  Did have chronic deconditioning. Referral placed to sleep again. Chronic fatigue reported improved.  Had bilateral lower leg swelling managed with compression socks daily felt related to BMI and untreated sleep apnea  Physical deconditioning.  Was very sedentary.  Prior echo and stress test before had COVID was normal.  CT during COVID in 2020 did not show right heart strain.  Reported no symptoms at rest or with mild exertion.  Encouraged to be more active.  Hx of breast cancer & had had right mastectomy and chemotherapy,  initially tried a saline implant but reported this had since been removed.  Reported no new lumps or ulcers on scar tissue.  Reported no left breast lumps, bumps, skin changes or nipple discharge.  Had been in remission long time and not seen oncology in a while.  Mammogram for the left placed  Heartburn controlled on Pepcid OTC daily. Constipation improved & was to continue with a high-fiber diet.  History of osteopenia.  Prior untreated osteoporosis but DEXA scan done 2021 showed osteopenia and was advised to continue calcium and vitamin D.  Unclear if taking calcium & recommended taking calcium citrate 1200 mg total a day and vitamin D at least 2000 units daily and rechecking DEXA scan in 2023.  Noted memory deficits, failed mini cog, seen alone, referred to neurology to further evaluate.   History of vitreal macular adhesion right eye and epiretinal membrane S/p surgery vitrectomy and stripping in June 2021. Was a bilateral glaucoma suspect and under care of eye. Was to see the dentist regularly. The 1 cm cyst on her right back was not a concern, & to monitor for worsening  Colon cancer screening due options discussed & [place] guard.ordered.   Labs showed normal  Vit d, TSH was mildly out of range but ft 4 was normal & continued on same dose for 3 months before rechecking. micro albumin had improved. Wbc was 2.2, not anemia, platelets normal. B 12 was low normal and advised 500 mcg of vit B 12.CMP showed stable CKD. Lipids not goal and atorvastatin increased to 20 mg. HB A1c was 5.7 and advised on diet and lifestyle changes. colo guard result not received. No apt made with sleep or neuro. Seen by eye 4/20/22 for glaucoma monitoring.      Seen  6/23/22 Blood pressure was not goal increased lisinopril to 20 mg bedtime ( to  take two of the 10 mg left until can start the new script of 20 mg dose) . Encouraged to avoid Gatorade as high in salt, drink tea in moderation, avoid alcohol. Check BP at home, goal should be less than 130/85. Recheck BP in 2 to 3 weeks with medical assistant and lab at that time for med monitoring with a bmp. Was to work on diet , exercise, low carb , smaller portions, avoid alcohol to help Blood pressure, weight and cholesterol.  BMI > 37  Discussed diet, exercise and weight loss. Was to continue atorvastatin 20 mg increased at last visit and see if with better BP control and repeat lipids if needed to be adjusted further in the future.  CKD 3 stable resolved microalbuminuria, on an ACE, to avoid antiinflammatories due to chronic kidney disease. Was clinically euthyroid on levothyroxine 150 mcg daily. Later TSH was WNL and continued on same dose. Due to prediabetes HB A1c checked & encouraged to  increase exercise, eat less carb's, avoid alcohol and work on loosing 10 % of total body weight. For hx of memory deficits, was  tracking and able to drive locally, in alone without family. Declined need to see neurology.  Was to continue to monitor. For  hx of snoring, suspected undiagnosed HUGO, opting to not see sleep for now due to cost. had resolved anemia. Recovered from Covid in 2020. Opted to defer seeing neuro for memory concern and snoring reporting  Fatigue resolved. Had stable asymptomatic low white count. Reviewed H x of breast cancer & Mammogram in April had been normal. Was to continue use of compression socks for dependant edema. Noted  Dexa due in 2023 for hx of osteopenia/ osteoporosis, encouraged to take calcium and vit d supp. Reminded to start vit B 12 500 mcg OTC daily for low normal B 12 noted in march and physical deconditioning ( forgot to start). Reported was working on health care directives  Covid # 3 due mid July. Declined Shingrex. Was to continue care with dentist and eye. Reported then  Heart burn and constipation had resolved. Was to contact colo guard about missing test result. This was later reported as positive & triage advised to contact patient and diagnostic colonoscopy ordered.      B 12 was normal.  Hemoglobin A1c in prediabetic range.  Normal CMP and TSH.  Chronically low white count no anemia.  ASCVD risk was 30% with normal LDL, low HDL and elevated triglycerides and continued on same dose statin.  On 7/7 blood pressure elevated recheck on 7/18 was normal and continued on same meds.  Finally got colonoscopy done 9/7/2022 fragmented superficial serrated adenoma removed x3 and tubular adenoma removed x1 and recommended recheck in 3 years     Seen 9/23/22 HTN stable on lisinopril increased dose 20 mg . No side effects. Avoiding salt. Not checking BP at home. CKD 3 stable resolved microalbuminuria, on an ACE, Avoiding  antiinflammatories due to chronic kidney disease. BMI up to 37, weight up , eating two meals a day, discussed smaller portions, avoiding alcohol, juices, declined weight evaluation, sleep doctor or mtm due to cost. HLD on atorvastatin 20 mg. Hypothyroid on levothyroxine 150 mcg daily. No hair loss, had had some weight gain, after colonoscopy bowels moving better.  Prediabetes & encouraged to  increase exercise, eat less carb's, avoid alcohol and work on loosing 10 % of total body weight. Hx of snoring & declined sleep  [place].    Had a positive colo guard early 2022, got colonoscopy 3 weeks prior in sept & it showed 3 serrated adenoma fragmented and 1 tubular adenoma and advised recheck 3 years.   Noted memory deficits,  Was racking and able to drive locally. Seen alone without family. Felt ok . Resolved anemia. stable low wbc, Recovered from Covid. Opted to defer seeing neuro for memory concern or sleep for snoring & reported fatigue resolved  Hx of snoring, suspected undiagnosed HUGO, opting to not see sleep due to cost.   Had stable asymptomatic low white count.  Hx of breast cancer but Mammogram in April was normal. Mammogram due 2023. Was to continue to use compression socks for dependant edema.   Osteopenia, Dexa due in 2023 for hx of osteopenia/ osteoporosis, unclear if on calcium and vit d. Encouraged to take calcium 1200 mg a day &  vit d 2000 units a day   Was to start vit B 12 500 mcg OTC daily for low normal B 12 noted in march and physical deconditioning ( forgot to start). Encouraged to take vitamin B 12 500 mcg OTC daily for low normal B 12 noted in march, memory and physical deconditioning   For new epigastric upper abdominal pain intermittent 1 week worse with pushing on it exam was  benign, no rebound or surgical abdomen noted.  Did not radiate into chest & had  no associated cardiac symptoms.  Suspected gastritis.  To avoid spicy, avoid greasy foods, and red sauces, coffee, alcohol till better. Increase Pepcid to twice a day, avoid aspirin and see what labs show, warm pack to area. counseled if gets worse, has chest pain, trouble breathing or has blood in stool or black stool, to go to the ER, & if not better may need an EGD etc. Ultrasound to check for pain. Ct abdomen if no answer.   Given flu shot. Declined Covid booster & Shingrex.   TSH was mildly out of range normal Ft4 & continued on same dose. LDL was 84, TG elevated at 184, normal HB A1c, B 12 low normal, negative for H Pylori but lipase quite elevated.  Advised to be seen in the er. [place] got hold of her & se opted to go to a local er there where noted symptoms had abated & chose not to do a ct scan in er. Repeat labs on  9/28 showed normal amylase & improved lipase& low wbc. Ct ordered & referred to hematology. Ct done 9/29 showed no pancreatitis,arthritis spine,atherosclerosis, diverticulosis & osteopenia & advised no alcohol. U/abdomen on 10/8/22 was normal. Seen by hematology 10/20/22 & suspected had chronic intermittent idiopathic neutropenia . Labs done showed normal copper, cr 1.9 stable, normal crp,ferritin folate, mild leukopenia, neutropenia & lymphopenia. BM Bx held off & advised to always get a cbc when sick& Neupogen shots if had absolute neutropenia at the time not responding to regular therapy.  Seen by eye 10/28/22 for routine check up of chronic concerns.    Seen 3/28/23 for Medicare wellness as well as for hypertension hypothyroidism chronic issues and medications. Vital stable. Vision under care of eye. Hearing okay. Mammogram due. Had had no falls. DEXA scan due to screen for osteoporosis and order placed. Failed cognition screen, tracking okay reported no concerns.  Alone at apt. Lived with daughter who drove her to clinic.  MRI ordered.  Opted out of neurology referral and recheck in 6 months with daughter present at next visit. Healthcare maintenance reviewed. Had healthcare directives on file. COVID booster due but declined. Discussed Shingrex was to think about it. Rest of vaccines up-to-date. Hypertension stable on lisinopril 20 mg bedtime with no side effects.  To continue to avoid salt, limit alcohol intake, regular exercise and weight loss. Hx of chronic kidney disease stage III with resolved microalbuminuria on ACE inhibitor. To avoid anti-inflammatories as much as possible. Later labs showed electrolytes & liver tests wnl, kidney function decreased, to increase water intake & recheck non fasting in a lab only  apt in 6 weeks when recommended rechecking TSH.    History of prediabetes, HB A1c came back 5.7 &encouraged to continue with a low carb diet, increase physical activity & some weight loss  to help. Hyperlipidemia on atorvastatin 20 mg.  Later labs showed lipids goal and continued on same dose of statin. BMI had gone up a bit likely due to shrinking in height though weight had improved.  To continue to eat small portions, less carb's, less juices, and increase physical activity.  Felt well and opted due to cost to hold off on seeing a sleep specialist or a weight doctor.  History of chronic intermittent idiopathic neutropenia that predated cancer diagnosis and treatment.  Negative work-up and asymptomatic from it.  Seen by hematology most recently in the fall 2022.  Peripheral smear and additional lab work for iron copper folic acid were normal.  No nutritional causes seen.  Held off on bone marrow biopsy given stability and lack of symptoms.  Advised to continue B 12 supplementation, taking unknown dosing. If should feel sick, have fevers, significant abdominal pains then recommended she should always come in to be evaluated for infection and get a CBC & let her providers know has a history of neutropenia and if neutrophils are low or if infection is not improving with regular treatment then hematology recommended a course of Neupogen 480 mcg subcu for 3 to 5 days at the time. At the time she was asymptomatic with stable low wbc stable & normal B 12. No longer with upper abdominal pain, resolved a week after seen for it in September 2022.  At that time lipase was elevated though CT scan later did not show pancreatitis & ultrasound was unremarkable.but imaging was noted done after symptoms had subsided. Remained on Pepcid 20 mg twice a day & to monitor for any new symptoms.   Hypothyroidism on levothyroxine 150 mcg daily.  In the past her TSH had been mildly out of range. After the visit noted thyroid testing  indicated under correction. Recommended changing thyroid to 150 mcg 1 tab 6 days a week & 1.5 tab ( 150+75) mcg one day a week & recheck TSH in 6 weeks. At visit noted just got a refill so new script not sent in and to wait to see what TSH looked like in 6 weeks of change to adjust meds and refill.   History of snoring, BMI more than 38, there was possibility of undiagnosed sleep apnea. She felt well and opted not to see the sleep specialist. Dependent edema likely due to weight and possibly varicose veins.& undiagnosed sleep apnea could also contribute to this. Had no sign of heart failure. To continue to use compression socks.  History of osteopenia continued on calcium 1200 a day & vitamin D at least 2000 units daily and recheck DEXA scan. Later Dexa showed osteopenia but with high fracture risk of hip & so sent in fosamax 70 mg 1/wk, to monitor for GERD & to connect with her dentist prior to starting & recheck Dexa in 1 yr.  History of breast cancer, prior right mastectomy scar looked good.  Left breast exam was normal.  Left screening mammogram due and order in place to schedule..  Memory deficits noted on screening test.  Was alone without family & responding appropriately.  Opted not to see neurology.  MRI brain ordered.  Encouraged to return in 6 months for recheck with family. Was to continue care with the eye doctor for history of vitreal macular adhesion, right eye epiretinal membrane on the right and bilateral glaucoma suspect monitoring.    Labs showed normal B 12, bmp, lfts, HB A1c of 5.7. Dexa 4/4/23 showed osteopenia. 4/14/ screening mammogram showed asymmetry left breast and dx studies ordered. MRI 4/14 showed atrophy and white matter changes. Seen by eye 4/26 for glaucoma check, presbyopia, epiretinal membrane, dx mammogram and left breast u/s on 4/26 showed no concern on left but noted right mastectomy scar dehiscence and referred to the surgeon.seen by breast clinic 4/27 biopsy done, showed  "radiation changes. Seen by surgeon 5/7 and continued on keflex given for wound infection. On 5/8 TSH stable & continued on levothyroxine 150 6 day sand then 1.5 tab one day a week. Seen By wound clinic 5/18 and 6/15 & noted better. On 6/19 TSH was normal and continued same dosing. Met with the  8/10/23 given fh & personal hx of cancer and was to think if desired testing or not     Review of Systems   Constitutional, HEENT, cardiovascular, pulmonary, GI, , musculoskeletal, neuro, skin, endocrine and psych systems are negative, except as otherwise noted.      Objective    BP (!) 160/80 (BP Location: Right arm, Patient Position: Sitting, Cuff Size: Adult Regular)   Pulse 60   Temp 97.6  F (36.4  C) (Temporal)   Resp 15   Ht 1.76 m (5' 9.29\")   Wt 108.4 kg (239 lb)   SpO2 98%   BMI 35.00 kg/m    Body mass index is 35 kg/m .  Physical Exam   GENERAL: healthy, alert, no distress, and obese  EYES: Eyes grossly normal to inspection, PERRL and conjunctivae and sclerae normal  HENT: ear canals and TM's normal, nose and mouth without ulcers or lesions  HENT: poor dentition   RESP: lungs clear to auscultation - no rales, rhonchi or wheezes  BREAST: left normal without masses, tenderness or nipple discharge and no palpable axillary masses or adenopathy  Right breast missing, prior mastectomy, intertriginous skin intact, no rash or wound noted  CV: regular rate and rhythm, normal S1 S2, no S3 or S4, no murmur, click or rub, no peripheral edema and peripheral pulses strong  ABDOMEN: soft, nontender, no hepatosplenomegaly, no masses and bowel sounds normal  MS: no gross musculoskeletal defects noted, no edema  SKIN: no suspicious lesions or rashes  NEURO: Normal strength and tone, mentation intact and speech normal  PSYCH: mentation appears normal, affect normal/bright    Results for orders placed or performed in visit on 09/26/23   TSH with free T4 reflex     Status: Abnormal   Result Value Ref Range    TSH 4.91 " (H) 0.30 - 4.20 uIU/mL   Hemoglobin A1c     Status: Abnormal   Result Value Ref Range    Hemoglobin A1C 5.7 (H) 0.0 - 5.6 %   Comprehensive metabolic panel (BMP + Alb, Alk Phos, ALT, AST, Total. Bili, TP)     Status: Abnormal   Result Value Ref Range    Sodium 140 135 - 145 mmol/L    Potassium 5.2 3.4 - 5.3 mmol/L    Carbon Dioxide (CO2) 27 22 - 29 mmol/L    Anion Gap 10 7 - 15 mmol/L    Urea Nitrogen 18.9 8.0 - 23.0 mg/dL    Creatinine 1.17 (H) 0.51 - 0.95 mg/dL    GFR Estimate 48 (L) >60 mL/min/1.73m2    Calcium 9.3 8.8 - 10.2 mg/dL    Chloride 103 98 - 107 mmol/L    Glucose 87 70 - 99 mg/dL    Alkaline Phosphatase 43 35 - 104 U/L    AST 28 0 - 45 U/L    ALT 21 0 - 50 U/L    Protein Total 7.4 6.4 - 8.3 g/dL    Albumin 4.0 3.5 - 5.2 g/dL    Bilirubin Total 0.3 <=1.2 mg/dL   CBC with platelets and differential     Status: Abnormal   Result Value Ref Range    WBC Count 2.6 (L) 4.0 - 11.0 10e3/uL    RBC Count 4.82 3.80 - 5.20 10e6/uL    Hemoglobin 13.4 11.7 - 15.7 g/dL    Hematocrit 43.0 35.0 - 47.0 %    MCV 89 78 - 100 fL    MCH 27.8 26.5 - 33.0 pg    MCHC 31.2 (L) 31.5 - 36.5 g/dL    RDW 14.3 10.0 - 15.0 %    Platelet Count 204 150 - 450 10e3/uL   Manual Differential     Status: Abnormal   Result Value Ref Range    % Neutrophils 31 %    % Lymphocytes 39 %    % Monocytes 23 %    % Eosinophils 5 %    % Basophils 2 %    Absolute Neutrophils 0.8 (L) 1.6 - 8.3 10e3/uL    Absolute Lymphocytes 1.0 0.8 - 5.3 10e3/uL    Absolute Monocytes 0.6 0.0 - 1.3 10e3/uL    Absolute Eosinophils 0.1 0.0 - 0.7 10e3/uL    Absolute Basophils 0.1 0.0 - 0.2 10e3/uL    RBC Morphology Confirmed RBC Indices     Platelet Assessment  Automated Count Confirmed. Platelet morphology is normal.     Automated Count Confirmed. Platelet morphology is normal.   T4 free     Status: Normal   Result Value Ref Range    Free T4 1.53 0.90 - 1.70 ng/dL   CBC with platelets and differential     Status: Abnormal    Narrative    The following orders were created  for panel order CBC with platelets and differential.  Procedure                               Abnormality         Status                     ---------                               -----------         ------                     CBC with platelets and d...[[id number]]  Abnormal            Final result               Manual Differential[[id number]]          Abnormal            Final result                 Please view results for these tests on the individual orders.     No results found for this or any previous visit (from the past 24 hour(s)).

## 2023-09-27 PROBLEM — Z90.11 S/P MASTECTOMY, RIGHT: Status: ACTIVE | Noted: 2023-09-27

## 2023-09-27 PROBLEM — Z86.0101 HISTORY OF ADENOMATOUS POLYP OF COLON: Status: ACTIVE | Noted: 2022-09-23

## 2023-09-27 RX ORDER — MULTIVIT-MIN/IRON/FOLIC ACID/K 18-600-40
1 CAPSULE ORAL DAILY
COMMUNITY

## 2023-09-27 RX ORDER — ATORVASTATIN CALCIUM 20 MG/1
20 TABLET, FILM COATED ORAL DAILY
Qty: 90 TABLET | Refills: 1 | Status: SHIPPED | OUTPATIENT
Start: 2023-09-27 | End: 2024-03-18

## 2023-10-20 DIAGNOSIS — H35.371 EPIRETINAL MEMBRANE, RIGHT: Primary | ICD-10-CM

## 2023-10-20 DIAGNOSIS — H40.003 GLAUCOMA SUSPECT OF BOTH EYES: ICD-10-CM

## 2023-10-31 ENCOUNTER — OFFICE VISIT (OUTPATIENT)
Dept: OPHTHALMOLOGY | Facility: CLINIC | Age: 78
End: 2023-10-31
Attending: OPHTHALMOLOGY
Payer: COMMERCIAL

## 2023-10-31 DIAGNOSIS — H43.821 VITREOMACULAR ADHESION OF RIGHT EYE: ICD-10-CM

## 2023-10-31 DIAGNOSIS — Z96.1 PSEUDOPHAKIA OF BOTH EYES: ICD-10-CM

## 2023-10-31 DIAGNOSIS — H35.371 EPIRETINAL MEMBRANE, RIGHT: Primary | ICD-10-CM

## 2023-10-31 DIAGNOSIS — H40.003 GLAUCOMA SUSPECT OF BOTH EYES: ICD-10-CM

## 2023-10-31 DIAGNOSIS — Z96.1 PSEUDOPHAKIA: ICD-10-CM

## 2023-10-31 DIAGNOSIS — T85.22XA DISPLACEMENT OF INTRAOCULAR LENS, INITIAL ENCOUNTER: ICD-10-CM

## 2023-10-31 PROCEDURE — 99214 OFFICE O/P EST MOD 30 MIN: CPT | Mod: GC | Performed by: OPHTHALMOLOGY

## 2023-10-31 PROCEDURE — G0463 HOSPITAL OUTPT CLINIC VISIT: HCPCS | Performed by: OPHTHALMOLOGY

## 2023-10-31 PROCEDURE — 92081 LIMITED VISUAL FIELD XM: CPT | Performed by: OPHTHALMOLOGY

## 2023-10-31 PROCEDURE — 92134 CPTRZ OPH DX IMG PST SGM RTA: CPT | Performed by: OPHTHALMOLOGY

## 2023-10-31 ASSESSMENT — VISUAL ACUITY
METHOD: SNELLEN - LINEAR
OD_CC: 20/250
CORRECTION_TYPE: GLASSES
OD_SC: 20/250
OS_SC+: -2
OD_PH_SC: 20/200
OS_CC: 20/20
OS_SC: 20/20

## 2023-10-31 ASSESSMENT — REFRACTION_WEARINGRX
OS_AXIS: 157
OD_SPHERE: -1.75
OD_AXIS: 090
OD_CYLINDER: SPHERE
OD_ADD: +2.75
OS_SPHERE: -0.25
OS_CYLINDER: +0.75
OS_ADD: +2.75

## 2023-10-31 ASSESSMENT — SLIT LAMP EXAM - LIDS
COMMENTS: MILD PTOSIS
COMMENTS: NORMAL

## 2023-10-31 ASSESSMENT — TONOMETRY
OD_IOP_MMHG: 12
IOP_METHOD: TONOPEN
OS_IOP_MMHG: 12

## 2023-10-31 ASSESSMENT — CONF VISUAL FIELD
OS_NORMAL: 1
OD_SUPERIOR_TEMPORAL_RESTRICTION: 3
OS_SUPERIOR_TEMPORAL_RESTRICTION: 0
OS_SUPERIOR_NASAL_RESTRICTION: 0
OD_SUPERIOR_NASAL_RESTRICTION: 0
OS_INFERIOR_NASAL_RESTRICTION: 0
OS_INFERIOR_TEMPORAL_RESTRICTION: 0

## 2023-10-31 ASSESSMENT — CUP TO DISC RATIO: OS_RATIO: 0.6

## 2023-10-31 ASSESSMENT — EXTERNAL EXAM - LEFT EYE: OS_EXAM: NORMAL

## 2023-10-31 ASSESSMENT — EXTERNAL EXAM - RIGHT EYE: OD_EXAM: NORMAL

## 2023-10-31 NOTE — NURSING NOTE
Chief Complaints and History of Present Illnesses   Patient presents with    Follow Up     Chief Complaint(s) and History of Present Illness(es)       Follow Up              Laterality: right eye    Onset: 6 months ago              Comments    Vitreoretinal adhesion RE-Pt. States that she is doing well. No change in VA BE. No flashes or floaters BE. No pain BE.   Ankita Doss COT 9:03 AM October 31, 2023

## 2023-10-31 NOTE — PROGRESS NOTES
CC: subluxated lens right eye     INTERVAL HISTORY - Vitreoretinal adhesion RE-Pt. States that she is doing well. No change in VA BE. No flashes or floaters BE. No pain BE.   .   HPI -   Romayne L Sathre is a 78 year old patient initially presented for referral from Dr Shelby for VMT right eye. Problem with focusing at distance and near. Now s/p PPV/MP for ERM and VMT right eye 6/7/21. S/P CE IOL left eye 2/2021.       PAST OCULAR SURGERY  CE IOL OU  S/P PPV/MP right eye 6/2021    RETINAL IMAGING:  OCT 10/31/23  OD - no VMT, no traction on fovea, no SRF/IRF,  (traction last seen 4/2021)-stable  OS - normal foveal contour ; small ERM predominantly nasally -stable    B scan: 4/26/23: right eye retina attached with buckle     ASSESSMENT & PLAN    0. Subluxated IOL, right eye   - s/p trauma ~10 years ago   - Vision potential appears to be at least 20/50, VA today 20/250   - Endopigment and guttae on exam bilaterally are concerning for fuchs   - Scleral fixated IOL may be preferred over ACIOL given endothelial health   - there is peritomy conj scarring and encircling band in the eye; possible RD after ocular trauma ~10 yrs ago; possibly performed at [facility]   - notes from [facility] will be reviewed     - IOL calcs done 2020   - recommend surgery with IOL explantation and placement of a scleral-fixated IOL; pt interested in 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 We discussed about the benefits of PPV surgery and its alternatives. All  the questions answered. Romayne agreed and wanted to proceed.      - case request placed; 120 minutes; resident/fellow participation   - POD1 and POW1 and POM1      1. Vitreoretinal adhesion right eye  2. H.o ERM right eye   S/P PPV/MP right eye 6/2021  Resolved VMT; Foveal anatomy improved significantly; VA limited due to subtle IS/OS disruption at fovea (+drusen) and IOL dislocation    2. Pseudophakia each eye   observe    3. Glaucoma suspect ou  Large disc with large cup each eye  Being followed by Dr. Shelby  Everything stable    Disposition:    RTC for POD1 and POW1 (Will schedule surgery for 1/2024)    Yobani Persaud MD MPH  Vitreoretinal Fellow PGY-6  [facility]       Complete documentation of historical and exam elements from today's encounter can be found in the full encounter summary report (not reduplicated in this progress note). I personally obtained the chief complaint(s) and history of present illness.  I confirmed and edited as necessary the review of systems, past medical/surgical history, family history, social history, and examination findings as documented by others; and I examined the patient myself. I personally reviewed the relevant tests, images, and reports as documented above. I formulated and edited as necessary the assessment and plan and discussed the findings and management plan with the patient and family.     Fede Donovan MD

## 2023-11-01 ENCOUNTER — TELEPHONE (OUTPATIENT)
Dept: OPHTHALMOLOGY | Facility: CLINIC | Age: 78
End: 2023-11-01
Payer: COMMERCIAL

## 2023-11-01 NOTE — TELEPHONE ENCOUNTER
Left voicemail for patient regarding scheduling surgery with Dr. Donovan.  Provided contact number to discuss.  [phone]    Rachelle Peraza, on 11/1/2023 at 10:46 AM

## 2023-11-09 ENCOUNTER — OFFICE VISIT (OUTPATIENT)
Dept: URGENT CARE | Facility: URGENT CARE | Age: 78
End: 2023-11-09
Payer: COMMERCIAL

## 2023-11-09 ENCOUNTER — ANCILLARY PROCEDURE (OUTPATIENT)
Dept: GENERAL RADIOLOGY | Facility: CLINIC | Age: 78
End: 2023-11-09
Attending: PHYSICIAN ASSISTANT
Payer: COMMERCIAL

## 2023-11-09 VITALS
DIASTOLIC BLOOD PRESSURE: 73 MMHG | BODY MASS INDEX: 34.6 KG/M2 | RESPIRATION RATE: 16 BRPM | TEMPERATURE: 98.6 F | OXYGEN SATURATION: 97 % | HEART RATE: 76 BPM | SYSTOLIC BLOOD PRESSURE: 120 MMHG | WEIGHT: 236.31 LBS

## 2023-11-09 DIAGNOSIS — M25.532 LEFT WRIST PAIN: ICD-10-CM

## 2023-11-09 DIAGNOSIS — S62.102A CLOSED FRACTURE OF LEFT WRIST, INITIAL ENCOUNTER: ICD-10-CM

## 2023-11-09 DIAGNOSIS — M79.642 PAIN OF LEFT HAND: ICD-10-CM
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
## 2023-11-09 DIAGNOSIS — R22.30 LOCALIZED SWELLING ON HAND: ICD-10-CM

## 2023-11-09 DIAGNOSIS — N18.30 STAGE 3 CHRONIC KIDNEY DISEASE, UNSPECIFIED WHETHER STAGE 3A OR 3B CKD (H): ICD-10-CM

## 2023-11-09 PROCEDURE — 73130 X-RAY EXAM OF HAND: CPT | Mod: TC | Performed by: RADIOLOGY

## 2023-11-09 PROCEDURE — 99214 OFFICE O/P EST MOD 30 MIN: CPT | Performed by: PHYSICIAN ASSISTANT

## 2023-11-09 PROCEDURE — 73110 X-RAY EXAM OF WRIST: CPT | Mod: TC | Performed by: RADIOLOGY

## 2023-11-09 RX ORDER — HYDROCODONE BITARTRATE AND ACETAMINOPHEN 5; 325 MG/1; MG/1
1 TABLET ORAL EVERY 6 HOURS PRN
Qty: 15 TABLET | Refills: 0 | Status: SHIPPED | OUTPATIENT
Start: 2023-11-09 | End: 2023-11-12

## 2023-11-09 NOTE — PROGRESS NOTES
Assessment & Plan     Fall, initial encounter    Fall last night    Pain of left hand    Xray hand Negative for acute findings, read by Rogelio MCKEON at time of visit.    RICE treatment: Rest, Ice, compression, elevation     - XR Hand Left G/E 3 Views; Future  - HYDROcodone-acetaminophen (NORCO) 5-325 MG tablet; Take 1 tablet by mouth every 6 hours as needed for severe pain    Localized swelling on hand    Secondary to wrist fracture    Stage 3 chronic kidney disease, unspecified whether stage 3a or 3b CKD (H)    Estimated Creatinine Clearance: 52.8 mL/min (A) (based on SCr of 1.17 mg/dL (H)).      Left wrist pain    Secondary to wrist fracture  RICE treatment: Rest, Ice, compression, elevation   DME for colles splint    - HYDROcodone-acetaminophen (NORCO) 5-325 MG tablet; Take 1 tablet by mouth every 6 hours as needed for severe pain    Wrist fracture, left,  closed, initial encounter    Your wrist can break, or fracture, during sports, a fall, or other accidents. The break may happen when your wrist is hit or is used to protect you in a fall. Fractures can range from a small, hairline crack, to a bone or bones broken into two or more pieces. Your treatment depends on how bad the break is.  Your doctor may have put your wrist in a cast or splint. This will help keep your wrist stable until your follow-up appointment. It may take weeks or months for your wrist to heal. You can help it heal with care at home    Wrist xray POS for wrist fracture  read by Rogelio MCKEON at time of visit.    Vicodin for wrist pain  Referral to ortho  DME colles splint    - Wrist/Arm Supplies Order Other (comments)  - Orthopedic  Referral; Future    Review of external notes as documented elsewhere in note       CONSULTATION/REFERRAL to ORthopedics    No follow-ups on file.    Rogelio Hurley, LINDA, PAMkC  [facility] [place]    Subjective Romayne is a 78 year old, presenting for the  following health issues:  Pain (Left wrist injury, swollen started yesterday, fall yesterday, no head injury, no loss of conciousness)      HPI   Review of Systems   Constitutional, HEENT, cardiovascular, pulmonary, gi and gu systems are negative, except as otherwise noted.      Objective    /73   Pulse 76   Temp 98.6  F (37  C) (Oral)   Resp 16   Wt 107.2 kg (236 lb 5 oz)   SpO2 97%   BMI 34.60 kg/m    Body mass index is 34.6 kg/m .  Physical Exam   GENERAL: healthy, alert and no distress  MS: Pos for right wrist tenderness, pain and swelling  SKIN: Pos for bruising right wrist  NEURO: Normal strength and tone, mentation intact and speech normal  PSYCH: mentation appears normal, affect normal/bright    Xray - Reviewed and interpreted by me.  POS for wrist fracture Rogelio Hurley, [facility] PA-C

## 2023-11-14 ENCOUNTER — TELEPHONE (OUTPATIENT)
Dept: ORTHOPEDICS | Facility: CLINIC | Age: 78
End: 2023-11-14
Payer: COMMERCIAL

## 2023-11-14 NOTE — TELEPHONE ENCOUNTER
** RED FLAG FRACTURE TRIAGE NEEDED **    [facility] Call Center     Phone Message     May a detailed message be left on voicemail: Yes     Reason for Call:  Pt has a referral for closed fracture of left wrist from 11/9.Please triage and advise for appt within appropriate timeframe.

## 2023-11-15 ENCOUNTER — TELEPHONE (OUTPATIENT)
Dept: ORTHOPEDICS | Facility: CLINIC | Age: 78
End: 2023-11-15
Payer: COMMERCIAL

## 2023-11-15 ENCOUNTER — DOCUMENTATION ONLY (OUTPATIENT)
Dept: ORTHOPEDICS | Facility: CLINIC | Age: 78
End: 2023-11-15
Payer: COMMERCIAL

## 2023-11-15 NOTE — PROGRESS NOTES
Orthopedic/Sports Medicine Fracture Triage    Incoming call/or message from call center member.    Fracture type: Wrist.    The patient is in a   unknown .    Date of injury 11/9/23.    Triaged by: EM Aly .    Determined to be managed Non operatively.    Needs to be seen within 1 week.    Additional Comments/information: KAROLINA Erwin, ATC

## 2023-11-16 DIAGNOSIS — S62.102A LEFT WRIST FRACTURE: Primary | ICD-10-CM

## 2023-11-16 NOTE — TELEPHONE ENCOUNTER
DIAGNOSIS: Closed fracture of left wrist,/Rogelio LANG/[employer] medicare/xr/ortho cons  See lashanda mayorga to use same day    APPOINTMENT DATE: 11/17/23   NOTES STATUS DETAILS   OFFICE NOTE from referring provider Internal 11/09/23 - Rogelio Hurley, EM    MEDICATION LIST Internal    XRAYS  & INJECTIONS (IMAGES & REPORTS) Internal XR L WRIST:  - 11/09/23

## 2023-11-17 ENCOUNTER — OFFICE VISIT (OUTPATIENT)
Dept: ORTHOPEDICS | Facility: CLINIC | Age: 78
End: 2023-11-17
Payer: COMMERCIAL

## 2023-11-17 ENCOUNTER — PRE VISIT (OUTPATIENT)
Dept: ORTHOPEDICS | Facility: CLINIC | Age: 78
End: 2023-11-17

## 2023-11-17 ENCOUNTER — ANCILLARY PROCEDURE (OUTPATIENT)
Dept: GENERAL RADIOLOGY | Facility: CLINIC | Age: 78
End: 2023-11-17
Attending: PREVENTIVE MEDICINE
Payer: COMMERCIAL

## 2023-11-17 VITALS — HEIGHT: 69 IN | BODY MASS INDEX: 34.96 KG/M2 | WEIGHT: 236 LBS

## 2023-11-17 DIAGNOSIS — S62.102A LEFT WRIST FRACTURE: ICD-10-CM

## 2023-11-17 DIAGNOSIS — S52.502D CLOSED FRACTURE OF DISTAL END OF LEFT RADIUS WITH ROUTINE HEALING, UNSPECIFIED FRACTURE MORPHOLOGY, SUBSEQUENT ENCOUNTER: Primary | ICD-10-CM

## 2023-11-17 DIAGNOSIS — S52.612D TRAUMATIC CLOSED FRACTURE OF ULNAR STYLOID WITH MINIMAL DISPLACEMENT, LEFT, WITH ROUTINE HEALING, SUBSEQUENT ENCOUNTER: ICD-10-CM

## 2023-11-17 DIAGNOSIS — S62.102A CLOSED FRACTURE OF LEFT WRIST, INITIAL ENCOUNTER: ICD-10-CM

## 2023-11-17 PROCEDURE — 73110 X-RAY EXAM OF WRIST: CPT | Mod: LT | Performed by: RADIOLOGY

## 2023-11-17 PROCEDURE — 29075 APPL CST ELBW FNGR SHORT ARM: CPT | Mod: LT | Performed by: STUDENT IN AN ORGANIZED HEALTH CARE EDUCATION/TRAINING PROGRAM

## 2023-11-17 PROCEDURE — 99203 OFFICE O/P NEW LOW 30 MIN: CPT | Mod: 25 | Performed by: STUDENT IN AN ORGANIZED HEALTH CARE EDUCATION/TRAINING PROGRAM

## 2023-11-17 NOTE — PROGRESS NOTES
"[facility]  Sports Medicine Clinic  Clinics and Surgery Center           SUBJECTIVE       Romayne L Sathre is a 78 year old female presenting to clinic today w/left wrist concerns.    She is right hand dominant.     Background:   Date of injury: 11/9/2023  Duration of symptoms: 1 week    Mechanism of Injury: patient reports that she fell in her home and landed on her left wrist.   Intensity: 5/10   Aggravating factors: use of her left wrist. She reports she is able to move her fingers easier.    Relieving Factors: splint, Tylenol prn, applies an ice pack and rest.    Prior Evaluation: 11/9/23, [facility]  \"Assessment & Plan   Fall, initial encounter     Fall last night     Pain of left hand     Xray hand Negative for acute findings, read by Rogelio MCKEON at time of visit.     RICE treatment: Rest, Ice, compression, elevation      - XR Hand Left G/E 3 Views; Future  - HYDROcodone-acetaminophen (NORCO) 5-325 MG tablet; Take 1 tablet by mouth every 6 hours as needed for severe pain     Localized swelling on hand     Secondary to wrist fracture     Stage 3 chronic kidney disease, unspecified whether stage 3a or 3b CKD (H)     Estimated Creatinine Clearance: 52.8 mL/min (A) (based on SCr of 1.17 mg/dL (H)).        Left wrist pain     Secondary to wrist fracture  RICE treatment: Rest, Ice, compression, elevation   DME for colles splint     - HYDROcodone-acetaminophen (NORCO) 5-325 MG tablet; Take 1 tablet by mouth every 6 hours as needed for severe pain     Wrist fracture, left,  closed, initial encounter     Your wrist can break, or fracture, during sports, a fall, or other accidents. The break may happen when your wrist is hit or is used to protect you in a fall. Fractures can range from a small, hairline crack, to a bone or bones broken into two or more pieces. Your treatment depends on how bad the break is.  Your doctor may have put your wrist in a cast or splint. This will help keep your wrist stable " "until your follow-up appointment. It may take weeks or months for your wrist to heal. You can help it heal with care at home     Wrist xray POS for wrist fracture  read by Rogelio MCKEON at time of visit.     Vicodin for wrist pain  Referral to ortho  DME colles splint     - Wrist/Arm Supplies Order Other (comments)  - Orthopedic  Referral; Future\"  Study Result    Narrative & Impression   WRIST LEFT THREE OR MORE VIEWS      DATE/TIME: 11/9/2023 4:15 PM      INDICATION: Left wrist pain.      COMPARISON: None.                                                                      IMPRESSION:  1.  Nondisplaced longitudinal oblique intra-articular fracture of the  left distal radius. The distal articular surface remains congruent.  2.  Tiny minimally displaced fracture of the ulna styloid process.  3.  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  4.  Soft tissue swelling about the wrist.          PMH, Medications and Allergies were reviewed and updated as needed.    ROS:  As noted above otherwise negative.    Patient Active Problem List   Diagnosis    Hypothyroidism    Hypertension goal BP (blood pressure) < 140/90    CKD (chronic kidney disease) stage 3, GFR 30-59 ml/min (H)    Mixed hyperlipidemia    Advanced directives, counseling/discussion    History of breast cancer    Chronic idiopathic neutropenia (H24)    Memory deficit    Obesity (BMI 35.0-39.9) with comorbidity (H)    Dependent edema    Epiretinal membrane, right    Vitreomacular adhesion of right eye    Glaucoma suspect, bilateral    Prediabetes    Snoring    Osteopenia, unspecified location    History of adenomatous polyp of colon    Displacement of intraocular lens, initial encounter    S/P mastectomy, right       Current Outpatient Medications   Medication Sig Dispense Refill    alendronate (FOSAMAX) 70 MG tablet Take 1 tablet (70 mg) by mouth every 7 days 12 tablet 3    amLODIPine (NORVASC) 2.5 MG tablet Take 1 tablet " "(2.5 mg) by mouth daily 90 tablet 0    Ascorbic Acid (VITAMIN C) 500 MG CAPS Take 1 each by mouth daily      atorvastatin (LIPITOR) 20 MG tablet Take 1 tablet (20 mg) by mouth daily 90 tablet 1    calcium carbonate-vitamin D (OSCAL W/D) 500-200 MG-UNIT tablet Take 1 tablet by mouth daily Taking every 3 dasy or so      cetirizine (ZYRTEC) 10 MG tablet Take 10 mg by mouth daily      Cyanocobalamin (B-12 PO)       famotidine (PEPCID) 20 MG tablet Take 20 mg by mouth daily      levothyroxine (SYNTHROID/LEVOTHROID) 150 MCG tablet 1 tab 6 days a week & 1.5 tab one day a week & recheck tsh in 3 month for further adjustment 100 tablet 1    lisinopril (ZESTRIL) 20 MG tablet Take 1 tablet (20 mg) by mouth At Bedtime 90 tablet 1    Multiple Vitamin (MULTI-VITAMIN) per tablet Take 1 tablet by mouth daily      TURMERIC PO Take 1 tablet by mouth daily              OBJECTIVE:       Vitals:   Vitals:    11/17/23 1343   Weight: 107 kg (236 lb)   Height: 1.76 m (5' 9.29\")     BMI: Body mass index is 34.56 kg/m .    Gen:  Well nourished and in no acute distress  HEENT: Extraocular movement intact  Neck: Supple  Pulm:  Breathing Comfortably. No increased respiratory effort.  Psych: Euthymic. Appropriately answers questions    MSK: Left wrist without evidence of an effusion, edema, overlying ecchymosis.  Reduced range of motion in flexion and extension secondary to the known fracture of the distal radius.  Tenderness to palpation on the radial and ulnar side of the distal radius.  No distal ulnar tenderness.  No pronation or supination deficits causing pain.  Full range of motion of the elbow without palpable tenderness.  No tenderness at the shoulder with full range of motion and intact rotator cuff strength.   strength is reduced secondary to fracture and mild pain.  Pincer  as well.      XRAY : Independent evaluation shows intra-articular fracture, and similar angulation to the previous x-ray of the left wrist.  More " conspicuous fracture of the distal ulna previously noted, at the styloid.  No other acute osseous abnormality seen.          ASSESSMENT and PLAN:     Romayne was seen today for consult.    Diagnoses and all orders for this visit:    Closed fracture of distal end of left radius with routine healing, unspecified fracture morphology, subsequent encounter    Traumatic closed fracture of ulnar styloid with minimal displacement, left, with routine healing, subsequent encounter      78-year-old female presenting to clinic today, a week after falling from height, being seen for follow-up of the left wrist fractures.  Patient does have a DEXA diagnosis of osteopenia, however with the most recent fractures happening from height, this is concern for fragility fracture, tipping her into the region of osteoporosis.  X-rays were done today and compared to that of 1 week ago, patterns are similar in angulation and alignment.  At this point the patient is not having any neuropathic symptoms, and her swelling has abated, therefore I do think would be most reasonable to place the patient in a short arm cast.  Typical timeframe for healing is 6 to 8 weeks.  Patient works at [employer], and I am fine with her working given the fact that she is right-hand dominant and will be in a cast.  We will see the patient back in 3 weeks, with new x-rays in the confines of the cast, and then a likely subsequent follow-up in another 3 weeks with a cast removed and new x-rays.  Patient will very well likely need hand therapy after immobilization.  I also think it would be ideal for the patient to follow-up with her primary care physician after the healing process has ensued, given the concerns for osteoporosis.  ER and urgent care precautions have been advised.  Return precautions discussed.  Cast care has been counseled.    Options for treatment and/or follow-up care were reviewed with the patient was actively involved in the decision making  process. Patient verbalized understanding and was in agreement with the plan.    Ruslan Frey DO  , Sports Medicine  Department of [facility] and [facility]    Cast/splint application    Date/Time: 11/17/2023 3:12 PM    Performed by: Janell Harrison ATC  Authorized by: Ruslan Frey DO    Consent:     Consent obtained:  Verbal    Consent given by:  Patient    Risks discussed:  Discoloration, numbness, pain and swelling    Alternatives discussed:  No treatment  Pre-procedure details:     Sensation:  Normal  Procedure details:     Laterality:  Left    Location:  Wrist    Wrist:  L wrist    Cast type:  Short arm    Supplies:  Fiberglass  Post-procedure details:     Pain:  Improved    Sensation:  Normal    Patient tolerance of procedure:  Tolerated well, no immediate complications    Patient provided with cast or splint care instructions: Yes

## 2023-11-17 NOTE — LETTER
"  11/17/2023      RE: Romayne L Sathre  [address]  [facility] [address]     Dear Colleague,    Thank you for referring your patient, Romayne L Sathre, to the [facility] [place]. Please see a copy of my visit note below.    [facility]  Sports Medicine Clinic  Clinics and Surgery Center           SUBJECTIVE       Romayne L Sathre is a 78 year old female presenting to clinic today w/left wrist concerns.    She is right hand dominant.     Background:   Date of injury: 11/9/2023  Duration of symptoms: 1 week    Mechanism of Injury: patient reports that she fell in her home and landed on her left wrist.   Intensity: 5/10   Aggravating factors: use of her left wrist. She reports she is able to move her fingers easier.    Relieving Factors: splint, Tylenol prn, applies an ice pack and rest.    Prior Evaluation: 11/9/23, UC  \"Assessment & Plan   Fall, initial encounter     Fall last night     Pain of left hand     Xray hand Negative for acute findings, read by Rogelio MCKEON at time of visit.     RICE treatment: Rest, Ice, compression, elevation      - XR Hand Left G/E 3 Views; Future  - HYDROcodone-acetaminophen (NORCO) 5-325 MG tablet; Take 1 tablet by mouth every 6 hours as needed for severe pain     Localized swelling on hand     Secondary to wrist fracture     Stage 3 chronic kidney disease, unspecified whether stage 3a or 3b CKD (H)     Estimated Creatinine Clearance: 52.8 mL/min (A) (based on SCr of 1.17 mg/dL (H)).        Left wrist pain     Secondary to wrist fracture  RICE treatment: Rest, Ice, compression, elevation   DME for colles splint     - HYDROcodone-acetaminophen (NORCO) 5-325 MG tablet; Take 1 tablet by mouth every 6 hours as needed for severe pain     Wrist fracture, left,  closed, initial encounter     Your wrist can break, or fracture, during sports, a fall, or other accidents. The break may happen when your wrist is hit or is used to " "protect you in a fall. Fractures can range from a small, hairline crack, to a bone or bones broken into two or more pieces. Your treatment depends on how bad the break is.  Your doctor may have put your wrist in a cast or splint. This will help keep your wrist stable until your follow-up appointment. It may take weeks or months for your wrist to heal. You can help it heal with care at home     Wrist xray POS for wrist fracture  read by Rogelio Hurley PA-C [facility] at time of visit.     Vicodin for wrist pain  Referral to ortho  DME colles splint     - Wrist/Arm Supplies Order Other (comments)  - Orthopedic  Referral; Future\"  Study Result    Narrative & Impression   WRIST LEFT THREE OR MORE VIEWS      DATE/TIME: 11/9/2023 4:15 PM      INDICATION: Left wrist pain.      COMPARISON: None.                                                                      IMPRESSION:  1.  Nondisplaced longitudinal oblique intra-articular fracture of the  left distal radius. The distal articular surface remains congruent.  2.  Tiny minimally displaced fracture of the ulna styloid process.  3.  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  4.  Soft tissue swelling about the wrist.          PMH, Medications and Allergies were reviewed and updated as needed.    ROS:  As noted above otherwise negative.    Patient Active Problem List   Diagnosis    Hypothyroidism    Hypertension goal BP (blood pressure) < 140/90    CKD (chronic kidney disease) stage 3, GFR 30-59 ml/min (H)    Mixed hyperlipidemia    Advanced directives, counseling/discussion    History of breast cancer    Chronic idiopathic neutropenia (H24)    Memory deficit    Obesity (BMI 35.0-39.9) with comorbidity (H)    Dependent edema    Epiretinal membrane, right    Vitreomacular adhesion of right eye    Glaucoma suspect, bilateral    Prediabetes    Snoring    Osteopenia, unspecified location    History of adenomatous polyp of colon    Displacement of intraocular " "lens, initial encounter    S/P mastectomy, right       Current Outpatient Medications   Medication Sig Dispense Refill    alendronate (FOSAMAX) 70 MG tablet Take 1 tablet (70 mg) by mouth every 7 days 12 tablet 3    amLODIPine (NORVASC) 2.5 MG tablet Take 1 tablet (2.5 mg) by mouth daily 90 tablet 0    Ascorbic Acid (VITAMIN C) 500 MG CAPS Take 1 each by mouth daily      atorvastatin (LIPITOR) 20 MG tablet Take 1 tablet (20 mg) by mouth daily 90 tablet 1    calcium carbonate-vitamin D (OSCAL W/D) 500-200 MG-UNIT tablet Take 1 tablet by mouth daily Taking every 3 dasy or so      cetirizine (ZYRTEC) 10 MG tablet Take 10 mg by mouth daily      Cyanocobalamin (B-12 PO)       famotidine (PEPCID) 20 MG tablet Take 20 mg by mouth daily      levothyroxine (SYNTHROID/LEVOTHROID) 150 MCG tablet 1 tab 6 days a week & 1.5 tab one day a week & recheck tsh in 3 month for further adjustment 100 tablet 1    lisinopril (ZESTRIL) 20 MG tablet Take 1 tablet (20 mg) by mouth At Bedtime 90 tablet 1    Multiple Vitamin (MULTI-VITAMIN) per tablet Take 1 tablet by mouth daily      TURMERIC PO Take 1 tablet by mouth daily              OBJECTIVE:       Vitals:   Vitals:    11/17/23 1343   Weight: 107 kg (236 lb)   Height: 1.76 m (5' 9.29\")     BMI: Body mass index is 34.56 kg/m .    Gen:  Well nourished and in no acute distress  HEENT: Extraocular movement intact  Neck: Supple  Pulm:  Breathing Comfortably. No increased respiratory effort.  Psych: Euthymic. Appropriately answers questions    MSK: Left wrist without evidence of an effusion, edema, overlying ecchymosis.  Reduced range of motion in flexion and extension secondary to the known fracture of the distal radius.  Tenderness to palpation on the radial and ulnar side of the distal radius.  No distal ulnar tenderness.  No pronation or supination deficits causing pain.  Full range of motion of the elbow without palpable tenderness.  No tenderness at the shoulder with full range of motion " and intact rotator cuff strength.   strength is reduced secondary to fracture and mild pain.  Pincer  as well.      XRAY : Independent evaluation shows intra-articular fracture, and similar angulation to the previous x-ray of the left wrist.  More conspicuous fracture of the distal ulna previously noted, at the styloid.  No other acute osseous abnormality seen.          ASSESSMENT and PLAN:     Romayne was seen today for consult.    Diagnoses and all orders for this visit:    Closed fracture of distal end of left radius with routine healing, unspecified fracture morphology, subsequent encounter    Traumatic closed fracture of ulnar styloid with minimal displacement, left, with routine healing, subsequent encounter      78-year-old female presenting to clinic today, a week after falling from height, being seen for follow-up of the left wrist fractures.  Patient does have a DEXA diagnosis of osteopenia, however with the most recent fractures happening from height, this is concern for fragility fracture, tipping her into the region of osteoporosis.  X-rays were done today and compared to that of 1 week ago, patterns are similar in angulation and alignment.  At this point the patient is not having any neuropathic symptoms, and her swelling has abated, therefore I do think would be most reasonable to place the patient in a short arm cast.  Typical timeframe for healing is 6 to 8 weeks.  Patient works at [employer], and I am fine with her working given the fact that she is right-hand dominant and will be in a cast.  We will see the patient back in 3 weeks, with new x-rays in the confines of the cast, and then a likely subsequent follow-up in another 3 weeks with a cast removed and new x-rays.  Patient will very well likely need hand therapy after immobilization.  I also think it would be ideal for the patient to follow-up with her primary care physician after the healing process has ensued, given the concerns  for osteoporosis.  ER and urgent care precautions have been advised.  Return precautions discussed.  Cast care has been counseled.    Options for treatment and/or follow-up care were reviewed with the patient was actively involved in the decision making process. Patient verbalized understanding and was in agreement with the plan.    Ruslan Frey DO  , Sports Medicine  Department of [facility] and [facility]    Cast/splint application    Date/Time: 11/17/2023 3:12 PM    Performed by: Janell Harrison ATC  Authorized by: Ruslan Frey DO    Consent:     Consent obtained:  Verbal    Consent given by:  Patient    Risks discussed:  Discoloration, numbness, pain and swelling    Alternatives discussed:  No treatment  Pre-procedure details:     Sensation:  Normal  Procedure details:     Laterality:  Left    Location:  Wrist    Wrist:  L wrist    Cast type:  Short arm    Supplies:  Fiberglass  Post-procedure details:     Pain:  Improved    Sensation:  Normal    Patient tolerance of procedure:  Tolerated well, no immediate complications    Patient provided with cast or splint care instructions: Yes          Again, thank you for allowing me to participate in the care of your patient.      Sincerely,    Ruslan Frey DO

## 2023-11-20 NOTE — TELEPHONE ENCOUNTER
Left voicemail for patient regarding scheduling surgery with Dr. Donovan.  Provided contact number to discuss.  [phone]    Zayra Earl, on 11/20/2023 at 11:56 AM

## 2023-11-27 NOTE — TELEPHONE ENCOUNTER
Left voicemail for patient regarding scheduling surgery with Dr. Donovan.  Provided contact number to discuss.  [phone]    Zayra Earl, on 11/27/2023 at 9:55 AM

## 2023-12-05 DIAGNOSIS — S52.612D TRAUMATIC CLOSED FRACTURE OF ULNAR STYLOID WITH MINIMAL DISPLACEMENT, LEFT, WITH ROUTINE HEALING, SUBSEQUENT ENCOUNTER: ICD-10-CM

## 2023-12-05 DIAGNOSIS — S52.502D CLOSED FRACTURE OF DISTAL END OF LEFT RADIUS WITH ROUTINE HEALING, UNSPECIFIED FRACTURE MORPHOLOGY, SUBSEQUENT ENCOUNTER: Primary | ICD-10-CM

## 2023-12-06 NOTE — PROGRESS NOTES
[facility]  Sports Medicine Clinic  Clinics and Surgery Center           SUBJECTIVE       Romayne L Sathre is a 78 year old female presenting to clinic today for follow-up of the left wrist.  Patient is overall doing well in the cast and is not having any issues.  Some mild itching every now and then, she did spilled coffee on it.  She also has been lifting some heavy objects in the cast, given the fact she works at [employer], although she does states she knows she should not have been doing that.  Nevertheless she is not having any pain, no new injuries, no falls.    11/11/23: Closed fracture of distal end of left radius with routine healing, unspecified fracture morphology, subsequent encounter     Traumatic closed fracture of ulnar styloid with minimal displacement, left, with routine healing, subsequent encounter        78-year-old female presenting to clinic today, a week after falling from height, being seen for follow-up of the left wrist fractures.  Patient does have a DEXA diagnosis of osteopenia, however with the most recent fractures happening from height, this is concern for fragility fracture, tipping her into the region of osteoporosis.  X-rays were done today and compared to that of 1 week ago, patterns are similar in angulation and alignment.  At this point the patient is not having any neuropathic symptoms, and her swelling has abated, therefore I do think would be most reasonable to place the patient in a short arm cast.  Typical timeframe for healing is 6 to 8 weeks.  Patient works at [employer], and I am fine with her working given the fact that she is right-hand dominant and will be in a cast.  We will see the patient back in 3 weeks, with new x-rays in the confines of the cast, and then a likely subsequent follow-up in another 3 weeks with a cast removed and new x-rays.  Patient will very well likely need hand therapy after immobilization.  I also think it would be ideal  for the patient to follow-up with her primary care physician after the healing process has ensued, given the concerns for osteoporosis.  ER and urgent care precautions have been advised.  Return precautions discussed.  Cast care has been counseled.    PMH, Medications and Allergies were reviewed and updated as needed.    ROS:  As noted above otherwise negative.    Patient Active Problem List   Diagnosis    Hypothyroidism    Hypertension goal BP (blood pressure) < 140/90    CKD (chronic kidney disease) stage 3, GFR 30-59 ml/min (H)    Mixed hyperlipidemia    Advanced directives, counseling/discussion    History of breast cancer    Chronic idiopathic neutropenia (H24)    Memory deficit    Obesity (BMI 35.0-39.9) with comorbidity (H)    Dependent edema    Epiretinal membrane, right    Vitreomacular adhesion of right eye    Glaucoma suspect, bilateral    Prediabetes    Snoring    Osteopenia, unspecified location    History of adenomatous polyp of colon    Displacement of intraocular lens, initial encounter    S/P mastectomy, right       Current Outpatient Medications   Medication Sig Dispense Refill    alendronate (FOSAMAX) 70 MG tablet Take 1 tablet (70 mg) by mouth every 7 days 12 tablet 3    amLODIPine (NORVASC) 2.5 MG tablet Take 1 tablet (2.5 mg) by mouth daily 90 tablet 0    Ascorbic Acid (VITAMIN C) 500 MG CAPS Take 1 each by mouth daily      atorvastatin (LIPITOR) 20 MG tablet Take 1 tablet (20 mg) by mouth daily 90 tablet 1    calcium carbonate-vitamin D (OSCAL W/D) 500-200 MG-UNIT tablet Take 1 tablet by mouth daily Taking every 3 dasy or so      cetirizine (ZYRTEC) 10 MG tablet Take 10 mg by mouth daily      Cyanocobalamin (B-12 PO)       famotidine (PEPCID) 20 MG tablet Take 20 mg by mouth daily      levothyroxine (SYNTHROID/LEVOTHROID) 150 MCG tablet 1 tab 6 days a week & 1.5 tab one day a week & recheck tsh in 3 month for further adjustment 100 tablet 1    lisinopril (ZESTRIL) 20 MG tablet Take 1 tablet  (20 mg) by mouth At Bedtime 90 tablet 1    Multiple Vitamin (MULTI-VITAMIN) per tablet Take 1 tablet by mouth daily      TURMERIC PO Take 1 tablet by mouth daily              OBJECTIVE:       Vitals: There were no vitals filed for this visit.  BMI: There is no height or weight on file to calculate BMI.    Gen:  Well nourished and in no acute distress  HEENT: Extraocular movement intact  Neck: Supple  Pulm:  Breathing Comfortably. No increased respiratory effort.  Psych: Euthymic. Appropriately answers questions    MSK: Left wrist examined within the confines of the cast.  Full active and passive range of motion of the IP joints and phalanges in total.  Wide finger abduction strength and thumb strength is full and intact.  No sensory deficits.  Capillary refill is less than 2 seconds.      XRAY : Independent evaluation of the left wrist fracture within the confines of the cast shows overlying cast material, somewhat angulation of the x-ray appears different given the distal radial fracture does appear to be distally, and more pronounced with displacement.  Some of this does seem projectional as well.          ASSESSMENT and PLAN:     Romayne was seen today for follow up.    Diagnoses and all orders for this visit:    Closed fracture of distal end of left radius with routine healing, unspecified fracture morphology, subsequent encounter    Traumatic closed fracture of ulnar styloid with minimal displacement, left, with routine healing, subsequent encounter      78-year-old female following up at the 3-week, out of 6 weeks, of immobilization for the distal radius/ulnar styloid fracture.  Patient is overall doing very well in the cast.  Has been working and unfortunately lifting some objects that are heavy AGAINST MEDICAL ADVICE.  X-rays were discussed with her in great detail which do show some mild displacement of the previous fracture of the distal radius, but at this point the patient is not having any other  symptoms and overall doing well.  Have discussed with the patient significantly the urge for her to not lift heavy objects or do much activity including the left hand because she does have 2 fractures of the distal radius and ulna.  Patient did express understanding of this.  We will see the patient back in 3 weeks, cast removed, and new x-rays.  That would be the 6-week guero at which point we can hopefully remove cast restrictions.  She may very well likely need a period of immobilization in a brace for 2 weeks as well as hand therapy, after the cast is removed.  I have discussed post fracture complications as well as displaced fracture complications including pain, stiffness, osteoarthrosis, as well as surgical fixation if the area becomes significantly displaced.  Patient expressed verbal understanding of this plan and in this entirety, and all questions were answered satisfactorily.  ER and urgent care precautions advised.  Return precautions discussed.    Options for treatment and/or follow-up care were reviewed with the patient was actively involved in the decision making process. Patient verbalized understanding and was in agreement with the plan.    Ruslan Frey DO  , Sports Medicine  Department of Family Medicine and [facility]

## 2023-12-08 ENCOUNTER — OFFICE VISIT (OUTPATIENT)
Dept: ORTHOPEDICS | Facility: CLINIC | Age: 78
End: 2023-12-08
Payer: COMMERCIAL

## 2023-12-08 ENCOUNTER — ANCILLARY PROCEDURE (OUTPATIENT)
Dept: GENERAL RADIOLOGY | Facility: CLINIC | Age: 78
End: 2023-12-08
Attending: STUDENT IN AN ORGANIZED HEALTH CARE EDUCATION/TRAINING PROGRAM
Payer: COMMERCIAL

## 2023-12-08 DIAGNOSIS — S52.502D CLOSED FRACTURE OF DISTAL END OF LEFT RADIUS WITH ROUTINE HEALING, UNSPECIFIED FRACTURE MORPHOLOGY, SUBSEQUENT ENCOUNTER: ICD-10-CM

## 2023-12-08 DIAGNOSIS — S52.502D CLOSED FRACTURE OF DISTAL END OF LEFT RADIUS WITH ROUTINE HEALING, UNSPECIFIED FRACTURE MORPHOLOGY, SUBSEQUENT ENCOUNTER: Primary | ICD-10-CM

## 2023-12-08 DIAGNOSIS — S52.612D TRAUMATIC CLOSED FRACTURE OF ULNAR STYLOID WITH MINIMAL DISPLACEMENT, LEFT, WITH ROUTINE HEALING, SUBSEQUENT ENCOUNTER: ICD-10-CM

## 2023-12-08 PROCEDURE — 99214 OFFICE O/P EST MOD 30 MIN: CPT | Performed by: STUDENT IN AN ORGANIZED HEALTH CARE EDUCATION/TRAINING PROGRAM

## 2023-12-08 PROCEDURE — 73110 X-RAY EXAM OF WRIST: CPT | Mod: LT | Performed by: RADIOLOGY

## 2023-12-08 NOTE — LETTER
12/8/2023      RE: Romayne L Sathre  [address]  [facility] [address]     Dear Colleague,    Thank you for referring your patient, Romayne L Sathre, to the [facility] [place]. Please see a copy of my visit note below.    [facility]  Sports Medicine Clinic  Clinics and Surgery Center           SUBJECTIVE       Romayne L Sathre is a 78 year old female presenting to clinic today for follow-up of the left wrist.  Patient is overall doing well in the cast and is not having any issues.  Some mild itching every now and then, she did spilled coffee on it.  She also has been lifting some heavy objects in the cast, given the fact she works at [employer], although she does states she knows she should not have been doing that.  Nevertheless she is not having any pain, no new injuries, no falls.    11/11/23: Closed fracture of distal end of left radius with routine healing, unspecified fracture morphology, subsequent encounter     Traumatic closed fracture of ulnar styloid with minimal displacement, left, with routine healing, subsequent encounter        78-year-old female presenting to clinic today, a week after falling from height, being seen for follow-up of the left wrist fractures.  Patient does have a DEXA diagnosis of osteopenia, however with the most recent fractures happening from height, this is concern for fragility fracture, tipping her into the region of osteoporosis.  X-rays were done today and compared to that of 1 week ago, patterns are similar in angulation and alignment.  At this point the patient is not having any neuropathic symptoms, and her swelling has abated, therefore I do think would be most reasonable to place the patient in a short arm cast.  Typical timeframe for healing is 6 to 8 weeks.  Patient works at [employer], and I am fine with her working given the fact that she is right-hand dominant and will be in a cast.  We will see the  patient back in 3 weeks, with new x-rays in the confines of the cast, and then a likely subsequent follow-up in another 3 weeks with a cast removed and new x-rays.  Patient will very well likely need hand therapy after immobilization.  I also think it would be ideal for the patient to follow-up with her primary care physician after the healing process has ensued, given the concerns for osteoporosis.  ER and urgent care precautions have been advised.  Return precautions discussed.  Cast care has been counseled.    PMH, Medications and Allergies were reviewed and updated as needed.    ROS:  As noted above otherwise negative.    Patient Active Problem List   Diagnosis    Hypothyroidism    Hypertension goal BP (blood pressure) < 140/90    CKD (chronic kidney disease) stage 3, GFR 30-59 ml/min (H)    Mixed hyperlipidemia    Advanced directives, counseling/discussion    History of breast cancer    Chronic idiopathic neutropenia (H24)    Memory deficit    Obesity (BMI 35.0-39.9) with comorbidity (H)    Dependent edema    Epiretinal membrane, right    Vitreomacular adhesion of right eye    Glaucoma suspect, bilateral    Prediabetes    Snoring    Osteopenia, unspecified location    History of adenomatous polyp of colon    Displacement of intraocular lens, initial encounter    S/P mastectomy, right       Current Outpatient Medications   Medication Sig Dispense Refill    alendronate (FOSAMAX) 70 MG tablet Take 1 tablet (70 mg) by mouth every 7 days 12 tablet 3    amLODIPine (NORVASC) 2.5 MG tablet Take 1 tablet (2.5 mg) by mouth daily 90 tablet 0    Ascorbic Acid (VITAMIN C) 500 MG CAPS Take 1 each by mouth daily      atorvastatin (LIPITOR) 20 MG tablet Take 1 tablet (20 mg) by mouth daily 90 tablet 1    calcium carbonate-vitamin D (OSCAL W/D) 500-200 MG-UNIT tablet Take 1 tablet by mouth daily Taking every 3 dasy or so      cetirizine (ZYRTEC) 10 MG tablet Take 10 mg by mouth daily      Cyanocobalamin (B-12 PO)        famotidine (PEPCID) 20 MG tablet Take 20 mg by mouth daily      levothyroxine (SYNTHROID/LEVOTHROID) 150 MCG tablet 1 tab 6 days a week & 1.5 tab one day a week & recheck tsh in 3 month for further adjustment 100 tablet 1    lisinopril (ZESTRIL) 20 MG tablet Take 1 tablet (20 mg) by mouth At Bedtime 90 tablet 1    Multiple Vitamin (MULTI-VITAMIN) per tablet Take 1 tablet by mouth daily      TURMERIC PO Take 1 tablet by mouth daily              OBJECTIVE:       Vitals: There were no vitals filed for this visit.  BMI: There is no height or weight on file to calculate BMI.    Gen:  Well nourished and in no acute distress  HEENT: Extraocular movement intact  Neck: Supple  Pulm:  Breathing Comfortably. No increased respiratory effort.  Psych: Euthymic. Appropriately answers questions    MSK: Left wrist examined within the confines of the cast.  Full active and passive range of motion of the IP joints and phalanges in total.  Wide finger abduction strength and thumb strength is full and intact.  No sensory deficits.  Capillary refill is less than 2 seconds.      XRAY : Independent evaluation of the left wrist fracture within the confines of the cast shows overlying cast material, somewhat angulation of the x-ray appears different given the distal radial fracture does appear to be distally, and more pronounced with displacement.  Some of this does seem projectional as well.          ASSESSMENT and PLAN:     Romayne was seen today for follow up.    Diagnoses and all orders for this visit:    Closed fracture of distal end of left radius with routine healing, unspecified fracture morphology, subsequent encounter    Traumatic closed fracture of ulnar styloid with minimal displacement, left, with routine healing, subsequent encounter      78-year-old female following up at the 3-week, out of 6 weeks, of immobilization for the distal radius/ulnar styloid fracture.  Patient is overall doing very well in the cast.  Has been  working and unfortunately lifting some objects that are heavy AGAINST MEDICAL ADVICE.  X-rays were discussed with her in great detail which do show some mild displacement of the previous fracture of the distal radius, but at this point the patient is not having any other symptoms and overall doing well.  Have discussed with the patient significantly the urge for her to not lift heavy objects or do much activity including the left hand because she does have 2 fractures of the distal radius and ulna.  Patient did express understanding of this.  We will see the patient back in 3 weeks, cast removed, and new x-rays.  That would be the 6-week guero at which point we can hopefully remove cast restrictions.  She may very well likely need a period of immobilization in a brace for 2 weeks as well as hand therapy, after the cast is removed.  I have discussed post fracture complications as well as displaced fracture complications including pain, stiffness, osteoarthrosis, as well as surgical fixation if the area becomes significantly displaced.  Patient expressed verbal understanding of this plan and in this entirety, and all questions were answered satisfactorily.  ER and urgent care precautions advised.  Return precautions discussed.    Options for treatment and/or follow-up care were reviewed with the patient was actively involved in the decision making process. Patient verbalized understanding and was in agreement with the plan.    Ruslan Frey DO  , Sports Medicine  Department of Family Medicine and [facility]

## 2023-12-22 DIAGNOSIS — S52.502D CLOSED FRACTURE OF DISTAL END OF LEFT RADIUS WITH ROUTINE HEALING, UNSPECIFIED FRACTURE MORPHOLOGY, SUBSEQUENT ENCOUNTER: Primary | ICD-10-CM

## 2023-12-23 DIAGNOSIS — I10 HYPERTENSION GOAL BP (BLOOD PRESSURE) < 140/90: ICD-10-CM

## 2023-12-26 RX ORDER — AMLODIPINE BESYLATE 2.5 MG/1
2.5 TABLET ORAL DAILY
Qty: 90 TABLET | Refills: 0 | Status: SHIPPED | OUTPATIENT
Start: 2023-12-26 | End: 2024-03-18

## 2023-12-26 NOTE — TELEPHONE ENCOUNTER
Patient due for inperson appointment. Was no show 11/6 the 6 wk BPcheck since amlodipine added   I sent lynette refill x 30 days   Reception: Please call to schedule patient for appointment.  Thank you !  LP

## 2023-12-27 NOTE — PROGRESS NOTES
[facility]  Sports Medicine Clinic  Clinics and Surgery Center           SUBJECTIVE       Romayne L Sathre is a 78 year old female presenting to clinic today for follow-up of the left wrist fractures.  She has been doing very well up to this moment, and being consistent with not lifting heavy objects as she was last time.  No pain today.  No numbness or tingling.  No new injuries.    12/8/23: Closed fracture of distal end of left radius with routine healing, unspecified fracture morphology, subsequent encounter     Traumatic closed fracture of ulnar styloid with minimal displacement, left, with routine healing, subsequent encounter        78-year-old female following up at the 3-week, out of 6 weeks, of immobilization for the distal radius/ulnar styloid fracture.  Patient is overall doing very well in the cast.  Has been working and unfortunately lifting some objects that are heavy AGAINST MEDICAL ADVICE.  X-rays were discussed with her in great detail which do show some mild displacement of the previous fracture of the distal radius, but at this point the patient is not having any other symptoms and overall doing well.  Have discussed with the patient significantly the urge for her to not lift heavy objects or do much activity including the left hand because she does have 2 fractures of the distal radius and ulna.  Patient did express understanding of this.  We will see the patient back in 3 weeks, cast removed, and new x-rays.  That would be the 6-week guero at which point we can hopefully remove cast restrictions.  She may very well likely need a period of immobilization in a brace for 2 weeks as well as hand therapy, after the cast is removed.  I have discussed post fracture complications as well as displaced fracture complications including pain, stiffness, osteoarthrosis, as well as surgical fixation if the area becomes significantly displaced.  Patient expressed verbal understanding of this plan  and in this entirety, and all questions were answered satisfactorily.  ER and urgent care precautions advised.  Return precautions discussed.    Study Result    Narrative & Impression   3 views left wrist radiographs 12/8/2023 8:08 AM     History: Closed fracture of distal end of left radius with routine  healing, unspecified fracture morphology, subsequent encounter;  Traumatic closed fracture of ulnar styloid with minimal displacement,  left, with routine healing, subsequent encounter     Additional History from EMR: Patient has osteopenia and fell ~ 2  weeks ago. Following up for left wrist distal radius non-displaced  intra-articular fracture.      Comparison: 11/17/2023, 11/9/2023      Findings:     PA, oblique and 2 lateral view(s) of the left wrist were obtained.  Cast material obscures underlying bony detail.     Ongoing healing of the nondisplaced distal radial intra-articular  fracture. There is unchanged alignment.     Moderate degenerative changes of the first carpometacarpal joint  triscaphe joints.     There is a subtle ulnar positive variance similar previous exams are  available.      Soft tissue is unremarkable.                                                                      Impression: Ongoing healing of the distal radial fracture with stable  alignment.       PMH, Medications and Allergies were reviewed and updated as needed.    ROS:  As noted above otherwise negative.    Patient Active Problem List   Diagnosis    Hypothyroidism    Hypertension goal BP (blood pressure) < 140/90    CKD (chronic kidney disease) stage 3, GFR 30-59 ml/min (H)    Mixed hyperlipidemia    Advanced directives, counseling/discussion    History of breast cancer    Chronic idiopathic neutropenia (H24)    Memory deficit    Obesity (BMI 35.0-39.9) with comorbidity (H)    Dependent edema    Epiretinal membrane, right    Vitreomacular adhesion of right eye    Glaucoma suspect, bilateral    Prediabetes    Snoring     Osteopenia, unspecified location    History of adenomatous polyp of colon    Displacement of intraocular lens, initial encounter    S/P mastectomy, right       Current Outpatient Medications   Medication Sig Dispense Refill    alendronate (FOSAMAX) 70 MG tablet Take 1 tablet (70 mg) by mouth every 7 days 12 tablet 3    amLODIPine (NORVASC) 2.5 MG tablet TAKE 1 TABLET(2.5 MG) BY MOUTH DAILY 90 tablet 0    Ascorbic Acid (VITAMIN C) 500 MG CAPS Take 1 each by mouth daily      atorvastatin (LIPITOR) 20 MG tablet Take 1 tablet (20 mg) by mouth daily 90 tablet 1    calcium carbonate-vitamin D (OSCAL W/D) 500-200 MG-UNIT tablet Take 1 tablet by mouth daily Taking every 3 dasy or so      cetirizine (ZYRTEC) 10 MG tablet Take 10 mg by mouth daily      Cyanocobalamin (B-12 PO)       famotidine (PEPCID) 20 MG tablet Take 20 mg by mouth daily      levothyroxine (SYNTHROID/LEVOTHROID) 150 MCG tablet 1 tab 6 days a week & 1.5 tab one day a week & recheck tsh in 3 month for further adjustment 100 tablet 1    lisinopril (ZESTRIL) 20 MG tablet Take 1 tablet (20 mg) by mouth At Bedtime 90 tablet 1    Multiple Vitamin (MULTI-VITAMIN) per tablet Take 1 tablet by mouth daily      TURMERIC PO Take 1 tablet by mouth daily              OBJECTIVE:       Vitals: There were no vitals filed for this visit.  BMI: There is no height or weight on file to calculate BMI.    Gen:  Well nourished and in no acute distress  HEENT: Extraocular movement intact  Neck: Supple  Pulm:  Breathing Comfortably. No increased respiratory effort.  Psych: Euthymic. Appropriately answers questions    MSK: Left wrist with skin sloughing, secondary to immobilization.  No overlying erythema or edema.  No discernible tenderness to palpation about the distal radius or distal ulna.  No elbow tenderness on the medial or lateral side.  Full range of motion of the elbow.  Limited range of motion in wrist flexion and extension, resisted testing in both of those areas does  lead to mild pain dorsally and volarly.   strength is intact at 5/5.  Pincer  is intact at 5/5.  Weakly positive Finkelstein.  Negative Tinel and Phalen's at the wrist.  Negative TFCC testing, although this can be false negative given the fact that we are unable to place the patient's wrist at a full 90 degrees of extension.      XRAY : Dependent evaluation shows interval healing of the known distal radial and distal ulnar fractures, similar alignment.  No other acute osseous abnormality seen          ASSESSMENT and PLAN:     Romayne was seen today for follow up.    Diagnoses and all orders for this visit:    Closed fracture of distal end of left radius with routine healing, unspecified fracture morphology, subsequent encounter  -     Hand Therapy Referral; Future    Traumatic closed fracture of ulnar styloid with minimal displacement, left, with routine healing, subsequent encounter  -     Hand Therapy Referral; Future      78-year-old female presenting to clinic today for 6-week follow-up of the fracture patterns as noted above.  Patient is overall doing well, although has some stiffness in the wrist.  X-rays have been discussed with her in great detail.  As of today, I do not think further cast immobilization was necessary.  I think the patient is overall doing well and progressing as expected.  However I do think further immobilization, with a removable wrist brace would be pertinent.  I have also placed the patient in hand therapy for ongoing range of motion improvement and dexterity improvement.  Patient should continue to be wary of lifting heavy objects over the next 3 to 4 weeks, while she is strengthening the wrist being in hand therapy.  Will see the patient back in roughly 4 weeks to assess how she is doing.  Repeat examination only at that time.  No indication for x-rays.  Return precautions have been advised.    Options for treatment and/or follow-up care were reviewed with the patient was  actively involved in the decision making process. Patient verbalized understanding and was in agreement with the plan.    Ruslan Frey DO  , Sports Medicine  Department of Family Medicine and [facility]

## 2023-12-28 ENCOUNTER — OFFICE VISIT (OUTPATIENT)
Dept: ORTHOPEDICS | Facility: CLINIC | Age: 78
End: 2023-12-28
Payer: COMMERCIAL

## 2023-12-28 ENCOUNTER — ANCILLARY PROCEDURE (OUTPATIENT)
Dept: GENERAL RADIOLOGY | Facility: CLINIC | Age: 78
End: 2023-12-28
Attending: STUDENT IN AN ORGANIZED HEALTH CARE EDUCATION/TRAINING PROGRAM
Payer: COMMERCIAL

## 2023-12-28 DIAGNOSIS — S52.502D CLOSED FRACTURE OF DISTAL END OF LEFT RADIUS WITH ROUTINE HEALING, UNSPECIFIED FRACTURE MORPHOLOGY, SUBSEQUENT ENCOUNTER: ICD-10-CM

## 2023-12-28 DIAGNOSIS — S52.612D TRAUMATIC CLOSED FRACTURE OF ULNAR STYLOID WITH MINIMAL DISPLACEMENT, LEFT, WITH ROUTINE HEALING, SUBSEQUENT ENCOUNTER: ICD-10-CM

## 2023-12-28 DIAGNOSIS — S52.502D CLOSED FRACTURE OF DISTAL END OF LEFT RADIUS WITH ROUTINE HEALING, UNSPECIFIED FRACTURE MORPHOLOGY, SUBSEQUENT ENCOUNTER: Primary | ICD-10-CM

## 2023-12-28 PROCEDURE — 99214 OFFICE O/P EST MOD 30 MIN: CPT | Performed by: STUDENT IN AN ORGANIZED HEALTH CARE EDUCATION/TRAINING PROGRAM

## 2023-12-28 PROCEDURE — 73110 X-RAY EXAM OF WRIST: CPT | Mod: LT | Performed by: RADIOLOGY

## 2023-12-28 NOTE — LETTER
12/28/2023      RE: Romayne L Sathre  [address]  [facility] [address]     Dear Colleague,    Thank you for referring your patient, Romayne L Sathre, to the [facility] SP[facility] [place]. Please see a copy of my visit note below.    [facility]  Sports Medicine Clinic  Clinics and Surgery Center           SUBJECTIVE       Romayne L Sathre is a 78 year old female presenting to clinic today for follow-up of the left wrist fractures.  She has been doing very well up to this moment, and being consistent with not lifting heavy objects as she was last time.  No pain today.  No numbness or tingling.  No new injuries.    12/8/23: Closed fracture of distal end of left radius with routine healing, unspecified fracture morphology, subsequent encounter     Traumatic closed fracture of ulnar styloid with minimal displacement, left, with routine healing, subsequent encounter        78-year-old female following up at the 3-week, out of 6 weeks, of immobilization for the distal radius/ulnar styloid fracture.  Patient is overall doing very well in the cast.  Has been working and unfortunately lifting some objects that are heavy AGAINST MEDICAL ADVICE.  X-rays were discussed with her in great detail which do show some mild displacement of the previous fracture of the distal radius, but at this point the patient is not having any other symptoms and overall doing well.  Have discussed with the patient significantly the urge for her to not lift heavy objects or do much activity including the left hand because she does have 2 fractures of the distal radius and ulna.  Patient did express understanding of this.  We will see the patient back in 3 weeks, cast removed, and new x-rays.  That would be the 6-week guero at which point we can hopefully remove cast restrictions.  She may very well likely need a period of immobilization in a brace for 2 weeks as well as hand therapy, after the cast is  removed.  I have discussed post fracture complications as well as displaced fracture complications including pain, stiffness, osteoarthrosis, as well as surgical fixation if the area becomes significantly displaced.  Patient expressed verbal understanding of this plan and in this entirety, and all questions were answered satisfactorily.  ER and urgent care precautions advised.  Return precautions discussed.    Study Result    Narrative & Impression   3 views left wrist radiographs 12/8/2023 8:08 AM     History: Closed fracture of distal end of left radius with routine  healing, unspecified fracture morphology, subsequent encounter;  Traumatic closed fracture of ulnar styloid with minimal displacement,  left, with routine healing, subsequent encounter     Additional History from EMR: Patient has osteopenia and fell ~ 2  weeks ago. Following up for left wrist distal radius non-displaced  intra-articular fracture.      Comparison: 11/17/2023, 11/9/2023      Findings:     PA, oblique and 2 lateral view(s) of the left wrist were obtained.  Cast material obscures underlying bony detail.     Ongoing healing of the nondisplaced distal radial intra-articular  fracture. There is unchanged alignment.     Moderate degenerative changes of the first carpometacarpal joint  triscaphe joints.     There is a subtle ulnar positive variance similar previous exams are  available.      Soft tissue is unremarkable.                                                                      Impression: Ongoing healing of the distal radial fracture with stable  alignment.       PMH, Medications and Allergies were reviewed and updated as needed.    ROS:  As noted above otherwise negative.    Patient Active Problem List   Diagnosis    Hypothyroidism    Hypertension goal BP (blood pressure) < 140/90    CKD (chronic kidney disease) stage 3, GFR 30-59 ml/min (H)    Mixed hyperlipidemia    Advanced directives, counseling/discussion    History of breast  cancer    Chronic idiopathic neutropenia (H24)    Memory deficit    Obesity (BMI 35.0-39.9) with comorbidity (H)    Dependent edema    Epiretinal membrane, right    Vitreomacular adhesion of right eye    Glaucoma suspect, bilateral    Prediabetes    Snoring    Osteopenia, unspecified location    History of adenomatous polyp of colon    Displacement of intraocular lens, initial encounter    S/P mastectomy, right       Current Outpatient Medications   Medication Sig Dispense Refill    alendronate (FOSAMAX) 70 MG tablet Take 1 tablet (70 mg) by mouth every 7 days 12 tablet 3    amLODIPine (NORVASC) 2.5 MG tablet TAKE 1 TABLET(2.5 MG) BY MOUTH DAILY 90 tablet 0    Ascorbic Acid (VITAMIN C) 500 MG CAPS Take 1 each by mouth daily      atorvastatin (LIPITOR) 20 MG tablet Take 1 tablet (20 mg) by mouth daily 90 tablet 1    calcium carbonate-vitamin D (OSCAL W/D) 500-200 MG-UNIT tablet Take 1 tablet by mouth daily Taking every 3 dasy or so      cetirizine (ZYRTEC) 10 MG tablet Take 10 mg by mouth daily      Cyanocobalamin (B-12 PO)       famotidine (PEPCID) 20 MG tablet Take 20 mg by mouth daily      levothyroxine (SYNTHROID/LEVOTHROID) 150 MCG tablet 1 tab 6 days a week & 1.5 tab one day a week & recheck tsh in 3 month for further adjustment 100 tablet 1    lisinopril (ZESTRIL) 20 MG tablet Take 1 tablet (20 mg) by mouth At Bedtime 90 tablet 1    Multiple Vitamin (MULTI-VITAMIN) per tablet Take 1 tablet by mouth daily      TURMERIC PO Take 1 tablet by mouth daily              OBJECTIVE:       Vitals: There were no vitals filed for this visit.  BMI: There is no height or weight on file to calculate BMI.    Gen:  Well nourished and in no acute distress  HEENT: Extraocular movement intact  Neck: Supple  Pulm:  Breathing Comfortably. No increased respiratory effort.  Psych: Euthymic. Appropriately answers questions    MSK: Left wrist with skin sloughing, secondary to immobilization.  No overlying erythema or edema.  No  discernible tenderness to palpation about the distal radius or distal ulna.  No elbow tenderness on the medial or lateral side.  Full range of motion of the elbow.  Limited range of motion in wrist flexion and extension, resisted testing in both of those areas does lead to mild pain dorsally and volarly.   strength is intact at 5/5.  Pincer  is intact at 5/5.  Weakly positive Finkelstein.  Negative Tinel and Phalen's at the wrist.  Negative TFCC testing, although this can be false negative given the fact that we are unable to place the patient's wrist at a full 90 degrees of extension.      XRAY : Dependent evaluation shows interval healing of the known distal radial and distal ulnar fractures, similar alignment.  No other acute osseous abnormality seen          ASSESSMENT and PLAN:     Romayne was seen today for follow up.    Diagnoses and all orders for this visit:    Closed fracture of distal end of left radius with routine healing, unspecified fracture morphology, subsequent encounter  -     Hand Therapy Referral; Future    Traumatic closed fracture of ulnar styloid with minimal displacement, left, with routine healing, subsequent encounter  -     Hand Therapy Referral; Future      78-year-old female presenting to clinic today for 6-week follow-up of the fracture patterns as noted above.  Patient is overall doing well, although has some stiffness in the wrist.  X-rays have been discussed with her in great detail.  As of today, I do not think further cast immobilization was necessary.  I think the patient is overall doing well and progressing as expected.  However I do think further immobilization, with a removable wrist brace would be pertinent.  I have also placed the patient in hand therapy for ongoing range of motion improvement and dexterity improvement.  Patient should continue to be wary of lifting heavy objects over the next 3 to 4 weeks, while she is strengthening the wrist being in hand therapy.   Will see the patient back in roughly 4 weeks to assess how she is doing.  Repeat examination only at that time.  No indication for x-rays.  Return precautions have been advised.    Options for treatment and/or follow-up care were reviewed with the patient was actively involved in the decision making process. Patient verbalized understanding and was in agreement with the plan.    Ruslan Frey DO  , Sports Medicine  Department of Family Medicine and [facility]

## 2024-01-03 NOTE — PROGRESS NOTES
OCCUPATIONAL THERAPY EVALUATION  Type of Visit: Evaluation    See electronic medical record for Abuse and Falls Screening details.    Subjective   Presenting condition or subjective complaint:  Left distal radius fracture; left ulnar styloid fracture  Date of onset: 11/09/23    Relevant medical history:  Past Medical History:   Diagnosis Date    Antiplatelet or antithrombotic long-term use     Breast cancer (H) 08/26/2008    Right Breast    Chronic fatigue 05/19/2020    CKD (chronic kidney disease) stage 3, GFR 30-59 ml/min (H) 09/17/2010    Combined form of age-related cataract, left eye 10/20/2020    Added automatically from request for surgery [id number]    Health Care Home 07/17/2012    [facility] for . Margarita Lawler RN-BSN, [facility] [phone]  DX V65.8 REPLACED WITH [facility]ALTH CARE HOME (04/08/2013)    Heart burn 05/19/2020    History of 2019 novel coronavirus disease (COVID-19) 11/16/2020    Seen in urgent care on 11/5/2020 for fever cough dizziness abdominal pain nausea was given Bactrim for cystitis tested for COVID discharged home and testing came back positive for Covid.  Seen in ER 11/16/2020 for shortness of breath sinus tachycardia and severe hypoxia.  Was admitted for worsening respiratory symptoms prompting her to seek further evaluation on 11/17/20 at [facility]. She was found to     History of acute respiratory failure 11/16/2020    With covid , complicated by PE and DVT, on oxygen and eliquis 3 months, completely resolved as of 2/2021    Hyperlipidemia LDL goal <100 11/29/2010    Hypertension goal BP (blood pressure) < 140/90 09/17/2010    Hypothyroid 2002    Lipoma of other skin and subcutaneous tissue 11/19/2004    Nonsenile cataract     Osteoporosis 2014    Other psoriasis     Physical deconditioning 05/19/2020    Positive colorectal cancer screening using Cologuard test 06/26/2022    Thyroid disease      Dates & types of surgery:  Past Surgical History:   Procedure Laterality  Date    COLONOSCOPY N/A 9/7/2022    Procedure: COLONOSCOPY, WITH POLYPECTOMY;  Surgeon: Leventhal, Thomas Michael, MD;  [facility]     REMV CATARACT EXTRACAP,INSERT LENS, W/O ECP Right 05/29/2002    Dr. Clinton Lopez ([id number], Power:  20.0D)    PHACOEMULSIFICATION CLEAR CORNEA WITH STANDARD INTRAOCULAR LENS IMPLANT Left 2/22/2021    Procedure: LEFT EYE PHACOEMULSIFICATION, CATARACT, WITH INTRAOCULAR LENS IMPLANT;  Surgeon: Ruben Shelby MD;  Location: [facility]    SURGICAL HISTORY OF -   Oct 3, 2008    Rt saline implant    VITRECTOMY PARSPLANA WITH 25 GAUGE SYSTEM Right 6/7/2021    Procedure: 1) Pars plana vitrectomy (PPV) 25g, Right eye,  2) Membrane stripping and stripping of  internal limiting membrane,;  Surgeon: Fede Danielson MD;  Location:  OR   * History of right wrist surgery about 8 years ago per patient report.    Mechanism of injury: Fall on outstretched hand at home.  Prior diagnostic imaging/testing results: See imaging reports in chart.  Prior therapy history for the same diagnosis, illness or injury: Cast immobilization    Prior level of function:  Transfers: Independent  Ambulation: Independent  ADL: Independent  IADL: Independent    Living environment: Patient lives with daughter and granddaughter in a two-story home.    Employment: Retired   Hobbies/Interests: Watching TV    Patient goals for therapy: Restore prior level of function.     Objective   Right hand dominant  Patient reports symptoms of pain, stiffness/loss of motion, weakness/loss of strength, and edema    Pain Level (Scale 0-10)   1/3/2024   At Rest 3-4/10   With Use NT     Pain Description  Date 1/3/2024   Location Radiocarpal joint and forearm   Pain Quality Sore, tingling   Frequency Intermittent     Pain is worst Daytime   Exacerbated by If bumped   Relieved by Rest, wearing brace   Progression Gradually improving     Edema  Circumference:  (Measured in cm)  [facility] 1/4/2024   Right 18.3   Left 19.0      Sensation   WNL throughout all nerve distributions per patient report.    ROM  Pain Report: - none  + mild    ++ moderate    +++ severe   Wrist 1/3/2024 1/3/2024   AROM (PROM) Right Left   Extension 50 40   Flexion 51 15+   RD 10 7   UD 25 10   Supination 60 45+   Pronation 80 80   Finger and thumb ROM- Able to fully extend fingers and make a loose fist. Able to oppose left thumb to small finger PIP joint. Of note, patient has a history of a right wrist fracture with surgical management.    Strength   (Measured in pounds)  Pain Report: - none  + mild    ++ moderate    +++ severe    1/4/2024 1/4/2024   Trials Right Left   1 74 5         Assessment & Plan   CLINICAL IMPRESSIONS  Medical Diagnosis: Left distal radius fracture; left ulnar styloid fracture    Treatment Diagnosis: Left wrist pain; left wrist stiffness    Impression/Assessment: Pt is a 78 year old female presenting to Occupational Therapy due to the above condition.  The following significant findings have been identified: Impaired ROM, Impaired strength, and Pain.  These identified deficits interfere with their ability to perform self care tasks, recreational activities, household chores, and meal planning and preparation as compared to previous level of function.   Patient's limitations or Problem List includes: Pain, Decreased ROM/motion, Increased edema, and Decreased  of the left wrist which interferes with the patient's ability to perform Self Care Tasks (dressing, eating, bathing, hygiene/toileting), Recreational Activities, and Household Chores as compared to previous level of function.    Clinical Decision Making (Complexity):  Assessment of Occupational Performance: 5 or more Performance Deficits  Occupational Performance Limitations: bathing/showering, toileting, dressing, feeding, hygiene and grooming, home establishment and management, meal preparation and cleanup, shopping, and leisure activities  Clinical Decision Making  (Complexity): Low complexity    PLAN OF CARE  Treatment Interventions:  Modalities:  Paraffin  Therapeutic Exercise:  AROM, AAROM, PROM, Tendon Gliding, Isotonics, and Isometrics  Manual Techniques:  Joint mobilization and Myofascial release  Orthotic Fabrication:  As indicated    Long Term Goals   OT Goal 1  Goal Identifier: ADL  Goal Description: Patient able to wash hair with 1/10 wrist pain or less.  Rationale: In order to maximize safety and independence with performance of self-care activities  Target Date: 02/04/24  OT Goal 2  Goal Identifier: IADL  Goal Description: Patient able to lift a shopping bag weighing 5 lbs or more with 2/10 pain or less.  Target Date: 03/04/24      Frequency of Treatment: 1x/week for 3 weeks  Duration of Treatment: tapering to 2x/month for 2 months     Recommended Referrals to Other Professionals:  N/A  Education Assessment: Learner/Method: Patient     Risks and benefits of evaluation/treatment have been explained.   Patient/Family/caregiver agrees with Plan of Care.     Evaluation Time:    OT Eval, Low Complexity Minutes (08541): 30    Signing Clinician: RAFAEL Hein [facility]                                                                                   OUTPATIENT OCCUPATIONAL THERAPY      PLAN OF TREATMENT FOR OUTPATIENT REHABILITATION   Patient's Last Name, First Name, M.I. Sathre,Romayne L YOB: 1945   Provider's Name   [facility]   Medical Record No.  [id number]     Onset Date: 11/09/24 Start of Care Date: 01/04/24     Medical Diagnosis:  Left distal radius fracture; left ulnar styloid fracture      OT Treatment Diagnosis:  Left wrist pain; left wrist stiffness Plan of Treatment  Frequency/Duration:1x/week for 3 weeks/tapering to 2x/month for 2 months    Certification date from 01/04/24   To 03/04/24        See note for plan of treatment details and functional goals     Aleida Montes  OT                         I CERTIFY THE NEED FOR THESE SERVICES FURNISHED UNDER        THIS PLAN OF TREATMENT AND WHILE UNDER MY CARE     (Physician attestation of this document indicates review and certification of the therapy plan).              Referring Provider:  Ruslan Frey, DO    Initial Assessment  See Epic Evaluation- 01/04/24

## 2024-01-04 ENCOUNTER — THERAPY VISIT (OUTPATIENT)
Dept: OCCUPATIONAL THERAPY | Facility: CLINIC | Age: 79
End: 2024-01-04
Payer: COMMERCIAL

## 2024-01-04 DIAGNOSIS — S52.612D TRAUMATIC CLOSED FRACTURE OF ULNAR STYLOID WITH MINIMAL DISPLACEMENT, LEFT, WITH ROUTINE HEALING, SUBSEQUENT ENCOUNTER: ICD-10-CM

## 2024-01-04 DIAGNOSIS — M25.532 LEFT WRIST PAIN: Primary | ICD-10-CM

## 2024-01-04 DIAGNOSIS — S52.502D CLOSED FRACTURE OF DISTAL END OF LEFT RADIUS WITH ROUTINE HEALING, UNSPECIFIED FRACTURE MORPHOLOGY, SUBSEQUENT ENCOUNTER: ICD-10-CM

## 2024-01-04 PROCEDURE — 97110 THERAPEUTIC EXERCISES: CPT | Mod: GO | Performed by: OCCUPATIONAL THERAPIST

## 2024-01-04 PROCEDURE — 97165 OT EVAL LOW COMPLEX 30 MIN: CPT | Mod: GO | Performed by: OCCUPATIONAL THERAPIST

## 2024-01-11 ENCOUNTER — THERAPY VISIT (OUTPATIENT)
Dept: OCCUPATIONAL THERAPY | Facility: CLINIC | Age: 79
End: 2024-01-11
Payer: COMMERCIAL

## 2024-01-11 DIAGNOSIS — M25.532 LEFT WRIST PAIN: Primary | ICD-10-CM

## 2024-01-11 PROCEDURE — 97110 THERAPEUTIC EXERCISES: CPT | Mod: GO | Performed by: OCCUPATIONAL THERAPIST

## 2024-01-24 NOTE — PROGRESS NOTES
[facility]  Sports Medicine Clinic  Clinics and Surgery Center           SUBJECTIVE Romayne L Sathre is a 78 year old female presenting to clinic today for follow-up of the left wrist.  She is overall doing very well at this point.  Continues in hand therapy, and has noticed increase in range of motion.  She also has an exercise ball that she uses in the morning.  She does note improvement in her stiffness as well.  She has not had any numbness or tingling or swelling.  Patient did stop working at [employer] as of recent.    12/28/23: Closed fracture of distal end of left radius with routine healing, unspecified fracture morphology, subsequent encounter  -     Hand Therapy Referral; Future     Traumatic closed fracture of ulnar styloid with minimal displacement, left, with routine healing, subsequent encounter  -     Hand Therapy Referral; Future        78-year-old female presenting to clinic today for 6-week follow-up of the fracture patterns as noted above.  Patient is overall doing well, although has some stiffness in the wrist.  X-rays have been discussed with her in great detail.  As of today, I do not think further cast immobilization was necessary.  I think the patient is overall doing well and progressing as expected.  However I do think further immobilization, with a removable wrist brace would be pertinent.  I have also placed the patient in hand therapy for ongoing range of motion improvement and dexterity improvement.  Patient should continue to be wary of lifting heavy objects over the next 3 to 4 weeks, while she is strengthening the wrist being in hand therapy.  Will see the patient back in roughly 4 weeks to assess how she is doing.  Repeat examination only at that time.  No indication for x-rays.  Return precautions have been advised.        PMH, Medications and Allergies were reviewed and updated as needed.    ROS:  As noted above otherwise negative.    Patient Active Problem List    Diagnosis    Hypothyroidism    Hypertension goal BP (blood pressure) < 140/90    CKD (chronic kidney disease) stage 3, GFR 30-59 ml/min (H)    Mixed hyperlipidemia    Advanced directives, counseling/discussion    History of breast cancer    Chronic idiopathic neutropenia (H24)    Memory deficit    Obesity (BMI 35.0-39.9) with comorbidity (H)    Dependent edema    Epiretinal membrane, right    Vitreomacular adhesion of right eye    Glaucoma suspect, bilateral    Prediabetes    Snoring    Osteopenia, unspecified location    History of adenomatous polyp of colon    Displacement of intraocular lens, initial encounter    S/P mastectomy, right    Left wrist pain       Current Outpatient Medications   Medication Sig Dispense Refill    alendronate (FOSAMAX) 70 MG tablet Take 1 tablet (70 mg) by mouth every 7 days 12 tablet 3    amLODIPine (NORVASC) 2.5 MG tablet TAKE 1 TABLET(2.5 MG) BY MOUTH DAILY 90 tablet 0    Ascorbic Acid (VITAMIN C) 500 MG CAPS Take 1 each by mouth daily      atorvastatin (LIPITOR) 20 MG tablet Take 1 tablet (20 mg) by mouth daily 90 tablet 1    calcium carbonate-vitamin D (OSCAL W/D) 500-200 MG-UNIT tablet Take 1 tablet by mouth daily Taking every 3 dasy or so      cetirizine (ZYRTEC) 10 MG tablet Take 10 mg by mouth daily      Cyanocobalamin (B-12 PO)       famotidine (PEPCID) 20 MG tablet Take 20 mg by mouth daily      levothyroxine (SYNTHROID/LEVOTHROID) 150 MCG tablet 1 tab 6 days a week & 1.5 tab one day a week & recheck tsh in 3 month for further adjustment 100 tablet 1    lisinopril (ZESTRIL) 20 MG tablet Take 1 tablet (20 mg) by mouth At Bedtime 90 tablet 1    Multiple Vitamin (MULTI-VITAMIN) per tablet Take 1 tablet by mouth daily      TURMERIC PO Take 1 tablet by mouth daily              OBJECTIVE:       Vitals: There were no vitals filed for this visit.  BMI: There is no height or weight on file to calculate BMI.    Gen:  Well nourished and in no acute distress  HEENT: Extraocular  movement intact  Neck: Supple  Pulm:  Breathing Comfortably. No increased respiratory effort.  Psych: Euthymic. Appropriately answers questions    MSK: Left hand and wrist without areas of edema or ecchymosis.  No discernible tenderness to palpation about the wrist or hand.  Diminished range of motion in left wrist flexion to roughly 35 degrees, when compared to almost 90 degrees of the right wrist.  Wrist extension, ulnar and radial deviation are intact and equal.  Resisted extension and flexion, as well as radial and ulnar deviation strength are intact.   strength is appropriate.  Wide finger abduction, and pincer  is also appropriate.    Study Result    Narrative & Impression   XR WRIST LEFT G/E 3 VIEWS 12/28/2023 10:22 AM      HISTORY:  AP/LAT/OBL; Closed fracture of distal end of left radius  with routine healing, unspecified fracture morphology, subsequent  encounter     COMPARISON: 12/8/2023     FINDINGS: The current films demonstrate the distal radial fracture. 1  mm step-off seen where the fracture courses through the distal radial  articular surface just ulnar to the radial styloid. There does appear  to be some narrowing joint space between the scaphoid and radius.  There is no abnormality of radial inclination or angulation.                                                                      IMPRESSION:   1. No change in position of distal radial fracture fragments.  2. Subtle step-off distal radial articular surface. The fracture did  course through the distal radial articular surface. This was seen on  the x-ray 11/17/2023  3. Some apparent narrowing of the radial scaphoid joint.  4. Again seen are the chronic degenerative changes between the  scaphoid and trapezium with sclerosis and some joint space narrowing.             ASSESSMENT and PLAN:     Romayne was seen today for pain.    Diagnoses and all orders for this visit:    Closed fracture of distal end of left radius with routine healing,  unspecified fracture morphology, subsequent encounter    Traumatic closed fracture of ulnar styloid with minimal displacement, left, with routine healing, subsequent encounter      8-year-old female presenting to clinic today for follow-up of her left wrist.  At this point the patient is currently doing hand therapy, and by all accounts of her physical exam have shown clinical healing of the known distal radial and ulnar styloid fractures.  As discussed with the patient previously, prolonged stiffness as well as the potential loss of full range of motion after this sort of fracture is a post injury complication.  The patient may very well be reaching the end stage or range of motion in terms of flexion of the left wrist, but she however is functional with this.  I have discussed with the patient that I would like her to continue hand therapy until discharge.  At this point she can follow-up in our clinic as needed.  Return precautions have been advised.    Options for treatment and/or follow-up care were reviewed with the patient was actively involved in the decision making process. Patient verbalized understanding and was in agreement with the plan.    Ruslan Frey DO  , Sports Medicine  Department of Family Medicine and [facility]

## 2024-01-25 ENCOUNTER — OFFICE VISIT (OUTPATIENT)
Dept: ORTHOPEDICS | Facility: CLINIC | Age: 79
End: 2024-01-25
Payer: COMMERCIAL

## 2024-01-25 ENCOUNTER — THERAPY VISIT (OUTPATIENT)
Dept: OCCUPATIONAL THERAPY | Facility: CLINIC | Age: 79
End: 2024-01-25
Payer: COMMERCIAL

## 2024-01-25 DIAGNOSIS — M25.532 LEFT WRIST PAIN: Primary | ICD-10-CM

## 2024-01-25 DIAGNOSIS — S52.612D TRAUMATIC CLOSED FRACTURE OF ULNAR STYLOID WITH MINIMAL DISPLACEMENT, LEFT, WITH ROUTINE HEALING, SUBSEQUENT ENCOUNTER: ICD-10-CM

## 2024-01-25 DIAGNOSIS — S52.502D CLOSED FRACTURE OF DISTAL END OF LEFT RADIUS WITH ROUTINE HEALING, UNSPECIFIED FRACTURE MORPHOLOGY, SUBSEQUENT ENCOUNTER: Primary | ICD-10-CM

## 2024-01-25 PROCEDURE — 99213 OFFICE O/P EST LOW 20 MIN: CPT | Performed by: STUDENT IN AN ORGANIZED HEALTH CARE EDUCATION/TRAINING PROGRAM

## 2024-01-25 PROCEDURE — 97110 THERAPEUTIC EXERCISES: CPT | Mod: GO | Performed by: OCCUPATIONAL THERAPIST

## 2024-01-25 NOTE — LETTER
1/25/2024      RE: Romayne L Sathre  [address]  [facility] [address]     Dear Colleague,    Thank you for referring your patient, Romayne L Sathre, to the [facility] [place]. Please see a copy of my visit note below.    [facility]  Sports Medicine Clinic  [facility]           SUBJECTIVE       Romayne L Sathre is a 78 year old female presenting to clinic today for follow-up of the left wrist.  She is overall doing very well at this point.  Continues in hand therapy, and has noticed increase in range of motion.  She also has an exercise ball that she uses in the morning.  She does note improvement in her stiffness as well.  She has not had any numbness or tingling or swelling.  Patient did stop working at [employer] as of recent.    12/28/23: Closed fracture of distal end of left radius with routine healing, unspecified fracture morphology, subsequent encounter  -     Hand Therapy Referral; Future     Traumatic closed fracture of ulnar styloid with minimal displacement, left, with routine healing, subsequent encounter  -     Hand Therapy Referral; Future        78-year-old female presenting to clinic today for 6-week follow-up of the fracture patterns as noted above.  Patient is overall doing well, although has some stiffness in the wrist.  X-rays have been discussed with her in great detail.  As of today, I do not think further cast immobilization was necessary.  I think the patient is overall doing well and progressing as expected.  However I do think further immobilization, with a removable wrist brace would be pertinent.  I have also placed the patient in hand therapy for ongoing range of motion improvement and dexterity improvement.  Patient should continue to be wary of lifting heavy objects over the next 3 to 4 weeks, while she is strengthening the wrist being in hand therapy.  Will see the patient back in roughly 4 weeks to assess how  she is doing.  Repeat examination only at that time.  No indication for x-rays.  Return precautions have been advised.        PMH, Medications and Allergies were reviewed and updated as needed.    ROS:  As noted above otherwise negative.    Patient Active Problem List   Diagnosis     Hypothyroidism     Hypertension goal BP (blood pressure) < 140/90     CKD (chronic kidney disease) stage 3, GFR 30-59 ml/min (H)     Mixed hyperlipidemia     Advanced directives, counseling/discussion     History of breast cancer     Chronic idiopathic neutropenia (H24)     Memory deficit     Obesity (BMI 35.0-39.9) with comorbidity (H)     Dependent edema     Epiretinal membrane, right     Vitreomacular adhesion of right eye     Glaucoma suspect, bilateral     Prediabetes     Snoring     Osteopenia, unspecified location     History of adenomatous polyp of colon     Displacement of intraocular lens, initial encounter     S/P mastectomy, right     Left wrist pain       Current Outpatient Medications   Medication Sig Dispense Refill     alendronate (FOSAMAX) 70 MG tablet Take 1 tablet (70 mg) by mouth every 7 days 12 tablet 3     amLODIPine (NORVASC) 2.5 MG tablet TAKE 1 TABLET(2.5 MG) BY MOUTH DAILY 90 tablet 0     Ascorbic Acid (VITAMIN C) 500 MG CAPS Take 1 each by mouth daily       atorvastatin (LIPITOR) 20 MG tablet Take 1 tablet (20 mg) by mouth daily 90 tablet 1     calcium carbonate-vitamin D (OSCAL W/D) 500-200 MG-UNIT tablet Take 1 tablet by mouth daily Taking every 3 dasy or so       cetirizine (ZYRTEC) 10 MG tablet Take 10 mg by mouth daily       Cyanocobalamin (B-12 PO)        famotidine (PEPCID) 20 MG tablet Take 20 mg by mouth daily       levothyroxine (SYNTHROID/LEVOTHROID) 150 MCG tablet 1 tab 6 days a week & 1.5 tab one day a week & recheck tsh in 3 month for further adjustment 100 tablet 1     lisinopril (ZESTRIL) 20 MG tablet Take 1 tablet (20 mg) by mouth At Bedtime 90 tablet 1     Multiple Vitamin (MULTI-VITAMIN) per  tablet Take 1 tablet by mouth daily       TURMERIC PO Take 1 tablet by mouth daily              OBJECTIVE:       Vitals: There were no vitals filed for this visit.  BMI: There is no height or weight on file to calculate BMI.    Gen:  Well nourished and in no acute distress  HEENT: Extraocular movement intact  Neck: Supple  Pulm:  Breathing Comfortably. No increased respiratory effort.  Psych: Euthymic. Appropriately answers questions    MSK: Left hand and wrist without areas of edema or ecchymosis.  No discernible tenderness to palpation about the wrist or hand.  Diminished range of motion in left wrist flexion to roughly 35 degrees, when compared to almost 90 degrees of the right wrist.  Wrist extension, ulnar and radial deviation are intact and equal.  Resisted extension and flexion, as well as radial and ulnar deviation strength are intact.   strength is appropriate.  Wide finger abduction, and pincer  is also appropriate.    Study Result    Narrative & Impression   XR WRIST LEFT G/E 3 VIEWS 12/28/2023 10:22 AM      HISTORY:  AP/LAT/OBL; Closed fracture of distal end of left radius  with routine healing, unspecified fracture morphology, subsequent  encounter     COMPARISON: 12/8/2023     FINDINGS: The current films demonstrate the distal radial fracture. 1  mm step-off seen where the fracture courses through the distal radial  articular surface just ulnar to the radial styloid. There does appear  to be some narrowing joint space between the scaphoid and radius.  There is no abnormality of radial inclination or angulation.                                                                      IMPRESSION:   1. No change in position of distal radial fracture fragments.  2. Subtle step-off distal radial articular surface. The fracture did  course through the distal radial articular surface. This was seen on  the x-ray 11/17/2023  3. Some apparent narrowing of the radial scaphoid joint.  4. Again seen are the  chronic degenerative changes between the  scaphoid and trapezium with sclerosis and some joint space narrowing.             ASSESSMENT and PLAN:     Romayne was seen today for pain.    Diagnoses and all orders for this visit:    Closed fracture of distal end of left radius with routine healing, unspecified fracture morphology, subsequent encounter    Traumatic closed fracture of ulnar styloid with minimal displacement, left, with routine healing, subsequent encounter      8-year-old female presenting to clinic today for follow-up of her left wrist.  At this point the patient is currently doing hand therapy, and by all accounts of her physical exam have shown clinical healing of the known distal radial and ulnar styloid fractures.  As discussed with the patient previously, prolonged stiffness as well as the potential loss of full range of motion after this sort of fracture is a post injury complication.  The patient may very well be reaching the end stage or range of motion in terms of flexion of the left wrist, but she however is functional with this.  I have discussed with the patient that I would like her to continue hand therapy until discharge.  At this point she can follow-up in our clinic as needed.  Return precautions have been advised.    Options for treatment and/or follow-up care were reviewed with the patient was actively involved in the decision making process. Patient verbalized understanding and was in agreement with the plan.    Ruslan Frey DO  , Sports Medicine  Department of [facility]      Again, thank you for allowing me to participate in the care of your patient.      Sincerely,    Ruslan Frey DO

## 2024-02-27 ENCOUNTER — PATIENT OUTREACH (OUTPATIENT)
Dept: CARE COORDINATION | Facility: CLINIC | Age: 79
End: 2024-02-27
Payer: COMMERCIAL

## 2024-03-06 DIAGNOSIS — I10 HYPERTENSION GOAL BP (BLOOD PRESSURE) < 140/90: ICD-10-CM

## 2024-03-06 RX ORDER — LISINOPRIL 20 MG/1
20 TABLET ORAL AT BEDTIME
Qty: 45 TABLET | Refills: 0 | Status: SHIPPED | OUTPATIENT
Start: 2024-03-06 | End: 2024-03-18

## 2024-03-06 NOTE — TELEPHONE ENCOUNTER
Last seen in sept for BP  on lisinopril 20 mg bedtime&  Added amlodipine 2.5 mg daily as BP then not goal  Was advised recheck in 6 weeks for bP & bmp  Was no show to nov apt & soon will be due for medicare wellness  Please have her do a nurse BP check & bmp in next 2 weeks & make apt for medicare wellness in may  Will send in 45 tabs for now

## 2024-03-12 ENCOUNTER — PATIENT OUTREACH (OUTPATIENT)
Dept: CARE COORDINATION | Facility: CLINIC | Age: 79
End: 2024-03-12
Payer: COMMERCIAL

## 2024-03-18 ENCOUNTER — NURSE TRIAGE (OUTPATIENT)
Dept: NURSING | Facility: CLINIC | Age: 79
End: 2024-03-18

## 2024-03-18 ENCOUNTER — OFFICE VISIT (OUTPATIENT)
Dept: FAMILY MEDICINE | Facility: CLINIC | Age: 79
End: 2024-03-18
Payer: COMMERCIAL

## 2024-03-18 VITALS
HEIGHT: 69 IN | WEIGHT: 238.6 LBS | TEMPERATURE: 97.6 F | DIASTOLIC BLOOD PRESSURE: 73 MMHG | HEART RATE: 60 BPM | RESPIRATION RATE: 16 BRPM | BODY MASS INDEX: 35.34 KG/M2 | SYSTOLIC BLOOD PRESSURE: 143 MMHG | OXYGEN SATURATION: 99 %

## 2024-03-18 DIAGNOSIS — I10 HYPERTENSION GOAL BP (BLOOD PRESSURE) < 140/90: Primary | ICD-10-CM

## 2024-03-18 DIAGNOSIS — Z87.81 PERSONAL HISTORY OF TRAUMATIC FRACTURE: ICD-10-CM

## 2024-03-18 DIAGNOSIS — R73.03 PREDIABETES: ICD-10-CM

## 2024-03-18 DIAGNOSIS — R41.3 MEMORY DEFICIT: ICD-10-CM

## 2024-03-18 DIAGNOSIS — Z78.0 ASYMPTOMATIC MENOPAUSAL STATE: ICD-10-CM

## 2024-03-18 DIAGNOSIS — M85.80 OSTEOPENIA, UNSPECIFIED LOCATION: ICD-10-CM

## 2024-03-18 DIAGNOSIS — Z23 NEED FOR COVID-19 VACCINE: ICD-10-CM

## 2024-03-18 DIAGNOSIS — Z29.11 NEED FOR VACCINATION AGAINST RESPIRATORY SYNCYTIAL VIRUS: ICD-10-CM

## 2024-03-18 DIAGNOSIS — Z85.3 HISTORY OF BREAST CANCER: ICD-10-CM

## 2024-03-18 DIAGNOSIS — Z86.0101 HISTORY OF ADENOMATOUS POLYP OF COLON: ICD-10-CM

## 2024-03-18 DIAGNOSIS — Z23 NEED FOR SHINGLES VACCINE: ICD-10-CM

## 2024-03-18 DIAGNOSIS — R60.9 DEPENDENT EDEMA: ICD-10-CM

## 2024-03-18 DIAGNOSIS — E66.01 MORBID OBESITY (H): ICD-10-CM

## 2024-03-18 DIAGNOSIS — N18.30 STAGE 3 CHRONIC KIDNEY DISEASE, UNSPECIFIED WHETHER STAGE 3A OR 3B CKD (H): ICD-10-CM

## 2024-03-18 DIAGNOSIS — R06.83 SNORING: ICD-10-CM

## 2024-03-18 DIAGNOSIS — E03.9 ACQUIRED HYPOTHYROIDISM: ICD-10-CM

## 2024-03-18 DIAGNOSIS — E78.2 MIXED HYPERLIPIDEMIA: ICD-10-CM

## 2024-03-18 DIAGNOSIS — D70.9 CHRONIC IDIOPATHIC NEUTROPENIA (H): ICD-10-CM

## 2024-03-18 DIAGNOSIS — Z90.11 S/P MASTECTOMY, RIGHT: ICD-10-CM

## 2024-03-18 LAB
ALBUMIN SERPL BCG-MCNC: 4.2 G/DL (ref 3.5–5.2)
ALP SERPL-CCNC: 45 U/L (ref 40–150)
ALT SERPL W P-5'-P-CCNC: 19 U/L (ref 0–50)
ANION GAP SERPL CALCULATED.3IONS-SCNC: 9 MMOL/L (ref 7–15)
AST SERPL W P-5'-P-CCNC: 22 U/L (ref 0–45)
BILIRUB SERPL-MCNC: 0.5 MG/DL
BUN SERPL-MCNC: 24.4 MG/DL (ref 8–23)
CALCIUM SERPL-MCNC: 9.7 MG/DL (ref 8.8–10.2)
CHLORIDE SERPL-SCNC: 101 MMOL/L (ref 98–107)
CHOLEST SERPL-MCNC: 173 MG/DL
CREAT SERPL-MCNC: 1.13 MG/DL (ref 0.51–0.95)
CREAT UR-MCNC: 69.5 MG/DL
DEPRECATED HCO3 PLAS-SCNC: 29 MMOL/L (ref 22–29)
EGFRCR SERPLBLD CKD-EPI 2021: 50 ML/MIN/1.73M2
FASTING STATUS PATIENT QL REPORTED: YES
GLUCOSE SERPL-MCNC: 83 MG/DL (ref 70–99)
HBA1C MFR BLD: 5.6 % (ref 0–5.6)
HDLC SERPL-MCNC: 47 MG/DL
LDLC SERPL CALC-MCNC: 100 MG/DL
MICROALBUMIN UR-MCNC: <12 MG/L
MICROALBUMIN/CREAT UR: NORMAL MG/G{CREAT}
NONHDLC SERPL-MCNC: 126 MG/DL
POTASSIUM SERPL-SCNC: 5 MMOL/L (ref 3.4–5.3)
PROT SERPL-MCNC: 7.6 G/DL (ref 6.4–8.3)
SODIUM SERPL-SCNC: 139 MMOL/L (ref 135–145)
T4 FREE SERPL-MCNC: 1.15 NG/DL (ref 0.9–1.7)
TRIGL SERPL-MCNC: 131 MG/DL
TSH SERPL DL<=0.005 MIU/L-ACNC: 5.24 UIU/ML (ref 0.3–4.2)
VIT D+METAB SERPL-MCNC: 43 NG/ML (ref 20–50)

## 2024-03-18 PROCEDURE — 84439 ASSAY OF FREE THYROXINE: CPT | Performed by: FAMILY MEDICINE

## 2024-03-18 PROCEDURE — 82306 VITAMIN D 25 HYDROXY: CPT | Performed by: FAMILY MEDICINE

## 2024-03-18 PROCEDURE — 80061 LIPID PANEL: CPT | Performed by: FAMILY MEDICINE

## 2024-03-18 PROCEDURE — 82570 ASSAY OF URINE CREATININE: CPT | Performed by: FAMILY MEDICINE

## 2024-03-18 PROCEDURE — 91320 SARSCV2 VAC 30MCG TRS-SUC IM: CPT | Performed by: FAMILY MEDICINE

## 2024-03-18 PROCEDURE — 99215 OFFICE O/P EST HI 40 MIN: CPT | Mod: 25 | Performed by: FAMILY MEDICINE

## 2024-03-18 PROCEDURE — 90480 ADMN SARSCOV2 VAC 1/ONLY CMP: CPT | Performed by: FAMILY MEDICINE

## 2024-03-18 PROCEDURE — 82043 UR ALBUMIN QUANTITATIVE: CPT | Performed by: FAMILY MEDICINE

## 2024-03-18 PROCEDURE — 83036 HEMOGLOBIN GLYCOSYLATED A1C: CPT | Performed by: FAMILY MEDICINE

## 2024-03-18 PROCEDURE — 36415 COLL VENOUS BLD VENIPUNCTURE: CPT | Performed by: FAMILY MEDICINE

## 2024-03-18 PROCEDURE — 80053 COMPREHEN METABOLIC PANEL: CPT | Performed by: FAMILY MEDICINE

## 2024-03-18 PROCEDURE — 99417 PROLNG OP E/M EACH 15 MIN: CPT | Performed by: FAMILY MEDICINE

## 2024-03-18 PROCEDURE — 84443 ASSAY THYROID STIM HORMONE: CPT | Performed by: FAMILY MEDICINE

## 2024-03-18 RX ORDER — LEVOTHYROXINE SODIUM 150 UG/1
TABLET ORAL
Qty: 100 TABLET | Refills: 0 | Status: SHIPPED | OUTPATIENT
Start: 2024-03-18 | End: 2024-07-03

## 2024-03-18 RX ORDER — AMLODIPINE BESYLATE 2.5 MG/1
2.5 TABLET ORAL DAILY
Qty: 90 TABLET | Refills: 0 | Status: CANCELLED | OUTPATIENT
Start: 2024-03-18

## 2024-03-18 RX ORDER — LEVOTHYROXINE SODIUM 150 UG/1
TABLET ORAL
Qty: 100 TABLET | Refills: 1 | Status: CANCELLED | OUTPATIENT
Start: 2024-03-18

## 2024-03-18 RX ORDER — ATORVASTATIN CALCIUM 40 MG/1
40 TABLET, FILM COATED ORAL DAILY
Qty: 90 TABLET | Refills: 1 | Status: SHIPPED | OUTPATIENT
Start: 2024-03-18 | End: 2024-07-03

## 2024-03-18 RX ORDER — ALENDRONATE SODIUM 70 MG/1
70 TABLET ORAL
Qty: 12 TABLET | Refills: 0 | Status: SHIPPED | OUTPATIENT
Start: 2024-03-18 | End: 2024-06-07

## 2024-03-18 RX ORDER — ATORVASTATIN CALCIUM 20 MG/1
20 TABLET, FILM COATED ORAL DAILY
Qty: 90 TABLET | Refills: 1 | Status: CANCELLED | OUTPATIENT
Start: 2024-03-18

## 2024-03-18 RX ORDER — LISINOPRIL 20 MG/1
20 TABLET ORAL AT BEDTIME
Qty: 90 TABLET | Refills: 0 | Status: SHIPPED | OUTPATIENT
Start: 2024-03-18 | End: 2024-07-02

## 2024-03-18 RX ORDER — AMLODIPINE BESYLATE 5 MG/1
2.5 TABLET ORAL DAILY
Qty: 90 TABLET | Refills: 0 | Status: SHIPPED | OUTPATIENT
Start: 2024-03-18 | End: 2024-05-17

## 2024-03-18 RX ORDER — LISINOPRIL 20 MG/1
20 TABLET ORAL AT BEDTIME
Qty: 45 TABLET | Refills: 0 | Status: CANCELLED | OUTPATIENT
Start: 2024-03-18

## 2024-03-18 RX ORDER — RESPIRATORY SYNCYTIAL VIRUS VACCINE 120MCG/0.5
0.5 KIT INTRAMUSCULAR ONCE
Qty: 1 EACH | Refills: 0 | Status: CANCELLED | OUTPATIENT
Start: 2024-03-18 | End: 2024-03-18

## 2024-03-18 RX ORDER — ALENDRONATE SODIUM 70 MG/1
70 TABLET ORAL
Qty: 12 TABLET | Refills: 3 | Status: CANCELLED | OUTPATIENT
Start: 2024-03-18

## 2024-03-18 ASSESSMENT — PAIN SCALES - GENERAL: PAINLEVEL: NO PAIN (0)

## 2024-03-18 NOTE — TELEPHONE ENCOUNTER
I told her it will take a couple days to get the results she is already been out of it for a week is not going to make a difference now.  I need to see the results to adjust the dose as she was taking more than recommended and finished it too early and then no showed in November when would have checked and refilled ideally so I really need to see the result to adjust the dosing   Same with atorvastatin I need to see the results before I refill the dose

## 2024-03-18 NOTE — TELEPHONE ENCOUNTER
Reason for Disposition    [1] Prescription refill request for NON-ESSENTIAL medicine (i.e., no harm to patient if med not taken) AND [2] triager unable to refill per department policy    Protocols used: Medication Refill and Renewal Call-A-AH

## 2024-03-18 NOTE — RESULT ENCOUNTER NOTE
Hello -    Here are my comments about your recent results:  -A1C (diabetic test) is normal and indicates that your blood sugar has been in a normal range the last 3 months.  For additional lab test information, labtestsonline.org is an excellent reference..    Please let us know if you have any questions or concerns.     Regards,  Marry Rodriguez MD

## 2024-03-18 NOTE — PATIENT INSTRUCTIONS
Increase amlodipine to 5 mg   Continue lisinopril 20 mg daily   Given script for home BP machine  Check and keep log  Goal BP should be ideally 130/80 or less   Wait to see lipids and tsh before refills atorvastatin and levothyroxine santos as took more than recommended dosing of levothyroxine then ran out early due to piror missed apt   Dexa ordered please schedule to check for osteoporosis  Give recent left wrist fracture form fall from low ht and on fosamax recommend seeing endocrine to evaluate options  Continue fosamax for now  Refilled 90 days  COVID 2nd dose today   Consider rsv and shingles at pharmacy   See you back in may / june for a medicare wellness and follow up

## 2024-03-18 NOTE — PROGRESS NOTES
Prior to immunization administration, verified patients identity using patient s name and date of birth. Please see Immunization Activity for additional information.     Screening Questionnaire for Adult Immunization    Are you sick today?   No   Do you have allergies to medications, food, a vaccine component or latex?   No   Have you ever had a serious reaction after receiving a vaccination?   No   Do you have a long-term health problem with heart, lung, kidney, or metabolic disease (e.g., diabetes), asthma, a blood disorder, no spleen, complement component deficiency, a cochlear implant, or a spinal fluid leak?  Are you on long-term aspirin therapy?   Yes, CKD stage 3   Do you have cancer, leukemia, HIV/AIDS, or any other immune system problem?   No   Do you have a parent, brother, or sister with an immune system problem?   No   In the past 3 months, have you taken medications that affect  your immune system, such as prednisone, other steroids, or anticancer drugs; drugs for the treatment of rheumatoid arthritis, Crohn s disease, or psoriasis; or have you had radiation treatments?   No   Have you had a seizure, or a brain or other nervous system problem?   No   During the past year, have you received a transfusion of blood or blood    products, or been given immune (gamma) globulin or antiviral drug?   No   For women: Are you pregnant or is there a chance you could become       pregnant during the next month?   No   Have you received any vaccinations in the past 4 weeks?   No     Immunization questionnaire was positive for at least one answer.  Notified Dr. Rodriguez.      Patient instructed to remain in clinic for 15 minutes afterwards, and to report any adverse reactions.     Screening performed by Yordan North RN on 3/18/2024 at 9:32 AM.

## 2024-03-18 NOTE — PROGRESS NOTES
Assessment & Plan     Hypertension goal BP (blood pressure) < 140/90  Hypertension uncontrolled on lisinopril 20 mg bedtime amlodipine 2.5 mg daily at night. Blood pressure elevated in clinic 157/74 initially then 143/73.  [place] compliance and gaps in manage refill.  Reports taking medications daily and not missing any dose.  Goal BP should be 130/80 santos given decreasing kidney function. Increase amlodipine to 5 mg. Continue lisinopril 20 mg daily. Given script for home BP machine, to check and keep log & bring to next visit. To return k in may / clint for a medicare wellness and follow up BP, thyroid etc     Chronic kidney disease stage III with hx of microalbuminuria in the past on ACE inhibitor.  Encouraged to avoid anti-inflammatories as much as possible. To check today. Kidney function remains at chronic kidney disease stage III with function around 50% & stable electrolytes. To continue to avoid anti-inflammatories.    BMI 34.  Had tried a Keto diet, declined referral to weight specialist or sleep, or mtm , or taking meds. Encouraged smaller portions and increasing exercise.     Prediabetes HB A1c 5.7 will recheck again today. Later noted improved to normal     Hyperlipidemia on atorvastatin 20 mg. The 10-year ASCVD risk score (Joe DK, et al., 2019) is: increased to 30.3%. Will check cholesterol before filling med in case dose needs adjustment.  and ASCVD risk is high 30% of having a heart attack or stroke in the next 10 years and will increase atorvastatin to 40 mg bedtime and recheck cholesterol in May when seen on this medication.  Liver tests are stable      Hypothyroidism on levothyroxine 150 mcg 6 days of week and 1.5 tab of 150 1 day of week. In 4/2023 TSH normal. In 9/2023 TSH slightly above range but Ft4 normal. Was continued on levothyroxine 1 tab 6 days a week & 1.5 tab one day a week & was to recheck TSH when returned on 11/9 for her BP. Had been given 100 with 1 refill end of June  2023 so expected would have enough till nov apt. Was no show to apt in nov & now reporting since tab was diff to cut had been taking 2 instead of 1.5 tab the one day. Unsure how long has been doing this. Reports ran out of med > 1 week ago, ? If not earlier. Will check labs today before refilling to decide on med dose. Likely wont be accurate & will need to check on med again in few weeks to reassess dosing given gaps in how taken. Plan to have her return again in may/ clint for medicare wellness &  recheck tsh on meds.   After the visit TSH came back elevated at 5.24, free T4 not back.  Will resume levothyroxine 150 mcg daily 6 days of the week but 1-1/2 tablet or 150 mcg 1 day of the week will send in 100 tablets advised not to double up on dosing if difficult to cut in half use a pill cutter and recheck on medications as advised when returns for follow-up in May so we can adjust meds appropriately at the time is currently levels may not be accurate as checked when had already ran out of medication.    History of snoring, BMI more than 35, there was possibility of undiagnosed sleep apnea.  Opted not to see the sleep specialist. Reports usually avoids driving.      History of chronic intermittent idiopathic neutropenia that predated cancer diagnosis and treatment.  Negative work-up and asymptomatic in past Seen by hematology most recently in the fall 2022.  Peripheral smear and additional lab work for iron copper folic acid had been normal.  No nutritional causes seen.  Held off on bone marrow biopsy given stability and lack of symptoms. On B 12 supp unknown dose.  B 12 wnl in past. Was advised if should feel sick, have fevers, significant abdominal pains then advised she should always come in to be evaluated for infection and get a CBC. & let her providers know the history of neutropenia and if neutrophils were low or if infection was not improving with regular treatment then hematology recommended a course of  Neupogen 480 mcg subcu for 3 to 5 days at the time.  Has had no symptoms recently.      Dependent edema likely due to weight  possibly varicose veins & undiagnosed sleep apnea. No swelling noted since on amlodipine, using compression socks and encouraged a continued low salt diet and elevation of legs 1 hr above heart level each day     Dexa in 4/2023 showed osteopenia but with high fracture risk of hip & so started on fosamax 70 mg 1/wk, & remained on calcium 1200 a day & vitamin D at least 2000 units daily. Had had no side effects, no increased GERD, no blood in stools or black stools & no dental issues. Is on Pepcid prn for heart burn related to diet & that has not gotten worse since on fosamax. Reviewed history of traumatic left wrist fracture from a fall from standing position and concern by sports medicine about it possibly being a fragility fracture.  Is overdue for DEXA scan & encouraged to schedule DEXA scan. Recommended seeing endocrine to discuss alternative options. Not sure if we would call her wrist fracture Fosamax failure or not.  Will refill Fosamax for next 3 months until we get more information on DEXA and from endocrine on what to do next. Vitamin D is normal at 43 continue with current dosing of vitamin D 2000 units daily and calcium 1200 mg total daily as advised previously.    History of right breast cancer, prior right mastectomy, hx of resolved scar breakdown and infection post punch biopsy for wound dehiscence. Seen by breast center. Left breast screening mammogram showed an asymmetry in April 2023 s/p dx imaging and u/s was unremarkable. To continue yearly screening. Due in April. Did meet with a  given fh and personal hx of cancer and opted not to do any testing.  Reports no breast or skin concerns today.    Hx of Cscope done for positive cologuard which showed 3 serrated adenoma and one tubular adenoma removed 9/2022 and advised recheck in 3 yrs ( 2025) .  Reported has had no  blood in the stools or black stools.     Memory deficits noted on screening test 2022. Minicog 2023 recalled 2 of 3 words. Always alone without family. MRI brain in April 2023 showed no mass effect, midline shift, or evidence of intracranial hemorrhage. The ventricles were proportionate to the cerebral sulci. Normal major vascular intracranial flow-voids. Advanced leukoaraiosis. Mild diffuse cerebral volume loss. Post contrast images demonstrate no abnormal intracranial enhancement. No abnormality of the skull marrow signal. The visualized portions of paranasal sinuses, and mastoid air cells were relatively clear. The orbits were grossly unremarkable. Discussed white matter changes and atrophy. Keeping BP under control will be helpful.  Discussed missed appointment in November.  Feels due to busy with other things.  Declines neurology referral.  Continue to monitor.       History of Viteral macular adhesion, right eye epiretinal membrane on the right and bilateral glaucoma suspect under care of eye. Hopes to get left cataract done soon     Given COVID vaccine today   Encouraged again to get RSV and shingles at pharmacy   To return in May for Medicare wellness, blood pressure and thyroid follow-up  - Albumin Random Urine Quantitative with Creat Ratio; Future  - Comprehensive metabolic panel (BMP + Alb, Alk Phos, ALT, AST, Total. Bili, TP); Future  - Home Blood Pressure Monitor Order for DME - ONLY FOR DME  - TSH with free T4 reflex; Future  - amLODIPine (NORVASC) 5 MG tablet; Take 0.5 tablets (2.5 mg) by mouth daily  - lisinopril (ZESTRIL) 20 MG tablet; Take 1 tablet (20 mg) by mouth at bedtime  - Albumin Random Urine Quantitative with Creat Ratio  - Comprehensive metabolic panel (BMP + Alb, Alk Phos, ALT, AST, Total. Bili, TP)  - TSH with free T4 reflex    Stage 3 chronic kidney disease, unspecified whether stage 3a or 3b CKD (H)  Chronic kidney disease stage III with hx of microalbuminuria in the past on ACE  inhibitor.  Encouraged to avoid anti-inflammatories as much as possible. To check today. Kidney function remains at chronic kidney disease stage III with function around 50% & stable electrolytes. To continue to avoid anti-inflammatories.  - Albumin Random Urine Quantitative with Creat Ratio; Future  - TSH with free T4 reflex; Future  - Albumin Random Urine Quantitative with Creat Ratio  - TSH with free T4 reflex    Obesity (BMI 35.0-39.9) with comorbidity (H)  BMI 34.  Had tried a Keto diet, declined referral to weight specialist or sleep, or mtm , or taking meds. Encouraged smaller portions and increasing exercise.  - TSH with free T4 reflex; Future  - TSH with free T4 reflex    Prediabetes  Prediabetes HB A1c 5.7 will recheck again today. Later noted improved to normal  - TSH with free T4 reflex; Future  - Hemoglobin A1c; Future  - TSH with free T4 reflex  - Hemoglobin A1c    Mixed hyperlipidemia  Hyperlipidemia on atorvastatin 20 mg. The 10-year ASCVD risk score (Joe BUTLER, et al., 2019) is: increased to 30.3%. Will check cholesterol before filling med in case dose needs adjustment.  and ASCVD risk is high 30% of having a heart attack or stroke in the next 10 years and will increase atorvastatin to 40 mg bedtime and recheck cholesterol in May when seen on this medication.  Liver tests are stable   - Lipid panel reflex to direct LDL Non-fasting; Future  - TSH with free T4 reflex; Future  - Lipid panel reflex to direct LDL Non-fasting  - TSH with free T4 reflex    Acquired hypothyroidism  Hypothyroidism on levothyroxine 150 mcg 6 days of week and 1.5 tab of 150 1 day of week. In 4/2023 TSH normal. In 9/2023 TSH slightly above range but Ft4 normal. Was continued on levothyroxine 1 tab 6 days a week & 1.5 tab one day a week & was to recheck TSH when returned on 11/9 for her BP. Had been given 100 with 1 refill end of June 2023 so expected would have enough till nov apt. Was no show to apt in nov & now  reporting since tab was diff to cut had been taking 2 instead of 1.5 tab the one day. Unsure how long has been doing this. Reports ran out of med > 1 week ago, ? If not earlier. Will check labs today before refilling to decide on med dose. Likely wont be accurate & will need to check on med again in few weeks to reassess dosing given gaps in how taken. Plan to have her return again in may/ clint for medicare wellness &  recheck tsh on meds.   After the visit TSH came back elevated at 5.24, free T4 not back.  Will resume levothyroxine 150 mcg daily 6 days of the week but 1-1/2 tablet or 150 mcg 1 day of the week will send in 100 tablets advised not to double up on dosing if difficult to cut in half use a pill cutter and recheck on medications as advised when returns for follow-up in May so we can adjust meds appropriately at the time is currently levels may not be accurate as checked when had already ran out of medication.  - TSH with free T4 reflex; Future  - TSH with free T4 reflex    Snoring  History of snoring, BMI more than 35, there was possibility of undiagnosed sleep apnea.  Opted not to see the sleep specialist. Reports usually avoids driving.   - TSH with free T4 reflex; Future  - TSH with free T4 reflex    Chronic idiopathic neutropenia (H 24)  History of chronic intermittent idiopathic neutropenia that predated cancer diagnosis and treatment.  Negative work-up and asymptomatic in past Seen by hematology most recently in the fall 2022.  Peripheral smear and additional lab work for iron copper folic acid had been normal.  No nutritional causes seen.  Held off on bone marrow biopsy given stability and lack of symptoms. On B 12 supp unknown dose.  B 12 wnl in past. Was advised if should feel sick, have fevers, significant abdominal pains then advised she should always come in to be evaluated for infection and get a CBC. & let her providers know the history of neutropenia and if neutrophils were low or if  infection was not improving with regular treatment then hematology recommended a course of Neupogen 480 mcg subcu for 3 to 5 days at the time.  Has had no symptoms recently.     Dependent edema  Dependent edema likely due to weight  possibly varicose veins & undiagnosed sleep apnea. No swelling noted since on amlodipine, using compression socks and encouraged a continued low salt diet and elevation of legs 1 hr above heart level each day    Osteopenia, unspecified location  Personal history of traumatic fracture  Asymptomatic menopausal state  Dexa in 4/2023 showed osteopenia but with high fracture risk of hip & so started on fosamax 70 mg 1/wk, & remained on calcium 1200 a day & vitamin D at least 2000 units daily. Had had no side effects, no increased GERD, no blood in stools or black stools & no dental issues. Is on Pepcid prn for heart burn related to diet & that has not gotten worse since on fosamax. Reviewed history of traumatic left wrist fracture from a fall from standing position and concern by sports medicine about it possibly being a fragility fracture.  Is overdue for DEXA scan & encouraged to schedule DEXA scan. Recommended seeing endocrine to discuss alternative options. Not sure if we would call her wrist fracture Fosamax failure or not.  Will refill Fosamax for next 3 months until we get more information on DEXA and from endocrine on what to do next. Vitamin D is normal at 43 continue with current dosing of vitamin D 2000 units daily and calcium 1200 mg total daily as advised previously.  - DX Bone Density; Future  - alendronate (FOSAMAX) 70 MG tablet; Take 1 tablet (70 mg) by mouth every 7 days  - Vitamin D Deficiency; Future  - Adult Endocrinology  Referral; Future  - Vitamin D Deficiency    History of breast cancer  S/P mastectomy, right  History of right breast cancer, prior right mastectomy, hx of resolved scar breakdown and infection post punch biopsy for wound dehiscence. Seen by  breast center. Left breast screening mammogram showed an asymmetry in April 2023 s/p dx imaging and u/s was unremarkable. To continue yearly screening. Due in April. Did meet with a  given fh and personal hx of cancer and opted not to do any testing.  Reports no breast or skin concerns today.    History of adenomatous polyp of colon  Hx of Cscope done for positive cologuard which showed 3 serrated adenoma and one tubular adenoma removed 9/2022 and advised recheck in 3 yrs ( 2025) .  Reported has had no blood in the stools or black stools.    Memory deficit  Memory deficits noted on screening test 2022. Minicog 2023 recalled 2 of 3 words. Always alone without family. MRI brain in April 2023 showed no mass effect, midline shift, or evidence of intracranial hemorrhage. The ventricles were proportionate to the cerebral sulci. Normal major vascular intracranial flow-voids. Advanced leukoaraiosis. Mild diffuse cerebral volume loss. Post contrast images demonstrate no abnormal intracranial enhancement. No abnormality of the skull marrow signal. The visualized portions of paranasal sinuses, and mastoid air cells were relatively clear. The orbits were grossly unremarkable. Discussed white matter changes and atrophy. Keeping BP under control will be helpful.  Discussed missed appointment in November.  Feels due to busy with other things.  Declines neurology referral.  Continue to monitor.      Need for COVID-19 vaccine  Given COVID vaccine today   - COVID-19 12+ (2023-24) (PFIZER)    Need for vaccination against respiratory syncytial virus  Need for shingles vaccine  Encouraged again to get RSV and shingles at pharmacy   To return in May for Medicare wellness, blood pressure and thyroid follow-up    Review of the result(s) of each unique test - diagnostics in epic  Independent interpretation of a test performed by another physician/other qualified health care professional (not separately reported) - sports  "medicine  Diagnosis or treatment significantly limited by social determinants of health - gaps in follow-up, gaps in medication, non adherence to dosing, questionable cognition  Ordering of each unique test  Prescription drug management  75 minutes spent by me on the date of the encounter doing chart review, history and exam, documentation and further activities per the note      BMI  Estimated body mass index is 34.93 kg/m  as calculated from the following:    Height as of this encounter: 1.76 m (5' 9.3\").    Weight as of this encounter: 108.2 kg (238 lb 9.6 oz).   Weight management plan: Discussed healthy diet and exercise guidelines      Work on weight loss  Regular exercise  See Patient Instructions        Subjective Romayne is a 78 year old, presenting for the following health issues:  Blood Pressure Check (Pt is fasting for some blood work if needed) and Medication Refill        3/18/2024     8:41 AM   Additional Questions   Roomed by Ronel Morris   Accompanied by Self     History of Present Illness       Hyperlipidemia:  She presents for follow up of hyperlipidemia.   She is taking medication to lower cholesterol. She is not having myalgia or other side effects to statin medications.    Hypertension: She presents for follow up of hypertension.  She does not check blood pressure  regularly outside of the clinic. Outside blood pressures have been over 140/90. She does not follow a low salt diet.     Hypothyroidism:     Since last visit, patient describes the following symptoms::  None    She eats 2-3 servings of fruits and vegetables daily.She consumes 0 sweetened beverage(s) daily.She exercises with enough effort to increase her heart rate 20 to 29 minutes per day.  She exercises with enough effort to increase her heart rate 3 or less days per week.   She is taking medications regularly.       CURRENTLY   Here for follow-up as requested refills and noted had no showed for follow-up in November and overdue for " recheck on blood pressure and labs.    Hypertension on lisinopril 20 mg bedtime amlodipine 2.5 mg daily at night.  Blood pressure elevated in clinic 157/74 initially then 143/73.  Reports taking medications daily and not missing any dose.  May have had gaps in med intake on review of refills seen in epic.  Not checking BP. Goal BP should be 130/80 santos given decreasing kidney function.      Chronic kidney disease stage III with hx of microalbuminuria in the past on ACE inhibitor.  Encouraged to avoid anti-inflammatories as much as possible. To check today.      BMI 34.  Had tried a Keto diet, declined referral to weight specialist or sleep, or mtm , or taking meds. Encouraged smaller portions and increasing exercise.     Prediabetes HB A1c 5.7 will recheck again today     Hyperlipidemia on atorvastatin 20 mg.   The 10-year ASCVD risk score (Joe BUTLER, et al., 2019) is: 30.3%    Values used to calculate the score:      Age: 78 years      Sex: Female      Is Non- : No      Diabetic: No      Tobacco smoker: No      Systolic Blood Pressure: 143 mmHg      Is BP treated: Yes      HDL Cholesterol: 44 mg/dL      Total Cholesterol: 160 mg/dL  Will check cholesterol before filling med incase dose needs adjustment      Hypothyroidism on levothyroxine 150 mcg 6 days of week and 1.5 tab of 150 1 day of week. In 4/2023 TSH normal. In 9/2023 TSH slightly above range but Ft4 normal. Was continued on levothyroxine 1 tab 6 days a week & 1.5 tab one day a week & was to recheck TSH when returned on 11/9 for her BP. Had been given 100 with 1 refill end of June 2023 so expected would have enough till nov apt. Was no shot to apt in nov & now reporting since tab was diff to cut had been taking 2 instead of 1.5 tab the one day. Unsure how long had been doing this. Reports ran out of med > 1 week ago, ? If not earlier. Will check labs today before refilling to decide on meds. Likely wont be accurate & will need to  check on med again in few weeks to reassess dosing given gaps in how taken.      History of snoring, BMI more than 35, there was possibility of undiagnosed sleep apnea.  Opted not to see the sleep specialist. Reports usually avoids driving.      History of chronic intermittent idiopathic neutropenia that predated cancer diagnosis and treatment.  Negative work-up and asymptomatic in past Seen by hematology most recently in the fall 2022.  Peripheral smear and additional lab work for iron copper folic acid had been normal.  No nutritional causes seen.  Held off on bone marrow biopsy given stability and lack of symptoms. On B 12 supp unknown dose.  B 12 wnl in past. Was advised if should feel sick, have fevers, significant abdominal pains then advised she should always come in to be evaluated for infection and get a CBC. & let her providers know the history of neutropenia and if neutrophils were low or if infection was not improving with regular treatment then hematology recommended a course of Neupogen 480 mcg subcu for 3 to 5 days at the time.  Has had no symptoms recently.      Dependent edema likely due to weight  possibly varicose veins & undiagnosed sleep apnea. No swelling noted since on amlodipine, using compression socks and encouraged a continued  low salt diet and elevation of legs 1 hr above heart level each day     Dexa in 4/2023 showed osteopenia but with high fracture risk of hip & so started on fosamax 70 mg 1/wk, & remained on calcium 1200 a day & vitamin D at least 2000 units daily . Had had no side effects, no increased GERD, no blood in stools or black stools & no dental issues.  On Pepcid prn heart burn related to diet did not get worse since on fosamax.  Reviewed history of traumatic left wrist fracture from a fall from standing position and concern by sports medicine about it possibly being a fragility fracture.  Is overdue for DEXA scan and also recommended seeing endocrine to discuss  alternative options.  Not sure if we would call her wrist fracture Fosamax failure or not.  Will refill Fosamax for next 3 months until we get more information to DEXA and endocrine on what to do next.     History of right breast cancer, prior right mastectomy, hx of resolved scar breakdown and infection post punch biopsy for wound dehiscence. Seen by breast center. Left breast screening mammogram showed an asymmetry in April 2023 s/p dx imaging and u/s was unremarkable. To continue yearly screening. Did meet with a  given fh and personal hx of cancer and opted not to do any testing.  Reports no breast or skin concerns today.    Hx of Cscope done for positive cologuard which showed 3 serrated adenoma and one tubular adenoma removed 9/2022 and advised recheck in 3 yrs ( 2025) .  Has had no blood in the stools or black stools.     Memory deficits noted on screening test 2022. Minicog 2023 recalled 2 of 3 words. Always alone without family. MRI brain in April 2023 showed no mass effect, midline shift, or evidence of intracranial hemorrhage. The ventricles were proportionate to the cerebral sulci. Normal major vascular intracranial flow-voids. Advanced leukoaraiosis. Mild diffuse cerebral volume loss. Post contrast images demonstrate no abnormal intracranial enhancement. No abnormality of the skull marrow signal. The visualized portions of paranasal sinuses, and mastoid air cells were relatively clear. The orbits were grossly unremarkable. Discussed white matter changes and atrophy. Keeping BP under control will be helpful.  Discussed missed appointment in November.  Feels due to busy with other things.  Declines neurology referral.  Continue to monitor.       History of Viteral macular adhesion, right eye epiretinal membrane on the right and bilateral glaucoma suspect under care of eye. Hopes to get left cataract done soon     Will do COVID vaccine today   Encouraged again to get RSV and shingles at pharmacy    Currently reports no fever or chills, no headache or dizziness, no double or blurry vision, no facial pain, earache, sore throat, runny nose, post nasal drip, no trouble hearing, smelling, tasting or swallowing, no cough , no chest pain, trouble breathing or palpitations, No abdominal pain, heart burn, reflux, nausea or vomiting or diarrhea or constipation, no blood in stools or black stools, no weight loss or night sweats. Nocturia at night. No dysuria, hematuria, frequency, urgency, hesitancy, incontinence, No pelvic complaints. No leg swelling or joint pain. No rash.    BACKGROUND  History of passive smoke exposure, obesity, hypertension on lisinopril, CKD 3, , prediabetes, hyperlipidemia on atorvastatin, hypothyroidism on levothyroxine, snoring, history of memory deficit per epic, history of chronic leg edema, history of prior maxillary sinusitis, resolved cellulitis, history of chronic idiopathic neutropenia/ leukopenia S/p work-up that was negative by hematology in 2012 & 2022,, history of lipoma noted in the past, history of prior right breast cancer in 2008 S/p right mastectomy and saline implants in remission no longer followed by oncology, history of resolved anemia, history of Covid infection in November 2020 with respiratory failure sepsis, hypoxia, Covid pneumonia, PE and left lower extremity DVT, elevated D-dimer, UTI, resolved HALI, resolved anemia, with complete resolution of Covid symptoms noted by February 2021.  No longer on oxygen.  History of snoring, chronic fatigue, physical deconditioning, GERD on Pepcid, resolved epigastric pain & pancreatitis noted in 2022, and constipation, history of untreated osteoporosis DEXA scan in 2021 showed osteopenia, history of vitreal macular adhesion right eye and epiretinal membrane S/p cataract surgery and remote right eye injury, S/p vitrectomy being managed by eye & being monitored as a glaucoma suspect  on multivitamin, serrated adenoma colon,   Under  "care of Dr. Sheridan then Sapphire Glass then Adela Avila,      Seen by PCP in April 2019 for dyspnea on exertion.  Examination was unremarkable, chest x-ray was negative.  EKG was unremarkable.  Echo showed a normal EF.  Stress test done was normal.  Her lisinopril was increased and suspected to be deconditioned and advised weight loss and increase activity and referred to sleep for her history of fatigue for possible obstructive sleep apnea but was never seen by sleep.  Fit test never done.  Not seen till called for medication refill on 4/30/2020 and appointment made with this writer, had an appointment by telephone with this writer for the first time on 5/19/2020 for blood pressure med refill.  Advise labs and follow-up in clinic.  Fit test done 6/8/2020 was negative.  TSH was elevated at 10 with free T4 normal and a normal hemoglobin A1c.   Visited with endocrine virtually on 7/8/2020 and noted had been treated for subclinical hypothyroidism with levothyroxine since 2004.  Medications adjusted and DEXA scan ordered.  Seen by eye 9/29/2020 for cataracts glaucoma check, prior right eye injury and which showed retinal membrane adhesion.  Seen by eye again 10/19/2020.  Seen in urgent care on 11/5/2020 for fever cough dizziness abdominal pain nausea was given Bactrim for cystitis tested for COVID discharged home and testing came back positive for Covid.  Seen in ER 11/16/2020 for shortness of breath sinus tachycardia and severe hypoxia.  Was admitted for worsening respiratory symptoms prompting her to seek further evaluation on 11/17/20 at [facility]. She was found to be in acute hypoxic respiratory failure requiring 30 LPM HFNC.  Chest x-ray at that time revealed \"diffuse patch airspace opacities consistent with COVID.\" She was admitted to the MICU for stabilization overnight and transferred to the general medical floor on 11/18/20. For her COVID infection, she received dexamethasone and remdesivir. She was initially " treated with broad spectrum antibiotics for possible super-imposed pneumonia, but these were quickly discontinued. Her hospital course was complicated by a LLE DVT and PE's. She was started on Lovenox and transitioned to Apixaban. She was also found to have a UTI on day of discharge and was sent home on oral antibiotics.  Her severe Sepsis, resolved. For acute Hypoxic Respiratory Failure 2/2 COVID-19 PNA - COVID+ 11/5.  She was COVID recovered,  initially requiring 30 LMP HFNC, but was able to wean to 2Ls NC with exertion, 0 to 0.5L at rest. She completed her 5 day course of Remdesivir during her hospital stay. On discharge was continued on dexamethasone 6mg daily for 10 doses (ending 11/27) & continued on 2Ls NC O2 with ambulation, 0-0.5L at rest. For the Non-occlusive thrombus in left popliteal vein & Pulmonary Embolisms of segmental and subsegmental right upper and lower lobes with Elevated D-dimer - D-dimer >20 on admission. Started on high-intensity heparin gtt in ED with bolus- and transitioned to low intensity on 11/18/20. Ultrasound of B/l lower extremities obtained on 11/19 which were notable for non-occlusive thrombus in left popliteal vein. She was then transitioned back to high intensity heparin gtt on 11/19. Had elevated hep 10A levels overnight 11/19. Transitioned to Lovenox 1mg/kg BID on 11/20/20. CT PE protocol with segmental and subsegmental PE in the right upper and lower lobes w/o evidence of RHS. Also w/ extensive reticular changes and GOO throughout lungs representing inflammation and/or fibrosis r/t recent COVID-19 infection. on Discharge continued on Apixiban 10mg BID x7 days, then 5mg BID thereafter for ~ 3 month duration of treatment as likely provoked DVT/PE due to COVID-19 infection. Was to follow up with PCP in one week to discuss anticoagulation. Noted Urinary Tract Infection & Urinary retention, resolved - Required straight cath x2 during her hospital admission. Developed LUTS last day  of discharge with urinalysis revealing few bacteria, moderate leukocytes, and 12 Wbcs. However, there were 4 epis.  & discharged on Keflex 500 mg BID for 5 days & to follow up with PCP in one week to review urine culture results. Her HALI, resolved. Noted CKD III - Baseline creatinine 1.1-1.2, elevated to 1.53 on presentation with BUN 47 then returned to baseline. Etiology likely prerenal 2/2 COVID. Lisinopril was held & resumed on discharge. For her HTN - Patient's lisinopril held on during hospital stay due to HALI. Started on amlodipine 11/20 for elevated BP but on discharge, amlodipine was discontinued and resumed home lisinopril 20 mg daily. She noted increased heartburn since starting Eliquis, while inpatient was on omeprazole this was switched to Pepcid once off the Eliquis and continued on Tums as needed. Had constipation on admission - improved with bowel regimen, however developed liquid stool which improved by discharge. Hypothyroidism: TSH on admission WNL at 0.42 & continued on  PTA Synthroid. Noted Normocytic anemia - Baseline Hgb 12-13. & was 12.2 at discharge. No S/sx of bleeding since initiation of anticoagulation. for HLD was continued on a statin.  She was discharged home on 11/24/2020.  Seen 12/3/2020 by Dr. Lazo for hospital discharge noted was no longer on oxygen during the day and satting fine & remained on Eliquis.  Seen by Sapphire Glass her PCP on 2/5/2021 for routine physical and noted had completely recovered from Covid & no longer short of breath, had no cough & not on oxygen, no longer with leg pain & was wearing compression socks for chronic leg swelling. and Doppler done on 2//22 was negative for DVT on Eliquis. DEXA scan ordered for prior untreated osteoporosis and continued on Pepcid for heartburn.  Labs later showed she was overcorrected on thyroid and levothyroxine was changed from double dosing on the weekend to daily simple dosing of 150 mcg daily and recheck TSH 6 weeks later was  within normal range and continued on same dose.  DEXA scan done 2/11/2021 showed osteopenia and continued on calcium and vitamin D.  Cataracts removed 2/22/2021.  Seen by 3/11/2021.  TSH normal 3/15/2021.  Noted blurry vision 4/13/2021.  Had stable CKD 5/18/2021 and advised to avoid NSAID's.  Seen 5/18/2021 by Dr. Avila for preop for vitrectomy.   On 6/7/2021 had vitrectomy and membrane stripping for history of vitreoretinal adhesion right eye.  Seen by the eye doctor postop day 1, 1 week postop and at 6-week postop guero and noted was doing well.  Last seen by eye on 10/20/2021 for hx of Vitreoretinal adhesion right eye, ERM right eye S/P PPV/MP right eye 6/2021. Foveal anatomy improved significantly; BCVA 20/50 limited likely due to subtle IS/OS disruption at fovea (+drusen) -3.0 refraction right eye and seeing well up close; instructed her to try to use right eye as monovision for near.  Was to observe pseudophakia each eye.  Noted to be a glaucoma suspect with large disc and large cupping child being followed by Dr. Shelby.  Seen 3/22/22 for medicare wellness/ preventive health and additional concerns. Discussed slef breast checks, mammogram ordered, Pelvic / PAP no longer needed  Health care maintenance reviewed. To Consider working on health care directives & given honoring choices. Given Covid pfizer booster for prior received Aron vaccine in 2021.  Discussed Shingrex.   BMI 37, mostly sedentary.  Discussed lifestyle. Encouraged to work on diet, exercise and losing at least 5 to 10% of total body weight.  Hyperlipidemia on atorvastatin 10 mg bedtime had enough meds till could get lab work reviewed. LDL later came back normal but triglycerides were elevated and overall cardiovascular risk is 23.2 so atorvastatin increased to 20 mg bedtime and was to recheck cholesterol fasting in 3 months when I saw her back again.  Hypertension on lisinopril 10 mg daily previously was on 20, blood pressure was  136/79.   Asymptomatic no side effects & refilled at same dose  #90 with 3 refills.   CKD 3 asymptomatic reminded to avoid NSAID's Kidney function came back stable with normal micro albumin.  Hypothyroid on levothyroxine 150 mcg daily deviously on differing doses on the weekend and weekdays but had been on this dose since February 2021.    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  Had no symptoms. Count came back low at 2.2 similar with neutropenia.  Stable to prior. History of anemia noted when had COVID asymptomatic no GI bleeding not on anticoagulation. hemoglobin and hematocrit came back normal.  History of Covid in November 2020 was hospitalized requiring oxygen and received steroids and antivirals.  Had a DVT and PE requiring anticoagulation 3 months.  Resolved thromboembolism and sepsis and HALI and treated for UTI at that time was in the ICU and discharged after a week in hospital.  Reported made a full recovery & no longer on oxygen or anticoagulation.  Resolved acute respiratory failure on no oxygen since January 2021.  History of snoring had been referred to sleep in the past.  Did have obesity, hyperlipidemia, hypertension and forgetfulness.  Did have chronic deconditioning. Referral placed to sleep again. Chronic fatigue reported improved.  Had bilateral lower leg swelling managed with compression socks daily felt related to BMI and untreated sleep apnea  Physical deconditioning.  Was very sedentary.  Prior echo and stress test before had COVID was normal.  CT during COVID in 2020 did not show right heart strain.  Reported no symptoms at rest or with mild exertion.  Encouraged to be more active.  Hx of breast cancer & had had right mastectomy and chemotherapy,  initially tried a saline implant but reported this had since been removed.  Reported no new lumps or ulcers on scar tissue.  Reported no left breast lumps, bumps, skin changes or nipple discharge.  Had been in  remission long time and not seen oncology in a while.  Mammogram for the left placed  Heartburn controlled on Pepcid OTC daily. Constipation improved & was to continue with a high-fiber diet.  History of osteopenia.  Prior untreated osteoporosis but DEXA scan done 2021 showed osteopenia and was advised to continue calcium and vitamin D.  Unclear if taking calcium & recommended taking calcium citrate 1200 mg total a day and vitamin D at least 2000 units daily and rechecking DEXA scan in 2023.  Noted memory deficits, failed mini cog, seen alone, referred to neurology to further evaluate.   History of vitreal macular adhesion right eye and epiretinal membrane S/p surgery vitrectomy and stripping in June 2021. Was a bilateral glaucoma suspect and under care of eye. Was to see the dentist regularly. The 1 cm cyst on her right back was not a concern, & to monitor for worsening  Colon cancer screening due options discussed & Effie guard.ordered.   Labs showed normal Vit d, TSH was mildly out of range but ft 4 was normal & continued on same dose for 3 months before rechecking. micro albumin had improved. Wbc was 2.2, not anemia, platelets normal. B 12 was low normal and advised 500 mcg of vit B 12.CMP showed stable CKD. Lipids not goal and atorvastatin increased to 20 mg. HB A1c was 5.7 and advised on diet and lifestyle changes. colo guard result not received. No apt made with sleep or neuro. Seen by eye 4/20/22 for glaucoma monitoring.      Seen  6/23/22 Blood pressure was not goal increased lisinopril to 20 mg bedtime ( to  take two of the 10 mg left until can start the new script of 20 mg dose) . Encouraged to avoid Gatorade as high in salt, drink tea in moderation, avoid alcohol. Check BP at home, goal should be less than 130/85. Recheck BP in 2 to 3 weeks with medical assistant and lab at that time for med monitoring with a bmp. Was to work on diet , exercise, low carb , smaller portions, avoid alcohol to help Blood  pressure, weight and cholesterol.  BMI > 37  Discussed diet, exercise and weight loss. Was to continue atorvastatin 20 mg increased at last visit and see if with better BP control and repeat lipids if needed to be adjusted further in the future.  CKD 3 stable resolved microalbuminuria, on an ACE, to avoid antiinflammatories due to chronic kidney disease. Was clinically euthyroid on levothyroxine 150 mcg daily. Later TSH was WNL and continued on same dose. Due to prediabetes HB A1c checked & encouraged to  increase exercise, eat less carb's, avoid alcohol and work on loosing 10 % of total body weight. For hx of memory deficits, was  tracking and able to drive locally, in alone without family. Declined need to see neurology.  Was to continue to monitor. For  hx of snoring, suspected undiagnosed HUGO, opting to not see sleep for now due to cost. had resolved anemia. Recovered from Covid in 2020. Opted to defer seeing neuro for memory concern and snoring reporting Fatigue resolved. Had stable asymptomatic low white count. Reviewed H x of breast cancer & Mammogram in April had been normal. Was to continue use of compression socks for dependant edema. Noted  Dexa due in 2023 for hx of osteopenia/ osteoporosis, encouraged to take calcium and vit d supp. Reminded to start vit B 12 500 mcg OTC daily for low normal B 12 noted in march and physical deconditioning ( forgot to start). Reported was working on health care directives  Covid # 3 due mid July. Declined Shingrex. Was to continue care with dentist and eye. Reported then  Heart burn and constipation had resolved. Was to contact colo guard about missing test result. This was later reported as positive & triage advised to contact patient and diagnostic colonoscopy ordered.      B 12 was normal.  Hemoglobin A1c in prediabetic range.  Normal CMP and TSH.  Chronically low white count no anemia.  ASCVD risk was 30% with normal LDL, low HDL and elevated triglycerides and  continued on same dose statin.  On 7/7 blood pressure elevated recheck on 7/18 was normal and continued on same meds.  Finally got colonoscopy done 9/7/2022 fragmented superficial serrated adenoma removed x3 and tubular adenoma removed x1 and recommended recheck in 3 years     Seen 9/23/22 HTN stable on lisinopril increased dose 20 mg . No side effects. Avoiding salt. Not checking BP at home. CKD 3 stable resolved microalbuminuria, on an ACE, Avoiding  antiinflammatories due to chronic kidney disease. BMI up to 37, weight up , eating two meals a day, discussed smaller portions, avoiding alcohol, juices, declined weight evaluation, sleep doctor or mtm due to cost. HLD on atorvastatin 20 mg. Hypothyroid on levothyroxine 150 mcg daily. No hair loss, had had some weight gain, after colonoscopy bowels moving better.  Prediabetes & encouraged to  increase exercise, eat less carb's, avoid alcohol and work on loosing 10 % of total body weight. Hx of snoring & declined sleep eval.    Had a positive colo guard early 2022, got colonoscopy 3 weeks prior in sept & it showed 3 serrated adenoma fragmented and 1 tubular adenoma and advised recheck 3 years.   Noted memory deficits,  Was racking and able to drive locally. Seen alone without family. Felt ok . Resolved anemia. stable low wbc, Recovered from Covid. Opted to defer seeing neuro for memory concern or sleep for snoring & reported fatigue resolved  Hx of snoring, suspected undiagnosed HUGO, opting to not see sleep due to cost.   Had stable asymptomatic low white count.  Hx of breast cancer but Mammogram in April was normal. Mammogram due 2023. Was to continue to use compression socks for dependant edema.   Osteopenia, Dexa due in 2023 for hx of osteopenia/ osteoporosis, unclear if on calcium and vit d. Encouraged to take calcium 1200 mg a day &  vit d 2000 units a day   Was to start vit B 12 500 mcg OTC daily for low normal B 12 noted in march and physical deconditioning (  forgot to start). Encouraged to take vitamin B 12 500 mcg OTC daily for low normal B 12 noted in march, memory and physical deconditioning   For new epigastric upper abdominal pain intermittent 1 week worse with pushing on it exam was  benign, no rebound or surgical abdomen noted.  Did not radiate into chest & had  no associated cardiac symptoms.  Suspected gastritis.  To avoid spicy, avoid greasy foods, and red sauces, coffee, alcohol till better. Increase Pepcid to twice a day, avoid aspirin and see what labs show, warm pack to area. counseled if gets worse, has chest pain, trouble breathing or has blood in stool or black stool, to go to the ER, & if not better may need an EGD etc. Ultrasound to check for pain. Ct abdomen if no answer.   Given flu shot. Declined Covid booster & Shingrex.   TSH was mildly out of range normal Ft4 & continued on same dose. LDL was 84, TG elevated at 184, normal HB A1c, B 12 low normal, negative for H Pylori but lipase quite elevated. Advised to be seen in the er. [place] got hold of her & se opted to go to a local er there where noted symptoms had abated & chose not to do a ct scan in er. Repeat labs on  9/28 showed normal amylase & improved lipase& low wbc. Ct ordered & referred to hematology. Ct done 9/29 showed no pancreatitis,arthritis spine,atherosclerosis, diverticulosis & osteopenia & advised no alcohol. U/abdomen on 10/8/22 was normal. Seen by hematology 10/20/22 & suspected had chronic intermittent idiopathic neutropenia . Labs done showed normal copper, cr 1.9 stable, normal crp,ferritin folate, mild leukopenia, neutropenia & lymphopenia. BM Bx held off & advised to always get a cbc when sick& Neupogen shots if had absolute neutropenia at the time not responding to regular therapy.  Seen by eye 10/28/22 for routine check up of chronic concerns.     Seen 3/28/23 for Medicare wellness as well as for hypertension hypothyroidism chronic issues and  medications. Vital stable. Vision under care of eye. Hearing okay. Mammogram due. Had had no falls. DEXA scan due to screen for osteoporosis and order placed. Failed cognition screen, tracking okay reported no concerns.  Alone at apt. Lived with daughter who drove her to clinic.  MRI ordered.  Opted out of neurology referral and recheck in 6 months with daughter present at next visit. Healthcare maintenance reviewed. Had healthcare directives on file. COVID booster due but declined. Discussed Shingrex was to think about it. Rest of vaccines up-to-date. Hypertension stable on lisinopril 20 mg bedtime with no side effects.  To continue to avoid salt, limit alcohol intake, regular exercise and weight loss. Hx of chronic kidney disease stage III with resolved microalbuminuria on ACE inhibitor. To avoid anti-inflammatories as much as possible. Later labs showed electrolytes & liver tests wnl, kidney function decreased, to increase water intake & recheck non fasting in a lab only apt in 6 weeks when recommended rechecking TSH.     History of prediabetes, HB A1c came back 5.7 &encouraged to continue with a low carb diet, increase physical activity & some weight loss  to help. Hyperlipidemia on atorvastatin 20 mg.  Later labs showed lipids goal and continued on same dose of statin. BMI had gone up a bit likely due to shrinking in height though weight had improved.  To continue to eat small portions, less carb's, less juices, and increase physical activity.  Felt well and opted due to cost to hold off on seeing a sleep specialist or a weight doctor.  History of chronic intermittent idiopathic neutropenia that predated cancer diagnosis and treatment.  Negative work-up and asymptomatic from it.  Seen by hematology most recently in the fall 2022.  Peripheral smear and additional lab work for iron copper folic acid were normal.  No nutritional causes seen.  Held off on bone marrow biopsy given stability and lack of symptoms.   Advised to continue B 12 supplementation, taking unknown dosing. If should feel sick, have fevers, significant abdominal pains then recommended she should always come in to be evaluated for infection and get a CBC & let her providers know has a history of neutropenia and if neutrophils are low or if infection is not improving with regular treatment then hematology recommended a course of Neupogen 480 mcg subcu for 3 to 5 days at the time. At the time she was asymptomatic with stable low wbc stable & normal B 12. No longer with upper abdominal pain, resolved a week after seen for it in September 2022.  At that time lipase was elevated though CT scan later did not show pancreatitis & ultrasound was unremarkable.but imaging was noted done after symptoms had subsided. Remained on Pepcid 20 mg twice a day & to monitor for any new symptoms.   Hypothyroidism on levothyroxine 150 mcg daily.  In the past her TSH had been mildly out of range. After the visit noted thyroid testing indicated under correction. Recommended changing thyroid to 150 mcg 1 tab 6 days a week & 1.5 tab ( 150+75) mcg one day a week & recheck TSH in 6 weeks. At visit noted just got a refill so new script not sent in and to wait to see what TSH looked like in 6 weeks of change to adjust meds and refill.   History of snoring, BMI more than 38, there was possibility of undiagnosed sleep apnea. She felt well and opted not to see the sleep specialist. Dependent edema likely due to weight and possibly varicose veins.& undiagnosed sleep apnea could also contribute to this. Had no sign of heart failure. To continue to use compression socks.  History of osteopenia continued on calcium 1200 a day & vitamin D at least 2000 units daily and recheck DEXA scan. Later Dexa showed osteopenia but with high fracture risk of hip & so sent in fosamax 70 mg 1/wk, to monitor for GERD & to connect with her dentist prior to starting & recheck Dexa in 1 yr.  History of breast  cancer, prior right mastectomy scar looked good.  Left breast exam was normal.  Left screening mammogram due and order in place to schedule..  Memory deficits noted on screening test.  Was alone without family & responding appropriately.  Opted not to see neurology.  MRI brain ordered.  Encouraged to return in 6 months for recheck with family. Was to continue care with the eye doctor for history of vitreal macular adhesion, right eye epiretinal membrane on the right and bilateral glaucoma suspect monitoring.     Labs showed normal B 12, bmp, lfts, HB A1c of 5.7. Dexa 4/4/23 showed osteopenia. 4/14/ screening mammogram showed asymmetry left breast and dx studies ordered. MRI 4/14 showed atrophy and white matter changes. Seen by eye 4/26 for glaucoma check, presbyopia, epiretinal membrane, dx mammogram and left breast u/s on 4/26 showed no concern on left but noted right mastectomy scar dehiscence and referred to the surgeon.seen by breast clinic 4/27 biopsy done, showed radiation changes. Seen by surgeon 5/7 and continued on keflex given for wound infection. On 5/8 TSH stable & continued on levothyroxine 150 6 day sand then 1.5 tab one day a week. Seen By wound clinic 5/18 and 6/15 & noted better. On 6/19 TSH was normal and continued same dosing. Met with the  8/10/23 given fh & personal hx of cancer and was to think if desired testing or not    Seen 9/26/23 Hypertension on lisinopril 20 mg bedtime with no side effects. BP elevated 167/75. Encouraged eating a low salt, caffeine diet and low alcohol intake. Added amlodipine 2.5 mg daily to help BP. Goal BP should be 130/80 santos given decreasing kidney function. Had enough lisinopril as filled recently. Rest of meds to wait to fill till labs back in case dose needed changing. Was to return in 6 weeks for BP. Chronic kidney disease stage III with hx of microalbuminuria in the past on ACE inhibitor.  Avoid anti-inflammatories as much as possible. Later labs  showed Kidney function decreased but similar to prior, to continue to avoid NSAID's, stay hydrated and take the lisinopril to protect kidney function and start the amlodipine to improve BP as uncontrolled BP would worsen kidney function. Rest of electrolytes were normal. BMI 35 & weight had gone up. Had tried a Keto diet, declined referral to weight specialist or sleep, or mtm , or taking meds. LFT's were normal. Encouraged smaller portions and increasing exercise. Prediabetes HB A1c 5.7. & unchanged to continue with dietary and lifestyle changes and recheck again in 3 to 6 months time. Hyperlipidemia on atorvastatin 20 mg. ASCVD risk 20% . Lipids in march had been normal to continue same dose of statin. Hypothyroidism on levothyroxine 150 mcg 6 days of week and 1.5 tab of 150 1 day of week. Since 4/2023 TSH in June had been. normal. Later labs showed TSH was slightly above range but Ft4 was normal. Was to continue her levothyroxine 1 tab 6 days a week & 1.5 tab one day a week & recheck TSH when I was to see her back on 11/9 for her BP. I gave her 100 with 1 refill end of June so she was expected to enough of this med till labs rechecked in nov apt. History of snoring, BMI more than 35, there was possibility of undiagnosed sleep apnea.  Opted not to see the sleep specialist. Reported her daughter did all the driving.   History of chronic intermittent idiopathic neutropenia that predated cancer diagnosis and treatment.  Negative work-up and asymptomatic in past Seen by hematology most recently in the fall 2022.  Peripheral smear and additional lab work for iron copper folic acid had been normal.  No nutritional causes seen.  Held off on bone marrow biopsy given stability and lack of symptoms. On B 12 supp unknown dose.  B 12 wnl in past. Was advised if should feel sick, have fevers, significant abdominal pains then advised she should always come in to be evaluated for infection and get a CBC. & let her providers know  the history of neutropenia and if neutrophils were low or if infection was not improving with regular treatment then hematology recommended a course of Neupogen 480 mcg subcu for 3 to 5 days at the time. Had no symptoms & Wbc low but unchanged from before.   Dependent edema likely due to weight  possibly varicose veins & undiagnosed sleep apnea. Was to continue using compression socks and encouraged a low salt diet and elevation  of legs 1 hr above heart level each day. Dexa  in 4/2023 showed osteopenia but with high fracture risk of hip & so started on fosamax 70 mg 1/wk, & remained on calcium 1200 a day & vitamin D at least 2000 units daily. Had had no side effects, no blood in stools or black stools & no dental issues. Dexa due again 2024. On Pepcid prn heart burn related to foods felt not any worse since on fosamax. Hx of C scope done for positive cologuard which showed 3 serrated adenoma and one tubular adenoma removed 9/2022 and advised recheck in 3 yrs ( 2025).    Memory deficits noted on screening test at last visit. Minicog at recent visit she recalled 2 of 3 words. Was again alone without family. Though daughter dropped her off. MRI brain in April 2023 showed no mass effect, midline shift, or evidence of intracranial hemorrhage. The ventricles were proportionate to the cerebral sulci. Normal major vascular intracranial flow-voids. Advanced leukoaraiosis. Mild diffuse cerebral volume loss. Post contrast images demonstrated no abnormal intracranial enhancement. No abnormality of the skull marrow signal. The visualized portions of paranasal sinuses, and mastoid air cells were relatively clear. The orbits were grossly unremarkable. Discussed white matter changes and atrophy. Encouraged to keep BP under control. She was responding appropriately & opted not to see neurology.    History of right breast cancer, prior right mastectomy scar breakdown and infection post punch biopsy for wound dehiscence had resolved.  "Skin noted intact. Seen by breast center. Left breast screening mammogram showed an asymmetry in April s/p dx imaging and u/s was unremarkable. To continue yearly screening. Did meet with a  given fh and personal hx of cancer and opted not to do any testing.   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 Given the high dose flu shot & discussed considering the new COVID vaccine and RSV vaccine when available.   Reminded to think of getting Shingrex. Was to return in 6 weeks for a BP & TSH check    Seen by GI doctor 10/31/2023.  No-show to appointment 11/9.  Seen by sports medicine 1 week later for history of left wrist fracture that had occurred a week prior there was concern for osteoporosis given was on Fosamax for osteopenia and had fallen from a short height sustaining injury.  Was managed conservatively with splint and seen by sports med for follow-up 12/8, 12/28 and 1/25/2024.  Did see OT once on 1/4/2024.  [place]  negative    Review of Systems  Constitutional, HEENT, cardiovascular, pulmonary, GI, , musculoskeletal, neuro, skin, endocrine and psych systems are negative, except as otherwise noted.      Objective    BP (!) 143/73 (BP Location: Right arm, Patient Position: Sitting, Cuff Size: Adult Large)   Pulse 60   Temp 97.6  F (36.4  C) (Temporal)   Resp 16   Ht 1.76 m (5' 9.3\")   Wt 108.2 kg (238 lb 9.6 oz)   SpO2 99%   Breastfeeding No   BMI 34.93 kg/m    Body mass index is 34.93 kg/m .  Physical Exam   GENERAL: alert, no distress, and obese  EYES: Eyes grossly normal to inspection, PERRL and conjunctivae and sclerae normal, glasses  HENT: ear canals and TM's normal, nose and mouth without ulcers or lesions, right ear canal mildly occluded with cerumen  NECK: no adenopathy, no asymmetry, masses, or scars  RESP: lungs clear to auscultation - no rales, rhonchi or wheezes  CV: " regular rate and rhythm, normal S1 S2, no S3 or S4, no murmur, click or rub, no peripheral edema  ABDOMEN: soft, nontender, no hepatosplenomegaly, no masses and bowel sounds normal  MS: no gross musculoskeletal defects noted, no edema  SKIN: no suspicious lesions or rashes  NEURO: Normal strength and tone, mentation intact and speech normal  PSYCH: mentation appears normal, affect normal/bright    Results for orders placed or performed in visit on 03/18/24   Hemoglobin A1c     Status: Normal   Result Value Ref Range    Hemoglobin A1C 5.6 0.0 - 5.6 %     Results for orders placed or performed in visit on 03/18/24 (from the past 24 hour(s))   Hemoglobin A1c   Result Value Ref Range    Hemoglobin A1C 5.6 0.0 - 5.6 %           Signed Electronically by: Marry Rodriguez MD

## 2024-03-18 NOTE — TELEPHONE ENCOUNTER
Romayne is calling re: Levothyroxine.     She wants to know if a new Rx was sent in after her visit with Dr. Rodriguez today.    Per OV note:  . . . In June advised continuing levothyroxine 1 tab 6 days a week & 1.5 tab one day a week & recheck TSH when I see her back on 11/9 for her BP. I gave her 100 with 1 refill end of June so she should have enough of this med till labs rechecked in nov apt. Reports was hard to cut and taking 2 one day and then ran out early. Previously not filled till 100 in june with 1 refill. And then missed nov apt. Ran out thinks 1 week ago could have been longer. Will check today and go from there . . .     Notified pt that Thyroid lab results are not yet in, (In process), and that Dr. Rodriguez will likely submit a new Rx after results are received.    She states that she will not be in town tomorrow.    Her prescriptions are normally sent to [facility]. Notified that if she was near another [facility], she could ask them to find the Rx in their system and fill it for her.    She is disappointed & frustrated. Unsure if transportation or access to pharmacy is an issue - not verbalized by pt.    Cristiane Colindres, RN  [facility]    Reason for Disposition   [1] Prescription refill request for NON-ESSENTIAL medicine (i.e., no harm to patient if med not taken) AND [2] triager unable to refill per department policy    Protocols used: Medication Refill and Renewal Call-A-       Reason for Disposition   Caller has NON-URGENT medicine question about med that PCP prescribed and triager unable to answer question    Protocols used: Medication Refill and Renewal Call-A-[place]

## 2024-03-19 ENCOUNTER — TELEPHONE (OUTPATIENT)
Dept: FAMILY MEDICINE | Facility: CLINIC | Age: 79
End: 2024-03-19

## 2024-03-19 NOTE — TELEPHONE ENCOUNTER
Tried calling patient but no answer.  states we've reached an 'Roxane' so did not leave VM. Sending patient Dr. Rodriguez's medication update via [facility] and also available to the patient via result notes.    MARY MarcialN, RN  [facility]

## 2024-03-19 NOTE — TELEPHONE ENCOUNTER
LM for pt to read her Mychart with this result and med message.  Call [facility] triage with any further questions.    It looks like pt will actually need to see this entire result message.  LENORA Roque      ----- Message from Marry Rodriguez MD sent at 3/18/2024  7:22 PM CDT -----  Hello -    Here are my comments about your recent results:  TSH came back elevated at 5.24, free T4 not back.  Will resume levothyroxine 150 mcg daily 6 days of the week but 1-1/2 tablet or 150 mcg 1 day of the week will send in 100 tablets advised not to double up on dosing if difficult to cut in half use a pill cutter and recheck on medications as advised when returns for follow-up in May so we can adjust meds appropriately at the time is currently levels may not be accurate as checked when had already ran out of medication.   and ASCVD risk is high 30% of having a heart attack or stroke in the next 10 years and will increase atorvastatin to 40 mg bedtime and recheck cholesterol in May when seen on this medication.  Electrolytes and liver tests are stable kidney function remains at chronic kidney disease stage III with function around 50% continue to avoid anti-inflammatories.  Vitamin D is normal at 43 continue with current dosing of vitamin D 2000 units daily and calcium 1200 mg total daily as advised previously       Please let us know if you have any questions or concerns.      Regards,  Marry Rodriguez MD

## 2024-03-19 NOTE — RESULT ENCOUNTER NOTE
Hello -    Here are my comments about your recent results:  -Microalbumin (urine protein) test is normal.  ADVISE: rechecking this annually.  For additional lab test information, labtestsonline.org is an excellent reference..    Please let us know if you have any questions or concerns.     Regards,  Marry Rodriguez MD

## 2024-03-19 NOTE — RESULT ENCOUNTER NOTE
Hello -    Here are my comments about your recent results:  TSH came back elevated at 5.24, free T4 not back.  Will resume levothyroxine 150 mcg daily 6 days of the week but 1-1/2 tablet or 150 mcg 1 day of the week will send in 100 tablets advised not to double up on dosing if difficult to cut in half use a pill cutter and recheck on medications as advised when returns for follow-up in May so we can adjust meds appropriately at the time is currently levels may not be accurate as checked when had already ran out of medication.   and ASCVD risk is high 30% of having a heart attack or stroke in the next 10 years and will increase atorvastatin to 40 mg bedtime and recheck cholesterol in May when seen on this medication.  Electrolytes and liver tests are stable kidney function remains at chronic kidney disease stage III with function around 50% continue to avoid anti-inflammatories.  Vitamin D is normal at 43 continue with current dosing of vitamin D 2000 units daily and calcium 1200 mg total daily as advised previously       Please let us know if you have any questions or concerns.     Regards,  Marry Rodriguez MD

## 2024-03-27 ENCOUNTER — PATIENT OUTREACH (OUTPATIENT)
Dept: CARE COORDINATION | Facility: CLINIC | Age: 79
End: 2024-03-27
Payer: COMMERCIAL

## 2024-04-05 ENCOUNTER — TELEPHONE (OUTPATIENT)
Dept: OPHTHALMOLOGY | Facility: CLINIC | Age: 79
End: 2024-04-05
Payer: COMMERCIAL

## 2024-04-05 NOTE — TELEPHONE ENCOUNTER
ENRIQUETA to schedule surgery with  and see if she would like to proceed with scheduling, Natalia sent as well    Anna C. Schoenecker on 4/5/2024 at 1:15 PM

## 2024-04-10 DIAGNOSIS — E03.9 ACQUIRED HYPOTHYROIDISM: ICD-10-CM

## 2024-04-10 RX ORDER — LEVOTHYROXINE SODIUM 150 UG/1
TABLET ORAL
Qty: 45 TABLET | OUTPATIENT
Start: 2024-04-10

## 2024-04-10 NOTE — TELEPHONE ENCOUNTER
Pt called said pharmacy needs a order. Pt was advised that 100 tablets sent on 03/18/24. Pt stated pharmacy only gave her 45 tablets. Pt was advised to contact pharmacy again and ask that they fulfill the remainder of her prescription order. Pt verbalized understanding and stated she will go to pharmacy.

## 2024-04-29 ENCOUNTER — ANCILLARY PROCEDURE (OUTPATIENT)
Dept: MAMMOGRAPHY | Facility: CLINIC | Age: 79
End: 2024-04-29
Attending: FAMILY MEDICINE
Payer: COMMERCIAL

## 2024-04-29 DIAGNOSIS — Z12.31 VISIT FOR SCREENING MAMMOGRAM: ICD-10-CM

## 2024-04-29 PROCEDURE — 77067 SCR MAMMO BI INCL CAD: CPT | Mod: 52 | Performed by: RADIOLOGY

## 2024-04-29 PROCEDURE — 77063 BREAST TOMOSYNTHESIS BI: CPT | Mod: 52 | Performed by: RADIOLOGY

## 2024-05-16 ENCOUNTER — MYC REFILL (OUTPATIENT)
Dept: FAMILY MEDICINE | Facility: CLINIC | Age: 79
End: 2024-05-16
Payer: COMMERCIAL

## 2024-05-16 DIAGNOSIS — I10 HYPERTENSION GOAL BP (BLOOD PRESSURE) < 140/90: ICD-10-CM

## 2024-05-16 RX ORDER — AMLODIPINE BESYLATE 5 MG/1
2.5 TABLET ORAL DAILY
Qty: 90 TABLET | Refills: 0 | Status: CANCELLED | OUTPATIENT
Start: 2024-05-16

## 2024-05-17 RX ORDER — AMLODIPINE BESYLATE 5 MG/1
5 TABLET ORAL DAILY
Qty: 90 TABLET | Refills: 0 | Status: SHIPPED | OUTPATIENT
Start: 2024-05-17 | End: 2024-07-02

## 2024-05-21 NOTE — TELEPHONE ENCOUNTER
"Patient called regarding being unable to get medication until June and that the pharmacy was waiting on provider authorization.  Connected with pharmacy who state this is an insurance thing, it does not matter if we authorize early fill or send another script it will not be paid for until 6/1.  Pharmacy will dispense 10 day supply to bridge patient to 6/1 when she will be able to get 90 day supply. Advised patient to connect with insurance as when relaying new information about 10 days she continued to talk about previous \"partial fill\" she received.  "

## 2024-05-26 ENCOUNTER — HEALTH MAINTENANCE LETTER (OUTPATIENT)
Age: 79
End: 2024-05-26

## 2024-06-07 DIAGNOSIS — Z87.81 PERSONAL HISTORY OF TRAUMATIC FRACTURE: ICD-10-CM

## 2024-06-07 DIAGNOSIS — M85.80 OSTEOPENIA, UNSPECIFIED LOCATION: ICD-10-CM

## 2024-06-07 RX ORDER — ALENDRONATE SODIUM 70 MG/1
70 TABLET ORAL
Qty: 12 TABLET | Refills: 0 | Status: SHIPPED | OUTPATIENT
Start: 2024-06-07 | End: 2024-07-02

## 2024-06-30 SDOH — HEALTH STABILITY: PHYSICAL HEALTH: ON AVERAGE, HOW MANY MINUTES DO YOU ENGAGE IN EXERCISE AT THIS LEVEL?: 30 MIN

## 2024-06-30 SDOH — HEALTH STABILITY: PHYSICAL HEALTH: ON AVERAGE, HOW MANY DAYS PER WEEK DO YOU ENGAGE IN MODERATE TO STRENUOUS EXERCISE (LIKE A BRISK WALK)?: 1 DAY

## 2024-06-30 ASSESSMENT — SOCIAL DETERMINANTS OF HEALTH (SDOH): HOW OFTEN DO YOU GET TOGETHER WITH FRIENDS OR RELATIVES?: ONCE A WEEK

## 2024-07-02 ENCOUNTER — OFFICE VISIT (OUTPATIENT)
Dept: FAMILY MEDICINE | Facility: CLINIC | Age: 79
End: 2024-07-02
Payer: COMMERCIAL

## 2024-07-02 VITALS
RESPIRATION RATE: 18 BRPM | WEIGHT: 243.9 LBS | HEIGHT: 69 IN | SYSTOLIC BLOOD PRESSURE: 138 MMHG | DIASTOLIC BLOOD PRESSURE: 78 MMHG | HEART RATE: 63 BPM | BODY MASS INDEX: 36.12 KG/M2 | OXYGEN SATURATION: 98 % | TEMPERATURE: 97.4 F

## 2024-07-02 DIAGNOSIS — H25.9 SENILE CATARACT OF LEFT EYE, UNSPECIFIED AGE-RELATED CATARACT TYPE: ICD-10-CM

## 2024-07-02 DIAGNOSIS — N18.30 STAGE 3 CHRONIC KIDNEY DISEASE, UNSPECIFIED WHETHER STAGE 3A OR 3B CKD (H): ICD-10-CM

## 2024-07-02 DIAGNOSIS — M85.80 OSTEOPENIA, UNSPECIFIED LOCATION: ICD-10-CM

## 2024-07-02 DIAGNOSIS — Z87.81 PERSONAL HISTORY OF TRAUMATIC FRACTURE: ICD-10-CM

## 2024-07-02 DIAGNOSIS — R06.83 SNORING: ICD-10-CM

## 2024-07-02 DIAGNOSIS — R41.3 MEMORY DEFICIT: ICD-10-CM

## 2024-07-02 DIAGNOSIS — E66.01 MORBID OBESITY (H): ICD-10-CM

## 2024-07-02 DIAGNOSIS — Z00.00 MEDICARE ANNUAL WELLNESS VISIT, SUBSEQUENT: Primary | ICD-10-CM

## 2024-07-02 DIAGNOSIS — Z23 NEED FOR SHINGLES VACCINE: ICD-10-CM

## 2024-07-02 DIAGNOSIS — Z90.11 S/P MASTECTOMY, RIGHT: ICD-10-CM

## 2024-07-02 DIAGNOSIS — E78.2 MIXED HYPERLIPIDEMIA: ICD-10-CM

## 2024-07-02 DIAGNOSIS — Z78.0 ASYMPTOMATIC MENOPAUSAL STATE: ICD-10-CM

## 2024-07-02 DIAGNOSIS — I67.81 LEUKOARAIOSIS: ICD-10-CM

## 2024-07-02 DIAGNOSIS — Z87.19 HISTORY OF RECTAL BLEEDING: ICD-10-CM

## 2024-07-02 DIAGNOSIS — Z85.3 HISTORY OF BREAST CANCER: ICD-10-CM

## 2024-07-02 DIAGNOSIS — I10 HYPERTENSION GOAL BP (BLOOD PRESSURE) < 140/90: ICD-10-CM

## 2024-07-02 DIAGNOSIS — T85.22XA DISPLACEMENT OF INTRAOCULAR LENS, INITIAL ENCOUNTER: ICD-10-CM

## 2024-07-02 DIAGNOSIS — Z00.00 HEALTH CARE MAINTENANCE: ICD-10-CM

## 2024-07-02 DIAGNOSIS — R60.9 DEPENDENT EDEMA: ICD-10-CM

## 2024-07-02 DIAGNOSIS — R73.03 PREDIABETES: ICD-10-CM

## 2024-07-02 DIAGNOSIS — Z86.0101 HISTORY OF ADENOMATOUS POLYP OF COLON: ICD-10-CM

## 2024-07-02 DIAGNOSIS — D70.9 CHRONIC IDIOPATHIC NEUTROPENIA (H): ICD-10-CM

## 2024-07-02 DIAGNOSIS — Z71.89 ADVANCED DIRECTIVES, COUNSELING/DISCUSSION: ICD-10-CM

## 2024-07-02 DIAGNOSIS — H35.371 EPIRETINAL MEMBRANE, RIGHT: ICD-10-CM

## 2024-07-02 DIAGNOSIS — E03.9 ACQUIRED HYPOTHYROIDISM: ICD-10-CM

## 2024-07-02 DIAGNOSIS — H40.003 GLAUCOMA SUSPECT, BILATERAL: ICD-10-CM

## 2024-07-02 DIAGNOSIS — Z29.11 NEED FOR VACCINATION AGAINST RESPIRATORY SYNCYTIAL VIRUS: ICD-10-CM

## 2024-07-02 LAB
ALBUMIN SERPL BCG-MCNC: 4.2 G/DL (ref 3.5–5.2)
ALP SERPL-CCNC: 41 U/L (ref 40–150)
ALT SERPL W P-5'-P-CCNC: 25 U/L (ref 0–50)
ANION GAP SERPL CALCULATED.3IONS-SCNC: 9 MMOL/L (ref 7–15)
AST SERPL W P-5'-P-CCNC: 27 U/L (ref 0–45)
BASOPHILS # BLD AUTO: ABNORMAL 10*3/UL
BASOPHILS # BLD MANUAL: 0.1 10E3/UL (ref 0–0.2)
BASOPHILS NFR BLD AUTO: ABNORMAL %
BASOPHILS NFR BLD MANUAL: 5 %
BILIRUB SERPL-MCNC: 0.4 MG/DL
BUN SERPL-MCNC: 24.4 MG/DL (ref 8–23)
BURR CELLS BLD QL SMEAR: SLIGHT
CA-I BLD-MCNC: 4.7 MG/DL (ref 4.4–5.2)
CALCIUM SERPL-MCNC: 9.3 MG/DL (ref 8.8–10.2)
CHLORIDE SERPL-SCNC: 102 MMOL/L (ref 98–107)
CHOLEST SERPL-MCNC: 181 MG/DL
CREAT SERPL-MCNC: 1.2 MG/DL (ref 0.51–0.95)
DACRYOCYTES BLD QL SMEAR: SLIGHT
DEPRECATED HCO3 PLAS-SCNC: 28 MMOL/L (ref 22–29)
EGFRCR SERPLBLD CKD-EPI 2021: 46 ML/MIN/1.73M2
EOSINOPHIL # BLD AUTO: ABNORMAL 10*3/UL
EOSINOPHIL # BLD MANUAL: 0.1 10E3/UL (ref 0–0.7)
EOSINOPHIL NFR BLD AUTO: ABNORMAL %
EOSINOPHIL NFR BLD MANUAL: 3 %
ERYTHROCYTE [DISTWIDTH] IN BLOOD BY AUTOMATED COUNT: 14.2 % (ref 10–15)
FASTING STATUS PATIENT QL REPORTED: YES
FASTING STATUS PATIENT QL REPORTED: YES
FRAGMENTS BLD QL SMEAR: SLIGHT
GLUCOSE SERPL-MCNC: 92 MG/DL (ref 70–99)
HCT VFR BLD AUTO: 42.5 % (ref 35–47)
HDLC SERPL-MCNC: 53 MG/DL
HGB BLD-MCNC: 13.5 G/DL (ref 11.7–15.7)
IMM GRANULOCYTES # BLD: ABNORMAL 10*3/UL
IMM GRANULOCYTES NFR BLD: ABNORMAL %
LDLC SERPL CALC-MCNC: 106 MG/DL
LYMPHOCYTES # BLD AUTO: ABNORMAL 10*3/UL
LYMPHOCYTES # BLD MANUAL: 0.8 10E3/UL (ref 0.8–5.3)
LYMPHOCYTES NFR BLD AUTO: ABNORMAL %
LYMPHOCYTES NFR BLD MANUAL: 39 %
MCH RBC QN AUTO: 29.2 PG (ref 26.5–33)
MCHC RBC AUTO-ENTMCNC: 31.8 G/DL (ref 31.5–36.5)
MCV RBC AUTO: 92 FL (ref 78–100)
MONOCYTES # BLD AUTO: ABNORMAL 10*3/UL
MONOCYTES # BLD MANUAL: 0.6 10E3/UL (ref 0–1.3)
MONOCYTES NFR BLD AUTO: ABNORMAL %
MONOCYTES NFR BLD MANUAL: 28 %
NEUTROPHILS # BLD AUTO: ABNORMAL 10*3/UL
NEUTROPHILS # BLD MANUAL: 0.5 10E3/UL (ref 1.6–8.3)
NEUTROPHILS NFR BLD AUTO: ABNORMAL %
NEUTROPHILS NFR BLD MANUAL: 25 %
NONHDLC SERPL-MCNC: 128 MG/DL
NRBC # BLD AUTO: 0 10E3/UL
NRBC BLD AUTO-RTO: 0 /100
PLAT MORPH BLD: ABNORMAL
PLATELET # BLD AUTO: 182 10E3/UL (ref 150–450)
POTASSIUM SERPL-SCNC: 4.9 MMOL/L (ref 3.4–5.3)
PROT SERPL-MCNC: 7.5 G/DL (ref 6.4–8.3)
PTH-INTACT SERPL-MCNC: 43 PG/ML (ref 15–65)
RBC # BLD AUTO: 4.63 10E6/UL (ref 3.8–5.2)
RBC MORPH BLD: ABNORMAL
SODIUM SERPL-SCNC: 139 MMOL/L (ref 135–145)
T4 FREE SERPL-MCNC: 1.14 NG/DL (ref 0.9–1.7)
TRIGL SERPL-MCNC: 112 MG/DL
TSH SERPL DL<=0.005 MIU/L-ACNC: 12.4 UIU/ML (ref 0.3–4.2)
VIT D+METAB SERPL-MCNC: 39 NG/ML (ref 20–50)
WBC # BLD AUTO: 2.1 10E3/UL (ref 4–11)

## 2024-07-02 PROCEDURE — 83970 ASSAY OF PARATHORMONE: CPT | Performed by: FAMILY MEDICINE

## 2024-07-02 PROCEDURE — 80061 LIPID PANEL: CPT | Performed by: FAMILY MEDICINE

## 2024-07-02 PROCEDURE — G0439 PPPS, SUBSEQ VISIT: HCPCS | Performed by: FAMILY MEDICINE

## 2024-07-02 PROCEDURE — 80053 COMPREHEN METABOLIC PANEL: CPT | Performed by: FAMILY MEDICINE

## 2024-07-02 PROCEDURE — 85007 BL SMEAR W/DIFF WBC COUNT: CPT | Performed by: FAMILY MEDICINE

## 2024-07-02 PROCEDURE — 36415 COLL VENOUS BLD VENIPUNCTURE: CPT | Performed by: FAMILY MEDICINE

## 2024-07-02 PROCEDURE — 84443 ASSAY THYROID STIM HORMONE: CPT | Performed by: FAMILY MEDICINE

## 2024-07-02 PROCEDURE — 82306 VITAMIN D 25 HYDROXY: CPT | Performed by: FAMILY MEDICINE

## 2024-07-02 PROCEDURE — 99214 OFFICE O/P EST MOD 30 MIN: CPT | Mod: 25 | Performed by: FAMILY MEDICINE

## 2024-07-02 PROCEDURE — 82330 ASSAY OF CALCIUM: CPT | Performed by: FAMILY MEDICINE

## 2024-07-02 PROCEDURE — 85027 COMPLETE CBC AUTOMATED: CPT | Performed by: FAMILY MEDICINE

## 2024-07-02 PROCEDURE — 84439 ASSAY OF FREE THYROXINE: CPT | Performed by: FAMILY MEDICINE

## 2024-07-02 RX ORDER — ALENDRONATE SODIUM 70 MG/1
70 TABLET ORAL
Qty: 12 TABLET | Refills: 3 | Status: SHIPPED | OUTPATIENT
Start: 2024-07-02

## 2024-07-02 RX ORDER — AMLODIPINE BESYLATE 5 MG/1
5 TABLET ORAL DAILY
Qty: 90 TABLET | Refills: 3 | Status: SHIPPED | OUTPATIENT
Start: 2024-07-02

## 2024-07-02 RX ORDER — RESPIRATORY SYNCYTIAL VIRUS VACCINE 120MCG/0.5
0.5 KIT INTRAMUSCULAR ONCE
Qty: 1 EACH | Refills: 0 | Status: CANCELLED | OUTPATIENT
Start: 2024-07-02 | End: 2024-07-02

## 2024-07-02 RX ORDER — LISINOPRIL 20 MG/1
20 TABLET ORAL AT BEDTIME
Qty: 90 TABLET | Refills: 3 | Status: SHIPPED | OUTPATIENT
Start: 2024-07-02

## 2024-07-02 ASSESSMENT — PAIN SCALES - GENERAL: PAINLEVEL: NO PAIN (0)

## 2024-07-02 NOTE — PATIENT INSTRUCTIONS
"Patient Education   Preventive Care Advice   This is general advice we often give to help people stay healthy. Your care team may have specific advice just for you. Please talk to your care team about your own preventive care needs.  Lifestyle  Exercise at least 150 minutes each week (30 minutes a day, 5 days a week).  Do muscle strengthening activities 2 days a week. These help control your weight and prevent disease.  No smoking.  Wear sunscreen to prevent skin cancer.  Have your home tested for radon every 2 to 5 years. Radon is a colorless, odorless gas that can harm your lungs. To learn more, go to www.health.[facility].[place] and search for \"Radon in Homes.\"  Keep guns unloaded and locked up in a safe place like a safe or gun vault, or, use a gun lock and hide the keys. Always lock away bullets separately. To learn more, visit [place] and search for \"safe gun storage.\"  Nutrition  Eat 5 or more servings of fruits and vegetables each day.  Try wheat bread, brown rice and whole grain pasta (instead of white bread, rice, and pasta).  Get enough calcium and vitamin D. Check the label on foods and aim for 100% of the RDA (recommended daily allowance).  Regular exams  Have a dental exam and cleaning every 6 months.  See your health care team every year to talk about:  Any changes in your health.  Any medicines your care team has prescribed.  Preventive care, family planning, and ways to prevent chronic diseases.  Shots (vaccines)   HPV shots (up to age 26), if you've never had them before.  Hepatitis B shots (up to age 59), if you've never had them before.  COVID-19 shot: Get this shot when it's due.  Flu shot: Get a flu shot every year.  Tetanus shot: Get a tetanus shot every 10 years.  Pneumococcal, hepatitis A, and RSV shots: Ask your care team if you need these based on your risk.  Shingles shot (for age 50 and up).  General health tests  Diabetes screening:  Starting at age 35, Get screened for diabetes at least " every 3 years.  If you are younger than age 35, ask your care team if you should be screened for diabetes.  Cholesterol test: At age 39, start having a cholesterol test every 5 years, or more often if advised.  Bone density scan (DEXA): At age 50, ask your care team if you should have this scan for osteoporosis (brittle bones).  Hepatitis C: Get tested at least once in your life.  Abdominal aortic aneurysm screening: Talk to your doctor about having this screening if you:  Have ever smoked; and  Are biologically male; and  Are between the ages of 65 and 75.  STIs (sexually transmitted infections)  Before age 24: Ask your care team if you should be screened for STIs.  After age 24: Get screened for STIs if you're at risk. You are at risk for STIs (including HIV) if:  You are sexually active with more than one person.  You don't use condoms every time.  You or a partner was diagnosed with a sexually transmitted infection.  If you are at risk for HIV, ask about PrEP medicine to prevent HIV.  Get tested for HIV at least once in your life, whether you are at risk for HIV or not.  Cancer screening tests  Cervical cancer screening: If you have a cervix, begin getting regular cervical cancer screening tests at age 21. Most people who have regular screenings with normal results can stop after age 65. Talk about this with your provider.  Breast cancer scan (mammogram): If you've ever had breasts, begin having regular mammograms starting at age 40. This is a scan to check for breast cancer.  Colon cancer screening: It is important to start screening for colon cancer at age 45.  Have a colonoscopy test every 10 years (or more often if you're at risk) Or, ask your provider about stool tests like a FIT test every year or Cologuard test every 3 years.  To learn more about your testing options, visit: www.[facility]/582868.pdf.  For help making a decision, visit: ervin/ek81581.  Prostate cancer screening test: If you have a  prostate and are age 55 to 69, ask your provider if you would benefit from a yearly prostate cancer screening test.  Lung cancer screening: If you are a current or former smoker age 50 to 80, ask your care team if ongoing lung cancer screenings are right for you.  For informational purposes only. Not to replace the advice of your health care provider. Copyright   2023 [facility]. All rights reserved. Clinically reviewed by the [facility] Transitions Program. [employer] [id number] - REV 04/24.

## 2024-07-02 NOTE — RESULT ENCOUNTER NOTE
Hello -    Here are my comments about your recent results:  -Normal red blood cell (hgb) levels, chronic low white blood cell count unchanged from before and normal platelet level  For additional lab test information, labtestsonline.org is an excellent reference..    Please let us know if you have any questions or concerns.     Regards,  Marry Rodriguez MD

## 2024-07-02 NOTE — PROGRESS NOTES
Preventive Care Visit  [facility]  Marry Rodriguez MD, Family Medicine  Jul 2, 2024      Assessment & Plan     Medicare annual wellness visit, subsequent  Here for medicare wellness  Health care maintenance reviewed  Has ACP on file, no changes reported   Reports vision ok  Reports hearing ok   Passed cognition screen  No falls since fell nov 2023 breaking her wrist. Fall precautions discussed briefly  Dexa due again 2025  No breast issues, exam normal. Right mastectomy scar unchanged, no new breakdown   Mammogram left breast in April noted normal. Continue annual screening.  COVID vacc had in march 2024, will wait to get new one now in the fall  Discussed to consider rsv and shingles vaccines at her pharmacy.  Fasting labs today and will make further recommendations once reviewed    Hypertension on lisinopril 20 mg bedtime & amlodipine 5 mg daily at night. BP initially 152/82, 147/79, 138/78 end of visit, Reports took med this am. Had been given a script for home BP machine last visit, not obtained yet. Maybe misplaced script. Given new DME script for this and to continue same dose of meds for now. Encouraged a low salt diet, limit caffeine, avoid alcohol as much as possible & work on weight loss with diet and increased exercise. Encouraged to check BP, goal < 130/80, to return with home machine to a nurse only visit in 1 month for validating readings.      Chronic kidney disease stage III with prior hx of now resolved microalbuminuria on ACE inhibitor.  Encouraged to avoid anti-inflammatories as much as possible. Discussed and held off on jardiance,   Labs came back showing mild decreased in GFR. Advised increasing water intake & recheck non fasting with TSH in 4 to 6 weeks when comes in for nurse BP check. Sodium & Potassium are normal.     BMI increased to 36 . Previously had tried a Keto diet, declined referral to weight specialist or sleep, or mtm, or taking meds. Encouraged smaller  portions, avoiding sugary drinks and increasing exercise.  by walking daily      Prediabetes hx, Does like her sweets. HBA1c normal in march 2024. Not checked today. Glucose is normal.     Hyperlipidemia on atorvastatin 40 mg.since march 2024. Was given 90 with 1 refill in march. LDL(bad) cholesterol level remains elevated & The 10-year ASCVD risk score (Joe BUTLER, et al., 2019) is: 29.1%. I question med compliance given gaps in med refills and follow up in past but insists taking all as directed daily without missing. A diet high in fat and simple carbohydrates, genetics and being overweight can contribute to this. Advised exercising 150 minutes of aerobic exercise per week (30 minutes for 5 days per week or 50 minutes for 3 days per week are options) and eating a low saturated fat/low carbohydrate diet to improve this. Continue atorvastatin 40 mg for now and will recheck when I see her back as an under corrected thyroid as noted below can also contribute to poor lipid control & want to only make one med change at a time to prevent confusion. Liver and gallbladder tests (ALT,AST, Alk phos,bilirubin) are normal.     Hypothyroidism on levothyroxine 150 mcg 6 days of week and 1.5 tab of 150 1 day of week. In 4/2023 TSH normal. In 9/2023 TSH slightly above range but Ft4 normal. Was continued on levothyroxine 1 tab 6 days a week & 1.5 tab one day a week & was to recheck TSH when returned on 11/9 for her BP. Had been given 100 with 1 refill end of June 2023 so expected would have enough till nov apt. Was no show to apt in nov & in march 2024 reported since tab was diff to cut had been taking 2 instead of the 1.5 tab the one day. Unsure how long had been doing this. Reported ran out of med > 1 week ago, ? If not earlier. After the visit TSH came back elevated at 5.24, with normal free T4 not back. Was resumed on levothyroxine 150 mcg daily 6 days of the week & 1-1/2 tablet of 150 mcg 1 day of the week , sent in 100  tablets advised not to double up on dosing & if difficult to cut in half use a pill cutter and to recheck today as prior levels may not have been accurate as checked when had already run out of medication. Noted some insurance issues and med pickup issues but reports has been taking daily as directed so to recheck today & wait to see results to refill appropriate dose.   Later TSH came back elevated at 12ish & ft4 wnl. Is currently under replaced.  I questioned compliance but since she did report at visit was taking 150  6 days & 1.5 tab of 150 7th day each week consistently as directed will change medication dose to 175 micrograms/day (a prescription has been sent to her pharmacy) and to recheck TSH in a lab appointment in 6 weeks when comes in BP check. Is encouraged to start new dosing right away so the lab accurately reflects medication & further adjustments can then be made appropriately      History of snoring, BMI more than 36, there is possibility of undiagnosed sleep apnea. Opted not to see the sleep specialist. Advised not to drive when drowsy    Osteopenia noted on Dexa in 4/2023 but with the 10 year probability of major osteoporotic fracture 12.3%, and of hip fracture 3.0%, based on left femoral neck was started on fosamax 70 mg 1/wk, ( Mon am ). Has had no side effects, no increased GERD, no blood in stools or black stools & no dental issues. Is on Pepcid for prior hx of heart burn related to diet & that has not gotten worse since on fosamax. Reviewed history of traumatic left wrist fracture from a fall from standing position in nov 2023 and concern by sports medicine about it possibly being a fragility fracture. Was referred to endo to see if med needed to be changed but no apt made with them and desires now to wait till next year to see Dexa scan shows on meds. Encouraged calcium 1200 mg total a day & vit d 2000 units daily.   Later labs showed Normal ionized calcium, Calcium,PTH &  Vitamin D level is  normal     History of chronic intermittent idiopathic neutropenia that predated cancer diagnosis and treatment.  Negative work-up and asymptomatic in past Seen by hematology most recently in the fall 2022.  Peripheral smear and additional lab work for iron copper folic acid had been normal.  No nutritional causes seen.  Held off on bone marrow biopsy given stability and lack of symptoms. On B 12 supp unknown dose.  B 12 wnl in past. Was advised if should feel sick, have fevers, significant abdominal pains then advised she should always come in to be evaluated for infection and get a CBC. & let her providers know the history of neutropenia and if neutrophils were low or if infection was not improving with regular treatment then hematology recommended a course of Neupogen 480 mcg subcu for 3 to 5 days at the time. Reports no symptoms recently.   Labs showed Normal red blood cell (Hgb) levels, chronic low white blood cell count unchanged from before and normal platelet level. Neutrophils remain low slightly lower than 9 months prior but currently asymptomatic and no sign of infection. To continue to monitor     Dependent edema likely due to weight, possibly varicose veins,amlodipine & undiagnosed sleep apnea. No increased swelling noted since started on amlodipine, using compression socks and encouraged a continued low salt diet and elevation of legs 1 hr above heart level each day. Has declined a sleep eval.     History of right breast cancer, & prior right mastectomy in 2008, & hx of resolved scar breakdown and infection post punch biopsy for wound dehiscence in 2022/23. Seen by breast center. Left breast screening mammogram showed an asymmetry in April 2023 s/p dx imaging and u/s was unremarkable. Mammogram normal in 4/2024. Previously did meet with a  given fh and personal hx of cancer and opted not to do any testing.  Reports no breast or skin concerns today. To continue with annual mammograms.     Hx of  Cscope done for positive cologuard which showed 3 serrated adenoma and one tubular adenoma removed 9/2022 and advised recheck in 3 yrs ( 2025).  Reported has had no black stools.  But has noted bright red blood on wiping 1 month ago. ? Hemorrhoidal. Referred to colorectal to further evaluate and treat.     Memory deficits noted on screening test 2022. Minicog 2023 recalled 2 of 3 words. Always alone without family. MRI brain in April 2023 showed no mass effect, midline shift, or evidence of intracranial hemorrhage. The ventricles were proportionate to the cerebral sulci. Normal major vascular intracranial flow-voids. Advanced leukokraurosis. Mild diffuse cerebral volume loss. Post contrast images demonstrate no abnormal intracranial enhancement. No abnormality of the skull marrow signal. The visualized portions of paranasal sinuses, and mastoid air cells were relatively clear. The orbits were grossly unremarkable. Discussed white matter changes and atrophy. Keeping BP under control will be helpful. Is also on a statin. Has declined neurology referral.       History of Viteral macular adhesion, right eye epiretinal membrane on the right and bilateral glaucoma suspect under care of eye. Hopes to get left cataract done soon    To return in an Apt with home & clinic machine for BP check in 1 to 2 months with nurse  Follow up with me in 6 months  - PRIMARY CARE FOLLOW-UP SCHEDULING; Future    Hypertension goal BP (blood pressure) < 140/90  Hypertension on lisinopril 20 mg bedtime & amlodipine 5 mg daily at night. BP initially 152/82, 147/79, 138/78 end of visit, Reports took med this am. Had been given a script for home BP machine last visit, not obtained yet. Maybe misplaced script. Given new DME script for this and to continue same dose of meds for now. Encouraged a low salt diet, limit caffeine, avoid alcohol as much as possible & work on weight loss with diet and increased exercise. Encouraged to check BP, goal <  130/80, to return with home machine to a nurse only visit in 1 month for validating readings.   - Comprehensive metabolic panel; Future  - TSH with free T4 reflex; Future  - Lipid panel reflex to direct LDL Fasting; Future  - amlodipine (NORVASC) 5 MG tablet; Take 1 tablet (5 mg) by mouth daily  - lisinopril (ZESTRIL) 20 MG tablet; Take 1 tablet (20 mg) by mouth at bedtime  - Home Blood Pressure Monitor Order for DME - ONLY FOR DME  - Comprehensive metabolic panel  - TSH with free T4 reflex  - Lipid panel reflex to direct LDL Fasting  - T4 free    Stage 3 chronic kidney disease, unspecified whether stage 3a or 3b CKD (H)  Chronic kidney disease stage III with prior hx of now resolved microalbuminuria on ACE inhibitor.  Encouraged to avoid anti-inflammatories as much as possible. Discussed and held off on jardiance,   Labs came back showing mild decreased in GFR. Advised increasing water intake & recheck non fasting with TSH in 4 to 6 weeks when comes in for nurse BP check. Sodium & Potassium are normal.  - CBC with Platelets & Differential; Future  - Comprehensive metabolic panel; Future  - TSH with free T4 reflex; Future  - Lipid panel reflex to direct LDL Fasting; Future  - Vitamin D Deficiency; Future  - Parathyroid Hormone Intact; Future  - Ionized Calcium; Future  - CBC with Platelets & Differential  - Comprehensive metabolic panel  - TSH with free T4 reflex  - Lipid panel reflex to direct LDL Fasting  - Vitamin D Deficiency  - Parathyroid Hormone Intact  - Ionized Calcium  - T4 free    Obesity (BMI 35.0-39.9) with comorbidity (H)  BMI increased to 36 . Previously had tried a Keto diet, declined referral to weight specialist or sleep, or mtm, or taking meds. Encouraged smaller portions, avoiding sugary drinks and increasing exercise.  by walking daily   - Comprehensive metabolic panel; Future  - TSH with free T4 reflex; Future  - Lipid panel reflex to direct LDL Fasting; Future  - Comprehensive metabolic  panel  - TSH with free T4 reflex  - Lipid panel reflex to direct LDL Fasting  - T4 free    Prediabetes  Prediabetes hx, Does like her sweets. HBA1c normal in march 2024. Not checked today. Glucose is normal.  - Comprehensive metabolic panel; Future  - TSH with free T4 reflex; Future  - Lipid panel reflex to direct LDL Fasting; Future  - Comprehensive metabolic panel  - TSH with free T4 reflex  - Lipid panel reflex to direct LDL Fasting  - T4 free    Mixed hyperlipidemia  Hyperlipidemia on atorvastatin 40 mg.since march 2024. Was given 90 with 1 refill in march. LDL(bad) cholesterol level remains elevated & The 10-year ASCVD risk score (Joe BUTLER, et al., 2019) is: 29.1%. I question med compliance given gaps in med refills and follow up in past but insists taking all as directed daily without missing. A diet high in fat and simple carbohydrates, genetics and being overweight can contribute to this. Advised exercising 150 minutes of aerobic exercise per week (30 minutes for 5 days per week or 50 minutes for 3 days per week are options) and eating a low saturated fat/low carbohydrate diet to improve this. Continue atorvastatin 40 mg for now and will recheck when I see her back as an under corrected thyroid as noted below can also contribute to poor lipid control & want to only make one med change at a time to prevent confusion. Liver and gallbladder tests (ALT,AST, Alk phos,bilirubin) are normal.  - TSH with free T4 reflex; Future  - Lipid panel reflex to direct LDL Fasting; Future  - TSH with free T4 reflex  - Lipid panel reflex to direct LDL Fasting  - T4 free  - atorvastatin (LIPITOR) 40 MG tablet; Take 1 tablet (40 mg) by mouth daily    Acquired hypothyroidism  Hypothyroidism on levothyroxine 150 mcg 6 days of week and 1.5 tab of 150 1 day of week. In 4/2023 TSH normal. In 9/2023 TSH slightly above range but Ft4 normal. Was continued on levothyroxine 1 tab 6 days a week & 1.5 tab one day a week & was to recheck TSH  when returned on 11/9 for her BP. Had been given 100 with 1 refill end of June 2023 so expected would have enough till nov apt. Was no show to apt in nov & in march 2024 reported since tab was diff to cut had been taking 2 instead of the 1.5 tab the one day. Unsure how long had been doing this. Reported ran out of med > 1 week ago, ? If not earlier. After the visit TSH came back elevated at 5.24, with normal free T4 not back. Was resumed on levothyroxine 150 mcg daily 6 days of the week & 1-1/2 tablet of 150 mcg 1 day of the week , sent in 100 tablets advised not to double up on dosing & if difficult to cut in half use a pill cutter and to recheck today as prior levels may not have been accurate as checked when had already run out of medication. Noted some insurance issues and med pickup issues but reports has been taking daily as directed so to recheck today & wait to see results to refill appropriate dose.   Later TSH came back elevated at 12ish & ft4 wnl. Is currently under replaced.  I questioned compliance but since she did report at visit was taking 150  6 days & 1.5 tab of 150 7th day each week consistently as directed will change medication dose to 175 micrograms/day (a prescription has been sent to her pharmacy) and to recheck TSH in a lab appointment in 6 weeks when comes in BP check. Is encouraged to start new dosing right away so the lab accurately reflects medication & further adjustments can then be made appropriately   - TSH with free T4 reflex; Future  - TSH with free T4 reflex  - T4 free  - levothyroxine (SYNTHROID/LEVOTHROID) 175 MCG tablet; Take 1 tablet (175 mcg) by mouth daily new increased dose & recheck TSH in may 2024 apt    Snoring  History of snoring, BMI more than 36, there is possibility of undiagnosed sleep apnea. Opted not to see the sleep specialist. Advised not to drive when drowsy  - CBC with Platelets & Differential; Future  - TSH with free T4 reflex; Future  - CBC with Platelets &  Differential  - TSH with free T4 reflex  - T4 free    Osteopenia, unspecified location  Osteopenia noted on Dexa in 4/2023 but with the 10 year probability of major osteoporotic fracture 12.3%, and of hip fracture 3.0%, based on left femoral neck was started on fosamax 70 mg 1/wk, ( Mon am ). Has had no side effects, no increased GERD, no blood in stools or black stools & no dental issues. Is on Pepcid for prior hx of heart burn related to diet & that has not gotten worse since on fosamax. Reviewed history of traumatic left wrist fracture from a fall from standing position in nov 2023 and concern by sports medicine about it possibly being a fragility fracture. Was referred to endo to see if med needed to be changed but no apt made with them and desires now to wait till next year to see Dexa scan shows on meds. Encouraged calcium 1200 mg total a day & vit d 2000 units daily.   Later labs showed Normal ionized calcium, Calcium,PTH &  Vitamin D level is normal   - Comprehensive metabolic panel; Future  - TSH with free T4 reflex; Future  - alendronate (FOSAMAX) 70 MG tablet; Take 1 tablet (70 mg) by mouth every 7 days  - Vitamin D Deficiency; Future  - Parathyroid Hormone Intact; Future  - Ionized Calcium; Future  - Comprehensive metabolic panel  - TSH with free T4 reflex  - Vitamin D Deficiency  - Parathyroid Hormone Intact  - Ionized Calcium  - T4 free    Personal history of traumatic fracture  - TSH with free T4 reflex; Future  - alendronate (FOSAMAX) 70 MG tablet; Take 1 tablet (70 mg) by mouth every 7 days  - TSH with free T4 reflex  - T4 free    Chronic idiopathic neutropenia (H24)  History of chronic intermittent idiopathic neutropenia that predated cancer diagnosis and treatment.  Negative work-up and asymptomatic in past Seen by hematology most recently in the fall 2022.  Peripheral smear and additional lab work for iron copper folic acid had been normal.  No nutritional causes seen.  Held off on bone marrow  biopsy given stability and lack of symptoms. On B 12 supp unknown dose.  B 12 wnl in past. Was advised if should feel sick, have fevers, significant abdominal pains then advised she should always come in to be evaluated for infection and get a CBC. & let her providers know the history of neutropenia and if neutrophils were low or if infection was not improving with regular treatment then hematology recommended a course of Neupogen 480 mcg subcu for 3 to 5 days at the time. Reports no symptoms recently.   Labs showed Normal red blood cell (Hgb) levels, chronic low white blood cell count unchanged from before and normal platelet level. Neutrophils remain low slightly lower than 9 months prior but currently asymptomatic and no sign of infection. To continue to monitor  - CBC with Platelets & Differential; Future  - CBC with Platelets & Differential    Dependent edema  Dependent edema likely due to weight, possibly varicose veins,amlodipine & undiagnosed sleep apnea. No increased swelling noted since started on amlodipine, using compression socks and encouraged a continued low salt diet and elevation of legs 1 hr above heart level each day. Has declined a sleep eval.  - TSH with free T4 reflex; Future  - TSH with free T4 reflex  - T4 free    Asymptomatic menopausal state  History of breast cancer  S/P mastectomy, right  History of right breast cancer, & prior right mastectomy in 2008, & hx of resolved scar breakdown and infection post punch biopsy for wound dehiscence in 2022/23. Seen by breast center. Left breast screening mammogram showed an asymmetry in April 2023 s/p dx imaging and u/s was unremarkable. Mammogram normal in 4/2024. Previously did meet with a  given fh and personal hx of cancer and opted not to do any testing.  Reports no breast or skin concerns today. To continue with annual mammograms.    History of adenomatous polyp of colon  Hx of Cscope done for positive cologuard which showed 3  serrated adenoma and one tubular adenoma removed 9/2022 and advised recheck in 3 yrs ( 2025).      History of rectal bleeding  Reported has had no black stools.  But has noted bright red blood on wiping 1 month ago. ? Hemorrhoidal. Referred to colorectal to further evaluate and treat.   - Adult Colorectal Surgery  Referral; Future    Memory deficit TSH with free T4 reflex; Future  Leukoaraiosis  Memory deficits noted on screening test 2022. Minicog 2023 recalled 2 of 3 words. Always alone without family. MRI brain in April 2023 showed no mass effect, midline shift, or evidence of intracranial hemorrhage. The ventricles were proportionate to the cerebral sulci. Normal major vascular intracranial flow-voids. Advanced leukokraurosis. Mild diffuse cerebral volume loss. Post contrast images demonstrate no abnormal intracranial enhancement. No abnormality of the skull marrow signal. The visualized portions of paranasal sinuses, and mastoid air cells were relatively clear. The orbits were grossly unremarkable. Discussed white matter changes and atrophy. Keeping BP under control will be helpful. Is also on a statin. Has declined neurology referral.      Glaucoma suspect, bilateral  Epiretinal membrane, right  Displacement of intraocular lens, initial encounter  Senile cataract of left eye, unspecified age-related cataract type  History of Viteral macular adhesion, right eye epiretinal membrane on the right and bilateral glaucoma suspect under care of eye. Hopes to get left cataract done soon    Health care maintenance  Health care maintenance reviewed  Has ACP on file, no changes reported   Reports vision ok  Reports hearing ok   Passed cognition screen  No falls since fell nov 2023 breaking her wrist. Fall precautions discussed briefly  Dexa due again 2025  No breast issues, exam normal. Right mastectomy scar unchanged, no new breakdown   Mammogram left breast in April noted normal. Continue annual  screening.  COVID vacc had in march 2024, will wait to get new one now in the fall  Discussed to consider rsv and shingles vaccines at her pharmacy.  Fasting labs today and will make further recommendations once reviewed  To return in an Apt with home & clinic machine for BP check in 1 to 2 months with nurse  Follow up with me in 6 months  - REVIEW OF HEALTH MAINTENANCE PROTOCOL ORDERS    Advanced directives, counseling/discussion    Need for shingles vaccine  Need for vaccination against respiratory syncytial virus  Reminded to get these at her pharmacy    Patient has been advised of split billing requirements and indicates understanding: Yes    Counseling  Appropriate preventive services were discussed with this patient, including applicable screening as appropriate for fall prevention, nutrition, physical activity, Tobacco-use cessation, weight loss and cognition.  Checklist reviewing preventive services available has been given to the patient.  Reviewed patient's diet, addressing concerns and/or questions.   She is at risk for lack of exercise and has been provided with information to increase physical activity for the benefit of her well-being.   Work on weight loss  Regular exercise  See Patient Instructions        Subjective Romayne is a 78 year old, presenting for the following:  Annual Visit        7/2/2024     8:35 AM   Additional Questions   Roomed by Alonso OLIVEIRA   Accompanied by self         Health Care Directive  Patient has a Health Care Directive on file  Advance care planning document is on file and is current.    HPI      6/30/2024   General Health   How would you rate your overall physical health? Good   Feel stress (tense, anxious, or unable to sleep) Not at all          6/30/2024   Nutrition   Diet: Carbohydrate counting         6/30/2024   Exercise   Days per week of moderate/strenous exercise 1 day   Average minutes spent exercising at this level 30 min      (!) EXERCISE CONCERN      6/30/2024    Social Factors   Frequency of gathering with friends or relatives Once a week   Worry food won't last until get money to buy more No   Food not last or not have enough money for food? No   Do you have housing? (Housing is defined as stable permanent housing and does not include staying ouside in a car, in a tent, in an abandoned building, in an overnight shelter, or couch-surfing.) Yes   Are you worried about losing your housing? No   Lack of transportation? No   Unable to get utilities (heat,electricity)? No          6/30/2024   Fall Risk   Fallen 2 or more times in the past year? No   Trouble with walking or balance? No             6/30/2024   Activities of Daily Living- Home Safety   Needs help with the following daily activities None of the above   Safety concerns in the home None of the above          6/30/2024   Dental   Dentist two times every year? (!) DECLINE            6/30/2024   Hearing Screening   Hearing concerns? None of the above          6/30/2024   Driving Risk Screening   Patient/family members have concerns about driving No            6/30/2024   General Alertness/Fatigue Screening   Have you been more tired than usual lately? No            6/30/2024   Urinary Incontinence Screening   Bothered by leaking urine in past 6 months No            6/30/2024   TB Screening   Were you born outside of the [place]? No      Today's PHQ-2 Score:       7/2/2024     8:35 AM   PHQ-2 ( 1999 Pfizer)   Q1: Little interest or pleasure in doing things 0   Q2: Feeling down, depressed or hopeless 0   PHQ-2 Score 0   Q1: Little interest or pleasure in doing things Not at all   Q2: Feeling down, depressed or hopeless Not at all   PHQ-2 Score 0         6/30/2024   Substance Use   Alcohol more than 3/day or more than 7/wk No   Do you have a current opioid prescription? No   How severe/bad is pain from 1 to 10? 0/10 (No Pain)   Do you use any other substances recreationally? No      Social History     Tobacco Use    Smoking  status: Never     Passive exposure: Yes    Smokeless tobacco: Never    Tobacco comments:     family members smoke; daughter   Vaping Use    Vaping status: Never Used   Substance Use Topics    Alcohol use: Yes     Alcohol/week: 10.0 standard drinks of alcohol     Comment: 0-1 drink per month    Drug use: No           2024   LAST FHS-7 RESULTS   1st degree relative breast or ovarian cancer No   Any relative bilateral breast cancer No   Any male have breast cancer No   Any ONE woman have BOTH breast AND ovarian cancer No   Any woman with breast cancer before 50yrs No   2 or more relatives with breast AND/OR ovarian cancer No   2 or more relatives with breast AND/OR bowel cancer No         Mammogram Screening - Mammogram every 1-2 years updated in Health Maintenance based on mutual decision making    ASCVD Risk   The 10-year ASCVD risk score (Joe BUTLER, et al., 2019) is: 28.8%    Values used to calculate the score:      Age: 78 years      Sex: Female      Is Non- : No      Diabetic: No      Tobacco smoker: No      Systolic Blood Pressure: 138 mmHg      Is BP treated: Yes      HDL Cholesterol: 47 mg/dL      Total Cholesterol: 173 mg/dL    Fracture Risk Assessment Tool  Link to Frax Calculator  Use the information below to complete the Frax calculator  : 1945  Sex: female  Weight (kg): 110.6 kg (actual weight)  Height (cm): 175 cm  Previous Fragility Fracture:  No  History of parent with fractured hip:  No  Current Smoking:  No  Patient has been on glucocorticoids for more than 3 months (5mg/day or more): No  Rheumatoid Arthritis on Problem List:  No  Secondary Osteoporosis on Problem List:  No  Consumes 3 or more units of alcohol per day: No  Femoral Neck BMD (g/cm2)            Reviewed and updated as needed this visit by Provider   Tobacco  Allergies  Meds  Problems  Med Hx  Surg Hx  Fam Hx            Past Medical History:   Diagnosis Date    Antiplatelet or  antithrombotic long-term use     Breast cancer (H) 08/26/2008    Right Breast    Chronic fatigue 05/19/2020    CKD (chronic kidney disease) stage 3, GFR 30-59 ml/min (H) 09/17/2010    Combined form of age-related cataract, left eye 10/20/2020    Added automatically from request for surgery [id number]    Health Care Home 07/17/2012    [facility]ly [facility] . Margarita Lawler RN-BSN, [facility] [phone]  DX V65.8 REPLACED WITH [id number]ALTH CARE HOME (04/08/2013)    Heart burn 05/19/2020    History of 2019 novel coronavirus disease (COVID-19) 11/16/2020    Seen in urgent care on 11/5/2020 for fever cough dizziness abdominal pain nausea was given Bactrim for cystitis tested for COVID discharged home and testing came back positive for Covid.  Seen in ER 11/16/2020 for shortness of breath sinus tachycardia and severe hypoxia.  Was admitted for worsening respiratory symptoms prompting her to seek further evaluation on 11/17/20 at [facility]. She was found to     History of acute respiratory failure 11/16/2020    With covid , complicated by PE and DVT, on oxygen and eliquis 3 months, completely resolved as of 2/2021    History of wrist fracture     Hyperlipidemia LDL goal <100 11/29/2010    Hypertension goal BP (blood pressure) < 140/90 09/17/2010    Hypothyroid 2002    Lipoma of other skin and subcutaneous tissue 11/19/2004    Nonsenile cataract     Osteoporosis 2014    Other psoriasis     Physical deconditioning 05/19/2020    Positive colorectal cancer screening using Cologuard test 06/26/2022    Thyroid disease      Past Surgical History:   Procedure Laterality Date    COLONOSCOPY N/A 9/7/2022    Procedure: COLONOSCOPY, WITH POLYPECTOMY;  Surgeon: Leventhal, Thomas Michael, MD;  [place] [facility] [place]    [facility] CATARACT EXTRACAP,INSERT LENS, W/O ECP Right 05/29/2002    Dr. Clinton Lopez (MA60AC, Power:  20.0D)    PHACOEMULSIFICATION CLEAR CORNEA WITH STANDARD INTRAOCULAR LENS IMPLANT Left 2/22/2021    Procedure: LEFT EYE  PHACOEMULSIFICATION, CATARACT, WITH INTRAOCULAR LENS IMPLANT;  Surgeon: Ruben Shelby MD;  Location: [facility] OR    SURGICAL HISTORY OF -   Oct 3, 2008    Rt saline implant    VITRECTOMY PARSPLANA WITH 25 GAUGE SYSTEM Right 2021    Procedure: 1) Pars plana vitrectomy (PPV) 25g, Right eye,  2) Membrane stripping and stripping of  internal limiting membrane,;  Surgeon: Fede Danielson MD;  Location: [facility] OR     OB History    Para Term  AB Living   3 3 3 0 0 3   SAB IAB Ectopic Multiple Live Births   0 0 0 0 0      # Outcome Date GA Lbr Kavon/2nd Weight Sex Type Anes PTL Lv   3 Term            2 Term            1 Term              Lab work is in process  Labs reviewed in EPIC  BP Readings from Last 3 Encounters:   24 138/78   24 (!) 143/73   23 120/73    Wt Readings from Last 3 Encounters:   24 110.6 kg (243 lb 14.4 oz)   24 108.2 kg (238 lb 9.6 oz)   23 107 kg (236 lb)         Patient Active Problem List   Diagnosis    Hypothyroidism    Hypertension goal BP (blood pressure) < 140/90    CKD (chronic kidney disease) stage 3, GFR 30-59 ml/min (H)    Mixed hyperlipidemia    History of breast cancer    Chronic idiopathic neutropenia (H24)    Memory deficit    Obesity (BMI 35.0-39.9) with comorbidity (H)    Dependent edema    Senile cataract of left eye, unspecified age-related cataract type    Epiretinal membrane, right    Vitreomacular adhesion of right eye    Glaucoma suspect, bilateral    Prediabetes    Snoring    Osteopenia, unspecified location    History of adenomatous polyp of colon    Displacement of intraocular lens, initial encounter    S/P mastectomy, right     Past Surgical History:   Procedure Laterality Date    COLONOSCOPY N/A 2022    Procedure: COLONOSCOPY, WITH POLYPECTOMY;  Surgeon: Leventhal, Thomas Michael, MD;  [place] [facility] [place]     [facility] CATARACT EXTRACAP,INSERT LENS, W/O ECP Right 2002    Dr. Clinton Lopez (MA60AC, Power:   20.0D)    PHACOEMULSIFICATION CLEAR CORNEA WITH STANDARD INTRAOCULAR LENS IMPLANT Left 2/22/2021    Procedure: LEFT EYE PHACOEMULSIFICATION, CATARACT, WITH INTRAOCULAR LENS IMPLANT;  Surgeon: Ruben Shelby MD;  [place] [facility] [place]    SURGICAL HISTORY OF -   Oct 3, 2008    Rt saline implant    VITRECTOMY PARSPLANA WITH 25 GAUGE SYSTEM Right 6/7/2021    Procedure: 1) Pars plana vitrectomy (PPV) 25g, Right eye,  2) Membrane stripping and stripping of  internal limiting membrane,;  Surgeon: Fede Danielson MD;  Location:  OR       Social History     Tobacco Use    Smoking status: Never     Passive exposure: Yes    Smokeless tobacco: Never    Tobacco comments:     family members smoke; daughter   Substance Use Topics    Alcohol use: Yes     Alcohol/week: 10.0 standard drinks of alcohol     Comment: 0-1 drink per month     Family History   Problem Relation Age of Onset    Diabetes Mother         dec    Thyroid Disease Mother         unsure    Respiratory Father         emphysema    Emphysema Father     Diabetes Sister     Family History Negative Sister         x 2    Suicide Brother     Diabetes Brother     Family History Negative Brother         x 2    Pancreatic Cancer Brother     Thyroid Disease Daughter     Diabetes Daughter     Goiter No family hx of     Glaucoma No family hx of     Macular Degeneration No family hx of     Hypertension No family hx of          Current Outpatient Medications   Medication Sig Dispense Refill    alendronate (FOSAMAX) 70 MG tablet TAKE 1 TABLET(70 MG) BY MOUTH EVERY 7 DAYS 12 tablet 0    amLODIPine (NORVASC) 5 MG tablet Take 1 tablet (5 mg) by mouth daily Was increased to 5 mg in April 90 tablet 0    Ascorbic Acid (VITAMIN C) 500 MG CAPS Take 1 each by mouth daily      atorvastatin (LIPITOR) 40 MG tablet Take 1 tablet (40 mg) by mouth daily 90 tablet 1    calcium carbonate-vitamin D (OSCAL W/D) 500-200 MG-UNIT tablet Take 1 tablet by mouth daily Taking every 3 dasy or  so      cetirizine (ZYRTEC) 10 MG tablet Take 10 mg by mouth daily      Cyanocobalamin (B-12 PO)       famotidine (PEPCID) 20 MG tablet Take 20 mg by mouth daily      levothyroxine (SYNTHROID/LEVOTHROID) 150 MCG tablet 1 tab 6 days a week & 1.5 tab one day a week & recheck tsh in may 2024 apt 100 tablet 0    lisinopril (ZESTRIL) 20 MG tablet Take 1 tablet (20 mg) by mouth at bedtime 90 tablet 0    Multiple Vitamin (MULTI-VITAMIN) per tablet Take 1 tablet by mouth daily      TURMERIC PO Take 1 tablet by mouth daily       Allergies   Allergen Reactions    No Known Drug Allergy      Recent Labs   Lab Test 03/18/24  0943 09/26/23  1446 05/08/23  0833 03/28/23  1449 10/20/22  0929 09/23/22  1010 03/22/22  0921 05/18/21  0942 03/15/21  0940 02/05/21  1119   A1C 5.6 5.7*  --  5.7*  --  5.5   < >  --   --   --      --   --  87  --  80   < >  --   --  90   HDL 47*  --   --  44*  --  49*   < >  --   --  45*   TRIG 131  --   --  145  --  140   < >  --   --  176*   ALT 19 21  --  25   < >  --    < >  --   --   --    CR 1.13* 1.17*   < > 1.31*   < > 1.24*   < > 1.30*  --  1.26*   GFRESTIMATED 50* 48*   < > 42*   < > 45*   < > 40*  --  42*   GFRESTBLACK  --   --   --   --   --   --   --  47*  --  48*   POTASSIUM 5.0 5.2   < > 5.0   < > 4.9   < > 5.1  --  4.9   TSH 5.24* 4.91*   < > 7.85*  --  5.02*   < >  --    < > 0.28*    < > = values in this interval not displayed.      Current providers sharing in care for this patient include:  Patient Care Team:  Marry Rodriguez MD as PCP - General (Family Medicine)  Marry Rodriguez MD as Referring Physician (Family Practice)  Kamila Jeffery MD as MD (INTERNAL MEDICINE - ENDOCRINOLOGY, DIABETES & METABOLISM)  Ruben Shelby MD as MD (Ophthalmology)  Fede Danielson MD as Assigned Surgical Provider  Doris Ritter RP as Pharmacist (Pharmacist)  Evelina Phillips PA-C as Physician Assistant (Surgery)  Evelina Phillips PA-C as Assigned Cancer Care  Provider  Marry Rodriguez MD as Assigned PCP  Ruslan Frey DO as Assigned Musculoskeletal Provider    The following health maintenance items are reviewed in Epic and correct as of today:  Health Maintenance   Topic Date Due    ZOSTER IMMUNIZATION (1 of 2) Never done    RSV VACCINE (Pregnancy & 60+) (1 - 1-dose 60+ series) Never done    ANNUAL REVIEW OF HM ORDERS  03/28/2024    COVID-19 Vaccine (4 - 2023-24 season) 05/13/2024    HEMOGLOBIN  09/26/2024    INFLUENZA VACCINE (1) 09/01/2024    BMP  03/18/2025    LIPID  03/18/2025    MICROALBUMIN  03/18/2025    TSH W/FREE T4 REFLEX  03/18/2025    DEXA  04/04/2025    MAMMO SCREENING  04/29/2025    MEDICARE ANNUAL WELLNESS VISIT  07/02/2025    FALL RISK ASSESSMENT  07/02/2025    COLORECTAL CANCER SCREENING  09/07/2025    GLUCOSE  03/18/2027    ADVANCE CARE PLANNING  07/02/2029    DTAP/TDAP/TD IMMUNIZATION (3 - Td or Tdap) 02/05/2031    HEPATITIS C SCREENING  Completed    PHQ-2 (once per calendar year)  Completed    Pneumococcal Vaccine: 65+ Years  Completed    URINALYSIS  Completed    IPV IMMUNIZATION  Aged Out    HPV IMMUNIZATION  Aged Out    MENINGITIS IMMUNIZATION  Aged Out    RSV MONOCLONAL ANTIBODY  Aged Out     BACKGROUND  History of passive smoke exposure, obesity, hypertension on lisinopril, CKD 3, , prediabetes, hyperlipidemia on atorvastatin, hypothyroidism on levothyroxine, snoring, history of memory deficit per epic, history of chronic leg edema, history of prior maxillary sinusitis, resolved cellulitis, history of chronic idiopathic neutropenia/ leukopenia S/p work-up that was negative by hematology in 2012 & 2022,, history of lipoma noted in the past, history of prior right breast cancer in 2008 S/p right mastectomy and saline implants in remission no longer followed by oncology, history of resolved anemia, history of Covid infection in November 2020 with respiratory failure sepsis, hypoxia, Covid pneumonia, PE and left lower extremity DVT, elevated D-dimer,  UTI, resolved HALI, resolved anemia, with complete resolution of Covid symptoms noted by February 2021.  No longer on oxygen.  History of snoring, chronic fatigue, physical deconditioning, GERD on Pepcid, resolved epigastric pain & pancreatitis noted in 2022, and constipation, history of untreated osteoporosis DEXA scan in 2021 showed osteopenia, history of vitreal macular adhesion right eye and epiretinal membrane S/p cataract surgery and remote right eye injury, S/p vitrectomy being managed by eye & being monitored as a glaucoma suspect  on multivitamin, serrated adenoma colon,   Under care of Dr. Sheridan then Sapphire Glass then Adela Avila,      Seen by PCP in April 2019 for dyspnea on exertion.  Examination was unremarkable, chest x-ray was negative.  EKG was unremarkable.  Echo showed a normal EF.  Stress test done was normal.  Her lisinopril was increased and suspected to be deconditioned and advised weight loss and increase activity and referred to sleep for her history of fatigue for possible obstructive sleep apnea but was never seen by sleep.  Fit test never done.  Not seen till called for medication refill on 4/30/2020 and appointment made with this writer, had an appointment by telephone with this writer for the first time on 5/19/2020 for blood pressure med refill.  Advise labs and follow-up in clinic.  Fit test done 6/8/2020 was negative.  TSH was elevated at 10 with free T4 normal and a normal hemoglobin A1c.   Visited with endocrine virtually on 7/8/2020 and noted had been treated for subclinical hypothyroidism with levothyroxine since 2004.  Medications adjusted and DEXA scan ordered.  Seen by eye 9/29/2020 for cataracts glaucoma check, prior right eye injury and which showed retinal membrane adhesion.  Seen by eye again 10/19/2020.  Seen in urgent care on 11/5/2020 for fever cough dizziness abdominal pain nausea was given Bactrim for cystitis tested for COVID discharged home and testing came back  "positive for Covid.  Seen in ER 11/16/2020 for shortness of breath sinus tachycardia and severe hypoxia.  Was admitted for worsening respiratory symptoms prompting her to seek further evaluation on 11/17/20 at [facility]. She was found to be in acute hypoxic respiratory failure requiring 30 LPM HFNC.  Chest x-ray at that time revealed \"diffuse patch airspace opacities consistent with COVID.\" She was admitted to the MICU for stabilization overnight and transferred to the general medical floor on 11/18/20. For her COVID infection, she received dexamethasone and remdesivir. She was initially treated with broad spectrum antibiotics for possible super-imposed pneumonia, but these were quickly discontinued. Her hospital course was complicated by a LLE DVT and PE's. She was started on Lovenox and transitioned to Apixaban. She was also found to have a UTI on day of discharge and was sent home on oral antibiotics.  Her severe Sepsis, resolved. For acute Hypoxic Respiratory Failure 2/2 COVID-19 PNA - COVID+ 11/5.  She was COVID recovered,  initially requiring 30 LMP HFNC, but was able to wean to 2Ls NC with exertion, 0 to 0.5L at rest. She completed her 5 day course of Remdesivir during her hospital stay. On discharge was continued on dexamethasone 6mg daily for 10 doses (ending 11/27) & continued on 2Ls NC O2 with ambulation, 0-0.5L at rest. For the Non-occlusive thrombus in left popliteal vein & Pulmonary Embolisms of segmental and subsegmental right upper and lower lobes with Elevated D-dimer - D-dimer >20 on admission. Started on high-intensity heparin gtt in ED with bolus- and transitioned to low intensity on 11/18/20. Ultrasound of B/l lower extremities obtained on 11/19 which were notable for non-occlusive thrombus in left popliteal vein. She was then transitioned back to high intensity heparin gtt on 11/19. Had elevated hep 10A levels overnight 11/19. Transitioned to Lovenox 1mg/kg BID on 11/20/20. CT PE protocol with " segmental and subsegmental PE in the right upper and lower lobes w/o evidence of RHS. Also w/ extensive reticular changes and GOO throughout lungs representing inflammation and/or fibrosis r/t recent COVID-19 infection. on Discharge continued on Apixiban 10mg BID x7 days, then 5mg BID thereafter for ~ 3 month duration of treatment as likely provoked DVT/PE due to COVID-19 infection. Was to follow up with PCP in one week to discuss anticoagulation. Noted Urinary Tract Infection & Urinary retention, resolved - Required straight cath x2 during her hospital admission. Developed LUTS last day of discharge with urinalysis revealing few bacteria, moderate leukocytes, and 12 Wbcs. However, there were 4 epis.  & discharged on Keflex 500 mg BID for 5 days & to follow up with PCP in one week to review urine culture results. Her HALI, resolved. Noted CKD III - Baseline creatinine 1.1-1.2, elevated to 1.53 on presentation with BUN 47 then returned to baseline. Etiology likely prerenal 2/2 COVID. Lisinopril was held & resumed on discharge. For her HTN - Patient's lisinopril held on during hospital stay due to HALI. Started on amlodipine 11/20 for elevated BP but on discharge, amlodipine was discontinued and resumed home lisinopril 20 mg daily. She noted increased heartburn since starting Eliquis, while inpatient was on omeprazole this was switched to Pepcid once off the Eliquis and continued on Tums as needed. Had constipation on admission - improved with bowel regimen, however developed liquid stool which improved by discharge. Hypothyroidism: TSH on admission WNL at 0.42 & continued on  PTA Synthroid. Noted Normocytic anemia - Baseline Hgb 12-13. & was 12.2 at discharge. No S/sx of bleeding since initiation of anticoagulation. for HLD was continued on a statin.  She was discharged home on 11/24/2020.  Seen 12/3/2020 by Dr. Lazo for hospital discharge noted was no longer on oxygen during the day and satting fine & remained on  Matthieu.  Seen by Sapphire Glass her PCP on 2/5/2021 for routine physical and noted had completely recovered from Covid & no longer short of breath, had no cough & not on oxygen, no longer with leg pain & was wearing compression socks for chronic leg swelling. and Doppler done on 2//22 was negative for DVT on Eliquis. DEXA scan ordered for prior untreated osteoporosis and continued on Pepcid for heartburn.  Labs later showed she was overcorrected on thyroid and levothyroxine was changed from double dosing on the weekend to daily simple dosing of 150 mcg daily and recheck TSH 6 weeks later was within normal range and continued on same dose.  DEXA scan done 2/11/2021 showed osteopenia and continued on calcium and vitamin D.  Cataracts removed 2/22/2021.  Seen by 3/11/2021.  TSH normal 3/15/2021.  Noted blurry vision 4/13/2021.  Had stable CKD 5/18/2021 and advised to avoid NSAID's.  Seen 5/18/2021 by Dr. Avila for preop for vitrectomy.   On 6/7/2021 had vitrectomy and membrane stripping for history of vitreoretinal adhesion right eye.  Seen by the eye doctor postop day 1, 1 week postop and at 6-week postop guero and noted was doing well.  Last seen by eye on 10/20/2021 for hx of Vitreoretinal adhesion right eye, ERM right eye S/P PPV/MP right eye 6/2021. Foveal anatomy improved significantly; BCVA 20/50 limited likely due to subtle IS/OS disruption at fovea (+drusen) -3.0 refraction right eye and seeing well up close; instructed her to try to use right eye as monovision for near.  Was to observe pseudophakia each eye.  Noted to be a glaucoma suspect with large disc and large cupping child being followed by Dr. Shelby.  Seen 3/22/22 for medicare wellness/ preventive health and additional concerns. Discussed slef breast checks, mammogram ordered, Pelvic / PAP no longer needed  Health care maintenance reviewed. To Consider working on health care directives & given honoring choices. Given Covid pfizer booster for prior  received Aron vaccine in 2021.  Discussed Shingrex.   BMI 37, mostly sedentary.  Discussed lifestyle. Encouraged to work on diet, exercise and losing at least 5 to 10% of total body weight.  Hyperlipidemia on atorvastatin 10 mg bedtime had enough meds till could get lab work reviewed. LDL later came back normal but triglycerides were elevated and overall cardiovascular risk is 23.2 so atorvastatin increased to 20 mg bedtime and was to recheck cholesterol fasting in 3 months when I saw her back again.  Hypertension on lisinopril 10 mg daily previously was on 20, blood pressure was  136/79.  Asymptomatic no side effects & refilled at same dose  #90 with 3 refills.   CKD 3 asymptomatic reminded to avoid NSAID's Kidney function came back stable with normal micro albumin.  Hypothyroid on levothyroxine 150 mcg daily deviously on differing doses on the weekend and weekdays but had been on this dose since February 2021.    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  Had no symptoms. Count came back low at 2.2 similar with neutropenia.  Stable to prior. History of anemia noted when had COVID asymptomatic no GI bleeding not on anticoagulation. hemoglobin and hematocrit came back normal.  History of Covid in November 2020 was hospitalized requiring oxygen and received steroids and antivirals.  Had a DVT and PE requiring anticoagulation 3 months.  Resolved thromboembolism and sepsis and HALI and treated for UTI at that time was in the ICU and discharged after a week in hospital.  Reported made a full recovery & no longer on oxygen or anticoagulation.  Resolved acute respiratory failure on no oxygen since January 2021.  History of snoring had been referred to sleep in the past.  Did have obesity, hyperlipidemia, hypertension and forgetfulness.  Did have chronic deconditioning. Referral placed to sleep again. Chronic fatigue reported improved.  Had bilateral lower leg swelling managed  with compression socks daily felt related to BMI and untreated sleep apnea  Physical deconditioning.  Was very sedentary.  Prior echo and stress test before had COVID was normal.  CT during COVID in 2020 did not show right heart strain.  Reported no symptoms at rest or with mild exertion.  Encouraged to be more active.  Hx of breast cancer & had had right mastectomy and chemotherapy,  initially tried a saline implant but reported this had since been removed.  Reported no new lumps or ulcers on scar tissue.  Reported no left breast lumps, bumps, skin changes or nipple discharge.  Had been in remission long time and not seen oncology in a while.  Mammogram for the left placed  Heartburn controlled on Pepcid OTC daily. Constipation improved & was to continue with a high-fiber diet.  History of osteopenia.  Prior untreated osteoporosis but DEXA scan done 2021 showed osteopenia and was advised to continue calcium and vitamin D.  Unclear if taking calcium & recommended taking calcium citrate 1200 mg total a day and vitamin D at least 2000 units daily and rechecking DEXA scan in 2023.  Noted memory deficits, failed mini cog, seen alone, referred to neurology to further evaluate.   History of vitreal macular adhesion right eye and epiretinal membrane S/p surgery vitrectomy and stripping in June 2021. Was a bilateral glaucoma suspect and under care of eye. Was to see the dentist regularly. The 1 cm cyst on her right back was not a concern, & to monitor for worsening  Colon cancer screening due options discussed & [place] guard.ordered.   Labs showed normal Vit d, TSH was mildly out of range but ft 4 was normal & continued on same dose for 3 months before rechecking. micro albumin had improved. Wbc was 2.2, not anemia, platelets normal. B 12 was low normal and advised 500 mcg of vit B 12.CMP showed stable CKD. Lipids not goal and atorvastatin increased to 20 mg. HB A1c was 5.7 and advised on diet and lifestyle changes. colo  guard result not received. No apt made with sleep or neuro. Seen by eye 4/20/22 for glaucoma monitoring.      Seen  6/23/22 Blood pressure was not goal increased lisinopril to 20 mg bedtime ( to  take two of the 10 mg left until can start the new script of 20 mg dose) . Encouraged to avoid Gatorade as high in salt, drink tea in moderation, avoid alcohol. Check BP at home, goal should be less than 130/85. Recheck BP in 2 to 3 weeks with medical assistant and lab at that time for med monitoring with a bmp. Was to work on diet , exercise, low carb , smaller portions, avoid alcohol to help Blood pressure, weight and cholesterol.  BMI > 37  Discussed diet, exercise and weight loss. Was to continue atorvastatin 20 mg increased at last visit and see if with better BP control and repeat lipids if needed to be adjusted further in the future.  CKD 3 stable resolved microalbuminuria, on an ACE, to avoid antiinflammatories due to chronic kidney disease. Was clinically euthyroid on levothyroxine 150 mcg daily. Later TSH was WNL and continued on same dose. Due to prediabetes HB A1c checked & encouraged to  increase exercise, eat less carb's, avoid alcohol and work on loosing 10 % of total body weight. For hx of memory deficits, was  tracking and able to drive locally, in alone without family. Declined need to see neurology.  Was to continue to monitor. For  hx of snoring, suspected undiagnosed HUGO, opting to not see sleep for now due to cost. had resolved anemia. Recovered from Covid in 2020. Opted to defer seeing neuro for memory concern and snoring reporting Fatigue resolved. Had stable asymptomatic low white count. Reviewed H x of breast cancer & Mammogram in April had been normal. Was to continue use of compression socks for dependant edema. Noted  Dexa due in 2023 for hx of osteopenia/ osteoporosis, encouraged to take calcium and vit d supp. Reminded to start vit B 12 500 mcg OTC daily for low normal B 12 noted in march and  physical deconditioning ( forgot to start). Reported was working on health care directives  Covid # 3 due mid July. Declined Shingrex. Was to continue care with dentist and eye. Reported then  Heart burn and constipation had resolved. Was to contact colo guard about missing test result. This was later reported as positive & triage advised to contact patient and diagnostic colonoscopy ordered.      B 12 was normal.  Hemoglobin A1c in prediabetic range.  Normal CMP and TSH.  Chronically low white count no anemia.  ASCVD risk was 30% with normal LDL, low HDL and elevated triglycerides and continued on same dose statin.  On 7/7 blood pressure elevated recheck on 7/18 was normal and continued on same meds.  Finally got colonoscopy done 9/7/2022 fragmented superficial serrated adenoma removed x3 and tubular adenoma removed x1 and recommended recheck in 3 years     Seen 9/23/22 HTN stable on lisinopril increased dose 20 mg . No side effects. Avoiding salt. Not checking BP at home. CKD 3 stable resolved microalbuminuria, on an ACE, Avoiding  antiinflammatories due to chronic kidney disease. BMI up to 37, weight up , eating two meals a day, discussed smaller portions, avoiding alcohol, juices, declined weight evaluation, sleep doctor or mtm due to cost. HLD on atorvastatin 20 mg. Hypothyroid on levothyroxine 150 mcg daily. No hair loss, had had some weight gain, after colonoscopy bowels moving better.  Prediabetes & encouraged to  increase exercise, eat less carb's, avoid alcohol and work on loosing 10 % of total body weight. Hx of snoring & declined sleep eval.    Had a positive colo guard early 2022, got colonoscopy 3 weeks prior in sept & it showed 3 serrated adenoma fragmented and 1 tubular adenoma and advised recheck 3 years.   Noted memory deficits,  Was racking and able to drive locally. Seen alone without family. Felt ok . Resolved anemia. stable low wbc, Recovered from Covid. Opted to defer seeing neuro for memory  concern or sleep for snoring & reported fatigue resolved  Hx of snoring, suspected undiagnosed HUGO, opting to not see sleep due to cost.   Had stable asymptomatic low white count.  Hx of breast cancer but Mammogram in April was normal. Mammogram due 2023. Was to continue to use compression socks for dependant edema.   Osteopenia, Dexa due in 2023 for hx of osteopenia/ osteoporosis, unclear if on calcium and vit d. Encouraged to take calcium 1200 mg a day &  vit d 2000 units a day   Was to start vit B 12 500 mcg OTC daily for low normal B 12 noted in march and physical deconditioning ( forgot to start). Encouraged to take vitamin B 12 500 mcg OTC daily for low normal B 12 noted in march, memory and physical deconditioning   For new epigastric upper abdominal pain intermittent 1 week worse with pushing on it exam was  benign, no rebound or surgical abdomen noted.  Did not radiate into chest & had  no associated cardiac symptoms.  Suspected gastritis.  To avoid spicy, avoid greasy foods, and red sauces, coffee, alcohol till better. Increase Pepcid to twice a day, avoid aspirin and see what labs show, warm pack to area. counseled if gets worse, has chest pain, trouble breathing or has blood in stool or black stool, to go to the ER, & if not better may need an EGD etc. Ultrasound to check for pain. Ct abdomen if no answer.   Given flu shot. Declined Covid booster & Shingrex.   TSH was mildly out of range normal Ft4 & continued on same dose. LDL was 84, TG elevated at 184, normal HB A1c, B 12 low normal, negative for H Pylori but lipase quite elevated. Advised to be seen in the er. [place] got hold of her & se opted to go to a local er there where noted symptoms had abated & chose not to do a ct scan in er. Repeat labs on  9/28 showed normal amylase & improved lipase& low wbc. Ct ordered & referred to hematology. Ct done 9/29 showed no pancreatitis,arthritis spine,atherosclerosis, diverticulosis &  osteopenia & advised no alcohol. U/abdomen on 10/8/22 was normal. Seen by hematology 10/20/22 & suspected had chronic intermittent idiopathic neutropenia . Labs done showed normal copper, cr 1.9 stable, normal crp,ferritin folate, mild leukopenia, neutropenia & lymphopenia. BM Bx held off & advised to always get a cbc when sick& Neupogen shots if had absolute neutropenia at the time not responding to regular therapy.  Seen by eye 10/28/22 for routine check up of chronic concerns.     Seen 3/28/23 for Medicare wellness as well as for hypertension hypothyroidism chronic issues and medications. Vital stable. Vision under care of eye. Hearing okay. Mammogram due. Had had no falls. DEXA scan due to screen for osteoporosis and order placed. Failed cognition screen, tracking okay reported no concerns.  Alone at apt. Lived with daughter who drove her to clinic.  MRI ordered.  Opted out of neurology referral and recheck in 6 months with daughter present at next visit. Healthcare maintenance reviewed. Had healthcare directives on file. COVID booster due but declined. Discussed Shingrex was to think about it. Rest of vaccines up-to-date. Hypertension stable on lisinopril 20 mg bedtime with no side effects.  To continue to avoid salt, limit alcohol intake, regular exercise and weight loss. Hx of chronic kidney disease stage III with resolved microalbuminuria on ACE inhibitor. To avoid anti-inflammatories as much as possible. Later labs showed electrolytes & liver tests wnl, kidney function decreased, to increase water intake & recheck non fasting in a lab only apt in 6 weeks when recommended rechecking TSH.     History of prediabetes, HB A1c came back 5.7 &encouraged to continue with a low carb diet, increase physical activity & some weight loss  to help. Hyperlipidemia on atorvastatin 20 mg.  Later labs showed lipids goal and continued on same dose of statin. BMI had gone up a bit likely due to shrinking in height though  weight had improved.  To continue to eat small portions, less carb's, less juices, and increase physical activity.  Felt well and opted due to cost to hold off on seeing a sleep specialist or a weight doctor.  History of chronic intermittent idiopathic neutropenia that predated cancer diagnosis and treatment.  Negative work-up and asymptomatic from it.  Seen by hematology most recently in the fall 2022.  Peripheral smear and additional lab work for iron copper folic acid were normal.  No nutritional causes seen.  Held off on bone marrow biopsy given stability and lack of symptoms.  Advised to continue B 12 supplementation, taking unknown dosing. If should feel sick, have fevers, significant abdominal pains then recommended she should always come in to be evaluated for infection and get a CBC & let her providers know has a history of neutropenia and if neutrophils are low or if infection is not improving with regular treatment then hematology recommended a course of Neupogen 480 mcg subcu for 3 to 5 days at the time. At the time she was asymptomatic with stable low wbc stable & normal B 12. No longer with upper abdominal pain, resolved a week after seen for it in September 2022.  At that time lipase was elevated though CT scan later did not show pancreatitis & ultrasound was unremarkable.but imaging was noted done after symptoms had subsided. Remained on Pepcid 20 mg twice a day & to monitor for any new symptoms.   Hypothyroidism on levothyroxine 150 mcg daily.  In the past her TSH had been mildly out of range. After the visit noted thyroid testing indicated under correction. Recommended changing thyroid to 150 mcg 1 tab 6 days a week & 1.5 tab ( 150+75) mcg one day a week & recheck TSH in 6 weeks. At visit noted just got a refill so new script not sent in and to wait to see what TSH looked like in 6 weeks of change to adjust meds and refill.   History of snoring, BMI more than 38, there was possibility of  undiagnosed sleep apnea. She felt well and opted not to see the sleep specialist. Dependent edema likely due to weight and possibly varicose veins.& undiagnosed sleep apnea could also contribute to this. Had no sign of heart failure. To continue to use compression socks.  History of osteopenia continued on calcium 1200 a day & vitamin D at least 2000 units daily and recheck DEXA scan. Later Dexa showed osteopenia but with high fracture risk of hip & so sent in fosamax 70 mg 1/wk, to monitor for GERD & to connect with her dentist prior to starting & recheck Dexa in 1 yr.  History of breast cancer, prior right mastectomy scar looked good.  Left breast exam was normal.  Left screening mammogram due and order in place to schedule..  Memory deficits noted on screening test.  Was alone without family & responding appropriately.  Opted not to see neurology.  MRI brain ordered.  Encouraged to return in 6 months for recheck with family. Was to continue care with the eye doctor for history of vitreal macular adhesion, right eye epiretinal membrane on the right and bilateral glaucoma suspect monitoring.     Labs showed normal B 12, bmp, lfts, HB A1c of 5.7. Dexa 4/4/23 showed osteopenia. 4/14/ screening mammogram showed asymmetry left breast and dx studies ordered. MRI 4/14 showed atrophy and white matter changes. Seen by eye 4/26 for glaucoma check, presbyopia, epiretinal membrane, dx mammogram and left breast u/s on 4/26 showed no concern on left but noted right mastectomy scar dehiscence and referred to the surgeon.seen by breast clinic 4/27 biopsy done, showed radiation changes. Seen by surgeon 5/7 and continued on keflex given for wound infection. On 5/8 TSH stable & continued on levothyroxine 150 6 day sand then 1.5 tab one day a week. Seen By wound clinic 5/18 and 6/15 & noted better. On 6/19 TSH was normal and continued same dosing. Met with the  8/10/23 given fh & personal hx of cancer and was to think if  desired testing or not     Seen 9/26/23 Hypertension on lisinopril 20 mg bedtime with no side effects. BP elevated 167/75. Encouraged eating a low salt, caffeine diet and low alcohol intake. Added amlodipine 2.5 mg daily to help BP. Goal BP should be 130/80 santos given decreasing kidney function. Had enough lisinopril as filled recently. Rest of meds to wait to fill till labs back in case dose needed changing. Was to return in 6 weeks for BP. Chronic kidney disease stage III with hx of microalbuminuria in the past on ACE inhibitor.  Avoid anti-inflammatories as much as possible. Later labs showed Kidney function decreased but similar to prior, to continue to avoid NSAID's, stay hydrated and take the lisinopril to protect kidney function and start the amlodipine to improve BP as uncontrolled BP would worsen kidney function. Rest of electrolytes were normal. BMI 35 & weight had gone up. Had tried a Keto diet, declined referral to weight specialist or sleep, or mtm , or taking meds. LFT's were normal. Encouraged smaller portions and increasing exercise. Prediabetes HB A1c 5.7. & unchanged to continue with dietary and lifestyle changes and recheck again in 3 to 6 months time. Hyperlipidemia on atorvastatin 20 mg. ASCVD risk 20% . Lipids in march had been normal to continue same dose of statin. Hypothyroidism on levothyroxine 150 mcg 6 days of week and 1.5 tab of 150 1 day of week. Since 4/2023 TSH in June had been. normal. Later labs showed TSH was slightly above range but Ft4 was normal. Was to continue her levothyroxine 1 tab 6 days a week & 1.5 tab one day a week & recheck TSH when I was to see her back on 11/9 for her BP. I gave her 100 with 1 refill end of June so she was expected to enough of this med till labs rechecked in nov apt. History of snoring, BMI more than 35, there was possibility of undiagnosed sleep apnea.  Opted not to see the sleep specialist. Reported her daughter did all the driving.   History of  chronic intermittent idiopathic neutropenia that predated cancer diagnosis and treatment.  Negative work-up and asymptomatic in past Seen by hematology most recently in the fall 2022.  Peripheral smear and additional lab work for iron copper folic acid had been normal.  No nutritional causes seen.  Held off on bone marrow biopsy given stability and lack of symptoms. On B 12 supp unknown dose.  B 12 wnl in past. Was advised if should feel sick, have fevers, significant abdominal pains then advised she should always come in to be evaluated for infection and get a CBC. & let her providers know the history of neutropenia and if neutrophils were low or if infection was not improving with regular treatment then hematology recommended a course of Neupogen 480 mcg subcu for 3 to 5 days at the time. Had no symptoms & Wbc low but unchanged from before.   Dependent edema likely due to weight  possibly varicose veins & undiagnosed sleep apnea. Was to continue using compression socks and encouraged a low salt diet and elevation  of legs 1 hr above heart level each day. Dexa  in 4/2023 showed osteopenia but with high fracture risk of hip & so started on fosamax 70 mg 1/wk, & remained on calcium 1200 a day & vitamin D at least 2000 units daily. Had had no side effects, no blood in stools or black stools & no dental issues. Dexa due again 2024. On Pepcid prn heart burn related to foods felt not any worse since on fosamax. Hx of C scope done for positive cologuard which showed 3 serrated adenoma and one tubular adenoma removed 9/2022 and advised recheck in 3 yrs ( 2025).    Memory deficits noted on screening test at last visit. Minicog at recent visit she recalled 2 of 3 words. Was again alone without family. Though daughter dropped her off. MRI brain in April 2023 showed no mass effect, midline shift, or evidence of intracranial hemorrhage. The ventricles were proportionate to the cerebral sulci. Normal major vascular intracranial  flow-voids. Advanced leukokraurosis. Mild diffuse cerebral volume loss. Post contrast images demonstrated no abnormal intracranial enhancement. No abnormality of the skull marrow signal. The visualized portions of paranasal sinuses, and mastoid air cells were relatively clear. The orbits were grossly unremarkable. Discussed white matter changes and atrophy. Encouraged to keep BP under control. She was responding appropriately & opted not to see neurology.    History of right breast cancer, prior right mastectomy scar breakdown and infection post punch biopsy for wound dehiscence had resolved. Skin noted intact. Seen by breast center. Left breast screening mammogram showed an asymmetry in April s/p dx imaging and u/s was unremarkable. To continue yearly screening. Did meet with a  given fh and personal hx of cancer and opted not to do any testing.   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 Given the high dose flu shot & discussed considering the new COVID vaccine and RSV vaccine when available.   Reminded to think of getting Shingrex. Was to return in 6 weeks for a BP & TSH check     Seen by GI doctor 10/31/2023.  No-show to appointment 11/9.  Seen by sports medicine 1 week later for history of left wrist fracture that had occurred a week prior there was concern for osteoporosis given was on Fosamax for osteopenia and had fallen from a short height sustaining injury.  Was managed conservatively with splint and seen by sports med for follow-up 12/8, 12/28 and 1/25/2024.  Did see OT once on 1/4/2024.  [place]  negative    Seen 3/18/24, noted Hypertension uncontrolled on lisinopril 20 mg bedtime & amlodipine 2.5 mg daily at night. Blood pressure elevated in clinic 157/74 initially then 143/73. Possible compliance and gaps in med refills. Reported taking medications daily and not missing any  dose. Goal BP should be 130/80 santos given decreasing kidney function. Increased amlodipine to 5 mg. Given script for home BP machine, to check and keep log & bring to next visit. To return in may / [place] for a medicare wellness and follow up BP, thyroid etc  Chronic kidney disease stage III with hx of microalbuminuria in the past on ACE inhibitor.  Encouraged to avoid anti-inflammatories as much as possible. Kidney function remained stable around 50% with stable electrolytes. To continue to avoid anti-inflammatories. BMI 34.  Had tried a Keto diet, declined referral to weight specialist or sleep, or mtm , or taking meds. Encouraged smaller portions and increasing exercise. Prediabetes hx,previously HB A1c 5.Later noted improved to normal. Hyperlipidemia on atorvastatin 20 mg. The 10-year ASCVD risk score (Joe BUTLER, et al., 2019) was  increased to 30.3%.  & increased atorvastatin to 40 mg bedtime and to recheck cholesterol in May when seen on this medication. Liver tests were wnl.  Hypothyroidism on levothyroxine 150 mcg 6 days of week and 1.5 tab of 150 1 day of week. In 4/2023 TSH normal. In 9/2023 TSH slightly above range but Ft4 normal. Was continued on levothyroxine 1 tab 6 days a week & 1.5 tab one day a week & was to recheck TSH when returned on 11/9 for her BP. Had been given 100 with 1 refill end of June 2023 so expected would have enough till nov apt. Was no show to apt in nov & then reported since tab was diff to cut had been taking 2 instead of 1.5 tab the one day. Unsure how long had been doing this. Reported ran out of med > 1 week ago, ? If not earlier. After the visit TSH came back elevated at 5.24, free T4 not back. Resumed levothyroxine 150 mcg daily 6 days of the week but 1-1/2 tablet of 150 mcg 1 day of the week, sent in 100 tablets advised not to double up on dosing if difficult to cut in half to use a pill cutter and recheck on medications as advised when returned for follow-up in May so  we could adjust meds appropriately at the time as levels may not be accurate since checked when had already run out of medication.  History of snoring, BMI more than 35, there was possibility of undiagnosed sleep apnea.  Opted not to see the sleep specialist. Reported usually avoided driving.   History of chronic intermittent idiopathic neutropenia that predated breast cancer diagnosis and treatment.  Negative work-up and asymptomatic in past. Seen by hematology most recently in the fall 2022.  Peripheral smear and additional lab work for iron copper folic acid had been normal.  No nutritional causes seen. Held off on bone marrow biopsy given stability and lack of symptoms. On B 12 supp unknown dose. B 12 wnl in past. Was advised if should feel sick, have fevers, significant abdominal pains then advised she should always come in to be evaluated for infection and get a CBC. & let her providers know the history of neutropenia and if neutrophils were low or if infection was not improving with regular treatment then hematology recommended a course of Neupogen 480 mcg subcu for 3 to 5 days at the time. Had no symptoms at visit.   Dependent edema likely due to weight  possibly varicose veins & undiagnosed sleep apnea. No swelling noted since on amlodipine, using compression socks and encouraged a continued low salt diet and elevation of legs 1 hr above heart level each day  Dexa in 4/2023 showed osteopenia but with high fracture risk of hip & so started on fosamax 70 mg 1/wk, & remained on calcium 1200 a day & vitamin D at least 2000 units daily. Had had no side effects, no increased GERD, no blood in stools or black stools & no dental issues. Was on Pepcid for heart burn related to diet & that had not gotten worse since on fosamax. Reviewed history of traumatic left wrist fracture from a fall from standing position in dec 2023 and concern by sports medicine about it possibly being a fragility fracture. Recommended seeing  endocrine to discuss osteoporosis Rx given this new fracture hx. Not sure if we would call her wrist fracture Fosamax failure or not. Refilled Fosamax for next 3 months. Vitamin D normal at 43 & was to continue with current dosing of vitamin D 2000 units daily and calcium 1200 mg total daily as advised previously & await endo apt & input.  History of right breast cancer, prior right mastectomy, hx of resolved scar breakdown and infection post punch biopsy for wound dehiscence in 2022/23. Seen by breast center. Left breast screening mammogram showed an asymmetry in April 2023 s/p dx imaging and u/s was unremarkable. To continue yearly screening. Due in April. Did meet with a  given fh and personal hx of cancer and opted not to do any testing.  Reported no new breast or skin concerns.   Hx of Cscope done for positive cologuard which showed 3 serrated adenoma and one tubular adenoma removed 9/2022 and advised recheck in 3 yrs ( 2025). Reported had had no blood in the stools or black stools.  Memory deficits noted on screening test 2022. Minicog 2023 recalled 2 of 3 words. Always alone at visits without family. MRI brain in April 2023 showed no mass effect, midline shift, or evidence of intracranial hemorrhage. The ventricles were proportionate to the cerebral sulci. Normal major vascular intracranial flow-voids. Had advanced leukokraurosis. Mild diffuse cerebral volume loss. Post contrast images demonstrated no abnormal intracranial enhancement. No abnormality of the skull marrow signal. The visualized portions of paranasal sinuses, and mastoid air cells were relatively clear. The orbits were grossly unremarkable. Discussed white matter changes and atrophy. Keeping BP under control would be helpful.  Discussed missed appointment in November.  Felt due to busy with other things.  Declined neurology referral.  To monitor.    History of Viteral macular adhesion, right eye epiretinal membrane on the right and  bilateral glaucoma suspect under care of eye. Hoped to get left cataract done soon  Given COVID vaccine & encouraged again to get RSV and shingles at pharmacy. Was to return in May for Medicare wellness, blood pressure and thyroid follow-up    Noted insurance issues with med refills. On 4/29/24 left mammogram came back normal.     CURRENTLY   Here for medicare wellness   reviewed  Has ACP on file, no changes  Vitals /82, 147/79, 138/78, wt 238 to 243, BMI up to 36  Reports vision ok  Hearing ok   Passed cognition screen  No falls  Dexa due again 2025  No breast issues  Right mastectomy scar unchanged, no new breakdown   Mammogram left breat in April normal  COVID vacc had in march, will wait to get new one now in the fall  Discussed to consider rsv and shingles  Fasting labs today and will make further recommendations once reviewed    Hypertension on lisinopril 20 mg bedtime & amlodipine 5 mg daily at night. 152/82, 147/79, 138/78, Reports took this am. Given script for home BP machine last visit, not got one yet. Maybe misplaced script. Needs again. Encouraged to check, goal < 130/80, to bring in to a nurse only visit in 1 month for validating readings      Chronic kidney disease stage III with prior hx of microalbuminuria on ACE inhibitor.  Encouraged to avoid anti-inflammatories as much as possible. Discussed and held off on jardiance,      BMI increased to 36 . Previously had tried a Keto diet, declined referral to weight specialist or sleep, or mtm, or taking meds. Encouraged smaller portions, avoiding sugary drinks and increasing exercise.  increase walking to daily      Prediabetes hx, Does like her sweets. HBA1c normal in march 2024.      Hyperlipidemia on atorvastatin 40 mg.since march 2024. Was given 90 with 1 refill in march  The 10-year ASCVD risk score (Joe DK, et al., 2019) is: 28.8%    Values used to calculate the score:      Age: 78 years      Sex: Female      Is Non-   American: No      Diabetic: No      Tobacco smoker: No      Systolic Blood Pressure: 138 mmHg      Is BP treated: Yes      HDL Cholesterol: 47 mg/dL      Total Cholesterol: 173 mg/dL     Hypothyroidism on levothyroxine 150 mcg 6 days of week and 1.5 tab of 150 1 day of week. In 4/2023 TSH normal. In 9/2023 TSH slightly above range but Ft4 normal. Was continued on levothyroxine 1 tab 6 days a week & 1.5 tab one day a week & was to recheck TSH when returned on 11/9 for her BP. Had been given 100 with 1 refill end of June 2023 so expected would have enough till nov apt. Was no show to apt in nov & in march 2024 reported since tab was diff to cut had been taking 2 instead of the 1.5 tab the one day. Unsure how long had been doing this. Reported ran out of med > 1 week ago, ? If not earlier. After the visit TSH came back elevated at 5.24, with normal free T4 not back. Was resumed on levothyroxine 150 mcg daily 6 days of the week & 1-1/2 tablet of 150 mcg 1 day of the week , sent in 100 tablets advised not to double up on dosing & if difficult to cut in half use a pill cutter and to recheck today as prior levels may not have been accurate as checked when had already run out of medication. Noted some insurance issues and med pickup issues but reports has been taking daily as directed so to recheck today & wait to see results to refill appropriate dose.      History of snoring, BMI more than 36, there is possibility of undiagnosed sleep apnea. Opted not to see the sleep specialist. Advised not to drive when drowsy    Osteopenia noted on Dexa in 4/2023 but with the 10 year probability of major osteoporotic fracture 12.3%, and of hip fracture 3.0%, based on left femoral neck & so started on fosamax 70 mg 1/wk, ( Mon am ). Has had no side effects, no increased GERD, no blood in stools or black stools & no dental issues. Is on Pepcid for prior hx of heart burn related to diet & that has not gotten worse since on fosamax.  Reviewed history of traumatic left wrist fracture from a fall from standing position in nov 2023 and concern by sports medicine about it possibly being a fragility fracture. Was referred to endo to see if med needed to be changed but no apt made with them and desires now to wait till next year to see Dexa scan on meds. Encouraged calcium 1200 mg total a day & vit d 2000 units daily.     History of chronic intermittent idiopathic neutropenia that predated cancer diagnosis and treatment.  Negative work-up and asymptomatic in past Seen by hematology most recently in the fall 2022.  Peripheral smear and additional lab work for iron copper folic acid had been normal.  No nutritional causes seen.  Held off on bone marrow biopsy given stability and lack of symptoms. On B 12 supp unknown dose.  B 12 wnl in past. Was advised if should feel sick, have fevers, significant abdominal pains then advised she should always come in to be evaluated for infection and get a CBC. & let her providers know the history of neutropenia and if neutrophils were low or if infection was not improving with regular treatment then hematology recommended a course of Neupogen 480 mcg subcu for 3 to 5 days at the time. Reports no symptoms recently.      Dependent edema likely due to weight, possibly varicose veins,amlodipine & undiagnosed sleep apnea. No increased swelling noted since started on amlodipine, using compression socks and encouraged a continued low salt diet and elevation of legs 1 hr above heart level each day. Has declined a sleep eval.     History of right breast cancer, prior right mastectomy in 2008, hx of resolved scar breakdown and infection post punch biopsy for wound dehiscence in 2022/23. Seen by breast center. Left breast screening mammogram showed an asymmetry in April 2023 s/p dx imaging and u/s was unremarkable. Mammogram normal 4/2024. Previously did meet with a  given fh and personal hx of cancer and opted not to  "do any testing.  Reports no breast or skin concerns today. To continue with annual mammograms.     Hx of Cscope done for positive cologuard which showed 3 serrated adenoma and one tubular adenoma removed 9/2022 and advised recheck in 3 yrs ( 2025).  Reported has had no black stools.  But has noted bright red blood on wiping 1 month ago. ? Hemorrhoidal.    Memory deficits noted on screening test 2022. Minicog 2023 recalled 2 of 3 words. Always alone without family. MRI brain in April 2023 showed no mass effect, midline shift, or evidence of intracranial hemorrhage. The ventricles were proportionate to the cerebral sulci. Normal major vascular intracranial flow-voids. Advanced leukokraurosis. Mild diffuse cerebral volume loss. Post contrast images demonstrate no abnormal intracranial enhancement. No abnormality of the skull marrow signal. The visualized portions of paranasal sinuses, and mastoid air cells were relatively clear. The orbits were grossly unremarkable. Discussed white matter changes and atrophy. Keeping BP under control will be helpful. Is also on a statin. Has declined neurology referral.       History of Viteral macular adhesion, right eye epiretinal membrane on the right and bilateral glaucoma suspect under care of eye. Hopes to get left cataract done soon       Review of Systems  Constitutional, HEENT, cardiovascular, pulmonary, GI, , musculoskeletal, neuro, skin, endocrine and psych systems are negative, except as otherwise noted.     Objective    Exam  /78 (BP Location: Right arm, Patient Position: Sitting)   Pulse 63   Temp 97.4  F (36.3  C) (Temporal)   Resp 18   Ht 1.75 m (5' 8.9\")   Wt 110.6 kg (243 lb 14.4 oz)   SpO2 98%   BMI 36.12 kg/m     Estimated body mass index is 36.12 kg/m  as calculated from the following:    Height as of this encounter: 1.75 m (5' 8.9\").    Weight as of this encounter: 110.6 kg (243 lb 14.4 oz).    Physical Exam  GENERAL: alert, no distress, and " obese  EYES: Eyes grossly normal to inspection, PERRL and conjunctivae and sclerae normal  HENT: ear canals and TM's normal, nose and mouth without ulcers or lesions  NECK: no adenopathy, no asymmetry, masses, or scars  RESP: lungs clear to auscultation - no rales, rhonchi or wheezes  BREAST: left normal without masses, tenderness or nipple discharge and no palpable axillary masses or adenopathy  Right mastectomy scar intact, punch biopsy scar unchanged  CV: regular rate and rhythm, normal S1 S2, no S3 or S4, no murmur, click or rub, no peripheral edema  ABDOMEN: soft, nontender, no hepatosplenomegaly, no masses and bowel sounds normal  MS: no gross musculoskeletal defects noted, no edema  SKIN: no suspicious lesions or rashes  NEURO: Normal strength and tone, mentation intact and speech normal  PSYCH: mentation appears normal, affect normal/bright, and appearance disheveled  LYMPH: no cervical, supraclavicular, axillary, or inguinal adenopathy        7/2/2024   Mini Cog   Clock Draw Score 2 Normal   3 Item Recall 2 objects recalled   Mini Cog Total Score 4           Signed Electronically by: Marry Rodriguez MD

## 2024-07-02 NOTE — RESULT ENCOUNTER NOTE
Hello -    Here are my comments about your recent results:  Normal ionized calcium    Please let us know if you have any questions or concerns.     Regards,  Marry Rodriguez MD

## 2024-07-02 NOTE — RESULT ENCOUNTER NOTE
Hello -    Here are my comments about your recent results:  Chronic idiopathic neutropenia neutrophils remain low slightly lower than 9 months ago but currently asymptomatic and no sign of infection.  Continue to monitor    Please let us know if you have any questions or concerns.     Regards,  Marry Rodriguez MD

## 2024-07-03 ENCOUNTER — TELEPHONE (OUTPATIENT)
Dept: FAMILY MEDICINE | Facility: CLINIC | Age: 79
End: 2024-07-03
Payer: COMMERCIAL

## 2024-07-03 DIAGNOSIS — N18.30 STAGE 3 CHRONIC KIDNEY DISEASE, UNSPECIFIED WHETHER STAGE 3A OR 3B CKD (H): ICD-10-CM

## 2024-07-03 DIAGNOSIS — E03.9 ACQUIRED HYPOTHYROIDISM: Primary | ICD-10-CM

## 2024-07-03 PROBLEM — Z87.19 HISTORY OF RECTAL BLEEDING: Status: ACTIVE | Noted: 2024-07-03

## 2024-07-03 PROBLEM — I67.81 LEUKOARAIOSIS: Status: ACTIVE | Noted: 2024-07-03

## 2024-07-03 RX ORDER — LEVOTHYROXINE SODIUM 175 UG/1
175 TABLET ORAL DAILY
Qty: 90 TABLET | Refills: 0 | Status: SHIPPED | OUTPATIENT
Start: 2024-07-03 | End: 2024-08-15

## 2024-07-03 RX ORDER — ATORVASTATIN CALCIUM 40 MG/1
40 TABLET, FILM COATED ORAL DAILY
Qty: 90 TABLET | Refills: 1 | Status: SHIPPED | OUTPATIENT
Start: 2024-07-03

## 2024-07-03 RX ORDER — LEVOTHYROXINE SODIUM 175 UG/1
175 TABLET ORAL DAILY
Qty: 90 TABLET | Refills: 0 | Status: SHIPPED | OUTPATIENT
Start: 2024-07-03 | End: 2024-07-03

## 2024-07-03 NOTE — RESULT ENCOUNTER NOTE
Hello -    Here are my comments about your recent results:  The 10-year ASCVD risk score (Joe BUTLER, et al., 2019) is: 29.1%    Values used to calculate the score:      Age: 78 years      Sex: Female      Is Non- : No      Diabetic: No      Tobacco smoker: No      Systolic Blood Pressure: 138 mmHg      Is BP treated: Yes      HDL Cholesterol: 53 mg/dL      Total Cholesterol: 181 mg/dL  -LDL(bad) cholesterol level is elevated which can increase your heart disease risk.  A diet high in fat and simple carbohydrates, genetics and being overweight can contribute to this. ADVISE: exercising 150 minutes of aerobic exercise per week (30 minutes for 5 days per week or 50 minutes for 3 days per week are options) and eating a low saturated fat/low carbohydrate diet are helpful to improve this. Continue atorvastatin 40 mg for now and will recheck when I see you back as under corrected thyroid as noted below can also contribute to poor lipid control & want to only make one change at a time.  -Liver and gallbladder tests (ALT,AST, Alk phos,bilirubin) are normal.  -Kidney function (GFR) is decreased.  ADVISE: increase water intake & recheck non fasting with tsh in 4 to 6 weeks when comes n for nurse BP check with [facility] machine for validation   -Sodium is normal.  -Potassium is normal.  -Calcium is normal.  -Glucose (diabetic screening test) is normal.  -TSH (thyroid stimulating hormone) is abnormal suggesting you are currently underreplaced.  I questioned compliance but did report at visit was taking 150  6 days & 1.5 tab of 150 7h day each week & if doing that consistently 7 labs as noted then really do need to change dosing . ADVISE: changing your medication dose to 175 micrograms/day (a prescription has been sent to your pharmacy) and rechecking your TSH with a lab appointment in 6 weeks when comes in for above please start right away so the lab accurately reflects medication & further adjustments  can then be made appropriately   -Vitamin D level is normal and getting 2000 IU daily in your diet or supplements is recommended.   For additional lab test information, labtestsonline.org is an excellent reference..    Please let us know if you have any questions or concerns.     Regards,  Marry Rodriguez MD

## 2024-07-03 NOTE — TELEPHONE ENCOUNTER
----- Message from Marry Rodriguez sent at 7/3/2024  9:18 AM CDT -----  Hello -    Here are my comments about your recent results:  The 10-year ASCVD risk score (Joe BUTLER, et al., 2019) is: 29.1%    Values used to calculate the score:      Age: 78 years      Sex: Female      Is Non- : No      Diabetic: No      Tobacco smoker: No      Systolic Blood Pressure: 138 mmHg      Is BP treated: Yes      HDL Cholesterol: 53 mg/dL      Total Cholesterol: 181 mg/dL  -LDL(bad) cholesterol level is elevated which can increase your heart disease risk.  A diet high in fat and simple carbohydrates, genetics and being overweight can contribute to this. ADVISE: exercising 150 minutes of aerobic exercise per week (30 minutes for 5 days per week or 50 minutes for 3 days per week are options) and eating a low saturated fat/low carbohydrate diet are helpful to improve this. Continue atorvastatin 40 mg for now and will recheck when I see you back as under corrected thyroid as noted below can also contribute to poor lipid control & want to only make one change at a time.  -Liver and gallbladder tests (ALT,AST, Alk phos,bilirubin) are normal.  -Kidney function (GFR) is decreased.  ADVISE: increase water intake & recheck non fasting with tsh in 4 to 6 weeks when comes n for nurse BP check with [facility] clinic machine for validation   -Sodium is normal.  -Potassium is normal.  -Calcium is normal.  -Glucose (diabetic screening test) is normal.  -TSH (thyroid stimulating hormone) is abnormal suggesting you are currently underreplaced.  I questioned compliance but did report at visit was taking 150  6 days & 1.5 tab of 150 7h day each week & if doing that consistently 7 labs as noted then really do need to change dosing . ADVISE: changing your medication dose to 175 micrograms/day (a prescription has been sent to your pharmacy) and rechecking your TSH with a lab appointment in 6 weeks when comes in for above please start right  away so the lab accurately reflects medication & further adjustments can then be made appropriately   -Vitamin D level is normal and getting 2000 IU daily in your diet or supplements is recommended.   For additional lab test information, [email or web address] is an excellent reference..    Please let us know if you have any questions or concerns.      Regards,  Marry Rodriguez MD

## 2024-07-03 NOTE — TELEPHONE ENCOUNTER
Dr. Rodriguez - future lab orders needed for this patient.    --------------------    Attempt #1 - Left message for patient to call back and ask to speak with an available triage nurse. Also relaying pertinent POC information via [employer].    Added lab onto RN BP visit appt notes and routed to PCP for future thyroid lab orders.    VEL Edmondson, MARYN, RN (she/her)  [facility] RN

## 2024-07-03 NOTE — RESULT ENCOUNTER NOTE
Hello -    Here are my comments about your recent results:  Normal parathyroid     Please let us know if you have any questions or concerns.     Regards,  Marry Rodriguez MD

## 2024-07-09 NOTE — TELEPHONE ENCOUNTER
Attempt #2 - Left message for patient to call back and ask to speak with an available triage nurse. MyChart was sent but not yet read. Lab note added onto future BP visit.  Closing encounter.

## 2024-08-14 ENCOUNTER — ALLIED HEALTH/NURSE VISIT (OUTPATIENT)
Dept: FAMILY MEDICINE | Facility: CLINIC | Age: 79
End: 2024-08-14
Payer: COMMERCIAL

## 2024-08-14 VITALS
OXYGEN SATURATION: 98 % | TEMPERATURE: 98.5 F | DIASTOLIC BLOOD PRESSURE: 78 MMHG | HEART RATE: 78 BPM | WEIGHT: 250 LBS | BODY MASS INDEX: 37.03 KG/M2 | SYSTOLIC BLOOD PRESSURE: 127 MMHG

## 2024-08-14 DIAGNOSIS — E03.9 ACQUIRED HYPOTHYROIDISM: ICD-10-CM

## 2024-08-14 DIAGNOSIS — N18.30 STAGE 3 CHRONIC KIDNEY DISEASE, UNSPECIFIED WHETHER STAGE 3A OR 3B CKD (H): ICD-10-CM

## 2024-08-14 DIAGNOSIS — I10 HYPERTENSION GOAL BP (BLOOD PRESSURE) < 140/90: Primary | ICD-10-CM

## 2024-08-14 LAB
ANION GAP SERPL CALCULATED.3IONS-SCNC: 8 MMOL/L (ref 7–15)
BUN SERPL-MCNC: 16.9 MG/DL (ref 8–23)
CALCIUM SERPL-MCNC: 9 MG/DL (ref 8.8–10.4)
CHLORIDE SERPL-SCNC: 102 MMOL/L (ref 98–107)
CREAT SERPL-MCNC: 1.36 MG/DL (ref 0.51–0.95)
EGFRCR SERPLBLD CKD-EPI 2021: 40 ML/MIN/1.73M2
GLUCOSE SERPL-MCNC: 97 MG/DL (ref 70–99)
HCO3 SERPL-SCNC: 28 MMOL/L (ref 22–29)
POTASSIUM SERPL-SCNC: 4.9 MMOL/L (ref 3.4–5.3)
SODIUM SERPL-SCNC: 138 MMOL/L (ref 135–145)
T4 FREE SERPL-MCNC: 0.81 NG/DL (ref 0.9–1.7)
TSH SERPL DL<=0.005 MIU/L-ACNC: 39.1 UIU/ML (ref 0.3–4.2)

## 2024-08-14 PROCEDURE — 84439 ASSAY OF FREE THYROXINE: CPT

## 2024-08-14 PROCEDURE — 84443 ASSAY THYROID STIM HORMONE: CPT

## 2024-08-14 PROCEDURE — 99207 PR NO CHARGE NURSE ONLY: CPT

## 2024-08-14 PROCEDURE — 80048 BASIC METABOLIC PNL TOTAL CA: CPT

## 2024-08-14 PROCEDURE — 36415 COLL VENOUS BLD VENIPUNCTURE: CPT

## 2024-08-14 NOTE — PROGRESS NOTES
Romayne L Sathre is a 78 year old year old patient who comes in today for a Blood Pressure check because of new medication.  Vital Signs as repeated by RN. 127/78. 2nd readin/76.  Patient is taking medication as prescribed  Patient is tolerating medications well.  Patient is not monitoring Blood Pressure at home.  Average readings if yes are NA  Current complaints: patient has a slight cold right now with slight congestion in chest  Disposition:  patient to continue with the same medication     Walked patient to lab for thyroid labs.     Antoinette Arteaga, BSN RN  [facility]

## 2024-08-14 NOTE — RESULT ENCOUNTER NOTE
Triage please call patient inform of results  Tsh is out of range now even more under corrected   Please clarify how exactly she has bene taking meds and if there were gaps in current dosing of 175 mcg prescribed early July 2024  Please check with pharmacy when picked up 175 mcg daily dosing  Need to be sure of compliance and duration of current dosing before go up further on the dose     Creatinine is elevated , kidney function slightly worse  Remains at CKD stage 3  Recommend a nephology consult prescribed since early July

## 2024-08-15 ENCOUNTER — TELEPHONE (OUTPATIENT)
Dept: FAMILY MEDICINE | Facility: CLINIC | Age: 79
End: 2024-08-15
Payer: COMMERCIAL

## 2024-08-15 DIAGNOSIS — N18.30 STAGE 3 CHRONIC KIDNEY DISEASE, UNSPECIFIED WHETHER STAGE 3A OR 3B CKD (H): Primary | ICD-10-CM

## 2024-08-15 DIAGNOSIS — E03.9 ACQUIRED HYPOTHYROIDISM: ICD-10-CM

## 2024-08-15 RX ORDER — LEVOTHYROXINE SODIUM 200 UG/1
200 TABLET ORAL DAILY
Qty: 50 TABLET | Refills: 0 | Status: SHIPPED | OUTPATIENT
Start: 2024-08-15 | End: 2024-09-26

## 2024-08-15 NOTE — TELEPHONE ENCOUNTER
Given triage stating patient reported has been taking 175 diligently daily for 6 weeks prior to this test and testing indicates severely undercorrected even more than before we will go ahead and increase levothyroxine to 200 mcg daily need to start today and recheck TSH on this dose in 6 weeks time.  If after that lab work remains abnormal then really needs to see endocrine to take over thyroid management  Also refer to nephrology for mild worsening of chronic kidney disease stage III

## 2024-08-15 NOTE — RESULT ENCOUNTER NOTE
Hello -    Here are my comments about your recent results:  Thyroid function shows under corrected will have triage call you to clarify things and get back to me so cn decide next steps     Please let us know if you have any questions or concerns.     Regards,  Marry Rodriguez MD

## 2024-08-15 NOTE — TELEPHONE ENCOUNTER
----- Message from Marry Rodriguez sent at 8/15/2024  7:44 AM CDT -----  Hello -    Here are my comments about your recent results:  Thyroid function shows under corrected will have triage call you to clarify things and get back to me so cn decide next steps     Please let us know if you have any questions or concerns.      Regards,  Marry Rodriguez MD

## 2024-08-15 NOTE — TELEPHONE ENCOUNTER
Called and relayed clinician message to patient. Assisted in scheduling lab only appt. Provided nephrology referral number.     Patient verbalized understanding and agrees with plan.      Carmina Cabrera RN  [facility]

## 2024-08-15 NOTE — TELEPHONE ENCOUNTER
Attempt #1. No answer. Left message on patient's voicemail to call back and speak with a triage nurse.     Carmina Cabrera RN  [facility]

## 2024-08-15 NOTE — TELEPHONE ENCOUNTER
Dr. Rodriguez-Please advise:  Writer cannot anyone to answer phone at [facility].    Writer unable to find Nephrology referral-patient agreeable to scheduling    Patient called back, was informed of message per Dr. Rodriguez and patient stated no missed doses.    Patient verbalized understanding and in agreement with plan.    Thank you!  MARY PartidaN, RN-BC  [facility]

## 2024-08-15 NOTE — TELEPHONE ENCOUNTER
----- Message from Marry Rodriguez sent at 8/14/2024  6:29 PM CDT -----  Triage please call patient inform of results  Tsh is out of range now even more under corrected   Please clarify how exactly she has bene taking meds and if there were gaps in current dosing of 175 mcg prescribed early July 2024  Please check with pharmacy when picked up 175 mcg daily dosing  Need to be sure of compliance and duration of current dosing before go up further on the dose     Creatinine is elevated , kidney function slightly worse  Remains at CKD stage 3  Recommend a nephology consult prescribed since early July

## 2024-09-26 ENCOUNTER — LAB (OUTPATIENT)
Dept: LAB | Facility: CLINIC | Age: 79
End: 2024-09-26
Payer: COMMERCIAL

## 2024-09-26 ENCOUNTER — TELEPHONE (OUTPATIENT)
Dept: FAMILY MEDICINE | Facility: CLINIC | Age: 79
End: 2024-09-26

## 2024-09-26 DIAGNOSIS — E03.9 ACQUIRED HYPOTHYROIDISM: ICD-10-CM

## 2024-09-26 LAB — TSH SERPL DL<=0.005 MIU/L-ACNC: 4.14 UIU/ML (ref 0.3–4.2)

## 2024-09-26 PROCEDURE — 84443 ASSAY THYROID STIM HORMONE: CPT

## 2024-09-26 PROCEDURE — 36415 COLL VENOUS BLD VENIPUNCTURE: CPT

## 2024-09-26 RX ORDER — LEVOTHYROXINE SODIUM 200 UG/1
200 TABLET ORAL DAILY
Qty: 90 TABLET | Refills: 0 | Status: SHIPPED | OUTPATIENT
Start: 2024-09-26

## 2024-09-26 NOTE — LETTER
September 27, 2024      Romayne L Sathre  [address]  [facility] [address]        Dear ,    We are writing to inform you of your test results.    Here are my comments about your recent results:   -TSH (thyroid stimulating hormone) level is normal which indicates appropriate thyroid replacement dosing.  ADVISE: continuing same replacement dose 200 mcg of levothyroxine daily and recheck TSH in 2 months to make sure that current dose is appropriate and you do not get overcorrected   For additional lab test information, labtestsonline.org is an excellent reference..     Resulted Orders   TSH with free T4 reflex   Result Value Ref Range    TSH 4.14 0.30 - 4.20 uIU/mL       If you have any questions or concerns, please call the clinic at the number listed above.       Sincerely,      Marry Rodriguez MD - PC

## 2024-09-27 NOTE — RESULT ENCOUNTER NOTE
Hello -    Here are my comments about your recent results:  -TSH (thyroid stimulating hormone) level is normal which indicates appropriate thyroid replacement dosing.  ADVISE: continuing same replacement dose 200 mcg of levothyroxine daily and recheck TSH in 2 months to make sure that current dose is appropriate and you do not get overcorrected  For additional lab test information, [email or web address] is an excellent reference..    Please let us know if you have any questions or concerns.     Regards,  Marry Rodriguez MD

## 2024-09-27 NOTE — TELEPHONE ENCOUNTER
-TSH (thyroid stimulating hormone) level is normal which indicates appropriate thyroid replacement dosing.  ADVISE: continuing same replacement dose 200 mcg of levothyroxine daily and recheck TSH in 2 months to make sure that current dose is appropriate and you do not get overcorrected

## 2024-09-27 NOTE — TELEPHONE ENCOUNTER
Patient called back and results/provider message was relayed to patient. Patient verbalizes understanding of results. 2 months lab only made for patient (end of Nov.).       Jaime Lima, MSN, RN   [facility]

## 2024-09-27 NOTE — TELEPHONE ENCOUNTER
Attempt #1. No answer. Left message on patient's voicemail to call back and speak with a triage nurse.     Carmina Cabrera RN  [facility]

## 2024-11-27 ENCOUNTER — LAB (OUTPATIENT)
Dept: LAB | Facility: CLINIC | Age: 79
End: 2024-11-27
Payer: COMMERCIAL

## 2024-11-27 DIAGNOSIS — E03.9 ACQUIRED HYPOTHYROIDISM: ICD-10-CM

## 2024-11-27 LAB
T4 FREE SERPL-MCNC: 1.27 NG/DL (ref 0.9–1.7)
TSH SERPL DL<=0.005 MIU/L-ACNC: 4.78 UIU/ML (ref 0.3–4.2)

## 2024-11-27 PROCEDURE — 84439 ASSAY OF FREE THYROXINE: CPT

## 2024-11-27 PROCEDURE — 36415 COLL VENOUS BLD VENIPUNCTURE: CPT

## 2024-11-27 PROCEDURE — 84443 ASSAY THYROID STIM HORMONE: CPT

## 2024-11-28 ENCOUNTER — TELEPHONE (OUTPATIENT)
Dept: FAMILY MEDICINE | Facility: CLINIC | Age: 79
End: 2024-11-28
Payer: COMMERCIAL

## 2024-11-28 DIAGNOSIS — E03.9 ACQUIRED HYPOTHYROIDISM: ICD-10-CM

## 2024-11-28 RX ORDER — LEVOTHYROXINE SODIUM 200 UG/1
200 TABLET ORAL DAILY
Qty: 90 TABLET | Refills: 0 | Status: SHIPPED | OUTPATIENT
Start: 2024-11-28

## 2024-11-28 NOTE — RESULT ENCOUNTER NOTE
Hello -    Here are my comments about your recent results:  TSH slightly out of range but FT4 wnl  For now continue same dose of levothyroxiine 200 mcg daily and will recheck when I see you back in clinic in January. Refill sent in    Please let us know if you have any questions or concerns.     Regards,  Marry Rodriguez MD

## 2024-11-28 NOTE — TELEPHONE ENCOUNTER
TSH slightly out of range but FT4 wnl  For now continue same dose of levothyroxiine 200 mcg daily and will recheck when I see you back in clinic in January. Refill sent in

## 2024-12-02 ENCOUNTER — OFFICE VISIT (OUTPATIENT)
Dept: URGENT CARE | Facility: URGENT CARE | Age: 79
End: 2024-12-02
Payer: COMMERCIAL

## 2024-12-02 ENCOUNTER — OFFICE VISIT (OUTPATIENT)
Dept: PEDIATRICS | Facility: CLINIC | Age: 79
End: 2024-12-02
Payer: COMMERCIAL

## 2024-12-02 ENCOUNTER — ANCILLARY PROCEDURE (OUTPATIENT)
Dept: ULTRASOUND IMAGING | Facility: CLINIC | Age: 79
End: 2024-12-02
Attending: INTERNAL MEDICINE
Payer: COMMERCIAL

## 2024-12-02 VITALS
BODY MASS INDEX: 37.03 KG/M2 | HEIGHT: 69 IN | HEART RATE: 67 BPM | DIASTOLIC BLOOD PRESSURE: 78 MMHG | OXYGEN SATURATION: 99 % | TEMPERATURE: 97.5 F | WEIGHT: 250 LBS | SYSTOLIC BLOOD PRESSURE: 130 MMHG

## 2024-12-02 VITALS
HEART RATE: 70 BPM | BODY MASS INDEX: 36.61 KG/M2 | HEIGHT: 69 IN | WEIGHT: 247.2 LBS | SYSTOLIC BLOOD PRESSURE: 149 MMHG | TEMPERATURE: 97.2 F | RESPIRATION RATE: 20 BRPM | DIASTOLIC BLOOD PRESSURE: 69 MMHG | OXYGEN SATURATION: 99 %

## 2024-12-02 DIAGNOSIS — L03.115 CELLULITIS OF RIGHT LOWER EXTREMITY: ICD-10-CM

## 2024-12-02 DIAGNOSIS — M79.661 PAIN OF RIGHT LOWER LEG: Primary | ICD-10-CM

## 2024-12-02 DIAGNOSIS — R60.0 EDEMA OF RIGHT LOWER EXTREMITY: Primary | ICD-10-CM

## 2024-12-02 DIAGNOSIS — R60.0 EDEMA OF RIGHT LOWER EXTREMITY: ICD-10-CM

## 2024-12-02 PROCEDURE — 93971 EXTREMITY STUDY: CPT | Mod: RT

## 2024-12-02 PROCEDURE — 99207 REFERRAL TO ACUTE AND DIAGNOSTIC SERVICES: CPT | Performed by: PHYSICIAN ASSISTANT

## 2024-12-02 PROCEDURE — 99214 OFFICE O/P EST MOD 30 MIN: CPT | Performed by: INTERNAL MEDICINE

## 2024-12-02 RX ORDER — CEFADROXIL 500 MG/1
500 CAPSULE ORAL 2 TIMES DAILY
Qty: 10 CAPSULE | Refills: 0 | Status: SHIPPED | OUTPATIENT
Start: 2024-12-02 | End: 2024-12-07

## 2024-12-02 ASSESSMENT — PAIN SCALES - GENERAL
PAINLEVEL_OUTOF10: SEVERE PAIN (6)
PAINLEVEL_OUTOF10: MODERATE PAIN (5)

## 2024-12-02 NOTE — PROGRESS NOTES
"Acute and Diagnostic Services Clinic Visit    Assessment & Plan     Edema of right lower extremity    - US Lower Extremity Venous Duplex Right; Future    Cellulitis of right lower extremity  US negative for DVT. Edema most likely related to cellulitis of the leg. UTD on TDAP, so will treat with abx. No evidence of abscess on exam.    - cefadroxil (DURICEF) 500 MG capsule; Take 1 capsule (500 mg) by mouth 2 times daily for 5 days.          BMI  Estimated body mass index is 36.51 kg/m  as calculated from the following:    Height as of this encounter: 1.753 m (5' 9\").    Weight as of this encounter: 112.1 kg (247 lb 3.2 oz).             Return in about 1 week (around 12/9/2024), or if symptoms worsen or fail to improve.    Subjective Romayne is a 79 year old, presenting for the following health issues:  Musculoskeletal Problem (Patient is here for a right lower injury.)    HPI     Evaluation for possible DVT  Onset/Duration: 1 week ago  Description:       Location: right lower leg       Redness: YES- and ecchymosis        Pain: severe, 6/10       Warmth: YES       Joint swelling YES  Progression of symptoms same  Accompanying signs and symptoms:       Fevers: no        Numbness/tingling/weakness: no        Chest pain/pleurisy: no        Shortness of breath: no   History        Trauma: YES- trauma to right lower leg after walking into [employer] that was not clean and had rust.        Recent travel/when: no         Previous history of DVT: no         Family history of DVT: no         Recent surgery: no   Aggravating factors include: standing and climbing stairs  Therapies tried and outcome: rest/inactivity and ice  Prior surgery on arteries of veins in this area: No              Objective    BP (!) 149/69 (BP Location: Right arm, Patient Position: Sitting, Cuff Size: Adult Large)   Pulse 70   Temp 97.2  F (36.2  C) (Temporal)   Resp 20   Ht 1.753 m (5' 9\")   Wt 112.1 kg (247 lb 3.2 oz)   SpO2 99%   BMI 36.51 kg/m  "   Body mass index is 36.51 kg/m .  Physical Exam       Well appearing  RRR  RLE with small abrasion on mid calf with surrounding erythema and edeam of lower leg            Signed Electronically by: Rossi Guerrero MD

## 2024-12-02 NOTE — PROGRESS NOTES
SUBJECTIVE:  Romayne L Sathre is a 79 year old female who presents to the clinic today for a rash.  Onset of rash was 2 day(s) ago.   Rash is sudden onset and worsening.  Location of the rash: lower leg.  Quality/symptoms of rash: painful, red, and warm/swollen   Symptoms are moderate and rash seems to be worsening.  Previous history of a similar rash? No, but cut shin 1 week ago on caty chad in yard  Hurts when bearing weight.    Past Medical History:   Diagnosis Date    Antiplatelet or antithrombotic long-term use     Breast cancer (H) 08/26/2008    Right Breast    Chronic fatigue 05/19/2020    CKD (chronic kidney disease) stage 3, GFR 30-59 ml/min (H) 09/17/2010    Combined form of age-related cataract, left eye 10/20/2020    Added automatically from request for surgery [id number]    [facility] 07/17/2012    [facility] for . Margarita Lawler RN-BSN, [facility] [phone]  DX V65.8 REPLACED WITH [id number] (04/08/2013)    Heart burn 05/19/2020    History of 2019 novel coronavirus disease (COVID-19) 11/16/2020    Seen in urgent care on 11/5/2020 for fever cough dizziness abdominal pain nausea was given Bactrim for cystitis tested for COVID discharged home and testing came back positive for Covid.  Seen in ER 11/16/2020 for shortness of breath sinus tachycardia and severe hypoxia.  Was admitted for worsening respiratory symptoms prompting her to seek further evaluation on 11/17/20 at [facility]. She was found to     History of acute respiratory failure 11/16/2020    With covid , complicated by PE and DVT, on oxygen and eliquis 3 months, completely resolved as of 2/2021    History of wrist fracture     Hyperlipidemia LDL goal <100 11/29/2010    Hypertension goal BP (blood pressure) < 140/90 09/17/2010    Hypothyroid 2002    Lipoma of other skin and subcutaneous tissue 11/19/2004    Nonsenile cataract     Osteoporosis 2014    Other psoriasis     Physical deconditioning 05/19/2020    Positive  "colorectal cancer screening using Cologuard test 06/26/2022    Thyroid disease      Current Outpatient Medications   Medication Sig Dispense Refill    alendronate (FOSAMAX) 70 MG tablet Take 1 tablet (70 mg) by mouth every 7 days 12 tablet 3    amLODIPine (NORVASC) 5 MG tablet Take 1 tablet (5 mg) by mouth daily 90 tablet 3    Ascorbic Acid (VITAMIN C) 500 MG CAPS Take 1 each by mouth daily      atorvastatin (LIPITOR) 40 MG tablet Take 1 tablet (40 mg) by mouth daily 90 tablet 1    calcium carbonate-vitamin D (OSCAL W/D) 500-200 MG-UNIT tablet Take 1 tablet by mouth daily Taking every 3 dasy or so      cetirizine (ZYRTEC) 10 MG tablet Take 10 mg by mouth daily      Cyanocobalamin (B-12 PO)       famotidine (PEPCID) 20 MG tablet Take 20 mg by mouth daily      levothyroxine (SYNTHROID/LEVOTHROID) 200 MCG tablet Take 1 tablet (200 mcg) by mouth daily. Recheck TSH at upcoming Jan apt 90 tablet 0    lisinopril (ZESTRIL) 20 MG tablet Take 1 tablet (20 mg) by mouth at bedtime 90 tablet 3    Multiple Vitamin (MULTI-VITAMIN) per tablet Take 1 tablet by mouth daily      TURMERIC PO Take 1 tablet by mouth daily       Social History     Tobacco Use    Smoking status: Never     Passive exposure: Yes    Smokeless tobacco: Never    Tobacco comments:     family members smoke; daughter   Substance Use Topics    Alcohol use: Yes     Alcohol/week: 10.0 standard drinks of alcohol     Comment: 0-1 drink per month       ROS:  Review of systems negative except as stated above.    EXAM:   /78   Pulse 67   Temp 97.5  F (36.4  C) (Oral)   Ht 1.753 m (5' 9\")   Wt 113.4 kg (250 lb)   SpO2 99%   BMI 36.92 kg/m    GENERAL: alert, no acute distress.  SKIN: tedner and swollen distal of closed laceration, no symptoms at laceration  GENERAL APPEARANCE: healthy, alert and no distress  RESP: lungs clear to auscultation - no rales, rhonchi or wheezes  CV: regular rates and rhythm, normal S1 S2, no murmur noted  NEURO: Normal strength and " tone, sensory exam grossly normal,  normal speech and mentation    ASSESSMENT:  (M79.661) Pain of right lower leg  (primary encounter diagnosis)  Plan: Referral to Acute and Diagnostic Services (Day         of diagnostic / First order acute)  Dvt ruleout    To HUB

## 2025-01-05 DIAGNOSIS — E78.2 MIXED HYPERLIPIDEMIA: ICD-10-CM

## 2025-01-05 RX ORDER — ATORVASTATIN CALCIUM 40 MG/1
40 TABLET, FILM COATED ORAL DAILY
Qty: 90 TABLET | Refills: 0 | Status: SHIPPED | OUTPATIENT
Start: 2025-01-05 | End: 2025-01-07

## 2025-01-07 ENCOUNTER — OFFICE VISIT (OUTPATIENT)
Dept: FAMILY MEDICINE | Facility: CLINIC | Age: 80
End: 2025-01-07
Payer: COMMERCIAL

## 2025-01-07 VITALS
DIASTOLIC BLOOD PRESSURE: 68 MMHG | SYSTOLIC BLOOD PRESSURE: 128 MMHG | WEIGHT: 247.1 LBS | OXYGEN SATURATION: 98 % | TEMPERATURE: 97.4 F | BODY MASS INDEX: 37.45 KG/M2 | HEART RATE: 68 BPM | HEIGHT: 68 IN

## 2025-01-07 DIAGNOSIS — I67.81 LEUKOARAIOSIS: ICD-10-CM

## 2025-01-07 DIAGNOSIS — Z86.0101 HISTORY OF ADENOMATOUS POLYP OF COLON: ICD-10-CM

## 2025-01-07 DIAGNOSIS — Z85.3 HISTORY OF BREAST CANCER: ICD-10-CM

## 2025-01-07 DIAGNOSIS — E87.5 SERUM POTASSIUM ELEVATED: ICD-10-CM

## 2025-01-07 DIAGNOSIS — H35.371 EPIRETINAL MEMBRANE, RIGHT: ICD-10-CM

## 2025-01-07 DIAGNOSIS — Z00.00 HEALTH CARE MAINTENANCE: ICD-10-CM

## 2025-01-07 DIAGNOSIS — R41.3 MEMORY DEFICIT: ICD-10-CM

## 2025-01-07 DIAGNOSIS — Z78.0 ASYMPTOMATIC MENOPAUSAL STATE: ICD-10-CM

## 2025-01-07 DIAGNOSIS — R60.9 DEPENDENT EDEMA: ICD-10-CM

## 2025-01-07 DIAGNOSIS — E03.9 ACQUIRED HYPOTHYROIDISM: ICD-10-CM

## 2025-01-07 DIAGNOSIS — R06.83 SNORING: ICD-10-CM

## 2025-01-07 DIAGNOSIS — Z23 NEED FOR COVID-19 VACCINE: ICD-10-CM

## 2025-01-07 DIAGNOSIS — Z90.11 S/P MASTECTOMY, RIGHT: ICD-10-CM

## 2025-01-07 DIAGNOSIS — Z23 NEED FOR SHINGLES VACCINE: ICD-10-CM

## 2025-01-07 DIAGNOSIS — Z87.19 HISTORY OF RECTAL BLEEDING: ICD-10-CM

## 2025-01-07 DIAGNOSIS — E78.2 MIXED HYPERLIPIDEMIA: ICD-10-CM

## 2025-01-07 DIAGNOSIS — D70.9 CHRONIC IDIOPATHIC NEUTROPENIA: ICD-10-CM

## 2025-01-07 DIAGNOSIS — T85.22XA DISPLACEMENT OF INTRAOCULAR LENS, INITIAL ENCOUNTER: ICD-10-CM

## 2025-01-07 DIAGNOSIS — Z87.81 PERSONAL HISTORY OF TRAUMATIC FRACTURE: ICD-10-CM

## 2025-01-07 DIAGNOSIS — I10 HYPERTENSION GOAL BP (BLOOD PRESSURE) < 140/90: Primary | ICD-10-CM

## 2025-01-07 DIAGNOSIS — E66.01 MORBID OBESITY (H): ICD-10-CM

## 2025-01-07 DIAGNOSIS — N18.30 STAGE 3 CHRONIC KIDNEY DISEASE, UNSPECIFIED WHETHER STAGE 3A OR 3B CKD (H): ICD-10-CM

## 2025-01-07 DIAGNOSIS — H25.9 SENILE CATARACT OF LEFT EYE, UNSPECIFIED AGE-RELATED CATARACT TYPE: ICD-10-CM

## 2025-01-07 DIAGNOSIS — M85.80 OSTEOPENIA, UNSPECIFIED LOCATION: ICD-10-CM

## 2025-01-07 DIAGNOSIS — H40.003 GLAUCOMA SUSPECT, BILATERAL: ICD-10-CM

## 2025-01-07 DIAGNOSIS — Z87.898 HISTORY OF PREDIABETES: ICD-10-CM

## 2025-01-07 LAB
ALBUMIN SERPL BCG-MCNC: 4.1 G/DL (ref 3.5–5.2)
ALP SERPL-CCNC: 48 U/L (ref 40–150)
ALT SERPL W P-5'-P-CCNC: 19 U/L (ref 0–50)
ANION GAP SERPL CALCULATED.3IONS-SCNC: 8 MMOL/L (ref 7–15)
AST SERPL W P-5'-P-CCNC: 24 U/L (ref 0–45)
BASOPHILS # BLD AUTO: 0 10E3/UL (ref 0–0.2)
BASOPHILS NFR BLD AUTO: 1 %
BILIRUB SERPL-MCNC: 0.4 MG/DL
BUN SERPL-MCNC: 21.8 MG/DL (ref 8–23)
CALCIUM SERPL-MCNC: 9.6 MG/DL (ref 8.8–10.4)
CHLORIDE SERPL-SCNC: 104 MMOL/L (ref 98–107)
CHOLEST SERPL-MCNC: 140 MG/DL
CREAT SERPL-MCNC: 1.18 MG/DL (ref 0.51–0.95)
EGFRCR SERPLBLD CKD-EPI 2021: 47 ML/MIN/1.73M2
EOSINOPHIL # BLD AUTO: 0.1 10E3/UL (ref 0–0.7)
EOSINOPHIL NFR BLD AUTO: 2 %
ERYTHROCYTE [DISTWIDTH] IN BLOOD BY AUTOMATED COUNT: 13.2 % (ref 10–15)
EST. AVERAGE GLUCOSE BLD GHB EST-MCNC: 114 MG/DL
FASTING STATUS PATIENT QL REPORTED: NO
FASTING STATUS PATIENT QL REPORTED: NO
GLUCOSE SERPL-MCNC: 96 MG/DL (ref 70–99)
HBA1C MFR BLD: 5.6 % (ref 0–5.6)
HCO3 SERPL-SCNC: 27 MMOL/L (ref 22–29)
HCT VFR BLD AUTO: 40.3 % (ref 35–47)
HDLC SERPL-MCNC: 47 MG/DL
HGB BLD-MCNC: 13.1 G/DL (ref 11.7–15.7)
IMM GRANULOCYTES # BLD: 0 10E3/UL
IMM GRANULOCYTES NFR BLD: 0 %
LDLC SERPL CALC-MCNC: 65 MG/DL
LYMPHOCYTES # BLD AUTO: 0.8 10E3/UL (ref 0.8–5.3)
LYMPHOCYTES NFR BLD AUTO: 18 %
MCH RBC QN AUTO: 29.4 PG (ref 26.5–33)
MCHC RBC AUTO-ENTMCNC: 32.5 G/DL (ref 31.5–36.5)
MCV RBC AUTO: 91 FL (ref 78–100)
MONOCYTES # BLD AUTO: 0.9 10E3/UL (ref 0–1.3)
MONOCYTES NFR BLD AUTO: 20 %
NEUTROPHILS # BLD AUTO: 2.5 10E3/UL (ref 1.6–8.3)
NEUTROPHILS NFR BLD AUTO: 59 %
NONHDLC SERPL-MCNC: 93 MG/DL
NRBC # BLD AUTO: 0 10E3/UL
NRBC BLD AUTO-RTO: 0 /100
PLATELET # BLD AUTO: 193 10E3/UL (ref 150–450)
POTASSIUM SERPL-SCNC: 5.4 MMOL/L (ref 3.4–5.3)
PROT SERPL-MCNC: 7.6 G/DL (ref 6.4–8.3)
RBC # BLD AUTO: 4.45 10E6/UL (ref 3.8–5.2)
SODIUM SERPL-SCNC: 139 MMOL/L (ref 135–145)
TRIGL SERPL-MCNC: 140 MG/DL
TSH SERPL DL<=0.005 MIU/L-ACNC: 2 UIU/ML (ref 0.3–4.2)
WBC # BLD AUTO: 4.3 10E3/UL (ref 4–11)

## 2025-01-07 PROCEDURE — 99214 OFFICE O/P EST MOD 30 MIN: CPT | Performed by: FAMILY MEDICINE

## 2025-01-07 PROCEDURE — 85025 COMPLETE CBC W/AUTO DIFF WBC: CPT | Performed by: FAMILY MEDICINE

## 2025-01-07 PROCEDURE — G2211 COMPLEX E/M VISIT ADD ON: HCPCS | Performed by: FAMILY MEDICINE

## 2025-01-07 PROCEDURE — 83036 HEMOGLOBIN GLYCOSYLATED A1C: CPT | Performed by: FAMILY MEDICINE

## 2025-01-07 PROCEDURE — 84443 ASSAY THYROID STIM HORMONE: CPT | Performed by: FAMILY MEDICINE

## 2025-01-07 PROCEDURE — 36415 COLL VENOUS BLD VENIPUNCTURE: CPT | Performed by: FAMILY MEDICINE

## 2025-01-07 PROCEDURE — 80053 COMPREHEN METABOLIC PANEL: CPT | Performed by: FAMILY MEDICINE

## 2025-01-07 PROCEDURE — 80061 LIPID PANEL: CPT | Performed by: FAMILY MEDICINE

## 2025-01-07 RX ORDER — LEVOTHYROXINE SODIUM 200 UG/1
200 TABLET ORAL DAILY
Qty: 90 TABLET | Refills: 1 | Status: SHIPPED | OUTPATIENT
Start: 2025-01-07

## 2025-01-07 RX ORDER — ATORVASTATIN CALCIUM 40 MG/1
40 TABLET, FILM COATED ORAL DAILY
Qty: 90 TABLET | Refills: 1 | Status: SHIPPED | OUTPATIENT
Start: 2025-01-07

## 2025-01-07 ASSESSMENT — PAIN SCALES - GENERAL: PAINLEVEL_OUTOF10: NO PAIN (0)

## 2025-01-07 NOTE — PATIENT INSTRUCTIONS
Continue all meds  Schedule dexa and mammogram this spring  Encouraged getting the COVID vaccine and shingles vaccine at pharmacy   See you back in July alicia ryour medic[employer] wellness

## 2025-01-07 NOTE — PROGRESS NOTES
The  below note was dictated using voice recognition. Although reviewed after completion, some word and grammatical error may remain .        {PROVIDER CHARTING PREFERENCE:358587}    *** -Normal red blood cell (hgb) levels, normal white blood cell count and normal platelet levels.  -Cholesterol levels are at your goal levels.  ADVISE: continuing your medication, a regular exercise program with at least 150 minutes of aerobic exercise per week, and eating a low saturated fat/low carbohydrate diet.  Also, you should recheck this fasting cholesterol panel in 6 months.  -Liver and gallbladder tests (ALT,AST, Alk phos,bilirubin) are normal.  -Kidney function (GFR) is decreased. But stable to prior chronic kidney disease stage 3.   -Sodium is normal.  -Potassium is elevated.  ADVISE: rechecking this in 1 month in a lab only apt ( non fasting) .  -Calcium is normal.  -Glucose (diabetic screening test) is normal.  -TSH (thyroid stimulating hormone) level is normal which indicates appropriate thyroid replacement dosing.  ADVISE: continuing same replacement dose and rechecking this in 6 months.***    Subjective Romayne is a 79 year old, presenting for the following health issues:  Follow Up        1/7/2025    10:43 AM   Additional Questions   Roomed by Elo rodriges   Accompanied by self     History of Present Illness       Hyperlipidemia:  She presents for follow up of hyperlipidemia.   She is taking medication to lower cholesterol. She is not having myalgia or other side effects to statin medications.    Hypertension: She presents for follow up of hypertension.  She does check blood pressure  regularly outside of the clinic. Outpatient blood pressures have not been over 140/90. She does not follow a low salt diet.     She eats 2-3 servings of fruits and vegetables daily.She consumes 0 sweetened beverage(s) daily.She exercises with enough effort to increase her heart rate 20 to 29 minutes per day.  She exercises with enough  effort to increase her heart rate 3 or less days per week.   She is taking medications regularly.     CURRENTLY   Here for follow up      Hypertension on lisinopril 20 mg bedtime & amlodipine 5 mg daily at night. BP initially 152/82, 147/79, 138/78 end of visit, Reports took med this am. Had been given a script for home BP machine last visit, not obtained yet. Maybe misplaced script. Given new DME script for this and to continue same dose of meds for now. Encouraged a low salt diet, limit caffeine, avoid alcohol as much as possible & work on weight loss with diet and increased exercise. Encouraged to check BP, goal < 130/80, to return with home machine to a nurse only visit in 1 month for validating readings.   Bp yifan not borught in   Not chekcing bp at home  Goal < 130/80  Today 28/68     Chronic kidney disease stage III with prior hx of now resolved microalbuminuria on ACE inhibitor.  Encouraged to avoid anti-inflammatories as much as possible. Discussed and held off on jardiance,   Labs came back showing mild decreased in GFR. Advised increasing water intake & recheck non fasting with TSH in 4 to 6 weeks when comes in for nurse BP check. Sodium & Potassium are normal.     BMI increased to 37 . Previously had tried a Keto diet, declined referral to weight specialist or sleep, or mtm, or taking meds. Encouraged smaller portions, avoiding sugary drinks and increasing exercise.  by walking daily   Decliens wt loss      Prediabetes hx, Does like her sweets. HBA1c normal in march 2024. Not checked today. Glucose is normal.  -A1C (diabetic test) is normal and indicates that your blood sugar has been in a normal range the last 3 months.     Hyperlipidemia on atorvastatin 40 mg.since march 2024. Was given 90 with 1 refill in march. LDL(bad) cholesterol level remains elevated & The 10-year ASCVD risk score (Joe DK, et al., 2019) is: 29.1%. I question med compliance given gaps in med refills and follow up in past but  insists taking all as directed daily without missing. A diet high in fat and simple carbohydrates, genetics and being overweight can contribute to this. Advised exercising 150 minutes of aerobic exercise per week (30 minutes for 5 days per week or 50 minutes for 3 days per week are options) and eating a low saturated fat/low carbohydrate diet to improve this. Continue atorvastatin 40 mg for now and will recheck when I see her back as an under corrected thyroid as noted below can also contribute to poor lipid control & want to only make one med change at a time to prevent confusion. Liver and gallbladder tests (ALT,AST, Alk phos,bilirubin) are normal.     Hypothyroidism on levothyroxine 150 mcg 6 days of week and 1.5 tab of 150 1 day of week. In 4/2023 TSH normal. In 9/2023 TSH slightly above range but Ft4 normal. Was continued on levothyroxine 1 tab 6 days a week & 1.5 tab one day a week & was to recheck TSH when returned on 11/9 for her BP. Had been given 100 with 1 refill end of June 2023 so expected would have enough till nov apt. Was no show to apt in nov & in march 2024 reported since tab was diff to cut had been taking 2 instead of the 1.5 tab the one day. Unsure how long had been doing this. Reported ran out of med > 1 week ago, ? If not earlier. After the visit TSH came back elevated at 5.24, with normal free T4 not back. Was resumed on levothyroxine 150 mcg daily 6 days of the week & 1-1/2 tablet of 150 mcg 1 day of the week , sent in 100 tablets advised not to double up on dosing & if difficult to cut in half use a pill cutter and to recheck today as prior levels may not have been accurate as checked when had already run out of medication. Noted some insurance issues and med pickup issues but reports has been taking daily as directed so to recheck today & wait to see results to refill appropriate dose.   Later TSH came back elevated at 12ish & ft4 wnl. Is currently under replaced.  I questioned compliance  but since she did report at visit was taking 150  6 days & 1.5 tab of 150 7th day each week consistently as directed will change medication dose to 175 micrograms/day (a prescription has been sent to her pharmacy) and to recheck TSH in a lab appointment in 6 weeks when comes in BP check. Is encouraged to start new dosing right away so the lab accurately reflects medication & further adjustments can then be made appropriately   ***     History of snoring, BMI more than 36, there is possibility of undiagnosed sleep apnea. Opted not to see the sleep specialist. Advised not to drive when drowsy      Osteopenia noted on Dexa in 4/2023 but with the 10 year probability of major osteoporotic fracture 12.3%, and of hip fracture 3.0%, based on left femoral neck was started on fosamax 70 mg 1/wk, ( Mon am ) ( started 4/2023) . Has had no side effects, no increased GERD, no blood in stools or black stools & no dental issues. Is on Pepcid for prior hx of heart burn related to diet & that has not gotten worse since on fosamax. Reviewed history of traumatic left wrist fracture from a fall from standing position in nov 2023 and concern by sports medicine about it possibly being a fragility fracture. Was referred to endo to see if med needed to be changed but no apt made with them and desires now to wait till next year to see Dexa scan shows on meds. Encouraged calcium 1200 mg total a day & vit d 2000 units daily.   Later labs showed Normal ionized calcium, Calcium,PTH &  Vitamin D level is normal   Ote dnot to see endo        History of chronic intermittent idiopathic neutropenia that predated cancer diagnosis and treatment.  Negative work-up and asymptomatic in past Seen by hematology most recently in the fall 2022.  Peripheral smear and additional lab work for iron copper folic acid had been normal.  No nutritional causes seen.  Held off on bone marrow biopsy given stability and lack of symptoms. On B 12 supp unknown dose.  B 12  wnl in past. Was advised if should feel sick, have fevers, significant abdominal pains then advised she should always come in to be evaluated for infection and get a CBC. & let her providers know the history of neutropenia and if neutrophils were low or if infection was not improving with regular treatment then hematology recommended a course of Neupogen 480 mcg subcu for 3 to 5 days at the time. Reports no symptoms recently.   Labs showed Normal red blood cell (Hgb) levels, chronic low white blood cell count unchanged from before and normal platelet level. Neutrophils remain low slightly lower than 9 months prior but currently asymptomatic and no sign of infection. To continue to monitor     Dependent edema likely due to weight, possibly varicose veins,amlodipine & undiagnosed sleep apnea. No increased swelling noted since started on amlodipine, using compression socks and encouraged a continued low salt diet and elevation of legs 1 hr above heart level each day. Has declined a sleep eval.  Puffy legs on aml  On com socks  Echo 202 nomr  Can alexandrepa  Michael  Opte dno slepe eval     History of right breast cancer, & prior right mastectomy in 2008, & hx of resolved scar breakdown and infection post punch biopsy for wound dehiscence in 2022/23. Seen by breast center. Left breast screening mammogram showed an asymmetry in April 2023 s/p dx imaging and u/s was unremarkable. Mammogram normal in 4/2024. Previously did meet with a  given fh and personal hx of cancer and opted not to do any testing.  Reports no breast or skin concerns today. To continue with annual mammograms.  Due in spring     Hx of Cscope done for positive cologuard which showed 3 serrated adenoma and one tubular adenoma removed 9/2022 and advised recheck in 3 yrs ( 2025).  Reported has had no black stools.  Due after July   But has noted bright red blood on wiping 1 month ago. ? Hemorrhoidal. Referred to colorectal to further evaluate and treat.  "  Did not see   No retu  Scope due 9/2025  Will ordre in [facility] r[facility] seen     Memory deficits noted on screening test 2022. Minicog 2023 recalled 2 of 3 words. Always alone without family. MRI brain in April 2023 showed no mass effect, midline shift, or evidence of intracranial hemorrhage. The ventricles were proportionate to the cerebral sulci. Normal major vascular intracranial flow-voids. Advanced leukokraurosis. Mild diffuse cerebral volume loss. Post contrast images demonstrate no abnormal intracranial enhancement. No abnormality of the skull marrow signal. The visualized portions of paranasal sinuses, and mastoid air cells were relatively clear. The orbits were grossly unremarkable. Discussed white matter changes and atrophy. Keeping BP under control will be helpful. Is also on a statin. Has declined neurology referral.    No concerns'take sbus or come sby others     History of Viteral macular adhesion, right eye epiretinal membrane on the right and bilateral glaucoma suspect under care of eye. Hopes to get left cataract done soon  Encouraed tos ee ye      No falls since fell nov 2023 breaking her wrist. Fall precautions discussed briefly  Dexa due again 2025  No breast issues, exam normal. Right mastectomy scar unchanged, no new breakdown   Mammogram left breast in April noted normal. Continue annual screening.    Discgot rsv, pne and flu   shingles vaccines at her pharmacy.      BACKGROUND  ***    Seen 7/2/24 ***      {ROS Picklists (Optional):782816}      Objective    /68 (BP Location: Right arm, Patient Position: Sitting, Cuff Size: Adult Large)   Pulse 68   Temp 97.4  F (36.3  C) (Temporal)   Ht 1.737 m (5' 8.4\")   Wt 112.1 kg (247 lb 1.6 oz)   SpO2 98%   BMI 37.13 kg/m    Body mass index is 37.13 kg/m .  Physical Exam   {Exam List (Optional):446045}    {Diagnostic Test Results (Optional):464905}        Signed Electronically by: Marry Rodriguez MD  {Email feedback regarding this note to " [email or web address]   :[id number]}   "proportionate to the cerebral sulci. Normal major vascular intracranial flow-voids. Advanced leukokraurosis. Mild diffuse cerebral volume loss. Post contrast images demonstrate no abnormal intracranial enhancement. No abnormality of the skull marrow signal. The visualized portions of paranasal sinuses, and mastoid air cells were relatively clear. The orbits were grossly unremarkable. Discussed white matter changes and atrophy. Keeping BP under control will be helpful. Is on a statin. Has declined neurology referral. Reports no concerns,takes the bus or someone else drives her to the clinic.     Glaucoma suspect, bilateral  Epiretinal membrane, right  Displacement of intraocular lens, initial encounter  Senile cataract of left eye, unspecified age-related cataract type  History of Viteral macular adhesion, right eye epiretinal membrane on the right and bilateral glaucoma suspect under care of eye. Not scheduled left cataract surgery yet. Encouraged to return to eye.    Health care maintenance  Noted had the RSV, Prevnar 20 & flu shot since last seen.   Opts to get the Covid vaccine at her pharmacy  Encouraged to also get shingles vaccines at her pharmacy.  Return in 6 months for medicare wellness  - REVIEW OF HEALTH MAINTENANCE PROTOCOL ORDERS    Need for COVID-19 vaccine  Need for shingles vaccine  To get at her pharmacy    Serum potassium elevated  Recheck 1 month  - **Basic metabolic panel FUTURE 14d; Future    BMI  Estimated body mass index is 37.13 kg/m  as calculated from the following:    Height as of this encounter: 1.737 m (5' 8.4\").    Weight as of this encounter: 112.1 kg (247 lb 1.6 oz).   Weight management plan: Discussed healthy diet and exercise guidelines    Work on weight loss  Regular exercise  See Patient Instructions  The longitudinal plan of care for the diagnosis(es)/condition(s) as documented were addressed during this visit. Due to the added complexity in care, I will continue to support " Romayne in the subsequent management and with ongoing continuity of care.        Subjective   Romayne is a 79 year old, presenting for the following health issues:  Follow Up        1/7/2025    10:43 AM   Additional Questions   Roomed by Elo rodriges   Accompanied by self     History of Present Illness       Hyperlipidemia:  She presents for follow up of hyperlipidemia.   She is taking medication to lower cholesterol. She is not having myalgia or other side effects to statin medications.    Hypertension: She presents for follow up of hypertension.  She does check blood pressure  regularly outside of the clinic. Outpatient blood pressures have not been over 140/90. She does not follow a low salt diet.     She eats 2-3 servings of fruits and vegetables daily.She consumes 0 sweetened beverage(s) daily.She exercises with enough effort to increase her heart rate 20 to 29 minutes per day.  She exercises with enough effort to increase her heart rate 3 or less days per week.   She is taking medications regularly.     CURRENTLY   Here for follow up      Hypertension on lisinopril 20 mg bedtime & amlodipine 5 mg daily at night. No side effects. Encouraged a low salt diet, limit caffeine, avoid alcohol as much as possible & work on weight loss with diet and increased exercise. Encouraged to check BP, goal < 130/80, to return with home machine at follow up     Chronic kidney disease stage III with prior hx of now resolved microalbuminuria on ACE inhibitor.  Encouraged to avoid anti-inflammatories as much as possible. Discussed and held off on jardiance, to see nephrology.      BMI increased to 37 . Previously had tried a Keto diet, declined referral to weight specialist or sleep, or mtm, or taking meds. Encouraged smaller portions, avoiding sugary drinks and increasing exercise by walking daily   Hx of prior Prediabetes HBA1c normal in march 2024. Will check today.     Hyperlipidemia on atorvastatin 40 mg.since march 2024.The 10-year  ASCVD risk score (Joe BUTLER, et al., 2019) is: 28%    Values used to calculate the score:      Age: 79 years      Sex: Female      Is Non- : No      Diabetic: No      Tobacco smoker: No      Systolic Blood Pressure: 128 mmHg      Is BP treated: Yes      HDL Cholesterol: 47 mg/dL      Total Cholesterol: 140 mg/dL     Hypothyroidism on levothyroxine oren on 200 mcg since 8/2024. Check today     History of snoring, BMI more than 36, there is possibility of undiagnosed sleep apnea. Opted not to see the sleep specialist. Advised not to drive when drowsy    Osteopenia noted on Dexa in 4/2023 but with the 10 year probability of major osteoporotic fracture 12.3%, and of hip fracture 3.0%, based on left femoral neck was started on fosamax 70 mg 1/wk, ( Mon am ). Has had no side effects, no increased GERD, no blood in stools or black stools & no dental issues. Is on Pepcid for prior hx of heart burn related to diet & that has not gotten worse since on fosamax. Reviewed history of traumatic left wrist fracture from a fall from standing position in nov 2023 and concern by sports medicine about it possibly being a fragility fracture. Was referred to endo to see if med needed to be changed but opted to wait to see next Dexa scan first. Dexa due this year. Previously noted normal ionized calcium, Calcium,PTH &  Vitamin D. Encouraged calcium 1200 mg total a day & vit d 2000 units daily.     History of chronic intermittent idiopathic neutropenia that predated breast cancer diagnosis and treatment.  Negative work-up and asymptomatic in past, Seen by hematology most recently in the fall 2022.  Peripheral smear and additional lab work for iron copper folic acid had been normal.  No nutritional causes seen.  Held off on bone marrow biopsy given stability and lack of symptoms. On B 12 supp unknown dose.  B 12 wnl in past. Was advised if should feel sick, have fevers, significant abdominal pains then advised she  should always come in to be evaluated for infection and get a CBC. & let her providers know the history of neutropenia and if neutrophils were low or if infection was not improving with regular treatment then hematology recommended a course of Neupogen 480 mcg subcu for 3 to 5 days at the time.  Recently recovered from cellulitis few weeks ago.      Dependent edema likely due to weight, possibly varicose veins, & undiagnosed sleep apnea. No increased swelling noted since started on amlodipine, using compression socks and encouraged a continued low salt diet and elevation of legs 1 hr above heart level each day. Echo 2020 normal. Has declined a sleep eval.     History of right breast cancer, & prior right mastectomy in 2008, & hx of resolved scar breakdown and infection post punch biopsy for wound dehiscence in 2022/23. Seen by breast center. Left breast screening mammogram showed an asymmetry in April 2023 s/p dx imaging and u/s was unremarkable. Mammogram normal in 4/2024. Previously did meet with a  given fh and personal hx of cancer and opted not to do any testing.  Reports no breast or skin concerns today. To continue with annual mammograms due in spring     Hx of Cscope done for positive cologuard which showed 3 serrated adenoma and one tubular adenoma removed 9/2022 and advised recheck in 3 yrs ( 8/2025).  Reported has had no black stools.    In July 2024 reported had had an episode of bright red blood on wiping in June. ? Hemorrhoidal. Was referred to colorectal to further evaluate and treat but did not see them as had no recurrence of symptoms. Will be due for colonoscopy in the summer      Memory deficits noted on screening test 2022. Minicog 2023 & 7/ 2024 recalled 2 of 3 words. Always alone without family. MRI brain in April 2023 showed no mass effect, midline shift, or evidence of intracranial hemorrhage. The ventricles were proportionate to the cerebral sulci. Normal major vascular  intracranial flow-voids. Advanced leukokraurosis. Mild diffuse cerebral volume loss. Post contrast images demonstrate no abnormal intracranial enhancement. No abnormality of the skull marrow signal. The visualized portions of paranasal sinuses, and mastoid air cells were relatively clear. The orbits were grossly unremarkable. Discussed white matter changes and atrophy. Keeping BP under control will be helpful. Is on a statin. Has declined neurology referral. Reports no concerns,takes the bus or someone else drives her to the clinic.      History of Viteral macular adhesion, right eye epiretinal membrane on the right and bilateral glaucoma suspect under care of eye. Not scheduled left cataract surgery yet. Encouraged to return to eye.     Noted had the RSV, Prevnar 20 & flu shot since last seen.   Opts to get the Covid vaccine at her pharmacy  Encouraged to also get shingles vaccines at her pharmacy.    BACKGROUND  History of passive smoke exposure, obesity, hypertension on lisinopril, CKD 3, , prediabetes, hyperlipidemia on atorvastatin, hypothyroidism on levothyroxine, snoring, history of memory deficit per epic, history of chronic leg edema, history of prior maxillary sinusitis, resolved cellulitis, history of chronic idiopathic neutropenia/ leukopenia S/p work-up that was negative by hematology in 2012 & 2022,, history of lipoma noted in the past, history of prior right breast cancer in 2008 S/p right mastectomy and saline implants in remission no longer followed by oncology, history of resolved anemia, history of Covid infection in November 2020 with respiratory failure sepsis, hypoxia, Covid pneumonia, PE and left lower extremity DVT, elevated D-dimer, UTI, resolved HALI, resolved anemia, with complete resolution of Covid symptoms noted by February 2021.  No longer on oxygen.  History of snoring, chronic fatigue, physical deconditioning, GERD on Pepcid, resolved epigastric pain & pancreatitis noted in 2022, and  constipation, history of untreated osteoporosis DEXA scan in 2021 showed osteopenia, history of vitreal macular adhesion right eye and epiretinal membrane S/p cataract surgery and remote right eye injury, S/p vitrectomy being managed by eye & being monitored as a glaucoma suspect  on multivitamin, serrated adenoma colon,   Under care of Dr. Sheridan then Sapphire Glass then Adela Avila,      Seen by PCP in April 2019 for dyspnea on exertion.  Examination was unremarkable, chest x-ray was negative.  EKG was unremarkable.  Echo showed a normal EF.  Stress test done was normal.  Her lisinopril was increased and suspected to be deconditioned and advised weight loss and increase activity and referred to sleep for her history of fatigue for possible obstructive sleep apnea but was never seen by sleep.  Fit test never done.  Not seen till called for medication refill on 4/30/2020 and appointment made with this writer, had an appointment by telephone with this writer for the first time on 5/19/2020 for blood pressure med refill.  Advise labs and follow-up in clinic.  Fit test done 6/8/2020 was negative.  TSH was elevated at 10 with free T4 normal and a normal hemoglobin A1c.   Visited with endocrine virtually on 7/8/2020 and noted had been treated for subclinical hypothyroidism with levothyroxine since 2004.  Medications adjusted and DEXA scan ordered.  Seen by eye 9/29/2020 for cataracts glaucoma check, prior right eye injury and which showed retinal membrane adhesion.  Seen by eye again 10/19/2020.  Seen in urgent care on 11/5/2020 for fever cough dizziness abdominal pain nausea was given Bactrim for cystitis tested for COVID discharged home and testing came back positive for Covid.  Seen in ER 11/16/2020 for shortness of breath sinus tachycardia and severe hypoxia.  Was admitted for worsening respiratory symptoms prompting her to seek further evaluation on 11/17/20 at [facility]. She was found to be in acute hypoxic  "respiratory failure requiring 30 LPM HFNC.  Chest x-ray at that time revealed \"diffuse patch airspace opacities consistent with COVID.\" She was admitted to the MICU for stabilization overnight and transferred to the general medical floor on 11/18/20. For her COVID infection, she received dexamethasone and remdesivir. She was initially treated with broad spectrum antibiotics for possible super-imposed pneumonia, but these were quickly discontinued. Her hospital course was complicated by a LLE DVT and PE's. She was started on Lovenox and transitioned to Apixaban. She was also found to have a UTI on day of discharge and was sent home on oral antibiotics.  Her severe Sepsis, resolved. For acute Hypoxic Respiratory Failure 2/2 COVID-19 PNA - COVID+ 11/5.  She was COVID recovered,  initially requiring 30 LMP HFNC, but was able to wean to 2Ls NC with exertion, 0 to 0.5L at rest. She completed her 5 day course of Remdesivir during her hospital stay. On discharge was continued on dexamethasone 6mg daily for 10 doses (ending 11/27) & continued on 2Ls NC O2 with ambulation, 0-0.5L at rest. For the Non-occlusive thrombus in left popliteal vein & Pulmonary Embolisms of segmental and subsegmental right upper and lower lobes with Elevated D-dimer - D-dimer >20 on admission. Started on high-intensity heparin gtt in ED with bolus- and transitioned to low intensity on 11/18/20. Ultrasound of B/l lower extremities obtained on 11/19 which were notable for non-occlusive thrombus in left popliteal vein. She was then transitioned back to high intensity heparin gtt on 11/19. Had elevated hep 10A levels overnight 11/19. Transitioned to Lovenox 1mg/kg BID on 11/20/20. CT PE protocol with segmental and subsegmental PE in the right upper and lower lobes w/o evidence of RHS. Also w/ extensive reticular changes and GOO throughout lungs representing inflammation and/or fibrosis r/t recent COVID-19 infection. on Discharge continued on Apixiban " 10mg BID x7 days, then 5mg BID thereafter for ~ 3 month duration of treatment as likely provoked DVT/PE due to COVID-19 infection. Was to follow up with PCP in one week to discuss anticoagulation. Noted Urinary Tract Infection & Urinary retention, resolved - Required straight cath x2 during her hospital admission. Developed LUTS last day of discharge with urinalysis revealing few bacteria, moderate leukocytes, and 12 Wbcs. However, there were 4 epis.  & discharged on Keflex 500 mg BID for 5 days & to follow up with PCP in one week to review urine culture results. Her HALI, resolved. Noted CKD III - Baseline creatinine 1.1-1.2, elevated to 1.53 on presentation with BUN 47 then returned to baseline. Etiology likely prerenal 2/2 COVID. Lisinopril was held & resumed on discharge. For her HTN - Patient's lisinopril held on during hospital stay due to HALI. Started on amlodipine 11/20 for elevated BP but on discharge, amlodipine was discontinued and resumed home lisinopril 20 mg daily. She noted increased heartburn since starting Eliquis, while inpatient was on omeprazole this was switched to Pepcid once off the Eliquis and continued on Tums as needed. Had constipation on admission - improved with bowel regimen, however developed liquid stool which improved by discharge. Hypothyroidism: TSH on admission WNL at 0.42 & continued on  PTA Synthroid. Noted Normocytic anemia - Baseline Hgb 12-13. & was 12.2 at discharge. No S/sx of bleeding since initiation of anticoagulation. for HLD was continued on a statin.  She was discharged home on 11/24/2020.  Seen 12/3/2020 by Dr. Lazo for hospital discharge noted was no longer on oxygen during the day and satting fine & remained on Eliquis.  Seen by [facility] her PCP on 2/5/2021 for routine physical and noted had completely recovered from Covid & no longer short of breath, had no cough & not on oxygen, no longer with leg pain & was wearing compression socks for chronic leg  swelling. and Doppler done on 2//22 was negative for DVT on Eliquis. DEXA scan ordered for prior untreated osteoporosis and continued on Pepcid for heartburn.  Labs later showed she was overcorrected on thyroid and levothyroxine was changed from double dosing on the weekend to daily simple dosing of 150 mcg daily and recheck TSH 6 weeks later was within normal range and continued on same dose.  DEXA scan done 2/11/2021 showed osteopenia and continued on calcium and vitamin D.  Cataracts removed 2/22/2021.  Seen by 3/11/2021.  TSH normal 3/15/2021.  Noted blurry vision 4/13/2021.  Had stable CKD 5/18/2021 and advised to avoid NSAID's.  Seen 5/18/2021 by Dr. Avila for preop for vitrectomy.   On 6/7/2021 had vitrectomy and membrane stripping for history of vitreoretinal adhesion right eye.  Seen by the eye doctor postop day 1, 1 week postop and at 6-week postop guero and noted was doing well.  Last seen by eye on 10/20/2021 for hx of Vitreoretinal adhesion right eye, ERM right eye S/P PPV/MP right eye 6/2021. Foveal anatomy improved significantly; BCVA 20/50 limited likely due to subtle IS/OS disruption at fovea (+drusen) -3.0 refraction right eye and seeing well up close; instructed her to try to use right eye as monovision for near.  Was to observe pseudophakia each eye.  Noted to be a glaucoma suspect with large disc and large cupping child being followed by Dr. Shelby.  Seen 3/22/22 for medicare wellness/ preventive health and additional concerns. Discussed slef breast checks, mammogram ordered, Pelvic / PAP no longer needed  Health care maintenance reviewed. To Consider working on health care directives & given honoring choices. Given Covid pfizer booster for prior received Aron vaccine in 2021.  Discussed Shingrex.   BMI 37, mostly sedentary.  Discussed lifestyle. Encouraged to work on diet, exercise and losing at least 5 to 10% of total body weight.  Hyperlipidemia on atorvastatin 10 mg bedtime had enough meds  till could get lab work reviewed. LDL later came back normal but triglycerides were elevated and overall cardiovascular risk is 23.2 so atorvastatin increased to 20 mg bedtime and was to recheck cholesterol fasting in 3 months when I saw her back again.  Hypertension on lisinopril 10 mg daily previously was on 20, blood pressure was  136/79.  Asymptomatic no side effects & refilled at same dose  #90 with 3 refills.   CKD 3 asymptomatic reminded to avoid NSAID's Kidney function came back stable with normal micro albumin.  Hypothyroid on levothyroxine 150 mcg daily deviously on differing doses on the weekend and weekdays but had been on this dose since February 2021.    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  Had no symptoms. Count came back low at 2.2 similar with neutropenia.  Stable to prior. History of anemia noted when had COVID asymptomatic no GI bleeding not on anticoagulation. hemoglobin and hematocrit came back normal.  History of Covid in November 2020 was hospitalized requiring oxygen and received steroids and antivirals.  Had a DVT and PE requiring anticoagulation 3 months.  Resolved thromboembolism and sepsis and HALI and treated for UTI at that time was in the ICU and discharged after a week in hospital.  Reported made a full recovery & no longer on oxygen or anticoagulation.  Resolved acute respiratory failure on no oxygen since January 2021.  History of snoring had been referred to sleep in the past.  Did have obesity, hyperlipidemia, hypertension and forgetfulness.  Did have chronic deconditioning. Referral placed to sleep again. Chronic fatigue reported improved.  Had bilateral lower leg swelling managed with compression socks daily felt related to BMI and untreated sleep apnea  Physical deconditioning.  Was very sedentary.  Prior echo and stress test before had COVID was normal.  CT during COVID in 2020 did not show right heart strain.  Reported no  symptoms at rest or with mild exertion.  Encouraged to be more active.  Hx of breast cancer & had had right mastectomy and chemotherapy,  initially tried a saline implant but reported this had since been removed.  Reported no new lumps or ulcers on scar tissue.  Reported no left breast lumps, bumps, skin changes or nipple discharge.  Had been in remission long time and not seen oncology in a while.  Mammogram for the left placed  Heartburn controlled on Pepcid OTC daily. Constipation improved & was to continue with a high-fiber diet.  History of osteopenia.  Prior untreated osteoporosis but DEXA scan done 2021 showed osteopenia and was advised to continue calcium and vitamin D.  Unclear if taking calcium & recommended taking calcium citrate 1200 mg total a day and vitamin D at least 2000 units daily and rechecking DEXA scan in 2023.  Noted memory deficits, failed mini cog, seen alone, referred to neurology to further evaluate.   History of vitreal macular adhesion right eye and epiretinal membrane S/p surgery vitrectomy and stripping in June 2021. Was a bilateral glaucoma suspect and under care of eye. Was to see the dentist regularly. The 1 cm cyst on her right back was not a concern, & to monitor for worsening  Colon cancer screening due options discussed & [place] guard.ordered.   Labs showed normal Vit d, TSH was mildly out of range but ft 4 was normal & continued on same dose for 3 months before rechecking. micro albumin had improved. Wbc was 2.2, not anemia, platelets normal. B 12 was low normal and advised 500 mcg of vit B 12.CMP showed stable CKD. Lipids not goal and atorvastatin increased to 20 mg. HB A1c was 5.7 and advised on diet and lifestyle changes. colo guard result not received. No apt made with sleep or neuro. Seen by eye 4/20/22 for glaucoma monitoring.      Seen  6/23/22 Blood pressure was not goal increased lisinopril to 20 mg bedtime ( to  take two of the 10 mg left until can start the new script  of 20 mg dose) . Encouraged to avoid Gatorade as high in salt, drink tea in moderation, avoid alcohol. Check BP at home, goal should be less than 130/85. Recheck BP in 2 to 3 weeks with medical assistant and lab at that time for med monitoring with a bmp. Was to work on diet , exercise, low carb , smaller portions, avoid alcohol to help Blood pressure, weight and cholesterol.  BMI > 37  Discussed diet, exercise and weight loss. Was to continue atorvastatin 20 mg increased at last visit and see if with better BP control and repeat lipids if needed to be adjusted further in the future.  CKD 3 stable resolved microalbuminuria, on an ACE, to avoid antiinflammatories due to chronic kidney disease. Was clinically euthyroid on levothyroxine 150 mcg daily. Later TSH was WNL and continued on same dose. Due to prediabetes HB A1c checked & encouraged to  increase exercise, eat less carb's, avoid alcohol and work on loosing 10 % of total body weight. For hx of memory deficits, was  tracking and able to drive locally, in alone without family. Declined need to see neurology.  Was to continue to monitor. For  hx of snoring, suspected undiagnosed HUGO, opting to not see sleep for now due to cost. had resolved anemia. Recovered from Covid in 2020. Opted to defer seeing neuro for memory concern and snoring reporting Fatigue resolved. Had stable asymptomatic low white count. Reviewed H x of breast cancer & Mammogram in April had been normal. Was to continue use of compression socks for dependant edema. Noted  Dexa due in 2023 for hx of osteopenia/ osteoporosis, encouraged to take calcium and vit d supp. Reminded to start vit B 12 500 mcg OTC daily for low normal B 12 noted in march and physical deconditioning ( forgot to start). Reported was working on health care directives  Covid # 3 due mid July. Declined Shingrex. Was to continue care with dentist and eye. Reported then  Heart burn and constipation had resolved. Was to contact colo  guard about missing test result. This was later reported as positive & triage advised to contact patient and diagnostic colonoscopy ordered.      B 12 was normal.  Hemoglobin A1c in prediabetic range.  Normal CMP and TSH.  Chronically low white count no anemia.  ASCVD risk was 30% with normal LDL, low HDL and elevated triglycerides and continued on same dose statin.  On 7/7 blood pressure elevated recheck on 7/18 was normal and continued on same meds.  Finally got colonoscopy done 9/7/2022 fragmented superficial serrated adenoma removed x3 and tubular adenoma removed x1 and recommended recheck in 3 years     Seen 9/23/22 HTN stable on lisinopril increased dose 20 mg . No side effects. Avoiding salt. Not checking BP at home. CKD 3 stable resolved microalbuminuria, on an ACE, Avoiding  antiinflammatories due to chronic kidney disease. BMI up to 37, weight up , eating two meals a day, discussed smaller portions, avoiding alcohol, juices, declined weight evaluation, sleep doctor or mtm due to cost. HLD on atorvastatin 20 mg. Hypothyroid on levothyroxine 150 mcg daily. No hair loss, had had some weight gain, after colonoscopy bowels moving better.  Prediabetes & encouraged to  increase exercise, eat less carb's, avoid alcohol and work on loosing 10 % of total body weight. Hx of snoring & declined sleep eval.    Had a positive colo guard early 2022, got colonoscopy 3 weeks prior in sept & it showed 3 serrated adenoma fragmented and 1 tubular adenoma and advised recheck 3 years.   Noted memory deficits,  Was racking and able to drive locally. Seen alone without family. Felt ok . Resolved anemia. stable low wbc, Recovered from Covid. Opted to defer seeing neuro for memory concern or sleep for snoring & reported fatigue resolved  Hx of snoring, suspected undiagnosed HUGO, opting to not see sleep due to cost.   Had stable asymptomatic low white count.  Hx of breast cancer but Mammogram in April was normal. Mammogram due 2023.  Was to continue to use compression socks for dependant edema.   Osteopenia, Dexa due in 2023 for hx of osteopenia/ osteoporosis, unclear if on calcium and vit d. Encouraged to take calcium 1200 mg a day &  vit d 2000 units a day   Was to start vit B 12 500 mcg OTC daily for low normal B 12 noted in march and physical deconditioning ( forgot to start). Encouraged to take vitamin B 12 500 mcg OTC daily for low normal B 12 noted in march, memory and physical deconditioning   For new epigastric upper abdominal pain intermittent 1 week worse with pushing on it exam was  benign, no rebound or surgical abdomen noted.  Did not radiate into chest & had  no associated cardiac symptoms.  Suspected gastritis.  To avoid spicy, avoid greasy foods, and red sauces, coffee, alcohol till better. Increase Pepcid to twice a day, avoid aspirin and see what labs show, warm pack to area. counseled if gets worse, has chest pain, trouble breathing or has blood in stool or black stool, to go to the ER, & if not better may need an EGD etc. Ultrasound to check for pain. Ct abdomen if no answer.   Given flu shot. Declined Covid booster & Shingrex.   TSH was mildly out of range normal Ft4 & continued on same dose. LDL was 84, TG elevated at 184, normal HB A1c, B 12 low normal, negative for H Pylori but lipase quite elevated. Advised to be seen in the er. [place] got hold of her & se opted to go to a local er there where noted symptoms had abated & chose not to do a ct scan in er. Repeat labs on  9/28 showed normal amylase & improved lipase& low wbc. Ct ordered & referred to hematology. Ct done 9/29 showed no pancreatitis,arthritis spine,atherosclerosis, diverticulosis & osteopenia & advised no alcohol. U/abdomen on 10/8/22 was normal. Seen by hematology 10/20/22 & suspected had chronic intermittent idiopathic neutropenia . Labs done showed normal copper, cr 1.9 stable, normal crp,ferritin folate, mild leukopenia,  neutropenia & lymphopenia. BM Bx held off & advised to always get a cbc when sick& Neupogen shots if had absolute neutropenia at the time not responding to regular therapy.  Seen by eye 10/28/22 for routine check up of chronic concerns.     Seen 3/28/23 for Medicare wellness as well as for hypertension hypothyroidism chronic issues and medications. Vital stable. Vision under care of eye. Hearing okay. Mammogram due. Had had no falls. DEXA scan due to screen for osteoporosis and order placed. Failed cognition screen, tracking okay reported no concerns.  Alone at apt. Lived with daughter who drove her to clinic.  MRI ordered.  Opted out of neurology referral and recheck in 6 months with daughter present at next visit. Healthcare maintenance reviewed. Had healthcare directives on file. COVID booster due but declined. Discussed Shingrex was to think about it. Rest of vaccines up-to-date. Hypertension stable on lisinopril 20 mg bedtime with no side effects.  To continue to avoid salt, limit alcohol intake, regular exercise and weight loss. Hx of chronic kidney disease stage III with resolved microalbuminuria on ACE inhibitor. To avoid anti-inflammatories as much as possible. Later labs showed electrolytes & liver tests wnl, kidney function decreased, to increase water intake & recheck non fasting in a lab only apt in 6 weeks when recommended rechecking TSH.     History of prediabetes, HB A1c came back 5.7 &encouraged to continue with a low carb diet, increase physical activity & some weight loss  to help. Hyperlipidemia on atorvastatin 20 mg.  Later labs showed lipids goal and continued on same dose of statin. BMI had gone up a bit likely due to shrinking in height though weight had improved.  To continue to eat small portions, less carb's, less juices, and increase physical activity.  Felt well and opted due to cost to hold off on seeing a sleep specialist or a weight doctor.  History of chronic intermittent idiopathic  neutropenia that predated cancer diagnosis and treatment.  Negative work-up and asymptomatic from it.  Seen by hematology most recently in the fall 2022.  Peripheral smear and additional lab work for iron copper folic acid were normal.  No nutritional causes seen.  Held off on bone marrow biopsy given stability and lack of symptoms.  Advised to continue B 12 supplementation, taking unknown dosing. If should feel sick, have fevers, significant abdominal pains then recommended she should always come in to be evaluated for infection and get a CBC & let her providers know has a history of neutropenia and if neutrophils are low or if infection is not improving with regular treatment then hematology recommended a course of Neupogen 480 mcg subcu for 3 to 5 days at the time. At the time she was asymptomatic with stable low wbc stable & normal B 12. No longer with upper abdominal pain, resolved a week after seen for it in September 2022.  At that time lipase was elevated though CT scan later did not show pancreatitis & ultrasound was unremarkable.but imaging was noted done after symptoms had subsided. Remained on Pepcid 20 mg twice a day & to monitor for any new symptoms.   Hypothyroidism on levothyroxine 150 mcg daily.  In the past her TSH had been mildly out of range. After the visit noted thyroid testing indicated under correction. Recommended changing thyroid to 150 mcg 1 tab 6 days a week & 1.5 tab ( 150+75) mcg one day a week & recheck TSH in 6 weeks. At visit noted just got a refill so new script not sent in and to wait to see what TSH looked like in 6 weeks of change to adjust meds and refill.   History of snoring, BMI more than 38, there was possibility of undiagnosed sleep apnea. She felt well and opted not to see the sleep specialist. Dependent edema likely due to weight and possibly varicose veins.& undiagnosed sleep apnea could also contribute to this. Had no sign of heart failure. To continue to use  compression socks.  History of osteopenia continued on calcium 1200 a day & vitamin D at least 2000 units daily and recheck DEXA scan. Later Dexa showed osteopenia but with high fracture risk of hip & so sent in fosamax 70 mg 1/wk, to monitor for GERD & to connect with her dentist prior to starting & recheck Dexa in 1 yr.  History of breast cancer, prior right mastectomy scar looked good.  Left breast exam was normal.  Left screening mammogram due and order in place to schedule..  Memory deficits noted on screening test.  Was alone without family & responding appropriately.  Opted not to see neurology.  MRI brain ordered.  Encouraged to return in 6 months for recheck with family. Was to continue care with the eye doctor for history of vitreal macular adhesion, right eye epiretinal membrane on the right and bilateral glaucoma suspect monitoring.     Labs showed normal B 12, bmp, lfts, HB A1c of 5.7. Dexa 4/4/23 showed osteopenia. 4/14/ screening mammogram showed asymmetry left breast and dx studies ordered. MRI 4/14 showed atrophy and white matter changes. Seen by eye 4/26 for glaucoma check, presbyopia, epiretinal membrane, dx mammogram and left breast u/s on 4/26 showed no concern on left but noted right mastectomy scar dehiscence and referred to the surgeon.seen by breast clinic 4/27 biopsy done, showed radiation changes. Seen by surgeon 5/7 and continued on keflex given for wound infection. On 5/8 TSH stable & continued on levothyroxine 150 6 day sand then 1.5 tab one day a week. Seen By wound clinic 5/18 and 6/15 & noted better. On 6/19 TSH was normal and continued same dosing. Met with the  8/10/23 given fh & personal hx of cancer and was to think if desired testing or not     Seen 9/26/23 Hypertension on lisinopril 20 mg bedtime with no side effects. BP elevated 167/75. Encouraged eating a low salt, caffeine diet and low alcohol intake. Added amlodipine 2.5 mg daily to help BP. Goal BP should be  130/80 santos given decreasing kidney function. Had enough lisinopril as filled recently. Rest of meds to wait to fill till labs back in case dose needed changing. Was to return in 6 weeks for BP. Chronic kidney disease stage III with hx of microalbuminuria in the past on ACE inhibitor.  Avoid anti-inflammatories as much as possible. Later labs showed Kidney function decreased but similar to prior, to continue to avoid NSAID's, stay hydrated and take the lisinopril to protect kidney function and start the amlodipine to improve BP as uncontrolled BP would worsen kidney function. Rest of electrolytes were normal. BMI 35 & weight had gone up. Had tried a Keto diet, declined referral to weight specialist or sleep, or mtm , or taking meds. LFT's were normal. Encouraged smaller portions and increasing exercise. Prediabetes HB A1c 5.7. & unchanged to continue with dietary and lifestyle changes and recheck again in 3 to 6 months time. Hyperlipidemia on atorvastatin 20 mg. ASCVD risk 20% . Lipids in march had been normal to continue same dose of statin. Hypothyroidism on levothyroxine 150 mcg 6 days of week and 1.5 tab of 150 1 day of week. Since 4/2023 TSH in June had been. normal. Later labs showed TSH was slightly above range but Ft4 was normal. Was to continue her levothyroxine 1 tab 6 days a week & 1.5 tab one day a week & recheck TSH when I was to see her back on 11/9 for her BP. I gave her 100 with 1 refill end of June so she was expected to enough of this med till labs rechecked in nov apt. History of snoring, BMI more than 35, there was possibility of undiagnosed sleep apnea.  Opted not to see the sleep specialist. Reported her daughter did all the driving.   History of chronic intermittent idiopathic neutropenia that predated cancer diagnosis and treatment.  Negative work-up and asymptomatic in past Seen by hematology most recently in the fall 2022.  Peripheral smear and additional lab work for iron copper folic acid  had been normal.  No nutritional causes seen.  Held off on bone marrow biopsy given stability and lack of symptoms. On B 12 supp unknown dose.  B 12 wnl in past. Was advised if should feel sick, have fevers, significant abdominal pains then advised she should always come in to be evaluated for infection and get a CBC. & let her providers know the history of neutropenia and if neutrophils were low or if infection was not improving with regular treatment then hematology recommended a course of Neupogen 480 mcg subcu for 3 to 5 days at the time. Had no symptoms & Wbc low but unchanged from before.   Dependent edema likely due to weight  possibly varicose veins & undiagnosed sleep apnea. Was to continue using compression socks and encouraged a low salt diet and elevation  of legs 1 hr above heart level each day. Dexa  in 4/2023 showed osteopenia but with high fracture risk of hip & so started on fosamax 70 mg 1/wk, & remained on calcium 1200 a day & vitamin D at least 2000 units daily. Had had no side effects, no blood in stools or black stools & no dental issues. Dexa due again 2024. On Pepcid prn heart burn related to foods felt not any worse since on fosamax. Hx of C scope done for positive cologuard which showed 3 serrated adenoma and one tubular adenoma removed 9/2022 and advised recheck in 3 yrs ( 2025).    Memory deficits noted on screening test at last visit. Minicog at recent visit she recalled 2 of 3 words. Was again alone without family. Though daughter dropped her off. MRI brain in April 2023 showed no mass effect, midline shift, or evidence of intracranial hemorrhage. The ventricles were proportionate to the cerebral sulci. Normal major vascular intracranial flow-voids. Advanced leukokraurosis. Mild diffuse cerebral volume loss. Post contrast images demonstrated no abnormal intracranial enhancement. No abnormality of the skull marrow signal. The visualized portions of paranasal sinuses, and mastoid air  cells were relatively clear. The orbits were grossly unremarkable. Discussed white matter changes and atrophy. Encouraged to keep BP under control. She was responding appropriately & opted not to see neurology.    History of right breast cancer, prior right mastectomy scar breakdown and infection post punch biopsy for wound dehiscence had resolved. Skin noted intact. Seen by breast center. Left breast screening mammogram showed an asymmetry in April s/p dx imaging and u/s was unremarkable. To continue yearly screening. Did meet with a  given fh and personal hx of cancer and opted not to do any testing.   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 Given the high dose flu shot & discussed considering the new COVID vaccine and RSV vaccine when available.   Reminded to think of getting Shingrex. Was to return in 6 weeks for a BP & TSH check     Seen by GI doctor 10/31/2023.  No-show to appointment 11/9.  Seen by sports medicine 1 week later for history of left wrist fracture that had occurred a week prior there was concern for osteoporosis given was on Fosamax for osteopenia and had fallen from a short height sustaining injury.  Was managed conservatively with splint and seen by sports med for follow-up 12/8, 12/28 and 1/25/2024.  Did see OT once on 1/4/2024.  [place]  negative     Seen 3/18/24, noted Hypertension uncontrolled on lisinopril 20 mg bedtime & amlodipine 2.5 mg daily at night. Blood pressure elevated in clinic 157/74 initially then 143/73. Possible compliance and gaps in med refills. Reported taking medications daily and not missing any dose. Goal BP should be 130/80 santos given decreasing kidney function. Increased amlodipine to 5 mg. Given script for home BP machine, to check and keep log & bring to next visit. To return in may / June for a medicare wellness and follow up BP, thyroid  etc  Chronic kidney disease stage III with hx of microalbuminuria in the past on ACE inhibitor.  Encouraged to avoid anti-inflammatories as much as possible. Kidney function remained stable around 50% with stable electrolytes. To continue to avoid anti-inflammatories. BMI 34.  Had tried a Keto diet, declined referral to weight specialist or sleep, or mtm , or taking meds. Encouraged smaller portions and increasing exercise. Prediabetes hx,previously HB A1c 5.Later noted improved to normal. Hyperlipidemia on atorvastatin 20 mg. The 10-year ASCVD risk score (Joe DK, et al., 2019) was  increased to 30.3%.  & increased atorvastatin to 40 mg bedtime and to recheck cholesterol in May when seen on this medication. Liver tests were wnl.  Hypothyroidism on levothyroxine 150 mcg 6 days of week and 1.5 tab of 150 1 day of week. In 4/2023 TSH normal. In 9/2023 TSH slightly above range but Ft4 normal. Was continued on levothyroxine 1 tab 6 days a week & 1.5 tab one day a week & was to recheck TSH when returned on 11/9 for her BP. Had been given 100 with 1 refill end of June 2023 so expected would have enough till nov apt. Was no show to apt in nov & then reported since tab was diff to cut had been taking 2 instead of 1.5 tab the one day. Unsure how long had been doing this. Reported ran out of med > 1 week ago, ? If not earlier. After the visit TSH came back elevated at 5.24, free T4 not back. Resumed levothyroxine 150 mcg daily 6 days of the week but 1-1/2 tablet of 150 mcg 1 day of the week, sent in 100 tablets advised not to double up on dosing if difficult to cut in half to use a pill cutter and recheck on medications as advised when returned for follow-up in May so we could adjust meds appropriately at the time as levels may not be accurate since checked when had already run out of medication.  History of snoring, BMI more than 35, there was possibility of undiagnosed sleep apnea.  Opted not to see the sleep  specialist. Reported usually avoided driving.   History of chronic intermittent idiopathic neutropenia that predated breast cancer diagnosis and treatment.  Negative work-up and asymptomatic in past. Seen by hematology most recently in the fall 2022.  Peripheral smear and additional lab work for iron copper folic acid had been normal.  No nutritional causes seen. Held off on bone marrow biopsy given stability and lack of symptoms. On B 12 supp unknown dose. B 12 wnl in past. Was advised if should feel sick, have fevers, significant abdominal pains then advised she should always come in to be evaluated for infection and get a CBC. & let her providers know the history of neutropenia and if neutrophils were low or if infection was not improving with regular treatment then hematology recommended a course of Neupogen 480 mcg subcu for 3 to 5 days at the time. Had no symptoms at visit.   Dependent edema likely due to weight  possibly varicose veins & undiagnosed sleep apnea. No swelling noted since on amlodipine, using compression socks and encouraged a continued low salt diet and elevation of legs 1 hr above heart level each day  Dexa in 4/2023 showed osteopenia but with high fracture risk of hip & so started on fosamax 70 mg 1/wk, & remained on calcium 1200 a day & vitamin D at least 2000 units daily. Had had no side effects, no increased GERD, no blood in stools or black stools & no dental issues. Was on Pepcid for heart burn related to diet & that had not gotten worse since on fosamax. Reviewed history of traumatic left wrist fracture from a fall from standing position in dec 2023 and concern by sports medicine about it possibly being a fragility fracture. Recommended seeing endocrine to discuss osteoporosis Rx given this new fracture hx. Not sure if we would call her wrist fracture Fosamax failure or not. Refilled Fosamax for next 3 months. Vitamin D normal at 43 & was to continue with current dosing of vitamin D  2000 units daily and calcium 1200 mg total daily as advised previously & await endo apt & input.  History of right breast cancer, prior right mastectomy, hx of resolved scar breakdown and infection post punch biopsy for wound dehiscence in 2022/23. Seen by breast center. Left breast screening mammogram showed an asymmetry in April 2023 s/p dx imaging and u/s was unremarkable. To continue yearly screening. Due in April. Did meet with a  given fh and personal hx of cancer and opted not to do any testing.  Reported no new breast or skin concerns.   Hx of Cscope done for positive cologuard which showed 3 serrated adenoma and one tubular adenoma removed 9/2022 and advised recheck in 3 yrs ( 2025). Reported had had no blood in the stools or black stools.  Memory deficits noted on screening test 2022. Minicog 2023 recalled 2 of 3 words. Always alone at visits without family. MRI brain in April 2023 showed no mass effect, midline shift, or evidence of intracranial hemorrhage. The ventricles were proportionate to the cerebral sulci. Normal major vascular intracranial flow-voids. Had advanced leukokraurosis. Mild diffuse cerebral volume loss. Post contrast images demonstrated no abnormal intracranial enhancement. No abnormality of the skull marrow signal. The visualized portions of paranasal sinuses, and mastoid air cells were relatively clear. The orbits were grossly unremarkable. Discussed white matter changes and atrophy. Keeping BP under control would be helpful.  Discussed missed appointment in November.  Felt due to busy with other things.  Declined neurology referral.  To monitor.    History of Viteral macular adhesion, right eye epiretinal membrane on the right and bilateral glaucoma suspect under care of eye. Hoped to get left cataract done soon  Given COVID vaccine & encouraged again to get RSV and shingles at pharmacy. Was to return in May for Medicare wellness, blood pressure and thyroid follow-up      Noted insurance issues with med refills. On 4/29/24 left mammogram came back normal.      Seen 7/2/24 for medicare wellness. Health care maintenance reviewed. Had ACP on file, no changes reported  Reported vision & hearing ok. Passed cognition screen. No falls since fell nov 2023 breaking her wrist. Fall precautions discussed briefly. Dexa due again 2025 No breast issues, exam normal. Right mastectomy scar unchanged, no new breakdown  Mammogram left breast in April noted normal. To continue annual screening. COVID vacc had in march 2024, to get new one in the fall. Discussed to consider rsv and shingles vaccines at her pharmacy.  Hypertension on lisinopril 20 mg bedtime & amlodipine 5 mg daily at night. BP initially 152/82, 147/79, 138/78 end of visit, Had been given a script for home BP machine last visit, not obtained yet. Maybe misplaced script. Given new DME script for this and to continue same dose of meds. Encouraged a low salt diet, limit caffeine, avoid alcohol as much as possible & work on weight loss with diet and increased exercise. Encouraged to check BP, goal < 130/80, to return with home machine to a nurse only visit in 1 month for validating readings.   Chronic kidney disease stage III with prior hx of resolved microalbuminuria on ACE inhibitor.  Encouraged to avoid anti-inflammatories as much as possible. Discussed and held off on jardiance, Labs came back showing mild decreased in GFR. Advised increasing water intake & recheck non fasting with TSH in 4 to 6 weeks when returned for nurse BP check. Sodium & Potassium normal.  BMI increased to 36 . Previously had tried a Keto diet, declined referral to weight specialist or sleep, or mtm, or taking meds. Encouraged smaller portions, avoiding sugary drinks and increasing exercise by walking daily   Prediabetes hx, Did cecilia her sweets. HBA1c normal in march 2024. Glucose is normal.  Hyperlipidemia on atorvastatin 40 mg.since march 2024. Was given 90 with  1 refill in march. LDL remained elevated & The 10-year ASCVD risk score (Joe BUTLER, et al., 2019) was 29.1%. Advised exercising 150 minutes of aerobic exercise per week (30 minutes for 5 days per week or 50 minutes for 3 days per week are options) and eating a low saturated fat/low carbohydrate diet to improve this. Continued on atorvastatin 40 mg.   Hypothyroidism on levothyroxine 150 mcg 6 days of week and 1.5 tab of 150 1 day of week. In 4/2023 TSH normal. In 9/2023 TSH slightly above range but Ft4 normal. Was continued on levothyroxine 1 tab 6 days a week & 1.5 tab one day a week & was to recheck TSH when returned on 11/9 for her BP. Had been given 100 with 1 refill end of June 2023 so expected would have enough till nov apt. Was no show to apt in nov & in march 2024 reported since tab was diff to cut had been taking 2 instead of the 1.5 tab the one day. Unsure how long had been doing this. Reported ran out of med > 1 week ago, ? If not earlier. After the visit TSH came back elevated at 5.24, with normal free T4 not back. Was resumed on levothyroxine 150 mcg daily 6 days of the week & 1-1/2 tablet of 150 mcg 1 day of the week , sent in 100 tablets advised not to double up on dosing & if difficult to cut in half use a pill cutter. Noted some insurance issues and med pickup issues but reported had been taking daily as directed. Later TSH came back elevated at 12ish & ft4 wnl.dose increased to 175 micrograms/day & recheck in 6 weeks at BP check.   History of snoring, BMI more than 36, there was possibility of undiagnosed sleep apnea. Opted not to see the sleep specialist. Advised not to drive when drowsy  Osteopenia noted on Dexa in 4/2023 but with the 10 year probability of major osteoporotic fracture 12.3%, and of hip fracture 3.0%, based on left femoral neck was started on fosamax 70 mg 1/wk, ( Mon am ). Had had no side effects, no increased GERD, no blood in stools or black stools & no dental issues. Was on  Pepcid for prior hx of heart burn related to diet & that had not gotten worse since on fosamax. Reviewed history of traumatic left wrist fracture from a fall from standing position in nov 2023 and concern by sports medicine about it possibly being a fragility fracture. Was referred to endo to see if med needed to be changed but no apt made with them and opted to wait till 2025 to see what Dexa scan showed first. Encouraged calcium 1200 mg total a day & vit d 2000 units daily & labs showed Normal ionized calcium, Calcium, PTH &  Vitamin D level normal   History of chronic intermittent idiopathic neutropenia that predated breast cancer diagnosis and treatment.  Negative work-up and asymptomatic in past  Seen by hematology most recently in the fall 2022.  Peripheral smear and additional lab work for iron copper folic acid had been normal.  No nutritional causes seen.  Held off on bone marrow biopsy given stability and lack of symptoms. On B 12 supp unknown dose.  B 12 wnl in past. Was advised if should feel sick, have fevers, significant abdominal pains then advised she should always come in to be evaluated for infection and get a CBC. & let her providers know the history of neutropenia and if neutrophils were low or if infection was not improving with regular treatment then hematology recommended a course of Neupogen 480 mcg subcu for 3 to 5 days at the time. Labs showed Normal red blood cell (Hgb) levels, chronic low white blood cell count unchanged from before and normal platelet level. Neutrophils remained low slightly lower than 9 months prior but was asymptomatic and had no sign of infection.   Dependent edema likely due to weight, possibly varicose veins, amlodipine & undiagnosed sleep apnea. No increased swelling noted since started on amlodipine, using compression socks and encouraged a continued low salt diet and elevation of legs 1 hr above heart level each day. Had declined a sleep eval.  History of right  breast cancer, & prior right mastectomy in 2008, & hx of resolved scar breakdown and infection post punch biopsy for wound dehiscence in 2022/23. Seen by breast center. Left breast screening mammogram showed an asymmetry in April 2023 s/p dx imaging and u/s was unremarkable. Mammogram normal in 4/2024. Previously did meet with a  given fh and personal hx of cancer and opted not to do any testing. To continue with annual mammograms.  Hx of Cscope done for positive cologuard which showed 3 serrated adenoma and one tubular adenoma removed 9/2022 and advised recheck in 3 yrs ( 2025).  Reported had had no black stools. Noted bright red blood on wiping 1 month prior ? Hemorrhoidal. Referred to colorectal to further evaluate and treat.   Memory deficits noted on screening test 2022. Minicog 2023 recalled 2 of 3 words. Always alone without family. MRI brain in April 2023 showed no mass effect, midline shift, or evidence of intracranial hemorrhage. The ventricles were proportionate to the cerebral sulci. Normal major vascular intracranial flow-voids. Advanced leukokraurosis. Mild diffuse cerebral volume loss. Post contrast images demonstrate no abnormal intracranial enhancement. No abnormality of the skull marrow signal. The visualized portions of paranasal sinuses, and mastoid air cells were relatively clear. The orbits were grossly unremarkable. Discussed white matter changes and atrophy. Keeping BP under control would be helpful. On a statin. Declined neurology referral.    History of Viteral macular adhesion, right eye epiretinal membrane on the right and bilateral glaucoma suspect under care of eye. Hoped to get left cataract done soon  Was to return in an Apt with home & clinic machine for BP check in 1 to 2 months with nurse  Follow up with me in 6 months    Did not return for BP check. On 8/14 tsh cam back out of range and levothyroxine increased to 200 mcg. Recheck on 9/2/24 was wnl and on nov 27th was  "slightly out of range with normal ft4 and continue don same dose. Seen or a rash on 12/2 and treated with an antibiotic for cellulitis.    Review of Systems  Constitutional, HEENT, cardiovascular, pulmonary, GI, , musculoskeletal, neuro, skin, endocrine and psych systems are negative, except as otherwise noted.      Objective    /68 (BP Location: Right arm, Patient Position: Sitting, Cuff Size: Adult Large)   Pulse 68   Temp 97.4  F (36.3  C) (Temporal)   Ht 1.737 m (5' 8.4\")   Wt 112.1 kg (247 lb 1.6 oz)   SpO2 98%   BMI 37.13 kg/m    Body mass index is 37.13 kg/m .  Physical Exam   GENERAL: alert and no distress  EYES: Eyes grossly normal to inspection, PERRL and conjunctivae and sclerae normal  HENT: ear canals and TM's normal, nose and mouth without ulcers or lesions  NECK: no adenopathy, no asymmetry, masses, or scars  RESP: lungs clear to auscultation - no rales, rhonchi or wheezes  BREAST: right mastectomy, scar wnl  CV: regular rate and rhythm, normal S1 S2, no S3 or S4, no murmur, click or rub, no peripheral edema  ABDOMEN: soft, nontender, no hepatosplenomegaly, no masses and bowel sounds normal  MS: no gross musculoskeletal defects noted, no edema  SKIN: no suspicious lesions or rashes  NEURO: Normal strength and tone, mentation intact and speech normal  PSYCH: mentation appears normal, affect normal/bright    Results for orders placed or performed in visit on 01/07/25   Comprehensive metabolic panel     Status: Abnormal   Result Value Ref Range    Sodium 139 135 - 145 mmol/L    Potassium 5.4 (H) 3.4 - 5.3 mmol/L    Carbon Dioxide (CO2) 27 22 - 29 mmol/L    Anion Gap 8 7 - 15 mmol/L    Urea Nitrogen 21.8 8.0 - 23.0 mg/dL    Creatinine 1.18 (H) 0.51 - 0.95 mg/dL    GFR Estimate 47 (L) >60 mL/min/1.73m2    Calcium 9.6 8.8 - 10.4 mg/dL    Chloride 104 98 - 107 mmol/L    Glucose 96 70 - 99 mg/dL    Alkaline Phosphatase 48 40 - 150 U/L    AST 24 0 - 45 U/L    ALT 19 0 - 50 U/L    Protein Total " 7.6 6.4 - 8.3 g/dL    Albumin 4.1 3.5 - 5.2 g/dL    Bilirubin Total 0.4 <=1.2 mg/dL    Patient Fasting > 8hrs? No    TSH with free T4 reflex     Status: Normal   Result Value Ref Range    TSH 2.00 0.30 - 4.20 uIU/mL   Lipid panel reflex to direct LDL Fasting     Status: Abnormal   Result Value Ref Range    Cholesterol 140 <200 mg/dL    Triglycerides 140 <150 mg/dL    Direct Measure HDL 47 (L) >=50 mg/dL    LDL Cholesterol Calculated 65 <100 mg/dL    Non HDL Cholesterol 93 <130 mg/dL    Patient Fasting > 8hrs? No     Narrative    Cholesterol  Desirable: < 200 mg/dL  Borderline High: 200 - 239 mg/dL  High: >= 240 mg/dL    Triglycerides  Normal: < 150 mg/dL  Borderline High: 150 - 199 mg/dL  High: 200-499 mg/dL  Very High: >= 500 mg/dL    Direct Measure HDL  Female: >= 50 mg/dL   Male: >= 40 mg/dL    LDL Cholesterol  Desirable: < 100 mg/dL  Above Desirable: 100 - 129 mg/dL   Borderline High: 130 - 159 mg/dL   High:  160 - 189 mg/dL   Very High: >= 190 mg/dL    Non HDL Cholesterol  Desirable: < 130 mg/dL  Above Desirable: 130 - 159 mg/dL  Borderline High: 160 - 189 mg/dL  High: 190 - 219 mg/dL  Very High: >= 220 mg/dL   Hemoglobin A1c     Status: Normal   Result Value Ref Range    Estimated Average Glucose 114 <117 mg/dL    Hemoglobin A1C 5.6 0.0 - 5.6 %   CBC with platelets and differential     Status: None   Result Value Ref Range    WBC Count 4.3 4.0 - 11.0 10e3/uL    RBC Count 4.45 3.80 - 5.20 10e6/uL    Hemoglobin 13.1 11.7 - 15.7 g/dL    Hematocrit 40.3 35.0 - 47.0 %    MCV 91 78 - 100 fL    MCH 29.4 26.5 - 33.0 pg    MCHC 32.5 31.5 - 36.5 g/dL    RDW 13.2 10.0 - 15.0 %    Platelet Count 193 150 - 450 10e3/uL    % Neutrophils 59 %    % Lymphocytes 18 %    % Monocytes 20 %    % Eosinophils 2 %    % Basophils 1 %    % Immature Granulocytes 0 %    NRBCs per 100 WBC 0 <1 /100    Absolute Neutrophils 2.5 1.6 - 8.3 10e3/uL    Absolute Lymphocytes 0.8 0.8 - 5.3 10e3/uL    Absolute Monocytes 0.9 0.0 - 1.3 10e3/uL     Absolute Eosinophils 0.1 0.0 - 0.7 10e3/uL    Absolute Basophils 0.0 0.0 - 0.2 10e3/uL    Absolute Immature Granulocytes 0.0 <=0.4 10e3/uL    Absolute NRBCs 0.0 10e3/uL   CBC with Platelets & Differential     Status: None    Narrative    The following orders were created for panel order CBC with Platelets & Differential.  Procedure                               Abnormality         Status                     ---------                               -----------         ------                     CBC with platelets and d...[[id number]]                      Final result                 Please view results for these tests on the individual orders.     No results found for this or any previous visit (from the past 24 hours).        Signed Electronically by: Marry Rodriguez MD

## 2025-01-08 ENCOUNTER — PATIENT OUTREACH (OUTPATIENT)
Dept: CARE COORDINATION | Facility: CLINIC | Age: 80
End: 2025-01-08
Payer: COMMERCIAL

## 2025-01-08 ENCOUNTER — TELEPHONE (OUTPATIENT)
Dept: FAMILY MEDICINE | Facility: CLINIC | Age: 80
End: 2025-01-08
Payer: COMMERCIAL

## 2025-01-08 NOTE — TELEPHONE ENCOUNTER
Writer called and left message on patient's voicemail to call back and speak with a triage nurse.  Plus left message for daughter, Luci.     ----- Message from Marry Rodriguez sent at 1/7/2025  6:07 PM CST -----  Hello -    Here are my comments about your recent results:  -Normal red blood cell (hgb) levels, normal white blood cell count and normal platelet levels.  -Cholesterol levels are at your goal levels.  ADVISE: continuing your medication, a regular exercise program with at least 150 minutes of aerobic exercise per week, and eating a low saturated fat/low carbohydrate diet.  Also, you should recheck this fasting cholesterol panel in 6 months.  -Liver and gallbladder tests (ALT,AST, Alk phos,bilirubin) are normal.  -Kidney function (GFR) is decreased. But stable to prior chronic kidney disease stage 3.   -Sodium is normal.  -Potassium is elevated.  ADVISE: rechecking this in 1 month in a lab only apt ( non fasting) .  -Calcium is normal.  -Glucose (diabetic screening test) is normal.  -TSH (thyroid stimulating hormone) level is normal which indicates appropriate thyroid replacement dosing.  ADVISE: continuing same replacement dose and rechecking this in 6 months.  For additional lab test information, [email or web address] is an excellent reference..    Please let us know if you have any questions or concerns.        MARY PopeN RN  [facility]

## 2025-01-08 NOTE — RESULT ENCOUNTER NOTE
Hello -    Here are my comments about your recent results:  -Normal red blood cell (hgb) levels, normal white blood cell count and normal platelet levels.  -Cholesterol levels are at your goal levels.  ADVISE: continuing your medication, a regular exercise program with at least 150 minutes of aerobic exercise per week, and eating a low saturated fat/low carbohydrate diet.  Also, you should recheck this fasting cholesterol panel in 6 months.  -Liver and gallbladder tests (ALT,AST, Alk phos,bilirubin) are normal.  -Kidney function (GFR) is decreased. But stable to prior chronic kidney disease stage 3.   -Sodium is normal.  -Potassium is elevated.  ADVISE: rechecking this in 1 month in a lab only apt ( non fasting) .  -Calcium is normal.  -Glucose (diabetic screening test) is normal.  -TSH (thyroid stimulating hormone) level is normal which indicates appropriate thyroid replacement dosing.  ADVISE: continuing same replacement dose and rechecking this in 6 months.  For additional lab test information, [email or web address] is an excellent reference..    Please let us know if you have any questions or concerns.     Regards,  Marry Rodriguez MD

## 2025-01-13 NOTE — TELEPHONE ENCOUNTER
Attempted to reach on home and mobile phone, unable. Left message  to call back. Message with MD comments also sent on [employer] as it shows she is active .   Nasrin Scott RN

## 2025-01-13 NOTE — TELEPHONE ENCOUNTER
Pt called back.  I reviewed this lab result message.  Also told pt the mychart message was sent, too.  PT will make the lab appt about 2/7/25.  Sent call back to CS to schedule this.  LENORA Roque

## 2025-01-14 ENCOUNTER — LAB (OUTPATIENT)
Dept: LAB | Facility: CLINIC | Age: 80
End: 2025-01-14
Payer: COMMERCIAL

## 2025-01-14 DIAGNOSIS — E87.5 SERUM POTASSIUM ELEVATED: ICD-10-CM

## 2025-01-14 LAB
ANION GAP SERPL CALCULATED.3IONS-SCNC: 9 MMOL/L (ref 7–15)
BUN SERPL-MCNC: 24.8 MG/DL (ref 8–23)
CALCIUM SERPL-MCNC: 9.9 MG/DL (ref 8.8–10.4)
CHLORIDE SERPL-SCNC: 103 MMOL/L (ref 98–107)
CREAT SERPL-MCNC: 1.05 MG/DL (ref 0.51–0.95)
EGFRCR SERPLBLD CKD-EPI 2021: 54 ML/MIN/1.73M2
GLUCOSE SERPL-MCNC: 100 MG/DL (ref 70–99)
HCO3 SERPL-SCNC: 28 MMOL/L (ref 22–29)
POTASSIUM SERPL-SCNC: 5 MMOL/L (ref 3.4–5.3)
SODIUM SERPL-SCNC: 140 MMOL/L (ref 135–145)

## 2025-01-14 PROCEDURE — 36415 COLL VENOUS BLD VENIPUNCTURE: CPT

## 2025-01-14 PROCEDURE — 80048 BASIC METABOLIC PNL TOTAL CA: CPT

## 2025-01-15 NOTE — RESULT ENCOUNTER NOTE
Hello -    Here are my comments about your recent results:  Potassium better  Kidney function stable     Please let us know if you have any questions or concerns.     Regards,  Marry Rodriguez MD

## 2025-01-23 DIAGNOSIS — N18.30 STAGE 3 CHRONIC KIDNEY DISEASE, UNSPECIFIED WHETHER STAGE 3A OR 3B CKD (H): Primary | ICD-10-CM

## 2025-01-30 ENCOUNTER — LAB (OUTPATIENT)
Dept: LAB | Facility: CLINIC | Age: 80
End: 2025-01-30
Payer: COMMERCIAL

## 2025-01-30 ENCOUNTER — PRE VISIT (OUTPATIENT)
Dept: NEPHROLOGY | Facility: CLINIC | Age: 80
End: 2025-01-30
Payer: COMMERCIAL

## 2025-01-30 DIAGNOSIS — N18.31 CKD STAGE 3A, GFR 45-59 ML/MIN (H): ICD-10-CM

## 2025-01-30 DIAGNOSIS — I10 HYPERTENSION GOAL BP (BLOOD PRESSURE) < 140/90: ICD-10-CM

## 2025-01-30 DIAGNOSIS — N18.30 STAGE 3 CHRONIC KIDNEY DISEASE, UNSPECIFIED WHETHER STAGE 3A OR 3B CKD (H): ICD-10-CM

## 2025-01-30 LAB
ALBUMIN MFR UR ELPH: 8.7 MG/DL
ALBUMIN SERPL BCG-MCNC: 4.1 G/DL (ref 3.5–5.2)
ALBUMIN UR-MCNC: NEGATIVE MG/DL
ANION GAP SERPL CALCULATED.3IONS-SCNC: 8 MMOL/L (ref 7–15)
APPEARANCE UR: CLEAR
BILIRUB UR QL STRIP: NEGATIVE
BUN SERPL-MCNC: 20.2 MG/DL (ref 8–23)
CALCIUM SERPL-MCNC: 9.5 MG/DL (ref 8.8–10.4)
CHLORIDE SERPL-SCNC: 103 MMOL/L (ref 98–107)
COLOR UR AUTO: YELLOW
CREAT SERPL-MCNC: 1.2 MG/DL (ref 0.51–0.95)
CREAT UR-MCNC: 108 MG/DL
CREAT UR-MCNC: 112 MG/DL
CYSTATIN C (ROCHE): 1.4 MG/L (ref 0.6–1)
EGFRCR SERPLBLD CKD-EPI 2021: 46 ML/MIN/1.73M2
ERYTHROCYTE [DISTWIDTH] IN BLOOD BY AUTOMATED COUNT: 13.2 % (ref 10–15)
GFR/BSA.PRED SERPLBLD CYS-BASED-ARV: 43 ML/MIN/1.73M2
GLUCOSE SERPL-MCNC: 101 MG/DL (ref 70–99)
GLUCOSE UR STRIP-MCNC: NEGATIVE MG/DL
HCO3 SERPL-SCNC: 28 MMOL/L (ref 22–29)
HCT VFR BLD AUTO: 39.8 % (ref 35–47)
HGB BLD-MCNC: 12.9 G/DL (ref 11.7–15.7)
HGB UR QL STRIP: NEGATIVE
HYALINE CASTS: 4 /LPF
KETONES UR STRIP-MCNC: NEGATIVE MG/DL
LEUKOCYTE ESTERASE UR QL STRIP: ABNORMAL
MCH RBC QN AUTO: 28.1 PG (ref 26.5–33)
MCHC RBC AUTO-ENTMCNC: 32.4 G/DL (ref 31.5–36.5)
MCV RBC AUTO: 87 FL (ref 78–100)
MICROALBUMIN UR-MCNC: <12 MG/L
MICROALBUMIN/CREAT UR: NORMAL MG/G{CREAT}
MUCOUS THREADS #/AREA URNS LPF: PRESENT /LPF
NITRATE UR QL: NEGATIVE
PH UR STRIP: 5 [PH] (ref 5–7)
PHOSPHATE SERPL-MCNC: 3.9 MG/DL (ref 2.5–4.5)
PLATELET # BLD AUTO: 191 10E3/UL (ref 150–450)
POTASSIUM SERPL-SCNC: 5.4 MMOL/L (ref 3.4–5.3)
PROT/CREAT 24H UR: 0.08 MG/MG CR (ref 0–0.2)
PTH-INTACT SERPL-MCNC: 82 PG/ML (ref 15–65)
RBC # BLD AUTO: 4.59 10E6/UL (ref 3.8–5.2)
RBC URINE: 1 /HPF
SODIUM SERPL-SCNC: 139 MMOL/L (ref 135–145)
SP GR UR STRIP: 1.02 (ref 1–1.03)
SQUAMOUS EPITHELIAL: 1 /HPF
TRANSITIONAL EPI: <1 /HPF
UROBILINOGEN UR STRIP-MCNC: NORMAL MG/DL
VIT D+METAB SERPL-MCNC: 41 NG/ML (ref 20–50)
WBC # BLD AUTO: 2.4 10E3/UL (ref 4–11)
WBC URINE: 3 /HPF

## 2025-01-30 PROCEDURE — 82306 VITAMIN D 25 HYDROXY: CPT | Performed by: INTERNAL MEDICINE

## 2025-01-30 PROCEDURE — 80069 RENAL FUNCTION PANEL: CPT | Performed by: PATHOLOGY

## 2025-01-30 PROCEDURE — 82043 UR ALBUMIN QUANTITATIVE: CPT | Performed by: INTERNAL MEDICINE

## 2025-01-30 PROCEDURE — 83970 ASSAY OF PARATHORMONE: CPT | Performed by: PATHOLOGY

## 2025-01-30 PROCEDURE — 99000 SPECIMEN HANDLING OFFICE-LAB: CPT | Performed by: PATHOLOGY

## 2025-01-30 PROCEDURE — 82610 CYSTATIN C: CPT | Performed by: INTERNAL MEDICINE

## 2025-01-30 PROCEDURE — 81001 URINALYSIS AUTO W/SCOPE: CPT | Performed by: PATHOLOGY

## 2025-01-30 PROCEDURE — 84156 ASSAY OF PROTEIN URINE: CPT | Performed by: PATHOLOGY

## 2025-01-30 PROCEDURE — 36415 COLL VENOUS BLD VENIPUNCTURE: CPT | Performed by: PATHOLOGY

## 2025-01-30 PROCEDURE — 85027 COMPLETE CBC AUTOMATED: CPT | Performed by: PATHOLOGY

## 2025-01-31 ENCOUNTER — ANCILLARY PROCEDURE (OUTPATIENT)
Dept: ULTRASOUND IMAGING | Facility: CLINIC | Age: 80
End: 2025-01-31
Attending: INTERNAL MEDICINE
Payer: COMMERCIAL

## 2025-01-31 DIAGNOSIS — I10 HYPERTENSION GOAL BP (BLOOD PRESSURE) < 140/90: ICD-10-CM

## 2025-01-31 DIAGNOSIS — N18.31 CKD STAGE 3A, GFR 45-59 ML/MIN (H): ICD-10-CM

## 2025-01-31 PROCEDURE — 76770 US EXAM ABDO BACK WALL COMP: CPT | Mod: GC | Performed by: RADIOLOGY

## 2025-06-02 ENCOUNTER — PATIENT OUTREACH (OUTPATIENT)
Dept: CARE COORDINATION | Facility: CLINIC | Age: 80
End: 2025-06-02
Payer: COMMERCIAL

## 2025-07-03 DIAGNOSIS — E78.2 MIXED HYPERLIPIDEMIA: ICD-10-CM

## 2025-07-03 RX ORDER — ATORVASTATIN CALCIUM 40 MG/1
40 TABLET, FILM COATED ORAL DAILY
Qty: 90 TABLET | Refills: 1 | Status: SHIPPED | OUTPATIENT
Start: 2025-07-03

## 2025-07-26 ENCOUNTER — MYC REFILL (OUTPATIENT)
Dept: FAMILY MEDICINE | Facility: CLINIC | Age: 80
End: 2025-07-26
Payer: COMMERCIAL

## 2025-07-26 DIAGNOSIS — I10 HYPERTENSION GOAL BP (BLOOD PRESSURE) < 140/90: ICD-10-CM

## 2025-07-27 RX ORDER — AMLODIPINE BESYLATE 5 MG/1
5 TABLET ORAL DAILY
Qty: 90 TABLET | Refills: 0 | Status: SHIPPED | OUTPATIENT
Start: 2025-07-27

## 2025-08-10 ENCOUNTER — MYC REFILL (OUTPATIENT)
Dept: FAMILY MEDICINE | Facility: CLINIC | Age: 80
End: 2025-08-10
Payer: COMMERCIAL

## 2025-08-10 DIAGNOSIS — M85.80 OSTEOPENIA, UNSPECIFIED LOCATION: ICD-10-CM

## 2025-08-10 DIAGNOSIS — Z87.81 PERSONAL HISTORY OF TRAUMATIC FRACTURE: ICD-10-CM

## 2025-08-10 RX ORDER — ALENDRONATE SODIUM 70 MG/1
70 TABLET ORAL
Qty: 12 TABLET | Refills: 0 | Status: SHIPPED | OUTPATIENT
Start: 2025-08-10

## 2025-08-27 ENCOUNTER — PATIENT OUTREACH (OUTPATIENT)
Dept: CARE COORDINATION | Facility: CLINIC | Age: 80
End: 2025-08-27
Payer: COMMERCIAL

## (undated) DEVICE — EYE KNIFE SLIT XSTAR VISITEC 2.6MM 45DEG 373726

## (undated) DEVICE — EYE FORCEP TIP ADV ILM DISP 25GA 725.44

## (undated) DEVICE — EYE PACK CUSTOM ANTERIOR 30DEG TIP CENTURION PPK6682-04

## (undated) DEVICE — EYE CANN IRR 25GA CYSTOTOME 581610

## (undated) DEVICE — DRSG EYE PAD STERILE 1.63X2.63" NON21600

## (undated) DEVICE — EYE NDL RETROBULBAR ATKINSON 25GA 1.5" 581637

## (undated) DEVICE — EYE DRAPE MICROSCOPE UNIVERSAL (BIOM FULL) 08-MK55140

## (undated) DEVICE — GLOVE PROTEXIS MICRO 7.0  2D73PM70

## (undated) DEVICE — SOL WATER IRRIG 500ML BOTTLE 2F7113

## (undated) DEVICE — EYE PACK 25GA CONSTELLATION 10,000 CPM PPK9380-04

## (undated) DEVICE — LINEN TOWEL PACK X5 5464

## (undated) DEVICE — SU PLAIN 6-0 TG140-8 18" 1735G

## (undated) DEVICE — KIT ENDO FIRST STEP DISINFECTANT 200ML W/POUCH EP-4

## (undated) DEVICE — EYE KNIFE STILETTO VISITEC 1.1MM ANG 45DEG SIDEPORT 376620

## (undated) DEVICE — ENDO TRAP POLYP E-TRAP 00711099

## (undated) DEVICE — SPECIMEN CONTAINER 3OZ W/FORMALIN 59901

## (undated) DEVICE — GLOVE PROTEXIS MICRO 7.5  2D73PM75

## (undated) DEVICE — ENDO SNARE EXACTO COLD 9MM LOOP 2.4MMX230CM 00711115

## (undated) DEVICE — POSITIONER ARMBOARD FOAM 1PAIR LF FP-ARMB1

## (undated) DEVICE — EYE CANN IRR 27GA ANTERIOR CHAMBER 581280

## (undated) DEVICE — EYE SHIELD PLASTIC CLEAR UNIVERSAL K9-6050

## (undated) DEVICE — ESU CORD BIPOLAR GREEN 10-4000

## (undated) DEVICE — EYE SOL BSS 500ML BAG 0065-1795-04

## (undated) DEVICE — EYE BLADE SCLERAL MVR 25GA 8065912501

## (undated) DEVICE — NDL SCLEROTHERAPY 25GA CARR-LOCK  00711811

## (undated) DEVICE — EYE LENS VITRECTOMY MAGNIFYING ODVM

## (undated) DEVICE — APPLICATORS COTTON TIP 6"X2 STERILE LF C15053-006

## (undated) DEVICE — STRAP KNEE/BODY 31143004

## (undated) DEVICE — TUBING SUCTION 12"X1/4" N612

## (undated) DEVICE — EYE SHIELD PLASTIC

## (undated) DEVICE — EYE PROBE ESU DIATHERMY DSP 25GA 339.21

## (undated) DEVICE — PACK CATARACT CUSTOM ASC SEY15CPUMC

## (undated) DEVICE — SYR 05ML LL W/O NDL

## (undated) DEVICE — KIT ENDO TURNOVER/PROCEDURE CARRY-ON 101822

## (undated) DEVICE — SUCTION MANIFOLD NEPTUNE 2 SYS 1 PORT 702-025-000

## (undated) DEVICE — TAPE TRANSPORE 1"X1.5YD 1534S-1

## (undated) DEVICE — EYE TIP IRRIGATION & ASPIRATION POLYMER CVD 0.3MM 8065751512

## (undated) DEVICE — GOWN IMPERVIOUS 2XL BLUE

## (undated) DEVICE — EYE CANN SOFT TIP 25GA FOR VALVED SET 8065149530

## (undated) DEVICE — SYR 01ML LL W/O NDL LATEX FREE 309628

## (undated) DEVICE — DRAPE MAYO STAND 23X54 8337

## (undated) DEVICE — SNARE CAPIVATOR ROUND COLD SNR BX10 M00561101

## (undated) DEVICE — PACK VITRECTOMY/RET CUSTOM ASC PQ15VRU12

## (undated) DEVICE — NDL 18GA 1.5" 305196

## (undated) RX ORDER — FENTANYL CITRATE 50 UG/ML
INJECTION, SOLUTION INTRAMUSCULAR; INTRAVENOUS
Status: DISPENSED
Start: 2021-02-22

## (undated) RX ORDER — FENTANYL CITRATE 50 UG/ML
INJECTION, SOLUTION INTRAMUSCULAR; INTRAVENOUS
Status: DISPENSED
Start: 2021-06-07

## (undated) RX ORDER — METOPROLOL TARTRATE 1 MG/ML
INJECTION, SOLUTION INTRAVENOUS
Status: DISPENSED
Start: 2019-04-23

## (undated) RX ORDER — LIDOCAINE HYDROCHLORIDE 20 MG/ML
INJECTION, SOLUTION EPIDURAL; INFILTRATION; INTRACAUDAL; PERINEURAL
Status: DISPENSED
Start: 2021-06-07

## (undated) RX ORDER — ATROPINE SULFATE 0.4 MG/ML
AMPUL (ML) INJECTION
Status: DISPENSED
Start: 2019-04-23

## (undated) RX ORDER — CYCLOPENTOLAT/TROPIC/PHENYLEPH 1%-1%-2.5%
DROPS (EA) OPHTHALMIC (EYE)
Status: DISPENSED
Start: 2021-06-07

## (undated) RX ORDER — ACETAMINOPHEN 325 MG/1
TABLET ORAL
Status: DISPENSED
Start: 2021-02-22

## (undated) RX ORDER — DOBUTAMINE HYDROCHLORIDE 200 MG/100ML
INJECTION INTRAVENOUS
Status: DISPENSED
Start: 2019-04-23